# Patient Record
Sex: FEMALE | Race: WHITE | NOT HISPANIC OR LATINO | Employment: OTHER | URBAN - METROPOLITAN AREA
[De-identification: names, ages, dates, MRNs, and addresses within clinical notes are randomized per-mention and may not be internally consistent; named-entity substitution may affect disease eponyms.]

---

## 2017-02-09 ENCOUNTER — OFFICE VISIT (OUTPATIENT)
Dept: MEDICAL GROUP | Facility: CLINIC | Age: 52
End: 2017-02-09
Payer: MEDICARE

## 2017-02-09 VITALS
OXYGEN SATURATION: 97 % | WEIGHT: 159.8 LBS | HEIGHT: 66 IN | SYSTOLIC BLOOD PRESSURE: 110 MMHG | RESPIRATION RATE: 14 BRPM | TEMPERATURE: 97.8 F | DIASTOLIC BLOOD PRESSURE: 70 MMHG | HEART RATE: 75 BPM | BODY MASS INDEX: 25.68 KG/M2

## 2017-02-09 DIAGNOSIS — F31.30 BIPOLAR AFFECTIVE DISORDER, CURRENT EPISODE DEPRESSED, CURRENT EPISODE SEVERITY UNSPECIFIED (HCC): ICD-10-CM

## 2017-02-09 DIAGNOSIS — G47.01 INSOMNIA DUE TO MEDICAL CONDITION: ICD-10-CM

## 2017-02-09 DIAGNOSIS — K58.0 IRRITABLE BOWEL SYNDROME WITH DIARRHEA: ICD-10-CM

## 2017-02-09 DIAGNOSIS — G25.0 ESSENTIAL TREMOR: ICD-10-CM

## 2017-02-09 DIAGNOSIS — R30.0 DYSURIA: ICD-10-CM

## 2017-02-09 DIAGNOSIS — G43.809 OTHER MIGRAINE WITHOUT STATUS MIGRAINOSUS, NOT INTRACTABLE: ICD-10-CM

## 2017-02-09 DIAGNOSIS — E53.9 VITAMIN B DEFICIENCY: ICD-10-CM

## 2017-02-09 DIAGNOSIS — J44.89 COPD (CHRONIC OBSTRUCTIVE PULMONARY DISEASE) WITH CHRONIC BRONCHITIS: ICD-10-CM

## 2017-02-09 DIAGNOSIS — M79.7 FIBROMYALGIA: ICD-10-CM

## 2017-02-09 PROBLEM — E53.8 VITAMIN B12 DEFICIENCY: Status: ACTIVE | Noted: 2017-02-09

## 2017-02-09 LAB
APPEARANCE UR: CLEAR
BILIRUB UR STRIP-MCNC: NEGATIVE MG/DL
COLOR UR AUTO: NORMAL
GLUCOSE UR STRIP.AUTO-MCNC: NEGATIVE MG/DL
KETONES UR STRIP.AUTO-MCNC: NEGATIVE MG/DL
LEUKOCYTE ESTERASE UR QL STRIP.AUTO: NEGATIVE
NITRITE UR QL STRIP.AUTO: NEGATIVE
PH UR STRIP.AUTO: 6 [PH] (ref 5–8)
PROT UR QL STRIP: 300 MG/DL
RBC UR QL AUTO: NEGATIVE
SP GR UR STRIP.AUTO: NEGATIVE
UROBILINOGEN UR STRIP-MCNC: NEGATIVE MG/DL

## 2017-02-09 PROCEDURE — 81002 URINALYSIS NONAUTO W/O SCOPE: CPT | Performed by: PHYSICIAN ASSISTANT

## 2017-02-09 PROCEDURE — G8484 FLU IMMUNIZE NO ADMIN: HCPCS | Performed by: PHYSICIAN ASSISTANT

## 2017-02-09 PROCEDURE — 3014F SCREEN MAMMO DOC REV: CPT | Mod: 8P | Performed by: PHYSICIAN ASSISTANT

## 2017-02-09 PROCEDURE — 3017F COLORECTAL CA SCREEN DOC REV: CPT | Mod: 8P | Performed by: PHYSICIAN ASSISTANT

## 2017-02-09 PROCEDURE — G8419 CALC BMI OUT NRM PARAM NOF/U: HCPCS | Performed by: PHYSICIAN ASSISTANT

## 2017-02-09 PROCEDURE — 4004F PT TOBACCO SCREEN RCVD TLK: CPT | Performed by: PHYSICIAN ASSISTANT

## 2017-02-09 PROCEDURE — 99215 OFFICE O/P EST HI 40 MIN: CPT | Mod: 25 | Performed by: PHYSICIAN ASSISTANT

## 2017-02-09 RX ORDER — PRIMIDONE 50 MG/1
50 TABLET ORAL DAILY
Qty: 90 TAB | Refills: 3 | Status: SHIPPED | OUTPATIENT
Start: 2017-02-09 | End: 2019-07-16

## 2017-02-09 RX ORDER — LANOLIN ALCOHOL/MO/W.PET/CERES
1000 CREAM (GRAM) TOPICAL 2 TIMES DAILY
Qty: 60 TAB | Refills: 11 | Status: SHIPPED | OUTPATIENT
Start: 2017-02-09 | End: 2021-12-28

## 2017-02-09 RX ORDER — MELOXICAM 15 MG/1
15 TABLET ORAL DAILY
Qty: 30 TAB | Refills: 11 | Status: SHIPPED | OUTPATIENT
Start: 2017-02-09 | End: 2017-12-15 | Stop reason: SDUPTHER

## 2017-02-09 RX ORDER — ALBUTEROL SULFATE 90 UG/1
2 AEROSOL, METERED RESPIRATORY (INHALATION) EVERY 6 HOURS PRN
Qty: 8.5 G | Refills: 11 | Status: SHIPPED | OUTPATIENT
Start: 2017-02-09 | End: 2017-12-01

## 2017-02-09 RX ORDER — ONDANSETRON 4 MG/1
4 TABLET, ORALLY DISINTEGRATING ORAL EVERY 8 HOURS PRN
Qty: 30 TAB | Refills: 3 | Status: SHIPPED | OUTPATIENT
Start: 2017-02-09 | End: 2018-03-19 | Stop reason: SDUPTHER

## 2017-02-09 RX ORDER — GABAPENTIN 600 MG/1
TABLET ORAL
Qty: 90 TAB | Refills: 11 | Status: SHIPPED | OUTPATIENT
Start: 2017-02-09 | End: 2018-03-11 | Stop reason: SDUPTHER

## 2017-02-09 RX ORDER — BACLOFEN 10 MG/1
10 TABLET ORAL 3 TIMES DAILY
COMMUNITY
End: 2017-02-09 | Stop reason: SDUPTHER

## 2017-02-09 RX ORDER — MELOXICAM 7.5 MG/1
15 TABLET ORAL DAILY
COMMUNITY
End: 2017-12-01

## 2017-02-09 RX ORDER — TOPIRAMATE 50 MG/1
TABLET, FILM COATED ORAL
Qty: 90 TAB | Refills: 11 | Status: SHIPPED | OUTPATIENT
Start: 2017-02-09 | End: 2018-01-29 | Stop reason: SDUPTHER

## 2017-02-09 RX ORDER — BACLOFEN 10 MG/1
10 TABLET ORAL 3 TIMES DAILY
Qty: 90 TAB | Refills: 3 | Status: SHIPPED | OUTPATIENT
Start: 2017-02-09 | End: 2018-07-12 | Stop reason: SDUPTHER

## 2017-02-09 RX ORDER — FOLIC ACID 1 MG/1
1 TABLET ORAL DAILY
Qty: 30 TAB | Refills: 11 | Status: SHIPPED | OUTPATIENT
Start: 2017-02-09 | End: 2018-07-12 | Stop reason: SDUPTHER

## 2017-02-09 RX ORDER — MIRTAZAPINE 30 MG/1
30 TABLET, FILM COATED ORAL
Qty: 30 TAB | Refills: 11 | Status: SHIPPED | OUTPATIENT
Start: 2017-02-09 | End: 2018-03-11 | Stop reason: SDUPTHER

## 2017-02-09 ASSESSMENT — PATIENT HEALTH QUESTIONNAIRE - PHQ9
SUM OF ALL RESPONSES TO PHQ QUESTIONS 1-9: 19
CLINICAL INTERPRETATION OF PHQ2 SCORE: 6
5. POOR APPETITE OR OVEREATING: 0 - NOT AT ALL

## 2017-02-09 NOTE — ASSESSMENT & PLAN NOTE
States she is chronically low on vitamin B12 B9 in the one most possibly due to chronic diarrhea. Currently takes cyanocobalamin 1000  mcg bid, folate acid 1 mg daily and B complex daily. Recent labs at [place] showed normal vitamin B12 level per pt.

## 2017-02-09 NOTE — ASSESSMENT & PLAN NOTE
First symtoms strated in 2002, on disability since 2010. Takes Savella, gabapentin, topiramate everyday. States without medication she can't function at all and she will stay all day in bed.
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
## 2017-02-09 NOTE — MR AVS SNAPSHOT
"        Jaclyn Peter Roberto   2017 2:45 PM   Office Visit   MRN: [id number]    Department:  [facility]   Dept Phone:  [phone]    Description:  Female : 1965   Provider:  Selene Gray PA-C           Reason for Visit     Establish Care Med Refill      Allergies as of 2017     Allergen Noted Reactions    Erythromycin 09/15/2016       Tetracycline 09/15/2016         You were diagnosed with     COPD (chronic obstructive pulmonary disease) with chronic bronchitis (CMS-HCC)   [256211]       H/O hysterectomy with oophorectomy   [417189]       Essential tremor   [239511]       Irritable bowel syndrome with diarrhea   [940591]       Other migraine without status migrainosus, not intractable   [5517549]       Fibromyalgia   [365263]       Insomnia due to medical condition   [386179]       Bipolar affective disorder, current episode depressed, current episode severity unspecified (CMS-HCC)   [9922818]       Vitamin B deficiency   [763652]       Dysuria   [788.1.ICD-9-CM]         Vital Signs     Blood Pressure Pulse Temperature Respirations Height Weight    110/70 mmHg 75 36.6 °C (97.8 °F) 14 1.664 m (5' 5.51\") 72.485 kg (159 lb 12.8 oz)    Body Mass Index Oxygen Saturation Breastfeeding? Smoking Status          26.18 kg/m2 97% No Current Every Day Smoker        Basic Information     Date Of Birth Sex Race Ethnicity Preferred Language    1965 Female White Non- English      Your appointments     2017 11:05 AM   Established Patient with Selene Gray PA-C   [facility] ([facility])    [address] [place] [address]   [phone]           You will be receiving a confirmation call a few days before your appointment from our automated call confirmation system.              Problem List              ICD-10-CM Priority Class Noted - Resolved    Fibromyalgia M79.7   9/15/2016 - Present    Migraine without status migrainosus, not " intractable G43.909   9/15/2016 - Present    Acute vaginitis N76.0   9/15/2016 - Present    COPD (chronic obstructive pulmonary disease) with chronic bronchitis (CMS-HCC) J44.9   2/9/2017 - Present    H/O hysterectomy with oophorectomy Z90.710, Z90.721   2/9/2017 - Present    Essential tremor G25.0   2/9/2017 - Present    Irritable bowel syndrome with diarrhea K58.0   2/9/2017 - Present    Insomnia due to medical condition G47.01   2/9/2017 - Present    Bipolar affective disorder, current episode depressed (CMS-HCC) F31.30   2/9/2017 - Present    Vitamin B deficiency E53.9   2/9/2017 - Present      Health Maintenance        Date Due Completion Dates    IMM DTaP/Tdap/Td Vaccine (1 - Tdap) 1/3/1985 ---    MAMMOGRAM 1/3/2006 ---    COLONOSCOPY 1/3/2016 ---    IMM INFLUENZA (1) 9/22/2017 (Originally 9/1/2016) ---            Results     POCT Urinalysis      Component Value Standard Range & Units    POC Color Livier Negative    POC Appearance Clear Negative    POC Leukocyte Esterase Negative Negative    POC Nitrites Negative Negative    POC Urobiligen Negative Negative (0.2) mg/dL    POC Protein 300 Negative mg/dL    POC Urine PH 6.0 5.0 - 8.0    POC Blood Negative Negative    POC Specific Gravity Negative <1.005 - >1.030    POC Ketones Negative Negative mg/dL    POC Biliruben Negative Negative mg/dL    POC Glucose Negative Negative mg/dL                        Current Immunizations     No immunizations on file.      Below and/or attached are the medications your provider expects you to take. Review all of your home medications and newly ordered medications with your provider and/or pharmacist. Follow medication instructions as directed by your provider and/or pharmacist. Please keep your medication list with you and share with your provider. Update the information when medications are discontinued, doses are changed, or new medications (including over-the-counter products) are added; and carry medication information at  all times in the event of emergency situations     Allergies:  ERYTHROMYCIN - (reactions not documented)     TETRACYCLINE - (reactions not documented)               Medications  Valid as of: February 09, 2017 -  5:01 PM    Generic Name Brand Name Tablet Size Instructions for use    Albuterol Sulfate (Aero Soln) albuterol 108 (90 BASE) MCG/ACT Inhale 2 Puffs by mouth every 6 hours as needed for Shortness of Breath.        Albuterol Sulfate (Aero Soln) albuterol 108 (90 BASE) MCG/ACT Inhale 2 Puffs by mouth every 6 hours as needed for Shortness of Breath.        B Complex Vitamins   Take  by mouth.        Baclofen (Tab) LIORESAL 10 MG Take 1 Tab by mouth 3 times a day.        Beclomethasone Dipropionate (Aero Soln) QVAR 40 MCG/ACT Inhale 1 Puff by mouth 2 Times a Day.        Bisacodyl (Tablet Delayed Response) DULCOLAX 5 MG Take 5 mg by mouth 1 time daily as needed for Constipation.        Clindamycin Phosphate (Cream) CLEOCIN 2 % 1 applic IV x 7d        Cyanocobalamin (Tab) vitamin B-12 1000 MCG Take 1 Tab by mouth 2 Times a Day.        Fluconazole (Tab) DIFLUCAN 150 MG Take 1 Tab by mouth every day.        Folic Acid (Tab) FOLVITE 1 MG Take 1 Tab by mouth every day.        Gabapentin (Tab) NEURONTIN 600 MG 1 TAB 3 TIMES A DAY        HydrOXYzine HCl (Tab) ATARAX 25 MG Take 25 mg by mouth 3 times a day as needed for Itching.        Meloxicam (Tab) MOBIC 7.5 MG Take 15 mg by mouth every day.        Meloxicam (Tab) MOBIC 15 MG Take 1 Tab by mouth every day.        MetroNIDAZOLE (Tab) FLAGYL 500 MG 1 TAB TWICE A DAY X 7 DAYS. NO ALCOHOL USE WITH THIS.        Milnacipran HCl (Tab) Milnacipran HCl 100 MG Take 1 Tab by mouth 2 Times a Day.        Mirtazapine (TABLET DISPERSIBLE) REMERON 30 MG Take 1 Tab by mouth every bedtime.        Ondansetron (TABLET DISPERSIBLE) ZOFRAN ODT 4 MG Take 1 Tab by mouth every 8 hours as needed for Nausea/Vomiting.        Ondansetron HCl (Tab) ZOFRAN 4 MG Take 1 Tab by mouth every four hours  as needed for Nausea/Vomiting.        Primidone (Tab) MYSOLINE 50 MG Take 1 Tab by mouth every day.        Topiramate (Tab) TOPAMAX 50 MG 1 TAB 3 TIMES A DAY FOR MIGRAINE PREVENTION.        .                 Medicines prescribed today were sent to:     [facility][id number] - [place] - [address] [place] [address]    Phone: [phone] Fax: [phone]    Open 24 Hours?: No      Medication refill instructions:       If your prescription bottle indicates you have medication refills left, it is not necessary to call your provider’s office. Please contact your pharmacy and they will refill your medication.    If your prescription bottle indicates you do not have any refills left, you may request refills at any time through one of the following ways: The online [employer] system (except Urgent Care), by calling your provider’s office, or by asking your pharmacy to contact your provider’s office with a refill request. Medication refills are processed only during regular business hours and may not be available until the next business day. Your provider may request additional information or to have a follow-up visit with you prior to refilling your medication.   *Please Note: Medication refills are assigned a new Rx number when refilled electronically. Your pharmacy may indicate that no refills were authorized even though a new prescription for the same medication is available at the pharmacy. Please request the medicine by name with the pharmacy before contacting your provider for a refill.        Referral     A referral request has been sent to our patient care coordination department. Please allow 3-5 business days for us to process this request and contact you either by phone or mail. If you do not hear from us by the 5th business day, please call us at [phone].           [employer] Access Code: [email or web address]  Expires: 2/20/2017  9:34 AM    [employer]  A secure, online tool to manage your health  information     [employer] Like[employer]fm® is a secure, online tool that connects you to your personalized health information from the privacy of your home -- day or night - making it very easy for you to manage your healthcare. Once the activation process is completed, you can even access your medical information using the Like.fm robin, which is available for free in the [employer] Robin store or Google Play store.     Like.fm provides the following levels of access (as shown below):   My Chart Features   [facility] Doctor [facility]  Specialists [facility]  Urgent  Care Non-[facility]  Primary Care  Doctor   Email your healthcare team securely and privately 24/7 X X X    Manage appointments: schedule your next appointment; view details of past/upcoming appointments X      Request prescription refills. X      View recent personal medical records, including lab and immunizations X X X X   View health record, including health history, allergies, medications X X X X   Read reports about your outpatient visits, procedures, consult and ER notes X X X X   See your discharge summary, which is a recap of your hospital and/or ER visit that includes your diagnosis, lab results, and care plan. X X       How to register for Like[employer]fm:  1. Go to  [email or web address].  2. Click on the Sign Up Now box, which takes you to the New Member Sign Up page. You will need to provide the following information:  a. Enter your Like[employer]fm Access Code exactly as it appears at the top of this page. (You will not need to use this code after you’ve completed the sign-up process. If you do not sign up before the expiration date, you must request a new code.)   b. Enter your date of birth.   c. Enter your home email address.   d. Click Submit, and follow the next screen’s instructions.  3. Create a Like.fm ID. This will be your Like[employer]fm login ID and cannot be changed, so think of one that is secure and easy to remember.  4. Create a Like[employer]fm password. You can  change your password at any time.  5. Enter your Password Reset Question and Answer. This can be used at a later time if you forget your password.   6. Enter your e-mail address. This allows you to receive e-mail notifications when new information is available in [employer].  7. Click Sign Up. You can now view your health information.    For assistance activating your [employer] account, call [phone]

## 2017-02-09 NOTE — ASSESSMENT & PLAN NOTE
She is on topiramate for fibromyalgia on Remeron for sleep. Didn't mention any other medicines for bipolar disorder.

## 2017-02-09 NOTE — ASSESSMENT & PLAN NOTE
States she has fibromyalgia for many years and eventually she started having tremors which were diagnosed as essential tremors by her neurologist in [place]. Currently on primidone 50 mg qd.

## 2017-02-09 NOTE — ASSESSMENT & PLAN NOTE
Patient is on albuterol and Qvar, however has not been taking medicine as prescribed and therfore cough and phlegm has increased. Patient continues to smoke daily one pack a day.

## 2017-02-09 NOTE — ASSESSMENT & PLAN NOTE
Has diarrhea and loose stooland takes Bentyl as needed for diarrhea. Also takes Zofran as needed for nausea. Denies vomiting or hematemesis
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
## 2017-02-16 ENCOUNTER — APPOINTMENT (OUTPATIENT)
Dept: MEDICAL GROUP | Facility: CLINIC | Age: 52
End: 2017-02-16
Payer: MEDICARE

## 2017-05-02 ENCOUNTER — APPOINTMENT (OUTPATIENT)
Dept: NEUROLOGY | Facility: MEDICAL CENTER | Age: 52
End: 2017-05-02
Payer: MEDICARE

## 2017-05-05 ENCOUNTER — OFFICE VISIT (OUTPATIENT)
Dept: MEDICAL GROUP | Facility: MEDICAL CENTER | Age: 52
End: 2017-05-05
Payer: MEDICARE

## 2017-05-05 VITALS
HEART RATE: 77 BPM | SYSTOLIC BLOOD PRESSURE: 114 MMHG | WEIGHT: 159 LBS | TEMPERATURE: 98.1 F | DIASTOLIC BLOOD PRESSURE: 78 MMHG | BODY MASS INDEX: 25.55 KG/M2 | HEIGHT: 66 IN | OXYGEN SATURATION: 97 % | RESPIRATION RATE: 20 BRPM

## 2017-05-05 DIAGNOSIS — R06.00 PND (PAROXYSMAL NOCTURNAL DYSPNEA): ICD-10-CM

## 2017-05-05 DIAGNOSIS — R05.9 COUGH: ICD-10-CM

## 2017-05-05 DIAGNOSIS — J40 BRONCHITIS: ICD-10-CM

## 2017-05-05 DIAGNOSIS — M79.7 FIBROMYALGIA: ICD-10-CM

## 2017-05-05 DIAGNOSIS — J02.9 SORE THROAT: ICD-10-CM

## 2017-05-05 LAB
FLUAV+FLUBV AG SPEC QL IA: NEGATIVE
INT CON NEG: NEGATIVE
INT CON NEG: NEGATIVE
INT CON POS: POSITIVE
INT CON POS: POSITIVE
S PYO AG THROAT QL: NEGATIVE

## 2017-05-05 PROCEDURE — 99214 OFFICE O/P EST MOD 30 MIN: CPT | Performed by: PHYSICIAN ASSISTANT

## 2017-05-05 PROCEDURE — 3014F SCREEN MAMMO DOC REV: CPT | Mod: 8P | Performed by: PHYSICIAN ASSISTANT

## 2017-05-05 PROCEDURE — G8432 DEP SCR NOT DOC, RNG: HCPCS | Performed by: PHYSICIAN ASSISTANT

## 2017-05-05 PROCEDURE — 3017F COLORECTAL CA SCREEN DOC REV: CPT | Mod: 8P | Performed by: PHYSICIAN ASSISTANT

## 2017-05-05 PROCEDURE — G8419 CALC BMI OUT NRM PARAM NOF/U: HCPCS | Performed by: PHYSICIAN ASSISTANT

## 2017-05-05 PROCEDURE — 87880 STREP A ASSAY W/OPTIC: CPT | Performed by: PHYSICIAN ASSISTANT

## 2017-05-05 PROCEDURE — 87804 INFLUENZA ASSAY W/OPTIC: CPT | Performed by: PHYSICIAN ASSISTANT

## 2017-05-05 PROCEDURE — 4004F PT TOBACCO SCREEN RCVD TLK: CPT | Performed by: PHYSICIAN ASSISTANT

## 2017-05-05 RX ORDER — AZITHROMYCIN 250 MG/1
TABLET, FILM COATED ORAL
Qty: 6 TAB | Refills: 0 | Status: SHIPPED | OUTPATIENT
Start: 2017-05-05 | End: 2017-05-10

## 2017-05-05 NOTE — PROGRESS NOTES
"Chief Complaint   Patient presents with   • Cough     chest congestion, sore throat with productive cough and sinus drainage x 1 wk.       HPIChristine Brittani Mejia is a 52 y.o. female here today for productive cough X sinus drainage X 1 wk. symptoms of started w throat dryness and itchiness and pain, no fevers or chills. Then she started having sinus pressure and productive cough, no nasal discharge which is green. States she is very congested. No new worsening SOB.  Pt has COPD, Currently on Qvar and albuterol current smoker 1 pack a  Day, No fevers or chills, no  Nv, pos bodyaches. Positive sick contact with mom who also has COPD.   No otalgia, positive pressure over her ears      Past medical, surgical, family, and social history is reviewed in Epic chart by me today.   Medications and allergies reviewed and updated in Epic chart by me today.     ROS:   As documented in history of present illness above    Exam:  Blood pressure 114/78, pulse 77, temperature 36.7 °C (98.1 °F), resp. rate 20, height 1.664 m (5' 5.51\"), weight 72.122 kg (159 lb), SpO2 97 %.  Constitutional: Alert, no distress, plus 3 vital signs  Skin:  Warm, dry, no rashes invisible areas  Eye: Equal, round and reactive, conjunctiva clear  ENT:Neck: Trachea midline, no masses, no thyromegaly, erythematous oropharynx with cobblestoning and PND tonsils nonenlarged without white exudates. No supraclavicular or so to call lymphadenopathy. No tenderness over sinuses.   Ears:  External ears unremarkable. TMs pearly gray, clear and intact, w/o any perforation or effusion.  Respiratory: Unlabored respiratory effort, lungs clear to auscultation, no wheezes, no rhonchi  Cardiovascular: RRR, no murmur, no lower extremities edema, pedal pulses equal bilaterally and 2+/4   Abdomen: Soft, nontender, no masses or hepatosplenomegaly  Psych: Alert, pleasant, well-groomed, normal affect    Negative for flu and strep    A/P:    1. Bronchitis  Discussed continue taking " albuterol and Qvar. Discussed stop smoking.  Robitussin for cough,  loratadine Sudafed, fluid intake, rest, humidified      - azithromycin (ZITHROMAX) 250 MG Tab; Take 2 pills first day, then 1 pill daily days 2-5, po.  Dispense: 6 Tab; Refill: 0  - POCT Influenza A/B  - POCT Rapid Strep A    2. PND (paroxysmal nocturnal dyspnea)    - loratadine-pseudoephedrine (CLARITIN-D 24-HOUR)  MG per tablet; Take 1 Tab by mouth every day.  Dispense: 30 Tab; Refill: 0  - POCT Influenza A/B  - POCT Rapid Strep A    3. Cough    - POCT Influenza A/B  - POCT Rapid Strep A    5. Sore throat    - POCT Influenza A/B  - POCT Rapid Strep A  Follow-up if symptoms does not improve or resolve in 7-10 days, sooner if worsening.

## 2017-05-05 NOTE — MR AVS SNAPSHOT
"        Jaclyn Peter Roberto   2017 11:00 AM   Office Visit   MRN: [id number]    Department:  [facility]   Dept Phone:  [phone]    Description:  Female : 1965   Provider:  Selene Gray PA-C           Reason for Visit     Cough chest congestion, sore throat with productive cough and sinus drainage x 1 wk.      Allergies as of 2017     Allergen Noted Reactions    Erythromycin 09/15/2016       Tetracycline 09/15/2016         You were diagnosed with     Bronchitis   [575315]       PND (paroxysmal nocturnal dyspnea)   [999180]       Fibromyalgia   [803835]         Vital Signs     Blood Pressure Pulse Temperature Respirations Height Weight    114/78 mmHg 77 36.7 °C (98.1 °F) 20 1.664 m (5' 5.51\") 72.122 kg (159 lb)    Body Mass Index Oxygen Saturation Smoking Status             26.05 kg/m2 97% Current Every Day Smoker         Basic Information     Date Of Birth Sex Race Ethnicity Preferred Language    1965 Female White Non- English      Problem List              ICD-10-CM Priority Class Noted - Resolved    Fibromyalgia M79.7   9/15/2016 - Present    Migraine without status migrainosus, not intractable G43.909   9/15/2016 - Present    Acute vaginitis N76.0   9/15/2016 - Present    COPD (chronic obstructive pulmonary disease) with chronic bronchitis (CMS-HCC) J44.9   2017 - Present    H/O hysterectomy with oophorectomy Z90.710, Z90.721   2017 - Present    Essential tremor G25.0   2017 - Present    Irritable bowel syndrome with diarrhea K58.0   2017 - Present    Insomnia due to medical condition G47.01   2017 - Present    Bipolar affective disorder, current episode depressed (CMS-HCC) F31.30   2017 - Present    Vitamin B deficiency E53.9   2017 - Present      Health Maintenance        Date Due Completion Dates    IMM DTaP/Tdap/Td Vaccine (1 - Tdap) 1/3/1985 ---    MAMMOGRAM 1/3/2006 ---    COLONOSCOPY 1/3/2016 ---            Current Immunizations     No " immunizations on file.      Below and/or attached are the medications your provider expects you to take. Review all of your home medications and newly ordered medications with your provider and/or pharmacist. Follow medication instructions as directed by your provider and/or pharmacist. Please keep your medication list with you and share with your provider. Update the information when medications are discontinued, doses are changed, or new medications (including over-the-counter products) are added; and carry medication information at all times in the event of emergency situations     Allergies:  ERYTHROMYCIN - (reactions not documented)     TETRACYCLINE - (reactions not documented)               Medications  Valid as of: May 05, 2017 - 11:50 AM    Generic Name Brand Name Tablet Size Instructions for use    Albuterol Sulfate (Aero Soln) albuterol 108 (90 BASE) MCG/ACT Inhale 2 Puffs by mouth every 6 hours as needed for Shortness of Breath.        Albuterol Sulfate (Aero Soln) albuterol 108 (90 BASE) MCG/ACT Inhale 2 Puffs by mouth every 6 hours as needed for Shortness of Breath.        Azithromycin (Tab) ZITHROMAX 250 MG Take 2 pills first day, then 1 pill daily days 2-5, po.        B Complex Vitamins   Take  by mouth.        Baclofen (Tab) LIORESAL 10 MG Take 1 Tab by mouth 3 times a day.        Beclomethasone Dipropionate (Aero Soln) QVAR 40 MCG/ACT Inhale 1 Puff by mouth 2 Times a Day.        Bisacodyl (Tablet Delayed Response) DULCOLAX 5 MG Take 5 mg by mouth 1 time daily as needed for Constipation.        Clindamycin Phosphate (Cream) CLEOCIN 2 % 1 applic IV x 7d        Cyanocobalamin (Tab) vitamin B-12 1000 MCG Take 1 Tab by mouth 2 Times a Day.        Fluconazole (Tab) DIFLUCAN 150 MG Take 1 Tab by mouth every day.        Folic Acid (Tab) FOLVITE 1 MG Take 1 Tab by mouth every day.        Gabapentin (Tab) NEURONTIN 600 MG 1 TAB 3 TIMES A DAY        HydrOXYzine HCl (Tab) ATARAX 25 MG Take 25 mg by mouth 3  times a day as needed for Itching.        Loratadine-Pseudoephedrine (TABLET SR 24 HR) CLARITIN-D 24-hour  MG Take 1 Tab by mouth every day.        Meloxicam (Tab) MOBIC 7.5 MG Take 15 mg by mouth every day.        Meloxicam (Tab) MOBIC 15 MG Take 1 Tab by mouth every day.        MetroNIDAZOLE (Tab) FLAGYL 500 MG 1 TAB TWICE A DAY X 7 DAYS. NO ALCOHOL USE WITH THIS.        Milnacipran HCl (Tab) Milnacipran HCl 100 MG Take 1 Tab by mouth 2 Times a Day.        Mirtazapine (TABLET DISPERSIBLE) REMERON 30 MG Take 1 Tab by mouth every bedtime.        Ondansetron (TABLET DISPERSIBLE) ZOFRAN ODT 4 MG Take 1 Tab by mouth every 8 hours as needed for Nausea/Vomiting.        Ondansetron HCl (Tab) ZOFRAN 4 MG Take 1 Tab by mouth every four hours as needed for Nausea/Vomiting.        Primidone (Tab) MYSOLINE 50 MG Take 1 Tab by mouth every day.        Topiramate (Tab) TOPAMAX 50 MG 1 TAB 3 TIMES A DAY FOR MIGRAINE PREVENTION.        .                 Medicines prescribed today were sent to:     [id number] - [place] - [address] [place] [address]    Phone: [phone] Fax: [phone]    Open 24 Hours?: No      Medication refill instructions:       If your prescription bottle indicates you have medication refills left, it is not necessary to call your provider’s office. Please contact your pharmacy and they will refill your medication.    If your prescription bottle indicates you do not have any refills left, you may request refills at any time through one of the following ways: The online Personalis system (except Urgent Care), by calling your provider’s office, or by asking your pharmacy to contact your provider’s office with a refill request. Medication refills are processed only during regular business hours and may not be available until the next business day. Your provider may request additional information or to have a follow-up visit with you prior to refilling your medication.   *Please Note:  Medication refills are assigned a new Rx number when refilled electronically. Your pharmacy may indicate that no refills were authorized even though a new prescription for the same medication is available at the pharmacy. Please request the medicine by name with the pharmacy before contacting your provider for a refill.           Waicai Access Code: [email or web address]  Expires: 5/18/2017 12:55 PM    Waicai  A secure, online tool to manage your health information     [employer]’s Waicai® is a secure, online tool that connects you to your personalized health information from the privacy of your home -- day or night - making it very easy for you to manage your healthcare. Once the activation process is completed, you can even access your medical information using the Waicai robin, which is available for free in the Apple Robin store or Google Play store.     Waicai provides the following levels of access (as shown below):   My Chart Features   [facility] Primary Care Doctor [facility]  Specialists [facility]  Urgent  Care Non-[facility]  Primary Care  Doctor   Email your healthcare team securely and privately 24/7 X X X    Manage appointments: schedule your next appointment; view details of past/upcoming appointments X      Request prescription refills. X      View recent personal medical records, including lab and immunizations X X X X   View health record, including health history, allergies, medications X X X X   Read reports about your outpatient visits, procedures, consult and ER notes X X X X   See your discharge summary, which is a recap of your hospital and/or ER visit that includes your diagnosis, lab results, and care plan. X X       How to register for Waicai:  1. Go to  [email or web address].  2. Click on the Sign Up Now box, which takes you to the New Member Sign Up page. You will need to provide the following information:  a. Enter your Waicai Access Code exactly as it appears at the top of this page. (You will  not need to use this code after you’ve completed the sign-up process. If you do not sign up before the expiration date, you must request a new code.)   b. Enter your date of birth.   c. Enter your home email address.   d. Click Submit, and follow the next screen’s instructions.  3. Create a SocialDefender ID. This will be your SocialDefender login ID and cannot be changed, so think of one that is secure and easy to remember.  4. Create a SocialDefender password. You can change your password at any time.  5. Enter your Password Reset Question and Answer. This can be used at a later time if you forget your password.   6. Enter your e-mail address. This allows you to receive e-mail notifications when new information is available in SocialDefender.  7. Click Sign Up. You can now view your health information.    For assistance activating your SocialDefender account, call [phone]        Quit Tobacco Information     Do you want to quit using tobacco?    Quitting tobacco decreases risks of cancer, heart and lung disease, increases life expectancy, improves sense of taste and smell, and increases spending money, among other benefits.    If you are thinking about quitting, we can help.  • [facility] Quit Tobacco Program: 210.926.7414  o Program occurs weekly for four weeks and includes pharmacist consultation on products to support quitting smoking or chewing tobacco. A provider referral is needed for pharmacist consultation.  • Tobacco Users Help Hotline: 8-565-QUIT-NOW (282-6956) or https://[place].quitlogix.org/  o Free, confidential telephone and online coaching for [place] residents. Sessions are designed on a schedule that is convenient for you. Eligible clients receive free nicotine replacement therapy.  • Nationally: www.smokefree.gov  o Information and professional assistance to support both immediate and long-term needs as you become, and remain, a non-smoker. Smokefree.gov allows you to choose the help that best fits your needs.

## 2017-11-13 ENCOUNTER — TELEPHONE (OUTPATIENT)
Dept: MEDICAL GROUP | Facility: PHYSICIAN GROUP | Age: 52
End: 2017-11-13

## 2017-11-13 NOTE — TELEPHONE ENCOUNTER
ESTABLISHED PATIENT PRE-VISIT PLANNING     Note: Patient will not be contacted if there is no indication to call.     1.  Reviewed notes from the last few office visits within the medical group: Yes    2.  If any orders were placed at last visit or intended to be done for this visit (i.e. 6 mos follow-up), do we have Results/Consult Notes?        •  Labs - Labs were not ordered at last office visit.   Note: If patient appointment is for lab review and patient did not complete labs, check with provider if OK to reschedule patient until labs completed.       •  Imaging - Imaging was not ordered at last office visit.       •  Referrals - No referrals were ordered at last office visit.    3. Is this appointment scheduled as a Hospital Follow-Up? No    4.  Immunizations were updated in Epic using WebIZ?: No WebIZ record       •  Web Iz Recommendations: UNKNOWN    5.  Patient is due for the following Health Maintenance Topics:   Health Maintenance Due   Topic Date Due   • Annual Wellness Visit  1965   • PFT SCREENING-FEV1 AND FEV/FVC RATIO / SPIROMETRY SHOULD BE PERFORMED ANNUALLY  01/03/1984   • IMM DTaP/Tdap/Td Vaccine (1 - Tdap) 01/03/1985   • IMM PNEUMOCOCCAL 19-64 (ADULT) MEDIUM RISK SERIES (1 of 1 - PPSV23) 01/03/1985   • MAMMOGRAM  01/03/2006   • COLONOSCOPY  01/03/2016   • IMM INFLUENZA (1) 09/01/2017       - Patient already has appointment scheduled for Immunizations: FLU, PNEUMOVAX (PPSV23) and TDAP.Ptlease ask pt about vaccinations at appointment      6.  Patient was NOT informed to arrive 15 min prior to their scheduled appointment and bring in their medication bottles.

## 2017-11-14 ENCOUNTER — APPOINTMENT (OUTPATIENT)
Dept: MEDICAL GROUP | Facility: PHYSICIAN GROUP | Age: 52
End: 2017-11-14
Payer: MEDICARE

## 2017-11-17 ENCOUNTER — APPOINTMENT (OUTPATIENT)
Dept: MEDICAL GROUP | Facility: PHYSICIAN GROUP | Age: 52
End: 2017-11-17
Payer: MEDICARE

## 2017-12-01 ENCOUNTER — OFFICE VISIT (OUTPATIENT)
Dept: MEDICAL GROUP | Facility: PHYSICIAN GROUP | Age: 52
End: 2017-12-01
Payer: MEDICARE

## 2017-12-01 ENCOUNTER — HOSPITAL ENCOUNTER (OUTPATIENT)
Facility: MEDICAL CENTER | Age: 52
End: 2017-12-01
Attending: NURSE PRACTITIONER
Payer: MEDICARE

## 2017-12-01 VITALS
OXYGEN SATURATION: 96 % | TEMPERATURE: 97.5 F | HEIGHT: 67 IN | BODY MASS INDEX: 27.47 KG/M2 | DIASTOLIC BLOOD PRESSURE: 72 MMHG | HEART RATE: 77 BPM | WEIGHT: 175 LBS | SYSTOLIC BLOOD PRESSURE: 118 MMHG | RESPIRATION RATE: 16 BRPM

## 2017-12-01 DIAGNOSIS — E53.9 VITAMIN B DEFICIENCY: ICD-10-CM

## 2017-12-01 DIAGNOSIS — G47.01 INSOMNIA DUE TO MEDICAL CONDITION: ICD-10-CM

## 2017-12-01 DIAGNOSIS — R53.83 FATIGUE, UNSPECIFIED TYPE: ICD-10-CM

## 2017-12-01 DIAGNOSIS — Z13.220 SCREENING FOR HYPERLIPIDEMIA: ICD-10-CM

## 2017-12-01 DIAGNOSIS — R39.89 URINARY PROBLEM: ICD-10-CM

## 2017-12-01 DIAGNOSIS — M79.7 FIBROMYALGIA: ICD-10-CM

## 2017-12-01 DIAGNOSIS — N89.8 VAGINAL DISCHARGE: ICD-10-CM

## 2017-12-01 DIAGNOSIS — D22.9 CHANGING NEVUS: ICD-10-CM

## 2017-12-01 DIAGNOSIS — G43.809 OTHER MIGRAINE WITHOUT STATUS MIGRAINOSUS, NOT INTRACTABLE: ICD-10-CM

## 2017-12-01 DIAGNOSIS — G25.0 ESSENTIAL TREMOR: ICD-10-CM

## 2017-12-01 DIAGNOSIS — Z23 NEED FOR VACCINATION: ICD-10-CM

## 2017-12-01 DIAGNOSIS — M19.90 ARTHRITIS: ICD-10-CM

## 2017-12-01 PROCEDURE — 87480 CANDIDA DNA DIR PROBE: CPT

## 2017-12-01 PROCEDURE — 87660 TRICHOMONAS VAGIN DIR PROBE: CPT

## 2017-12-01 PROCEDURE — 99215 OFFICE O/P EST HI 40 MIN: CPT | Mod: 25 | Performed by: NURSE PRACTITIONER

## 2017-12-01 PROCEDURE — 90686 IIV4 VACC NO PRSV 0.5 ML IM: CPT | Performed by: NURSE PRACTITIONER

## 2017-12-01 PROCEDURE — 87491 CHLMYD TRACH DNA AMP PROBE: CPT

## 2017-12-01 PROCEDURE — 87510 GARDNER VAG DNA DIR PROBE: CPT

## 2017-12-01 PROCEDURE — 87591 N.GONORRHOEAE DNA AMP PROB: CPT

## 2017-12-01 PROCEDURE — 99000 SPECIMEN HANDLING OFFICE-LAB: CPT | Performed by: NURSE PRACTITIONER

## 2017-12-01 PROCEDURE — G0008 ADMIN INFLUENZA VIRUS VAC: HCPCS | Performed by: NURSE PRACTITIONER

## 2017-12-01 RX ORDER — CLINDAMYCIN PHOSPHATE 20 MG/G
CREAM VAGINAL
Qty: 40 G | Refills: 0 | Status: SHIPPED | OUTPATIENT
Start: 2017-12-01 | End: 2018-03-28 | Stop reason: SDUPTHER

## 2017-12-01 ASSESSMENT — PATIENT HEALTH QUESTIONNAIRE - PHQ9
SUM OF ALL RESPONSES TO PHQ QUESTIONS 1-9: 10
CLINICAL INTERPRETATION OF PHQ2 SCORE: 4
5. POOR APPETITE OR OVEREATING: 2 - MORE THAN HALF THE DAYS

## 2017-12-01 NOTE — ASSESSMENT & PLAN NOTE
This is a new problem. Patient states that she has had vaginal discharge, itching, irritation for the last 2 months. States that it has become worse since she has been in a sexual relationship with a male partner. States that she does remember having chronic vaginal infections when she was previously with a male partner. States that she would like STD testing, swab for yeast/BV as she is unsure the cause of this most recent infection. States that she has used clindamycin cream in the past for BV.

## 2017-12-02 DIAGNOSIS — N89.8 VAGINAL DISCHARGE: ICD-10-CM

## 2017-12-02 LAB
C TRACH DNA SPEC QL NAA+PROBE: NEGATIVE
CANDIDA DNA VAG QL PROBE+SIG AMP: NEGATIVE
G VAGINALIS DNA VAG QL PROBE+SIG AMP: POSITIVE
N GONORRHOEA DNA SPEC QL NAA+PROBE: NEGATIVE
SPECIMEN SOURCE: NORMAL
T VAGINALIS DNA VAG QL PROBE+SIG AMP: NEGATIVE

## 2017-12-02 NOTE — ASSESSMENT & PLAN NOTE
Chronic in nature. Stable. Patient states that she has difficulty with sleep related to fibromyalgia, anxiety, bipolar disorder. Patient states she takes Remeron at night for sleep. States that she does not have side effects from this medication. States that this was originally recommended by psychiatry she was seen in [place].

## 2017-12-02 NOTE — ASSESSMENT & PLAN NOTE
Chronic in nature. States that symptoms started in 2002 discussed that she did have a diagnosis by neurology, has not seen rheumatology. States that she has been on permanent complete disability since 2010 related to severe pain. States she takes Savella, gabapentin, Topamax states that Topamax is also preventative therapy for migraines. Patient states that she does not have side effects from these medications. States that she is not found anything that makes her symptoms better aside from medications. Patient states that she does have difficulty eating up and out of bed some days.

## 2017-12-02 NOTE — ASSESSMENT & PLAN NOTE
Chronic in nature. Stable. Patient states she does not have many episodes of migraines. Patient is currently taking Topamax C she is doing overall well this time.

## 2017-12-02 NOTE — ASSESSMENT & PLAN NOTE
Chronic in nature. Stable. Patient states she is taking primidone 50 mg every day states that this is working well to control her symptoms. Patient states that she does have a diagnosis of essential tremors with complete workup from a neurologist in [place]. Patient states she feels she is overall doing well at this time.

## 2017-12-02 NOTE — PROGRESS NOTES
Chief Complaint   Patient presents with   • Establish Care     New Patient    • Vaginal Discharge     x 3 months        HISTORY OF THE PRESENT ILLNESS: This is a 52 y.o. female new patient to our practice. This pleasant patient is here todayTo establish care and discuss vaginal discharge.    Vaginal discharge  This is a new problem. Patient states that she has had vaginal discharge, itching, irritation for the last 2 months. States that it has become worse since she has been in a sexual relationship with a male partner. States that she does remember having chronic vaginal infections when she was previously with a male partner. States that she would like STD testing, swab for yeast/BV as she is unsure the cause of this most recent infection. States that she has used clindamycin cream in the past for BV.    Essential tremor  Chronic in nature. Stable. Patient states she is taking primidone 50 mg every day states that this is working well to control her symptoms. Patient states that she does have a diagnosis of essential tremors with complete workup from a neurologist in [place]. Patient states she feels she is overall doing well at this time.    Fibromyalgia  Chronic in nature. States that symptoms started in 2002 discussed that she did have a diagnosis by neurology, has not seen rheumatology. States that she has been on permanent complete disability since 2010 related to severe pain. States she takes Savella, gabapentin, Topamax states that Topamax is also preventative therapy for migraines. Patient states that she does not have side effects from these medications. States that she is not found anything that makes her symptoms better aside from medications. Patient states that she does have difficulty eating up and out of bed some days.     Insomnia due to medical condition  Chronic in nature. Stable. Patient states that she has difficulty with sleep related to fibromyalgia, anxiety, bipolar disorder. Patient  states she takes Remeron at night for sleep. States that she does not have side effects from this medication. States that this was originally recommended by psychiatry she was seen in [place].    Migraine without status migrainosus, not intractable  Chronic in nature. Stable. Patient states she does not have many episodes of migraines. Patient is currently taking Topamax C she is doing overall well this time.      Past Medical History:   Diagnosis Date   • Allergy    • Anxiety    • Arthritis    • Chronic pain syndrome    • COPD (chronic obstructive pulmonary disease) (CMS-[facility])    • DDD (degenerative disc disease), cervical    • DDD (degenerative disc disease), thoracolumbar    • Depression    • Diverticulosis    • Essential tremor    • Fibromyalgia    • IBS (irritable bowel syndrome)    • Migraine    • Pulmonary emphysema (CMS-[facility])        Past Surgical History:   Procedure Laterality Date   • ABDOMINAL HYSTERECTOMY TOTAL     • CHOLECYSTECTOMY     • TONSILLECTOMY         Family Status   Relation Status   • Mother Alive   • Father    • Sister      Family History   Problem Relation Age of Onset   • Lung Disease Mother      copd   • Cancer Mother      basal/squamous   • Heart Disease Father      HF   • Arthritis Father      rheumatoid   • Lung Disease Father      emphysema   • Cancer Sister        Social History   Substance Use Topics   • Smoking status: Current Every Day Smoker     Packs/day: 1.00     Years: 35.00   • Smokeless tobacco: Never Used   • Alcohol use 1.2 oz/week     2 Glasses of wine per week      Comment: weekly       Allergies: Erythromycin and Tetracycline    [facility] Outpatient Pres[facility]   Medication Sig Dispense Refill   • Diclofenac Sodium (VOLTAREN) 1 % Gel Apply 1 Application to skin as directed 2 times a day as needed. 2 Tube 6   • clindamycin (CLEOCIN) 2 % vaginal cream 1 applic IV x 7d 40 g 0   • primidone (MYSOLINE) 50 MG Tab Take 1 Tab by mouth every day. 90 Tab 3    • topiramate (TOPAMAX) 50 MG tablet 1 TAB 3 TIMES A DAY FOR MIGRAINE PREVENTION. 90 Tab 11   • ondansetron (ZOFRAN ODT) 4 MG TABLET DISPERSIBLE Take 1 Tab by mouth every 8 hours as needed for Nausea/Vomiting. 30 Tab 3   • gabapentin (NEURONTIN) 600 MG tablet 1 TAB 3 TIMES A DAY 90 Tab 11   • Milnacipran HCl (SAVELLA) 100 MG Tab Take 1 Tab by mouth 2 Times a Day. 60 Tab 11   • baclofen (LIORESAL) 10 MG Tab Take 1 Tab by mouth 3 times a day. 90 Tab 3   • folic acid (FOLVITE) 1 MG Tab Take 1 Tab by mouth every day. 30 Tab 11   • Cyanocobalamin (VITAMIN B-12) 1000 MCG Tab Take 1 Tab by mouth 2 Times a Day. 60 Tab 11   • meloxicam (MOBIC) 15 MG tablet Take 1 Tab by mouth every day. 30 Tab 11   • mirtazapine (REMERON) 30 MG TABLET DISPERSIBLE Take 1 Tab by mouth every bedtime. 30 Tab 11   • hydrOXYzine (ATARAX) 25 MG Tab Take 25 mg by mouth 3 times a day as needed for Itching.     • B Complex Vitamins (B COMPLEX 1 PO) Take  by mouth.     • albuterol (PROAIR HFA) 108 (90 BASE) MCG/ACT Aero Soln inhalation aerosol Inhale 2 Puffs by mouth every 6 hours as needed for Shortness of Breath.     • loratadine-pseudoephedrine (CLARITIN-D 24-HOUR)  MG per tablet Take 1 Tab by mouth every day. 30 Tab 0   • ondansetron (ZOFRAN) 4 MG Tab tablet Take 1 Tab by mouth every four hours as needed for Nausea/Vomiting. 20 Tab 1   • bisacodyl EC (DULCOLAX) 5 MG Tablet Delayed Response Take 5 mg by mouth 1 time daily as needed for Constipation.       No current Epic-ordered facility-administered medications on file.        Review of Systems   Constitutional:  Negative for fever, chills, weight loss and malaise/fatigue.   HENT:  Negative for ear pain, nosebleeds, congestion, sore throat and neck pain.    Eyes:  Negative for blurred vision.   Respiratory:  Negative for cough, sputum production, shortness of breath and wheezing.    Cardiovascular:  Negative for chest pain, palpitations, orthopnea and leg swelling.   Gastrointestinal:   "Negative for heartburn, nausea, vomiting and abdominal pain.   Genitourinary:  Negative for dysuria, urgency and frequency.   Musculoskeletal:  Positive for myalgias, back pain and joint pain.   Skin:  Negative for rash and itching.   Neurological:  Negative for dizziness, tingling, tremors, sensory change, focal weakness and headaches.   Endo/Heme/Allergies:  Does not bruise/bleed easily.   Psychiatric/Behavioral:  Negative for depression, anxiety, or memory loss.     All other systems reviewed and are negative except as in HPI.    Exam: Blood pressure 118/72, pulse 77, temperature 36.4 °C (97.5 °F), resp. rate 16, height 1.702 m (5' 7\"), weight 79.4 kg (175 lb), last menstrual period 12/01/2000, SpO2 96 %, not currently breastfeeding.  General:  Normal appearing. No distress.  Pulmonary:  Clear to ausculation.  Normal effort. No rales, ronchi, or wheezing.  Cardiovascular:  Regular rate and rhythm without murmur. Carotid and radial pulses are intact and equal bilaterally.  Abdomen:  Soft, nontender, nondistended. Normal bowel sounds. Liver and spleen are not palpable  Neurologic:  Grossly nonfocal  Skin:  Warm and dry.  No obvious lesions.  Musculoskeletal:  Normal gait. No extremity cyanosis, clubbing, or edema.  Psych:  Normal mood and affect. Alert and oriented x3. Judgment and insight is normal.   :Perineum and external genitalia erythematous without rash. Vagina with white thick  Discharge and a fishy odor. Cervix without visible lesions or discharge. Bimanual exam without adnexal masses or cervical motion tenderness.    PLAN:    1. Need for vaccination  - Flu Quad Inj >3 Year Pre-Filled (Preservative Free)    2. Vaginal discharge  Counseled patient regarding vaginal discharge, treatment of BV, safe sex, proper hygiene, wearing cotton underwear, STD testing.  - CHLAMYDIA/GC, DNA PROBE W/RFLX  - VAGINAL PATHOGENS DNA PANEL; Future  - HIV ANTIBODIES; Future  - T.PALLIDUM AB EIA; Future  - clindamycin " (CLEOCIN) 2 % vaginal cream; 1 applic IV x 7d  Dispense: 40 g; Refill: 0    3. Arthritis  Continue current plan of care.    4. Fibromyalgia  Continue current medications, counseled patient regarding risks, benefits, side effects of medication, discussed treatment options, discussed opportunity for follow-up with specialists patient states she is stable at this time and does not feel that that is necessary at this time. Refill of diclofenac gel provided at this time.  - Diclofenac Sodium (VOLTAREN) 1 % Gel; Apply 1 Application to skin as directed 2 times a day as needed.  Dispense: 2 Tube; Refill: 6    5. Other migraine without status migrainosus, not intractable  Continue Topamax    6. Vitamin B deficiency  Will repeat labs.  - VITAMIN B12; Future    7. Fatigue, unspecified type  - COMP METABOLIC PANEL; Future  - CBC WITH DIFFERENTIAL; Future  - VITAMIN D,25 HYDROXY; Future  - TSH+FREE T4    8. Screening for hyperlipidemia  Review of labs to screen for hyperlipidemia patient does have significant family history.  - LIPID PROFILE; Future    10. Changing nevus  Patient is referred to dermatology as the lesion is on her face.  - REFERRAL TO DERMATOLOGY    11. Essential tremor  Continue current plan of care.    12. Insomnia due to medical condition  Continue current plan of care    Follow-up in one month for a long appointment. Patient is encouraged to be seen in the emergency room for chest pain, palpitations, shortness of breath, dizziness, severe abdominal pain or other concerning symptoms.    Patient was seen for 40 minutes face to face of which, greater than 50% was spent counseling regarding the above mentioned problems.    Please note that this dictation was created using voice recognition software. I have made every reasonable attempt to correct obvious errors, but I expect that there are errors of grammar and possibly content that I did not discover before finalizing the note.      Assessment/Plan  1. Need for  vaccination  Flu Quad Inj >3 Year Pre-Filled (Preservative Free)   2. Vaginal discharge  CHLAMYDIA/GC, DNA PROBE W/RFLX    VAGINAL PATHOGENS DNA PANEL    HIV ANTIBODIES    T.PALLIDUM AB EIA   3. Arthritis     4. Fibromyalgia  Diclofenac Sodium (VOLTAREN) 1 % Gel   5. Other migraine without status migrainosus, not intractable     6. Vitamin B deficiency  VITAMIN B12   7. Fatigue, unspecified type  COMP METABOLIC PANEL    CBC WITH DIFFERENTIAL    VITAMIN D,25 HYDROXY    TSH+FREE T4   8. Screening for hyperlipidemia  LIPID PROFILE   9. Urinary problem  clindamycin (CLEOCIN) 2 % vaginal cream   10. Changing nevus  REFERRAL TO DERMATOLOGY   11. Essential tremor     12. Insomnia due to medical condition           I have placed the below orders and discussed them with an approved delegating provider. The MA is performing the below orders under the direction of Dr. Hernandez.

## 2017-12-04 ENCOUNTER — PATIENT OUTREACH (OUTPATIENT)
Dept: HEALTH INFORMATION MANAGEMENT | Facility: OTHER | Age: 52
End: 2017-12-04

## 2017-12-04 ENCOUNTER — TELEPHONE (OUTPATIENT)
Dept: MEDICAL GROUP | Facility: PHYSICIAN GROUP | Age: 52
End: 2017-12-04

## 2017-12-04 NOTE — PROGRESS NOTES
Attempt #:1    WebIZ Checked & Epic Updated: Yes  · WebIZ Recommendations: PREVNAR (PCV13)  and TDAP  · Is patient due for Tdap? YES. Patient was not notified of copay/out of pocket cost.  · Is patient due for Shingles? NO  [facility] Verified: yes  Verify PCP: yes    Communication Preference Obtained: yes     Review Care Team: yes    Annual Wellness Visit Scheduling  1. Scheduling Status:Scheduled     Care Gap Scheduling (Attempt to Schedule EACH Overdue Care Gap!)     Health Maintenance Due   Topic Date Due   • Annual Wellness Visit  1965   • PFT SCREENING-FEV1 AND FEV/FVC RATIO / SPIROMETRY SHOULD BE PERFORMED ANNUALLY  01/03/1984   • IMM DTaP/Tdap/Td Vaccine (1 - Tdap) 01/03/1985   • IMM PNEUMOCOCCAL 19-64 (ADULT) MEDIUM RISK SERIES (1 of 1 - PPSV23) 01/03/1985   • MAMMOGRAM  01/03/2006   • COLONOSCOPY  01/03/2016        Scheduled patient for Annual Wellness Visit and MammogramPatient prefers to discuss Immunizations: FLU, PREVNAR (PCV13)  and TDAP with PCP.           Genomera Activation: already active  Genomera Robin: no  Virtual Visits: no  Opt In to Text Messages: no

## 2017-12-05 NOTE — TELEPHONE ENCOUNTER
----- Message from JAN Rose sent at 12/4/2017  2:52 PM PST -----  Please call pt and give lab results: Vaginal swab is positive for Gardnerella, medication provided at your appointment should treat this infection please let me know symptoms worsen or do not resolve.

## 2017-12-05 NOTE — TELEPHONE ENCOUNTER
Called and spoke with patient regarding lab results. Patient had no questions at this time. LEONOR

## 2017-12-15 DIAGNOSIS — M79.7 FIBROMYALGIA: ICD-10-CM

## 2017-12-15 RX ORDER — MELOXICAM 15 MG/1
15 TABLET ORAL DAILY
Qty: 30 TAB | Refills: 5 | Status: SHIPPED | OUTPATIENT
Start: 2017-12-15 | End: 2018-06-22 | Stop reason: SDUPTHER

## 2018-01-15 ENCOUNTER — TELEPHONE (OUTPATIENT)
Dept: MEDICAL GROUP | Facility: PHYSICIAN GROUP | Age: 53
End: 2018-01-15

## 2018-01-15 NOTE — TELEPHONE ENCOUNTER
.Left message for patient to call back regarding pre-visit planning. Please transfer call to Angely at [phone]

## 2018-01-22 ENCOUNTER — OFFICE VISIT (OUTPATIENT)
Dept: MEDICAL GROUP | Facility: PHYSICIAN GROUP | Age: 53
End: 2018-01-22
Payer: MEDICARE

## 2018-01-22 VITALS
HEART RATE: 88 BPM | OXYGEN SATURATION: 94 % | SYSTOLIC BLOOD PRESSURE: 112 MMHG | DIASTOLIC BLOOD PRESSURE: 80 MMHG | TEMPERATURE: 99.4 F | BODY MASS INDEX: 29.09 KG/M2 | RESPIRATION RATE: 16 BRPM | HEIGHT: 66 IN | WEIGHT: 181 LBS

## 2018-01-22 DIAGNOSIS — E53.9 VITAMIN B DEFICIENCY: ICD-10-CM

## 2018-01-22 DIAGNOSIS — Z00.00 MEDICARE ANNUAL WELLNESS VISIT, SUBSEQUENT: Primary | ICD-10-CM

## 2018-01-22 DIAGNOSIS — M47.816 FACET ARTHROPATHY, LUMBAR: ICD-10-CM

## 2018-01-22 DIAGNOSIS — G25.0 ESSENTIAL TREMOR: ICD-10-CM

## 2018-01-22 DIAGNOSIS — F41.1 GAD (GENERALIZED ANXIETY DISORDER): ICD-10-CM

## 2018-01-22 DIAGNOSIS — M79.7 FIBROMYALGIA: ICD-10-CM

## 2018-01-22 DIAGNOSIS — G47.01 INSOMNIA DUE TO MEDICAL CONDITION: ICD-10-CM

## 2018-01-22 DIAGNOSIS — Z12.39 SCREENING FOR BREAST CANCER: ICD-10-CM

## 2018-01-22 DIAGNOSIS — Z12.11 SCREEN FOR COLON CANCER: ICD-10-CM

## 2018-01-22 DIAGNOSIS — G43.809 OTHER MIGRAINE WITHOUT STATUS MIGRAINOSUS, NOT INTRACTABLE: ICD-10-CM

## 2018-01-22 DIAGNOSIS — F31.30 BIPOLAR AFFECTIVE DISORDER, CURRENT EPISODE DEPRESSED, CURRENT EPISODE SEVERITY UNSPECIFIED (HCC): ICD-10-CM

## 2018-01-22 DIAGNOSIS — F17.200 TOBACCO USE DISORDER: ICD-10-CM

## 2018-01-22 DIAGNOSIS — K58.0 IRRITABLE BOWEL SYNDROME WITH DIARRHEA: ICD-10-CM

## 2018-01-22 DIAGNOSIS — J84.9 INTERSTITIAL LUNG DISEASE (HCC): ICD-10-CM

## 2018-01-22 DIAGNOSIS — M47.812 FACET ARTHROPATHY, CERVICAL: ICD-10-CM

## 2018-01-22 DIAGNOSIS — M65.321 TRIGGER FINGER, RIGHT INDEX FINGER: ICD-10-CM

## 2018-01-22 DIAGNOSIS — F33.1 MODERATE EPISODE OF RECURRENT MAJOR DEPRESSIVE DISORDER (HCC): ICD-10-CM

## 2018-01-22 DIAGNOSIS — Z23 NEED FOR VACCINATION: ICD-10-CM

## 2018-01-22 DIAGNOSIS — J44.89 COPD (CHRONIC OBSTRUCTIVE PULMONARY DISEASE) WITH CHRONIC BRONCHITIS: ICD-10-CM

## 2018-01-22 PROBLEM — N89.8 VAGINAL DISCHARGE: Status: RESOLVED | Noted: 2017-12-01 | Resolved: 2018-01-22

## 2018-01-22 PROCEDURE — G0438 PPPS, INITIAL VISIT: HCPCS | Mod: 25 | Performed by: NURSE PRACTITIONER

## 2018-01-22 PROCEDURE — 90732 PPSV23 VACC 2 YRS+ SUBQ/IM: CPT | Performed by: NURSE PRACTITIONER

## 2018-01-22 PROCEDURE — G0009 ADMIN PNEUMOCOCCAL VACCINE: HCPCS | Performed by: NURSE PRACTITIONER

## 2018-01-22 ASSESSMENT — PATIENT HEALTH QUESTIONNAIRE - PHQ9
SUM OF ALL RESPONSES TO PHQ QUESTIONS 1-9: 23
CLINICAL INTERPRETATION OF PHQ2 SCORE: 6
5. POOR APPETITE OR OVEREATING: 3 - NEARLY EVERY DAY

## 2018-01-22 ASSESSMENT — PAIN SCALES - GENERAL: PAINLEVEL: 7=MODERATE-SEVERE PAIN

## 2018-01-22 NOTE — ASSESSMENT & PLAN NOTE
Chronic in nature. Stable. Patient states she continues to have symptoms of anxiety states that she would like to see psychology but is unable to afford counseling sessions. Patient states that she does have a lot of techniques to reduce anxiety including medication. Patient states right now a lot of her anxiety stems from her current relationship states that she is planning on dissolving this relationship and states she believes symptoms will improve after that.

## 2018-01-22 NOTE — ASSESSMENT & PLAN NOTE
Chronic in nature. Stable. Patient has not had any recent episodes of migraine. Continues to take Topamax and is doing well this time.

## 2018-01-22 NOTE — ASSESSMENT & PLAN NOTE
Panic and nature. Stable. Patient continues to use meloxicam, diclofenac gel, over-the-counter medications as needed. Patient states the pain is worse during the winter. Patient states that heating pads, other conservative measures to improve symptoms.

## 2018-01-22 NOTE — ASSESSMENT & PLAN NOTE
Chronic in nature. Stable. Patient takes Bentyl, Zofran as needed. Denies nausea vomiting or hematemesis or blood in her stool.

## 2018-01-22 NOTE — ASSESSMENT & PLAN NOTE
Chronic in nature. Stable. Symptoms are well-controlled with primidone 50mg. Has had complete work-up.
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
## 2018-01-22 NOTE — ASSESSMENT & PLAN NOTE
Chronic in nature. Stable. Patient refuses referral to pulmonology. States that breathing symptoms have been somewhat worse recently related to not using her inhalers patient is encouraged to use inhalers regularly and follow-up if symptoms do not improve. Patient states that her inhalers over the last 2 days has significantly improved her shortness of breath.

## 2018-01-22 NOTE — ASSESSMENT & PLAN NOTE
Chronic in nature. Stable. Patient takes Remeron for sleep. Denies side effects. Patient is not currently seeing psychiatry.

## 2018-01-22 NOTE — ASSESSMENT & PLAN NOTE
Chronic in nature. Has had increased symptoms, but has not been using inhalers regularly. Continues to smoke. Discussed smoking cessation. Patient is having more SOB and more dyspnea. Restarted inhalers. Will see if this improves symptoms.

## 2018-01-22 NOTE — ASSESSMENT & PLAN NOTE
Chronic in nature. Stable. Patient continues to have severe pain. Sonya is using meloxicam, Voltaren gel, over-the-counter medications as needed.
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
## 2018-01-22 NOTE — ASSESSMENT & PLAN NOTE
Chronic in nature. Patient continues to smoke. States that she is not interested in cessation at this time.

## 2018-01-22 NOTE — ASSESSMENT & PLAN NOTE
This is a new problem. Started approximately 1 week ago. States she is having pain, soreness, consistently over the last 3 weeks. Patient has not tried ice, these that she has not used anything over-the-counter for this issue. Patient states that finger while intermittently become locked states that she will notice some popping when she tries to straighten it. Patient states she is also having some pain in her bilateral thumbs.

## 2018-01-22 NOTE — ASSESSMENT & PLAN NOTE
Chronic in nature. Stable. Patient has seen neurology in the past refuses referral to rheumatology. Patient has been on disability since 2010 related to pain. Patient takes Savella, gabapentin, Topamax, Remeron. Patient denies side effects from these medications. Patient has not found anything that makes symptoms better or worse but does use over-the-counter medications, NSAIDs as needed.

## 2018-01-29 DIAGNOSIS — G43.809 OTHER MIGRAINE WITHOUT STATUS MIGRAINOSUS, NOT INTRACTABLE: ICD-10-CM

## 2018-01-29 DIAGNOSIS — M79.7 FIBROMYALGIA: ICD-10-CM

## 2018-01-29 RX ORDER — TOPIRAMATE 50 MG/1
TABLET, FILM COATED ORAL
Qty: 90 TAB | Refills: 11 | Status: SHIPPED | OUTPATIENT
Start: 2018-01-29 | End: 2018-07-12 | Stop reason: SDUPTHER

## 2018-01-29 NOTE — TELEPHONE ENCOUNTER
Was the patient seen in the last year in this department? Yes     Does patient have an active prescription for medications requested? No     Received Request Via: Pharmacy     Future Appointments       Provider Department [place]    2/6/2018 11:00 AM DORIS Guerrero [facility]    2/7/2018 11:40 AM MICHAEL GILL [facility] IMAGING [facility] MAMMOGRAPHY [facility]    4/24/2018 10:00 AM JAN Rose [facility] [place]

## 2018-02-06 ENCOUNTER — OFFICE VISIT (OUTPATIENT)
Dept: DERMATOLOGY | Facility: IMAGING CENTER | Age: 53
End: 2018-02-06
Payer: MEDICARE

## 2018-02-06 VITALS
HEIGHT: 66 IN | WEIGHT: 180 LBS | BODY MASS INDEX: 28.93 KG/M2 | DIASTOLIC BLOOD PRESSURE: 60 MMHG | TEMPERATURE: 97.3 F | SYSTOLIC BLOOD PRESSURE: 110 MMHG

## 2018-02-06 DIAGNOSIS — L57.0 ACTINIC KERATOSIS: ICD-10-CM

## 2018-02-06 DIAGNOSIS — L82.1 SEBORRHEIC KERATOSES: ICD-10-CM

## 2018-02-06 PROCEDURE — 17000 DESTRUCT PREMALG LESION: CPT | Mod: 59 | Performed by: NURSE PRACTITIONER

## 2018-02-06 PROCEDURE — 17003 DESTRUCT PREMALG LES 2-14: CPT | Performed by: NURSE PRACTITIONER

## 2018-02-06 PROCEDURE — 99213 OFFICE O/P EST LOW 20 MIN: CPT | Mod: 25 | Performed by: NURSE PRACTITIONER

## 2018-02-06 PROCEDURE — 17110 DESTRUCTION B9 LES UP TO 14: CPT | Performed by: NURSE PRACTITIONER

## 2018-02-06 NOTE — ASSESSMENT & PLAN NOTE
Patient has felt somewhat rough lesions on her nose and face. They have been present for the past several months.

## 2018-02-06 NOTE — PROGRESS NOTES
Chief Complaint   Patient presents with   • Skin Lesion         HISTORY OF THE PRESENT ILLNESS: Patient is a 53 y.o. female. Her primary care provider is Magdiel Johnson This pleasant patient is here today regarding skin lesions she's noticed and requesting a skin check.  Her mother and sisters were treated for squamous cell cancer. Patient has no personal history of skin cancer. She does continue to smoke and has smoked for some 35 years. She admits to lots of sun exposure for many years.She admits to picking at her skin at rough areas frequently .  Actinic keratosis  Patient has felt somewhat rough lesions on her nose and face. They have been present for the past several months.      Allergies: Erythromycin and Tetracycline    Current Outpatient Prescriptions   Medication Sig Dispense Refill   • topiramate (TOPAMAX) 50 MG tablet 1 TAB 3 TIMES A DAY FOR MIGRAINE PREVENTION. 90 Tab 11   • meloxicam (MOBIC) 15 MG tablet TAKE 1 TAB BY MOUTH EVERY DAY. 30 Tab 5   • Diclofenac Sodium (VOLTAREN) 1 % Gel Apply 1 Application to skin as directed 2 times a day as needed. 2 Tube 6   • clindamycin (CLEOCIN) 2 % vaginal cream 1 applic IV x 7d 40 g 0   • loratadine-pseudoephedrine (CLARITIN-D 24-HOUR)  MG per tablet Take 1 Tab by mouth every day. 30 Tab 0   • primidone (MYSOLINE) 50 MG Tab Take 1 Tab by mouth every day. 90 Tab 3   • ondansetron (ZOFRAN ODT) 4 MG TABLET DISPERSIBLE Take 1 Tab by mouth every 8 hours as needed for Nausea/Vomiting. 30 Tab 3   • gabapentin (NEURONTIN) 600 MG tablet 1 TAB 3 TIMES A DAY 90 Tab 11   • Milnacipran HCl (SAVELLA) 100 MG Tab Take 1 Tab by mouth 2 Times a Day. 60 Tab 11   • baclofen (LIORESAL) 10 MG Tab Take 1 Tab by mouth 3 times a day. 90 Tab 3   • folic acid (FOLVITE) 1 MG Tab Take 1 Tab by mouth every day. 30 Tab 11   • Cyanocobalamin (VITAMIN B-12) 1000 MCG Tab Take 1 Tab by mouth 2 Times a Day. 60 Tab 11   • mirtazapine (REMERON) 30 MG TABLET DISPERSIBLE Take 1 Tab by mouth every  "bedtime. 30 Tab 11   • hydrOXYzine (ATARAX) 25 MG Tab Take 25 mg by mouth 3 times a day as needed for Itching.     • B Complex Vitamins (B COMPLEX 1 PO) Take  by mouth.     • bisacodyl EC (DULCOLAX) 5 MG Tablet Delayed Response Take 5 mg by mouth 1 time daily as needed for Constipation.     • albuterol (PROAIR HFA) 108 (90 BASE) MCG/ACT Aero Soln inhalation aerosol Inhale 2 Puffs by mouth every 6 hours as needed for Shortness of Breath.       No current facility-administered medications for this visit.        Past Medical History:   Diagnosis Date   • Actinic keratosis 2018   • Allergy    • Anxiety    • Arthritis    • Chronic pain syndrome    • COPD (chronic obstructive pulmonary disease) (CMS-HCC)    • DDD (degenerative disc disease), cervical    • DDD (degenerative disc disease), thoracolumbar    • Depression    • Diverticulosis    • Essential tremor    • Fibromyalgia    • IBS (irritable bowel syndrome)    • Migraine    • Pulmonary emphysema (CMS-HCC)        Past Surgical History:   Procedure Laterality Date   • ABDOMINAL HYSTERECTOMY TOTAL     • CHOLECYSTECTOMY     • TONSILLECTOMY         Social History   Substance Use Topics   • Smoking status: Current Every Day Smoker     Packs/day: 1.00     Years: 35.00   • Smokeless tobacco: Never Used   • Alcohol use 1.2 oz/week     2 Glasses of wine per week      Comment: weekly       Family Status   Relation Status   • Mother Alive   • Father    • Sister      Family History   Problem Relation Age of Onset   • Lung Disease Mother      copd   • Cancer Mother      basal/squamous   • Heart Disease Father      HF   • Arthritis Father      rheumatoid   • Lung Disease Father      emphysema   • Cancer Sister        Review of Systems   Constitutional: Negative for fever, chills, weight loss and malaise/fatigue.   Skin: Skin neoplasms on her nose.    Exam: Blood pressure 110/60, temperature 36.3 °C (97.3 °F), height 1.676 m (5' 6\"), weight 81.6 kg (180 lb).  General: " Normal appearing. No distress.  An examination was performed including the scalp( including the hair) , head and face, inspection of eyelids, lips , right and left ear externally but not ear canals.  Neck and chest and back.  Including  Both upper and lower extremities, including hands and feet and nails and between fingers and toes.     All areas were found to be within normal limits except as noted in the description below.     Patient's nose with 2 small actinic keratosis. We discussed liquid nitrogen can cause hypo-or hyperpigmentation. She consents to liquid nitrogen to actinic keratosis are treated with liquid nitrogen.  Left shoulder a heart-shaped tattoo with 2 names is noted  Left breast a bird and roses tattoo is noted.  Left upper chest evidence of excoriation over 2 seborrheic keratosis patient states she picks it these lesions because they itch. That are treated with liquid nitrogen .  Left foot with a lady bug and Natacha tattoo.  Right upper quadrant of the abdomen well-healed surgical incision consistent with patient's history of cholecystectomy.  Suprapubic area well-healed incision consistent with patient's history of abdominal hysterectomy.  1. Actinic keratosis      We discussed these are precancerous lesions that should be monitored and treated. Patient will return in 6 months for retreatment.    Seborrheic keratosis we discussed these are benign lesions that can be treated with liquid nitrogen if the above.    I encouraged use of sunscreens and protective clothing and hats.    She is encouraged to continue to monitor skin and return for any changes or suspicious lesions.  Please note that this dictation was created using voice recognition software. I have made every reasonable attempt to correct obvious errors, but I expect that there are errors of grammar and possibly content that I did not discover before finalizing the note.      Assessment/Plan

## 2018-02-07 ENCOUNTER — APPOINTMENT (OUTPATIENT)
Dept: RADIOLOGY | Facility: MEDICAL CENTER | Age: 53
End: 2018-02-07
Attending: NURSE PRACTITIONER
Payer: MEDICARE

## 2018-03-19 DIAGNOSIS — K58.0 IRRITABLE BOWEL SYNDROME WITH DIARRHEA: ICD-10-CM

## 2018-03-19 RX ORDER — ONDANSETRON 4 MG/1
4 TABLET, ORALLY DISINTEGRATING ORAL EVERY 8 HOURS PRN
Qty: 30 TAB | Refills: 0 | Status: SHIPPED | OUTPATIENT
Start: 2018-03-19 | End: 2018-07-12 | Stop reason: SDUPTHER

## 2018-03-28 DIAGNOSIS — R39.89 URINARY PROBLEM: ICD-10-CM

## 2018-03-28 RX ORDER — CLINDAMYCIN PHOSPHATE 20 MG/G
CREAM VAGINAL
Qty: 40 G | Refills: 0 | Status: SHIPPED | OUTPATIENT
Start: 2018-03-28 | End: 2019-06-06 | Stop reason: SDUPTHER

## 2018-04-09 DIAGNOSIS — G47.01 INSOMNIA DUE TO MEDICAL CONDITION: ICD-10-CM

## 2018-04-09 DIAGNOSIS — M79.7 FIBROMYALGIA: ICD-10-CM

## 2018-04-09 RX ORDER — MILNACIPRAN HYDROCHLORIDE 100 MG/1
TABLET, FILM COATED ORAL
Qty: 60 TAB | Refills: 0 | Status: SHIPPED | OUTPATIENT
Start: 2018-04-09 | End: 2018-05-12 | Stop reason: SDUPTHER

## 2018-04-09 RX ORDER — MIRTAZAPINE 30 MG/1
30 TABLET, FILM COATED ORAL
Qty: 30 TAB | Refills: 0 | Status: SHIPPED | OUTPATIENT
Start: 2018-04-09 | End: 2018-05-11 | Stop reason: SDUPTHER

## 2018-04-09 RX ORDER — GABAPENTIN 600 MG/1
TABLET ORAL
Qty: 90 TAB | Refills: 0 | Status: SHIPPED | OUTPATIENT
Start: 2018-04-09 | End: 2018-05-12 | Stop reason: SDUPTHER

## 2018-04-23 ENCOUNTER — TELEPHONE (OUTPATIENT)
Dept: MEDICAL GROUP | Facility: PHYSICIAN GROUP | Age: 53
End: 2018-04-23

## 2018-04-23 NOTE — TELEPHONE ENCOUNTER
ESTABLISHED PATIENT PRE-VISIT PLANNING     Note: Patient will not be contacted if there is no indication to call.     1.  Reviewed notes from the last few office visits within the medical group: Yes    2.  If any orders were placed at last visit or intended to be done for this visit (i.e. 6 mos follow-up), do we have Results/Consult Notes?        •  Labs - Labs ordered, NOT completed. Patient advised to complete prior to next appointment.   Note: If patient appointment is for lab review and patient did not complete labs, check with provider if OK to reschedule patient until labs completed.       •  Imaging - Imaging ordered, NOT completed. Patient advised to complete prior to next appointment.       •  Referrals - Referral ordered, patient has NOT been seen.    3. Is this appointment scheduled as a Hospital Follow-Up? No    4.  Immunizations were updated in [facility] using WebIZ?: Yes       •  Web [employer] Recommendations: MMR , TDAP and VARICELLA (Chicken Pox)     5.  Patient is due for the following Health Maintenance Topics:   Health Maintenance Due   Topic Date Due   • PFT SCREENING-FEV1 AND FEV/FVC RATIO / SPIROMETRY SHOULD BE PERFORMED ANNUALLY  01/03/1984   • IMM DTaP/Tdap/Td Vaccine (1 - Tdap) 01/03/1985   • MAMMOGRAM  01/03/2006   • COLONOSCOPY  01/03/2016       - Patient has completed FLU and PNEUMOVAX (PPSV23) Immunization(s) per WebIZ. Chart has been updated.    6.  MDX printed for Provider? YES    7.  Patient was NOT informed to arrive 15 min prior to their scheduled appointment and bring in their medication bottles.

## 2018-04-24 ENCOUNTER — APPOINTMENT (OUTPATIENT)
Dept: MEDICAL GROUP | Facility: PHYSICIAN GROUP | Age: 53
End: 2018-04-24
Payer: MEDICARE

## 2018-05-11 DIAGNOSIS — G47.01 INSOMNIA DUE TO MEDICAL CONDITION: ICD-10-CM

## 2018-05-11 RX ORDER — MIRTAZAPINE 30 MG/1
30 TABLET, FILM COATED ORAL
Qty: 30 TAB | Refills: 0 | Status: SHIPPED | OUTPATIENT
Start: 2018-05-11 | End: 2018-07-12 | Stop reason: SDUPTHER

## 2018-05-12 DIAGNOSIS — M79.7 FIBROMYALGIA: ICD-10-CM

## 2018-05-14 DIAGNOSIS — G47.01 INSOMNIA DUE TO MEDICAL CONDITION: ICD-10-CM

## 2018-05-14 RX ORDER — GABAPENTIN 600 MG/1
TABLET ORAL
Qty: 90 TAB | Refills: 0 | Status: SHIPPED | OUTPATIENT
Start: 2018-05-14 | End: 2018-07-12 | Stop reason: SDUPTHER

## 2018-05-14 RX ORDER — MIRTAZAPINE 30 MG/1
30 TABLET, FILM COATED ORAL
Qty: 90 TAB | Refills: 0 | OUTPATIENT
Start: 2018-05-14

## 2018-05-14 RX ORDER — MILNACIPRAN HYDROCHLORIDE 100 MG/1
TABLET, FILM COATED ORAL
Qty: 60 TAB | Refills: 0 | Status: SHIPPED | OUTPATIENT
Start: 2018-05-14 | End: 2018-07-12 | Stop reason: SDUPTHER

## 2018-06-22 DIAGNOSIS — M79.7 FIBROMYALGIA: ICD-10-CM

## 2018-06-22 RX ORDER — MELOXICAM 15 MG/1
15 TABLET ORAL DAILY
Qty: 30 TAB | Refills: 0 | Status: SHIPPED | OUTPATIENT
Start: 2018-06-22 | End: 2018-07-12 | Stop reason: SDUPTHER

## 2018-07-12 ENCOUNTER — HOSPITAL ENCOUNTER (OUTPATIENT)
Dept: LAB | Facility: MEDICAL CENTER | Age: 53
End: 2018-07-12
Attending: NURSE PRACTITIONER
Payer: MEDICARE

## 2018-07-12 DIAGNOSIS — K58.0 IRRITABLE BOWEL SYNDROME WITH DIARRHEA: ICD-10-CM

## 2018-07-12 DIAGNOSIS — Z13.220 SCREENING FOR HYPERLIPIDEMIA: ICD-10-CM

## 2018-07-12 DIAGNOSIS — G43.809 OTHER MIGRAINE WITHOUT STATUS MIGRAINOSUS, NOT INTRACTABLE: ICD-10-CM

## 2018-07-12 DIAGNOSIS — M79.7 FIBROMYALGIA: ICD-10-CM

## 2018-07-12 DIAGNOSIS — E53.9 VITAMIN B DEFICIENCY: ICD-10-CM

## 2018-07-12 DIAGNOSIS — R53.83 FATIGUE, UNSPECIFIED TYPE: ICD-10-CM

## 2018-07-12 DIAGNOSIS — N89.8 VAGINAL DISCHARGE: ICD-10-CM

## 2018-07-12 DIAGNOSIS — G47.01 INSOMNIA DUE TO MEDICAL CONDITION: ICD-10-CM

## 2018-07-12 LAB
25(OH)D3 SERPL-MCNC: 42 NG/ML (ref 30–100)
ALBUMIN SERPL BCP-MCNC: 4.2 G/DL (ref 3.2–4.9)
ALBUMIN/GLOB SERPL: 1.6 G/DL
ALP SERPL-CCNC: 87 U/L (ref 30–99)
ALT SERPL-CCNC: 12 U/L (ref 2–50)
ANION GAP SERPL CALC-SCNC: 8 MMOL/L (ref 0–11.9)
AST SERPL-CCNC: 15 U/L (ref 12–45)
BASOPHILS # BLD AUTO: 0.8 % (ref 0–1.8)
BASOPHILS # BLD: 0.06 K/UL (ref 0–0.12)
BILIRUB SERPL-MCNC: 0.5 MG/DL (ref 0.1–1.5)
BUN SERPL-MCNC: 15 MG/DL (ref 8–22)
CALCIUM SERPL-MCNC: 9.2 MG/DL (ref 8.5–10.5)
CHLORIDE SERPL-SCNC: 110 MMOL/L (ref 96–112)
CHOLEST SERPL-MCNC: 132 MG/DL (ref 100–199)
CO2 SERPL-SCNC: 22 MMOL/L (ref 20–33)
CREAT SERPL-MCNC: 1.18 MG/DL (ref 0.5–1.4)
EOSINOPHIL # BLD AUTO: 0.23 K/UL (ref 0–0.51)
EOSINOPHIL NFR BLD: 3.1 % (ref 0–6.9)
ERYTHROCYTE [DISTWIDTH] IN BLOOD BY AUTOMATED COUNT: 43.1 FL (ref 35.9–50)
GLOBULIN SER CALC-MCNC: 2.6 G/DL (ref 1.9–3.5)
GLUCOSE SERPL-MCNC: 91 MG/DL (ref 65–99)
HCT VFR BLD AUTO: 45.7 % (ref 37–47)
HDLC SERPL-MCNC: 49 MG/DL
HGB BLD-MCNC: 15 G/DL (ref 12–16)
HIV 1+2 AB+HIV1 P24 AG SERPL QL IA: NON REACTIVE
IMM GRANULOCYTES # BLD AUTO: 0.02 K/UL (ref 0–0.11)
IMM GRANULOCYTES NFR BLD AUTO: 0.3 % (ref 0–0.9)
LDLC SERPL CALC-MCNC: 64 MG/DL
LYMPHOCYTES # BLD AUTO: 3.58 K/UL (ref 1–4.8)
LYMPHOCYTES NFR BLD: 47.6 % (ref 22–41)
MCH RBC QN AUTO: 32.2 PG (ref 27–33)
MCHC RBC AUTO-ENTMCNC: 32.8 G/DL (ref 33.6–35)
MCV RBC AUTO: 98.1 FL (ref 81.4–97.8)
MONOCYTES # BLD AUTO: 0.44 K/UL (ref 0–0.85)
MONOCYTES NFR BLD AUTO: 5.9 % (ref 0–13.4)
NEUTROPHILS # BLD AUTO: 3.19 K/UL (ref 2–7.15)
NEUTROPHILS NFR BLD: 42.3 % (ref 44–72)
NRBC # BLD AUTO: 0 K/UL
NRBC BLD-RTO: 0 /100 WBC
PLATELET # BLD AUTO: 299 K/UL (ref 164–446)
PMV BLD AUTO: 10.8 FL (ref 9–12.9)
POTASSIUM SERPL-SCNC: 4.2 MMOL/L (ref 3.6–5.5)
PROT SERPL-MCNC: 6.8 G/DL (ref 6–8.2)
RBC # BLD AUTO: 4.66 M/UL (ref 4.2–5.4)
SODIUM SERPL-SCNC: 140 MMOL/L (ref 135–145)
T4 FREE SERPL-MCNC: 0.79 NG/DL (ref 0.53–1.43)
TREPONEMA PALLIDUM IGG+IGM AB [PRESENCE] IN SERUM OR PLASMA BY IMMUNOASSAY: NON REACTIVE
TRIGL SERPL-MCNC: 95 MG/DL (ref 0–149)
TSH SERPL DL<=0.005 MIU/L-ACNC: 1.87 UIU/ML (ref 0.38–5.33)
VIT B12 SERPL-MCNC: 963 PG/ML (ref 211–911)
WBC # BLD AUTO: 7.5 K/UL (ref 4.8–10.8)

## 2018-07-12 PROCEDURE — 82607 VITAMIN B-12: CPT

## 2018-07-12 PROCEDURE — 85025 COMPLETE CBC W/AUTO DIFF WBC: CPT

## 2018-07-12 PROCEDURE — 82306 VITAMIN D 25 HYDROXY: CPT

## 2018-07-12 PROCEDURE — 87389 HIV-1 AG W/HIV-1&-2 AB AG IA: CPT

## 2018-07-12 PROCEDURE — 80061 LIPID PANEL: CPT

## 2018-07-12 PROCEDURE — 36415 COLL VENOUS BLD VENIPUNCTURE: CPT

## 2018-07-12 PROCEDURE — 84439 ASSAY OF FREE THYROXINE: CPT

## 2018-07-12 PROCEDURE — 80053 COMPREHEN METABOLIC PANEL: CPT

## 2018-07-12 PROCEDURE — 86780 TREPONEMA PALLIDUM: CPT

## 2018-07-12 PROCEDURE — 84443 ASSAY THYROID STIM HORMONE: CPT

## 2018-07-12 RX ORDER — MELOXICAM 15 MG/1
15 TABLET ORAL DAILY
Qty: 30 TAB | Refills: 5 | Status: SHIPPED | OUTPATIENT
Start: 2018-07-12 | End: 2019-07-08

## 2018-07-12 RX ORDER — GABAPENTIN 600 MG/1
600 TABLET ORAL 3 TIMES DAILY
Qty: 90 TAB | Refills: 0 | Status: SHIPPED | OUTPATIENT
Start: 2018-07-12 | End: 2018-08-13 | Stop reason: SDUPTHER

## 2018-07-12 RX ORDER — MELOXICAM 15 MG/1
15 TABLET ORAL DAILY
Qty: 30 TAB | Refills: 0 | Status: SHIPPED | OUTPATIENT
Start: 2018-07-12 | End: 2019-07-08

## 2018-07-12 RX ORDER — TOPIRAMATE 50 MG/1
TABLET, FILM COATED ORAL
Qty: 90 TAB | Refills: 0 | Status: SHIPPED | OUTPATIENT
Start: 2018-07-12 | End: 2018-09-06 | Stop reason: SDUPTHER

## 2018-07-12 RX ORDER — ONDANSETRON 4 MG/1
4 TABLET, ORALLY DISINTEGRATING ORAL EVERY 8 HOURS PRN
Qty: 30 TAB | Refills: 0 | Status: SHIPPED | OUTPATIENT
Start: 2018-07-12 | End: 2018-12-18 | Stop reason: SDUPTHER

## 2018-07-12 RX ORDER — MIRTAZAPINE 30 MG/1
30 TABLET, FILM COATED ORAL
Qty: 30 TAB | Refills: 0 | Status: SHIPPED | OUTPATIENT
Start: 2018-07-12 | End: 2018-07-30 | Stop reason: SDUPTHER

## 2018-07-12 RX ORDER — BACLOFEN 10 MG/1
10 TABLET ORAL 3 TIMES DAILY
Qty: 90 TAB | Refills: 0 | Status: SHIPPED | OUTPATIENT
Start: 2018-07-12 | End: 2018-08-13 | Stop reason: SDUPTHER

## 2018-07-12 RX ORDER — FOLIC ACID 1 MG/1
1 TABLET ORAL DAILY
Qty: 30 TAB | Refills: 0 | Status: SHIPPED | OUTPATIENT
Start: 2018-07-12 | End: 2018-08-13 | Stop reason: SDUPTHER

## 2018-07-12 RX ORDER — ALBUTEROL SULFATE 90 UG/1
2 AEROSOL, METERED RESPIRATORY (INHALATION) EVERY 6 HOURS PRN
Qty: 8.5 G | Refills: 0 | Status: SHIPPED | OUTPATIENT
Start: 2018-07-12 | End: 2019-07-16

## 2018-07-12 NOTE — TELEPHONE ENCOUNTER
Was the patient seen in the last year in this department? Yes     Does patient have an active prescription for medications requested? No     Received Request Via: Patient     Patient will get blood drawn today and make a follow up after. LEONOR

## 2018-07-13 NOTE — PROGRESS NOTES
Please let the patient know that the labs look good. We can discuss at her next appointment with Magdiel. Thank you  Sukhi Quintanilla M.D.

## 2018-07-30 ENCOUNTER — TELEPHONE (OUTPATIENT)
Dept: MEDICAL GROUP | Facility: PHYSICIAN GROUP | Age: 53
End: 2018-07-30

## 2018-07-30 DIAGNOSIS — G47.01 INSOMNIA DUE TO MEDICAL CONDITION: ICD-10-CM

## 2018-07-30 RX ORDER — MIRTAZAPINE 30 MG/1
30 TABLET, FILM COATED ORAL
Qty: 90 TAB | Refills: 0 | Status: SHIPPED | OUTPATIENT
Start: 2018-07-30 | End: 2018-07-31

## 2018-07-30 NOTE — TELEPHONE ENCOUNTER
Was the patient seen in the last year in this department? Yes   Refill on 7/12/18 states patient needs appt for refills - please advise.     Does patient have an active prescription for medications requested? No     Received Request Via: Pharmacy     Request for 90 day supply.

## 2018-07-30 NOTE — TELEPHONE ENCOUNTER
ESTABLISHED PATIENT PRE-VISIT PLANNING     Note: Patient will not be contacted if there is no indication to call.     1.  Reviewed notes from the last few office visits within the medical group: Yes    2.  If any orders were placed at last visit or intended to be done for this visit (i.e. 6 mos follow-up), do we have Results/Consult Notes?        •  Labs - Labs ordered, completed on 07/12/18 and results are in chart.   Note: If patient appointment is for lab review and patient did not complete labs, check with provider if OK to reschedule patient until labs completed.       •  Imaging - Imaging was not ordered at last office visit.       •  Referrals - Referral ordered, patient has NOT been seen.    3. Is this appointment scheduled as a Hospital Follow-Up? No    4.  Immunizations were updated in Epic using WebIZ?: Epic matches WebIZ       •  Web Iz Recommendations: FLU, MMR , TDAP, VARICELLA (Chicken Pox)  and ZOSTAVAX (Shingles)    5.  Patient is due for the following Health Maintenance Topics:   Health Maintenance Due   Topic Date Due   • PFT SCREENING-FEV1 AND FEV/FVC RATIO / SPIROMETRY SHOULD BE PERFORMED ANNUALLY  01/03/1984   • IMM DTaP/Tdap/Td Vaccine (1 - Tdap) 01/03/1985   • MAMMOGRAM  01/03/2006   • COLONOSCOPY  01/03/2016   • IMM ZOSTER VACCINES (1 of 2) 01/03/2016       - Patient has completed FLU and PNEUMOVAX (PPSV23) Immunization(s) per WebIZ. Chart has been updated.    6.  MDX printed for Provider? NO    7.  Patient was NOT informed to arrive 15 min prior to their scheduled appointment and bring in their medication bottles.

## 2018-07-31 ENCOUNTER — HOSPITAL ENCOUNTER (OUTPATIENT)
Facility: MEDICAL CENTER | Age: 53
End: 2018-07-31
Attending: NURSE PRACTITIONER
Payer: MEDICARE

## 2018-07-31 ENCOUNTER — OFFICE VISIT (OUTPATIENT)
Dept: MEDICAL GROUP | Facility: PHYSICIAN GROUP | Age: 53
End: 2018-07-31
Payer: MEDICARE

## 2018-07-31 VITALS
DIASTOLIC BLOOD PRESSURE: 78 MMHG | HEIGHT: 66 IN | RESPIRATION RATE: 18 BRPM | SYSTOLIC BLOOD PRESSURE: 134 MMHG | OXYGEN SATURATION: 96 % | HEART RATE: 92 BPM | TEMPERATURE: 97.6 F | WEIGHT: 171 LBS | BODY MASS INDEX: 27.48 KG/M2

## 2018-07-31 DIAGNOSIS — F41.1 GAD (GENERALIZED ANXIETY DISORDER): ICD-10-CM

## 2018-07-31 DIAGNOSIS — F31.30 BIPOLAR AFFECTIVE DISORDER, CURRENT EPISODE DEPRESSED, CURRENT EPISODE SEVERITY UNSPECIFIED (HCC): ICD-10-CM

## 2018-07-31 DIAGNOSIS — R94.4 DECREASED GFR: ICD-10-CM

## 2018-07-31 DIAGNOSIS — J44.89 COPD (CHRONIC OBSTRUCTIVE PULMONARY DISEASE) WITH CHRONIC BRONCHITIS: ICD-10-CM

## 2018-07-31 DIAGNOSIS — G25.0 ESSENTIAL TREMOR: ICD-10-CM

## 2018-07-31 DIAGNOSIS — R10.9 FLANK PAIN: ICD-10-CM

## 2018-07-31 DIAGNOSIS — F33.1 MODERATE EPISODE OF RECURRENT MAJOR DEPRESSIVE DISORDER (HCC): ICD-10-CM

## 2018-07-31 DIAGNOSIS — G47.01 INSOMNIA DUE TO MEDICAL CONDITION: ICD-10-CM

## 2018-07-31 DIAGNOSIS — M79.7 FIBROMYALGIA: ICD-10-CM

## 2018-07-31 LAB
APPEARANCE UR: CLEAR
BILIRUB UR STRIP-MCNC: NORMAL MG/DL
COLOR UR AUTO: YELLOW
GLUCOSE UR STRIP.AUTO-MCNC: NORMAL MG/DL
KETONES UR STRIP.AUTO-MCNC: NORMAL MG/DL
LEUKOCYTE ESTERASE UR QL STRIP.AUTO: NORMAL
NITRITE UR QL STRIP.AUTO: NORMAL
PH UR STRIP.AUTO: 5.5 [PH] (ref 5–8)
PROT UR QL STRIP: NORMAL MG/DL
RBC UR QL AUTO: NORMAL
SP GR UR STRIP.AUTO: 1.02
UROBILINOGEN UR STRIP-MCNC: 0.2 MG/DL

## 2018-07-31 PROCEDURE — 99000 SPECIMEN HANDLING OFFICE-LAB: CPT | Performed by: NURSE PRACTITIONER

## 2018-07-31 PROCEDURE — 99214 OFFICE O/P EST MOD 30 MIN: CPT | Performed by: NURSE PRACTITIONER

## 2018-07-31 PROCEDURE — 81002 URINALYSIS NONAUTO W/O SCOPE: CPT | Performed by: NURSE PRACTITIONER

## 2018-07-31 PROCEDURE — 87086 URINE CULTURE/COLONY COUNT: CPT

## 2018-07-31 RX ORDER — MIRTAZAPINE 30 MG/1
30 TABLET, FILM COATED ORAL
Qty: 90 TAB | Refills: 3 | Status: SHIPPED | OUTPATIENT
Start: 2018-07-31 | End: 2019-08-05 | Stop reason: SDUPTHER

## 2018-07-31 ASSESSMENT — PAIN SCALES - GENERAL: PAINLEVEL: 9=SEVERE PAIN

## 2018-07-31 NOTE — PROGRESS NOTES
Chief Complaint   Patient presents with   • Results     Labs    • Medication Refill       HISTORY OF PRESENT ILLNESS: Patient is a 53 y.o. female established patient who presents today to follow-up on labs and chronic issues.    Flank pain  Has not noticed dark urine, states she has been drinking plenty of fluids. States that she has pain on the left. States that it has been sharp intermittently. Has not noticed fevers or chills. States that she has not had frequency or urgency with urination. Does have hx diverticulitis.     Bipolar affective disorder, current episode depressed ([facility])  Chronic in nature.  Stable.  Patient states that things have been improved since winter.  States that boyfriend is currently working and she has been doing better.  Patient states that she has had increases in anxiety and depression she continues to take topiramate and Remeron but did not follow-up with behavioral health as recommended.  Patient denies side effects on the medication.    COPD (chronic obstructive pulmonary disease) with chronic bronchitis ([facility])  Chronic in nature.  Stable.  O2 saturation is 96% today.  Patient continues to use inhalers intermittently.  Continues to smoke.  Smoking cessation patient refuses at this time.  Patient states that shortness of breath and dyspnea has been surprisingly good considering the smoke outside.    Essential tremor  Chronic in nature.  Stable.  Symptoms are well controlled with primidone, the tremor is more noticeable today.  Patient states that has had work-up in the past.     Fibromyalgia  Chronic in nature.  Stable.  Patient states that she continues to take medications as prescribed without side effects.  Recent states that she uses over-the-counter medications as needed for pain.  Refuses referral.      Patient Active Problem List    Diagnosis Date Noted   • Flank pain 07/31/2018   • Actinic keratosis 02/06/2018   • Trigger finger, right index finger 01/22/2018   • COPD  (chronic obstructive pulmonary disease) with chronic bronchitis ([facility]) 02/09/2017   • H/O hysterectomy with oophorectomy 02/09/2017   • Essential tremor 02/09/2017   • Irritable bowel syndrome with diarrhea 02/09/2017   • Insomnia due to medical condition 02/09/2017   • Bipolar affective disorder, current episode depressed ([facility]) 02/09/2017   • Vitamin B deficiency 02/09/2017   • Fibromyalgia 09/15/2016   • Migraine without status migrainosus, not intractable 09/15/2016   • Facet arthropathy, cervical ([facility]) 04/08/2016   • Facet arthropathy, lumbar ([facility]) 04/08/2016   • KVNG (generalized anxiety disorder) 09/23/2014   • Interstitial lung disease ([facility]) 09/05/2014   • Tobacco use disorder 06/07/2014   • Recurrent major depressive disorder ([facility]) 01/27/2010       Allergies:Erythromycin and Tetracycline    Current Outpatient Prescriptions   Medication Sig Dispense Refill   • mirtazapine (REMERON) 30 MG Tab tablet Take 1 Tab by mouth every bedtime. 90 Tab 3   • albuterol (PROAIR HFA) 108 (90 Base) MCG/ACT Aero Soln inhalation aerosol Inhale 2 Puffs by mouth every 6 hours as needed for Shortness of Breath. 8.5 g 0   • gabapentin (NEURONTIN) 600 MG tablet Take 1 Tab by mouth 3 times a day. 90 Tab 0   • topiramate (TOPAMAX) 50 MG tablet 1 TAB 3 TIMES A DAY FOR MIGRAINE PREVENTION. 90 Tab 0   • Milnacipran HCl (SAVELLA) 100 MG Tab TAKE 1 TABLET BY MOUTH 2 TIMES A DAY. 60 Tab 0   • ondansetron (ZOFRAN ODT) 4 MG TABLET DISPERSIBLE Take 1 Tab by mouth every 8 hours as needed. 30 Tab 0   • meloxicam (MOBIC) 15 MG tablet Take 1 Tab by mouth every day. 30 Tab 0   • folic acid (FOLVITE) 1 MG Tab Take 1 Tab by mouth every day. 30 Tab 0   • baclofen (LIORESAL) 10 MG Tab Take 1 Tab by mouth 3 times a day. 90 Tab 0   • meloxicam (MOBIC) 15 MG tablet TAKE 1 TAB BY MOUTH EVERY DAY. 30 Tab 5   • clindamycin (CLEOCIN) 2 % vaginal cream APPLY 1 TIME TO THE AFFECTED AREA ONE TIME A DAY FOR 7 DAYS 40 g 0   • Diclofenac Sodium (VOLTAREN) 1 % Gel  Apply 1 Application to skin as directed 2 times a day as needed. 2 Tube 6   • loratadine-pseudoephedrine (CLARITIN-D 24-HOUR)  MG per tablet Take 1 Tab by mouth every day. 30 Tab 0   • primidone (MYSOLINE) 50 MG Tab Take 1 Tab by mouth every day. 90 Tab 3   • Cyanocobalamin (VITAMIN B-12) 1000 MCG Tab Take 1 Tab by mouth 2 Times a Day. 60 Tab 11   • hydrOXYzine (ATARAX) 25 MG Tab Take 25 mg by mouth 3 times a day as needed for Itching.     • B Complex Vitamins (B COMPLEX 1 PO) Take  by mouth.     • bisacodyl EC (DULCOLAX) 5 MG Tablet Delayed Response Take 5 mg by mouth 1 time daily as needed for Constipation.       No current facility-administered medications for this visit.        Social History   Substance Use Topics   • Smoking status: Current Every Day Smoker     Packs/day: 1.00     Years: 35.00   • Smokeless tobacco: Never Used   • Alcohol use 1.2 oz/week     2 Glasses of wine per week      Comment: weekly       Family Status   Relation Status   • Mo Alive   • Fa    • Sis (Not Specified)     Family History   Problem Relation Age of Onset   • Lung Disease Mother         copd   • Cancer Mother         basal/squamous   • Heart Disease Father         HF   • Arthritis Father         rheumatoid   • Lung Disease Father         emphysema   • Cancer Sister        Review of Systems:   Constitutional:  Negative for fever, chills, weight loss and malaise/fatigue.   HEENT:  Negative for ear pain, nosebleeds, congestion, sore throat and neck pain.    Eyes:  Negative for blurred vision.   Respiratory:  Negative for cough, sputum production, shortness of breath and wheezing.    Cardiovascular:  Negative for chest pain, palpitations, orthopnea and leg swelling.   Gastrointestinal:  Negative for heartburn, nausea, vomiting and abdominal pain. Positive flank pain.  Genitourinary:  Negative for dysuria, urgency and frequency.   Musculoskeletal:  Positive for myalgias, back pain and joint pain.   Skin:  Negative for  "rash and itching.   Neurological:  Negative for dizziness, tingling, positive unchanged tremors, negative sensory change, focal weakness and headaches.   Endo/Heme/Allergies:  Does not bruise/bleed easily.   Psychiatric/Behavioral: Positive for depression, negative suicidal ideas and memory loss.  The patient is nervous/anxious and does not have insomnia.    All other systems reviewed and are negative except as in HPI.    Exam:  Blood pressure 134/78, pulse 92, temperature 36.4 °C (97.6 °F), resp. rate 18, height 1.676 m (5' 6\"), weight 77.6 kg (171 lb), SpO2 96 %, not currently breastfeeding.  General:  Normal appearing. No distress.  Pulmonary:  Clear to ausculation.  Normal effort. No rales, ronchi, or wheezing.  Cardiovascular:  Regular rate and rhythm without murmur. Carotid and radial pulses are intact and equal bilaterally.  Neurologic:  Grossly nonfocal  Skin:  Warm and dry.  No obvious lesions.  Musculoskeletal:  Normal gait. No extremity cyanosis, clubbing, or edema.  Musculoskeletal tenderness at left flank, spasm is noted.  Psych:  Normal mood and affect. Alert and oriented x3. Judgment and insight is normal.      PLAN:    1. Insomnia due to medical condition  2. Bipolar affective disorder, current episode depressed, current episode severity unspecified (HCC)  3. Moderate episode of recurrent major depressive disorder (HCC)  4. KVNG (generalized anxiety disorder)  Current medication patient is recommended to be seen in behavioral health.  - mirtazapine (REMERON) 30 MG Tab tablet; Take 1 Tab by mouth every bedtime.  Dispense: 90 Tab; Refill: 3  - REFERRAL TO BEHAVIORAL HEALTH    5. Flank pain  In clinic her urinalysis is 100% within normal limits.  - POCT Urinalysis  - URINE CULTURE    6. Decreased GFR  - BASIC METABOLIC PANEL; Future    7. COPD (chronic obstructive pulmonary disease) with chronic bronchitis (HCC)  Continue to use inhalers.    8. Essential tremor  Continue monitoring.    9. " Fibromyalgia  Continue to take current medications.    Follow-up in 3 months or sooner as needed. Patient is encouraged to be seen in the emergency room for chest pain, palpitations, shortness of breath, dizziness, severe abdominal pain or other concerning symptoms.    Please note that this dictation was created using voice recognition software. I have made every reasonable attempt to correct obvious errors, but I expect that there are errors of grammar and possibly content that I did not discover before finalizing the note.    Assessment/Plan:  1. Insomnia due to medical condition  mirtazapine (REMERON) 30 MG Tab tablet    REFERRAL TO BEHAVIORAL HEALTH   2. Bipolar affective disorder, current episode depressed, current episode severity unspecified (HCC)  REFERRAL TO BEHAVIORAL HEALTH   3. Moderate episode of recurrent major depressive disorder (HCC)  REFERRAL TO BEHAVIORAL HEALTH   4. KVGN (generalized anxiety disorder)  REFERRAL TO BEHAVIORAL HEALTH   5. Flank pain  POCT Urinalysis    URINE CULTURE    CANCELED: URINE CULTURE   6. Decreased GFR  BASIC METABOLIC PANEL   7. COPD (chronic obstructive pulmonary disease) with chronic bronchitis (HCC)     8. Essential tremor     9. Fibromyalgia            I have placed the below orders and discussed them with an approved delegating provider. The MA is performing the below orders under the direction of Dr. Hernandez.

## 2018-07-31 NOTE — ASSESSMENT & PLAN NOTE
Has not noticed dark urine, states she has been drinking plenty of fluids. States that she has pain on the left. States that it has been sharp intermittently. Has not noticed fevers or chills. States that she has not had frequency or urgency with urination. Does have hx diverticulitis.

## 2018-07-31 NOTE — ASSESSMENT & PLAN NOTE
Chronic in nature.  Stable.  O2 saturation is 96% today.  Patient continues to use inhalers intermittently.  Continues to smoke.  Smoking cessation patient refuses at this time.  Patient states that shortness of breath and dyspnea has been surprisingly good considering the smoke outside.

## 2018-07-31 NOTE — ASSESSMENT & PLAN NOTE
Chronic in nature.  Stable.  Symptoms are well controlled with primidone, the tremor is more noticeable today.  Patient states that has had work-up in the past.

## 2018-07-31 NOTE — ASSESSMENT & PLAN NOTE
Chronic in nature.  Stable.  Patient states that she continues to take medications as prescribed without side effects.  Recent states that she uses over-the-counter medications as needed for pain.  Refuses referral.

## 2018-07-31 NOTE — ASSESSMENT & PLAN NOTE
Chronic in nature.  Stable.  Patient states that things have been improved since winter.  States that boyfriend is currently working and she has been doing better.  Patient states that she has had increases in anxiety and depression she continues to take topiramate and Remeron but did not follow-up with behavioral health as recommended.  Patient denies side effects on the medication.

## 2018-08-02 LAB
BACTERIA UR CULT: NORMAL
SIGNIFICANT IND 70042: NORMAL
SITE SITE: NORMAL
SOURCE SOURCE: NORMAL

## 2018-08-13 DIAGNOSIS — M79.7 FIBROMYALGIA: ICD-10-CM

## 2018-08-13 DIAGNOSIS — E53.9 VITAMIN B DEFICIENCY: ICD-10-CM

## 2018-08-13 RX ORDER — BACLOFEN 10 MG/1
TABLET ORAL
Qty: 90 TAB | Refills: 0 | Status: SHIPPED | OUTPATIENT
Start: 2018-08-13 | End: 2018-09-16 | Stop reason: SDUPTHER

## 2018-08-13 RX ORDER — FOLIC ACID 1 MG/1
TABLET ORAL
Qty: 30 TAB | Refills: 0 | Status: SHIPPED | OUTPATIENT
Start: 2018-08-13 | End: 2019-07-16

## 2018-08-13 RX ORDER — MILNACIPRAN HYDROCHLORIDE 100 MG/1
TABLET, FILM COATED ORAL
Qty: 60 TAB | Refills: 0 | Status: SHIPPED | OUTPATIENT
Start: 2018-08-13 | End: 2018-09-16 | Stop reason: SDUPTHER

## 2018-08-13 RX ORDER — GABAPENTIN 600 MG/1
TABLET ORAL
Qty: 90 TAB | Refills: 0 | Status: SHIPPED | OUTPATIENT
Start: 2018-08-13 | End: 2018-09-16 | Stop reason: SDUPTHER

## 2018-09-06 DIAGNOSIS — G43.809 OTHER MIGRAINE WITHOUT STATUS MIGRAINOSUS, NOT INTRACTABLE: ICD-10-CM

## 2018-09-06 DIAGNOSIS — M79.7 FIBROMYALGIA: ICD-10-CM

## 2018-09-06 RX ORDER — TOPIRAMATE 50 MG/1
TABLET, FILM COATED ORAL
Qty: 90 TAB | Refills: 0 | Status: SHIPPED | OUTPATIENT
Start: 2018-09-06 | End: 2019-01-14 | Stop reason: SDUPTHER

## 2018-09-16 DIAGNOSIS — M79.7 FIBROMYALGIA: ICD-10-CM

## 2018-09-17 RX ORDER — BACLOFEN 10 MG/1
TABLET ORAL
Qty: 90 TAB | Refills: 0 | Status: SHIPPED | OUTPATIENT
Start: 2018-09-17 | End: 2018-10-28 | Stop reason: SDUPTHER

## 2018-09-17 RX ORDER — GABAPENTIN 600 MG/1
TABLET ORAL
Qty: 90 TAB | Refills: 0 | Status: SHIPPED | OUTPATIENT
Start: 2018-09-17 | End: 2018-10-28 | Stop reason: SDUPTHER

## 2018-09-17 RX ORDER — MILNACIPRAN HYDROCHLORIDE 100 MG/1
TABLET, FILM COATED ORAL
Qty: 60 TAB | Refills: 0 | Status: SHIPPED | OUTPATIENT
Start: 2018-09-17 | End: 2018-10-28 | Stop reason: SDUPTHER

## 2018-12-18 DIAGNOSIS — K58.0 IRRITABLE BOWEL SYNDROME WITH DIARRHEA: ICD-10-CM

## 2018-12-18 RX ORDER — ONDANSETRON 4 MG/1
TABLET, ORALLY DISINTEGRATING ORAL
Qty: 30 TAB | Refills: 0 | Status: SHIPPED | OUTPATIENT
Start: 2018-12-18 | End: 2019-02-22 | Stop reason: SDUPTHER

## 2018-12-21 ENCOUNTER — PATIENT OUTREACH (OUTPATIENT)
Dept: HEALTH INFORMATION MANAGEMENT | Facility: OTHER | Age: 53
End: 2018-12-21

## 2018-12-21 NOTE — PROGRESS NOTES
Attempt #: Final    WebIZ Checked & Epic Updated: yes  HealthConnect Verified: yes  Verify PCP: yes    Communication Preference Obtained: no / prefers to complete info during appt.     Review Care Team: no    Annual Wellness Visit Scheduling  1. Scheduling Status:Scheduled     Care Gap Scheduling (Attempt to Schedule EACH Overdue Care Gap!) // Pt stated that her last Colonoscopy and Mammogram  Were couple yrs ago in [place]. / Requested records from [facility].  F #[phone]      Health Maintenance Due   Topic Date Due   • PFT SCREENING-FEV1 AND FEV/FVC RATIO / SPIROMETRY SHOULD BE PERFORMED ANNUALLY  01/03/1984   • MAMMOGRAM  01/03/2006   • COLONOSCOPY  01/03/2016   • IMM ZOSTER VACCINES (1 of 2) 01/03/2016   • IMM INFLUENZA (1) 09/01/2018       MyChart Activation: already active

## 2019-01-03 ENCOUNTER — TELEPHONE (OUTPATIENT)
Dept: MEDICAL GROUP | Facility: PHYSICIAN GROUP | Age: 54
End: 2019-01-03

## 2019-01-03 NOTE — TELEPHONE ENCOUNTER
Left message for patient to call back regarding pre-visit planning. Please transfer call to Angely Ricardo.

## 2019-01-09 ENCOUNTER — APPOINTMENT (OUTPATIENT)
Dept: RADIOLOGY | Facility: MEDICAL CENTER | Age: 54
End: 2019-01-09
Attending: NURSE PRACTITIONER
Payer: MEDICARE

## 2019-01-14 DIAGNOSIS — M79.7 FIBROMYALGIA: ICD-10-CM

## 2019-01-14 DIAGNOSIS — G43.809 OTHER MIGRAINE WITHOUT STATUS MIGRAINOSUS, NOT INTRACTABLE: ICD-10-CM

## 2019-01-15 ENCOUNTER — APPOINTMENT (OUTPATIENT)
Dept: RADIOLOGY | Facility: MEDICAL CENTER | Age: 54
End: 2019-01-15
Attending: NURSE PRACTITIONER
Payer: MEDICARE

## 2019-01-15 ENCOUNTER — HOSPITAL ENCOUNTER (OUTPATIENT)
Facility: MEDICAL CENTER | Age: 54
End: 2019-01-15
Payer: MEDICARE

## 2019-01-15 PROCEDURE — 82274 ASSAY TEST FOR BLOOD FECAL: CPT

## 2019-01-15 RX ORDER — TOPIRAMATE 50 MG/1
TABLET, FILM COATED ORAL
Qty: 90 TAB | Refills: 0 | Status: SHIPPED | OUTPATIENT
Start: 2019-01-15 | End: 2019-02-22 | Stop reason: SDUPTHER

## 2019-01-16 ENCOUNTER — APPOINTMENT (OUTPATIENT)
Dept: MEDICAL GROUP | Facility: PHYSICIAN GROUP | Age: 54
End: 2019-01-16
Payer: MEDICARE

## 2019-01-17 LAB — HEMOCCULT STL QL IA: NEGATIVE

## 2019-01-23 ENCOUNTER — APPOINTMENT (OUTPATIENT)
Dept: RADIOLOGY | Facility: MEDICAL CENTER | Age: 54
End: 2019-01-23
Attending: NURSE PRACTITIONER
Payer: MEDICARE

## 2019-02-20 ENCOUNTER — APPOINTMENT (OUTPATIENT)
Dept: MEDICAL GROUP | Facility: PHYSICIAN GROUP | Age: 54
End: 2019-02-20
Payer: MEDICARE

## 2019-03-27 DIAGNOSIS — G43.809 OTHER MIGRAINE WITHOUT STATUS MIGRAINOSUS, NOT INTRACTABLE: ICD-10-CM

## 2019-03-27 DIAGNOSIS — M79.7 FIBROMYALGIA: ICD-10-CM

## 2019-03-27 RX ORDER — TOPIRAMATE 50 MG/1
TABLET, FILM COATED ORAL
Qty: 270 TAB | Refills: 3 | Status: SHIPPED | OUTPATIENT
Start: 2019-03-27 | End: 2020-03-16

## 2019-04-08 DIAGNOSIS — K58.0 IRRITABLE BOWEL SYNDROME WITH DIARRHEA: ICD-10-CM

## 2019-04-08 RX ORDER — ONDANSETRON 4 MG/1
TABLET, ORALLY DISINTEGRATING ORAL
Qty: 30 TAB | Refills: 0 | Status: SHIPPED | OUTPATIENT
Start: 2019-04-08 | End: 2019-06-06 | Stop reason: SDUPTHER

## 2019-05-09 ENCOUNTER — PATIENT OUTREACH (OUTPATIENT)
Dept: HEALTH INFORMATION MANAGEMENT | Facility: OTHER | Age: 54
End: 2019-05-09

## 2019-05-09 NOTE — PROGRESS NOTES
Outcome: Left Message    Please transfer to Patient Outreach Team at [phone] when patient returns call.    WebIZ Checked & Epic Updated:  yes    HealthConnect Verified: yes    Attempt # 1

## 2019-05-23 NOTE — PROGRESS NOTES
Outcome: Left Message    Please transfer to Patient Outreach Team at [phone] when patient returns call.      Attempt # 2

## 2019-05-30 NOTE — PROGRESS NOTES
1. Attempt #:1    2. HealthConnect Verified: no    3. Verify PCP: yes    4. Review Care Team: yes    5. WebIZ Checked & Epic Updated: NA  · WebIZ Recommendations:NA  · Is patient due for Tdap? NO  · Is patient due for Shingles? YES. Patient was not notified of copay/out of pocket cost. Out of Stock     6. Reviewed/Updated the following with patient:       •   Communication Preference Obtained? YES       •   Preferred Pharmacy? YES       •   Preferred Lab? YES       •   Family History (document living status of immediate family members and if + hx of cancer, diabetes, hypertension, hyperlipidemia, heart attack, stroke) YES    7. Annual Wellness Visit Scheduling  · Scheduling Status:Scheduled     8. Care Gap Scheduling (Attempt to Schedule EACH Overdue Care Gap!)     Health Maintenance Due   Topic Date Due   • PFT SCREENING-FEV1 AND FEV/FVC RATIO / SPIROMETRY SHOULD BE PERFORMED ANNUALLY  01/03/1984   • MAMMOGRAM  01/03/2006   • COLONOSCOPY  01/03/2016   • IMM ZOSTER VACCINES (1 of 2) 01/03/2016   • Annual Wellness Visit  01/23/2019        Scheduled patient for Annual Wellness Visit     9. [employer] Activation: already active    10. [employer] Robin: yes    11. Virtual Visits: no    12. Opt In to Text Messages: yes    13. Patient was advised: “This is a free wellness visit. The provider will screen for medical conditions to help you stay healthy. If you have other concerns to address you may be asked to discuss these at a separate visit or there may be an additional fee.”     14. Patient was informed to arrive 15 min prior to their scheduled appointment and bring in their medication bottles.

## 2019-06-06 DIAGNOSIS — K58.0 IRRITABLE BOWEL SYNDROME WITH DIARRHEA: ICD-10-CM

## 2019-06-06 DIAGNOSIS — R39.89 URINARY PROBLEM: ICD-10-CM

## 2019-06-10 RX ORDER — ONDANSETRON 4 MG/1
TABLET, ORALLY DISINTEGRATING ORAL
Qty: 30 TAB | Refills: 0 | Status: SHIPPED | OUTPATIENT
Start: 2019-06-10 | End: 2019-07-06 | Stop reason: SDUPTHER

## 2019-06-10 RX ORDER — CLINDAMYCIN PHOSPHATE 20 MG/G
CREAM VAGINAL
Qty: 40 G | Refills: 0 | Status: SHIPPED | OUTPATIENT
Start: 2019-06-10 | End: 2019-07-16

## 2019-06-12 ENCOUNTER — APPOINTMENT (OUTPATIENT)
Dept: MEDICAL GROUP | Facility: PHYSICIAN GROUP | Age: 54
End: 2019-06-12
Payer: MEDICARE

## 2019-07-06 DIAGNOSIS — K58.0 IRRITABLE BOWEL SYNDROME WITH DIARRHEA: ICD-10-CM

## 2019-07-07 DIAGNOSIS — M79.7 FIBROMYALGIA: ICD-10-CM

## 2019-07-08 RX ORDER — BACLOFEN 10 MG/1
TABLET ORAL
Qty: 90 TAB | Refills: 0 | Status: SHIPPED | OUTPATIENT
Start: 2019-07-08 | End: 2019-08-04 | Stop reason: SDUPTHER

## 2019-07-08 RX ORDER — MELOXICAM 15 MG/1
TABLET ORAL
Qty: 30 TAB | Refills: 0 | Status: SHIPPED | OUTPATIENT
Start: 2019-07-08 | End: 2019-07-16

## 2019-07-08 RX ORDER — ONDANSETRON 4 MG/1
8 TABLET, ORALLY DISINTEGRATING ORAL EVERY 8 HOURS PRN
Qty: 30 TAB | Refills: 0 | Status: SHIPPED | OUTPATIENT
Start: 2019-07-08 | End: 2019-07-16

## 2019-07-16 ENCOUNTER — HOSPITAL ENCOUNTER (EMERGENCY)
Facility: MEDICAL CENTER | Age: 54
End: 2019-07-17
Attending: EMERGENCY MEDICINE
Payer: MEDICARE

## 2019-07-16 LAB
POC BREATHALIZER: 0.03 PERCENT (ref 0–0.01)
POC BREATHALIZER: 0.18 PERCENT (ref 0–0.01)

## 2019-07-16 PROCEDURE — 99285 EMERGENCY DEPT VISIT HI MDM: CPT

## 2019-07-16 PROCEDURE — 302970 POC BREATHALIZER

## 2019-07-16 PROCEDURE — 700102 HCHG RX REV CODE 250 W/ 637 OVERRIDE(OP): Performed by: EMERGENCY MEDICINE

## 2019-07-16 PROCEDURE — 302970 POC BREATHALIZER: Performed by: EMERGENCY MEDICINE

## 2019-07-16 PROCEDURE — A9270 NON-COVERED ITEM OR SERVICE: HCPCS | Performed by: EMERGENCY MEDICINE

## 2019-07-16 RX ORDER — NICOTINE 21 MG/24HR
1 PATCH, TRANSDERMAL 24 HOURS TRANSDERMAL ONCE
Status: DISCONTINUED | OUTPATIENT
Start: 2019-07-16 | End: 2019-07-17

## 2019-07-16 RX ORDER — BACITRACIN ZINC 500 [USP'U]/G
OINTMENT TOPICAL 2 TIMES DAILY
Status: DISCONTINUED | OUTPATIENT
Start: 2019-07-16 | End: 2019-07-17 | Stop reason: HOSPADM

## 2019-07-16 RX ORDER — LORAZEPAM 1 MG/1
1 TABLET ORAL ONCE
Status: COMPLETED | OUTPATIENT
Start: 2019-07-16 | End: 2019-07-16

## 2019-07-16 RX ADMIN — NICOTINE 14 MG: 14 PATCH, EXTENDED RELEASE TRANSDERMAL at 19:33

## 2019-07-16 RX ADMIN — BACITRACIN ZINC: 500 OINTMENT TOPICAL at 21:45

## 2019-07-16 RX ADMIN — LORAZEPAM 1 MG: 1 TABLET ORAL at 19:33

## 2019-07-16 ASSESSMENT — LIFESTYLE VARIABLES
ON A TYPICAL DAY WHEN YOU DRINK ALCOHOL HOW MANY DRINKS DO YOU HAVE: 6
EVER HAD A DRINK FIRST THING IN THE MORNING TO STEADY YOUR NERVES TO GET RID OF A HANGOVER: YES
TOTAL SCORE: 2
AVERAGE NUMBER OF DAYS PER WEEK YOU HAVE A DRINK CONTAINING ALCOHOL: 7
CONSUMPTION TOTAL: POSITIVE
HAVE YOU EVER FELT YOU SHOULD CUT DOWN ON YOUR DRINKING: NO
TOTAL SCORE: 2
DOES PATIENT WANT TO STOP DRINKING: CANNOT ASSESS
EVER FELT BAD OR GUILTY ABOUT YOUR DRINKING: NO
HAVE PEOPLE ANNOYED YOU BY CRITICIZING YOUR DRINKING: YES
DO YOU DRINK ALCOHOL: YES
HOW MANY TIMES IN THE PAST YEAR HAVE YOU HAD 5 OR MORE DRINKS IN A DAY: 4
TOTAL SCORE: 2

## 2019-07-17 VITALS
HEIGHT: 66 IN | WEIGHT: 191 LBS | RESPIRATION RATE: 18 BRPM | BODY MASS INDEX: 30.7 KG/M2 | OXYGEN SATURATION: 99 % | TEMPERATURE: 97.2 F | SYSTOLIC BLOOD PRESSURE: 144 MMHG | HEART RATE: 63 BPM | DIASTOLIC BLOOD PRESSURE: 95 MMHG

## 2019-07-17 LAB
AMPHET UR QL SCN: NEGATIVE
BARBITURATES UR QL SCN: NEGATIVE
BENZODIAZ UR QL SCN: NEGATIVE
BZE UR QL SCN: NEGATIVE
CANNABINOIDS UR QL SCN: POSITIVE
METHADONE UR QL SCN: NEGATIVE
OPIATES UR QL SCN: NEGATIVE
OXYCODONE UR QL SCN: NEGATIVE
PCP UR QL SCN: NEGATIVE
PROPOXYPH UR QL SCN: NEGATIVE

## 2019-07-17 PROCEDURE — 700102 HCHG RX REV CODE 250 W/ 637 OVERRIDE(OP): Performed by: EMERGENCY MEDICINE

## 2019-07-17 PROCEDURE — A9270 NON-COVERED ITEM OR SERVICE: HCPCS | Performed by: EMERGENCY MEDICINE

## 2019-07-17 PROCEDURE — 80307 DRUG TEST PRSMV CHEM ANLYZR: CPT

## 2019-07-17 RX ORDER — LORAZEPAM 1 MG/1
1 TABLET ORAL EVERY 4 HOURS PRN
Status: DISCONTINUED | OUTPATIENT
Start: 2019-07-17 | End: 2019-07-17 | Stop reason: HOSPADM

## 2019-07-17 RX ORDER — LORAZEPAM 1 MG/1
0.5 TABLET ORAL EVERY 4 HOURS PRN
Status: DISCONTINUED | OUTPATIENT
Start: 2019-07-17 | End: 2019-07-17 | Stop reason: HOSPADM

## 2019-07-17 RX ORDER — LORAZEPAM 2 MG/ML
0.5 INJECTION INTRAMUSCULAR EVERY 4 HOURS PRN
Status: DISCONTINUED | OUTPATIENT
Start: 2019-07-17 | End: 2019-07-17 | Stop reason: HOSPADM

## 2019-07-17 RX ORDER — LORAZEPAM 2 MG/1
2 TABLET ORAL
Status: DISCONTINUED | OUTPATIENT
Start: 2019-07-17 | End: 2019-07-17 | Stop reason: HOSPADM

## 2019-07-17 RX ORDER — LORAZEPAM 2 MG/ML
2 INJECTION INTRAMUSCULAR
Status: DISCONTINUED | OUTPATIENT
Start: 2019-07-17 | End: 2019-07-17 | Stop reason: HOSPADM

## 2019-07-17 RX ORDER — LORAZEPAM 2 MG/ML
1 INJECTION INTRAMUSCULAR
Status: DISCONTINUED | OUTPATIENT
Start: 2019-07-17 | End: 2019-07-17 | Stop reason: HOSPADM

## 2019-07-17 RX ORDER — NICOTINE 21 MG/24HR
1 PATCH, TRANSDERMAL 24 HOURS TRANSDERMAL ONCE
Status: DISCONTINUED | OUTPATIENT
Start: 2019-07-17 | End: 2019-07-17 | Stop reason: HOSPADM

## 2019-07-17 RX ORDER — LORAZEPAM 2 MG/1
4 TABLET ORAL
Status: DISCONTINUED | OUTPATIENT
Start: 2019-07-17 | End: 2019-07-17 | Stop reason: HOSPADM

## 2019-07-17 RX ORDER — FOLIC ACID 1 MG/1
1 TABLET ORAL DAILY
Status: DISCONTINUED | OUTPATIENT
Start: 2019-07-17 | End: 2019-07-17 | Stop reason: HOSPADM

## 2019-07-17 RX ORDER — LORAZEPAM 2 MG/ML
1.5 INJECTION INTRAMUSCULAR
Status: DISCONTINUED | OUTPATIENT
Start: 2019-07-17 | End: 2019-07-17 | Stop reason: HOSPADM

## 2019-07-17 RX ORDER — THIAMINE MONONITRATE (VIT B1) 100 MG
100 TABLET ORAL DAILY
Status: DISCONTINUED | OUTPATIENT
Start: 2019-07-17 | End: 2019-07-17 | Stop reason: HOSPADM

## 2019-07-17 RX ADMIN — THERA TABS 1 TABLET: TAB at 06:25

## 2019-07-17 RX ADMIN — LORAZEPAM 0.5 MG: 1 TABLET ORAL at 10:58

## 2019-07-17 RX ADMIN — Medication 100 MG: at 06:25

## 2019-07-17 RX ADMIN — FOLIC ACID 1 MG: 1 TABLET ORAL at 06:25

## 2019-07-17 RX ADMIN — NICOTINE 1 PATCH: 21 PATCH, EXTENDED RELEASE TRANSDERMAL at 10:58

## 2019-07-17 ASSESSMENT — LIFESTYLE VARIABLES
AGITATION: SOMEWHAT MORE THAN NORMAL ACTIVITY
AUDITORY DISTURBANCES: NOT PRESENT
TREMOR: NO TREMOR
HEADACHE, FULLNESS IN HEAD: NOT PRESENT
HEADACHE, FULLNESS IN HEAD: NOT PRESENT
TACTILE DISTURBANCES: MILD ITCHING, PINS AND NEEDLES SENSATION, BURNING OR NUMBNESS
TACTILE DISTURBANCES: VERY MILD ITCHING, PINS AND NEEDLES SENSATION, BURNING OR NUMBNESS
ORIENTATION AND CLOUDING OF SENSORIUM: ORIENTED AND CAN DO SERIAL ADDITIONS
NAUSEA AND VOMITING: MILD NAUSEA WITH NO VOMITING
TREMOR: NO TREMOR
AUDITORY DISTURBANCES: NOT PRESENT
ANXIETY: NO ANXIETY (AT EASE)
VISUAL DISTURBANCES: NOT PRESENT
ANXIETY: MILDLY ANXIOUS
PAROXYSMAL SWEATS: BARELY PERCEPTIBLE SWEATING. PALMS MOIST
TOTAL SCORE: 1
NAUSEA AND VOMITING: NO NAUSEA AND NO VOMITING
AGITATION: NORMAL ACTIVITY
VISUAL DISTURBANCES: NOT PRESENT
ORIENTATION AND CLOUDING OF SENSORIUM: CANNOT DO SERIAL ADDITIONS OR IS UNCERTAIN ABOUT DATE
PAROXYSMAL SWEATS: NO SWEAT VISIBLE
TOTAL SCORE: 7

## 2019-07-17 NOTE — ED NOTES
Crying in room after eating, no other distress noted.     Turned TV on for patient, remains in line of sight of sitter for q15 min checks.

## 2019-07-17 NOTE — ED NOTES
Med rec updated and complete. Allergies reviewed. Pt denies  Oral antibiotic use in last 14 days at home  Home pharmacy [facility]

## 2019-07-17 NOTE — ED NOTES
Patient able to be verbally deescalated but cries intermittently in relation to her social and family situation.    Granted access to phone to talk to sister, patient tolerating well and has good behavior.     Pt remains line of sight of RN and haleigh.

## 2019-07-17 NOTE — ED NOTES
"Pt standing at door, appears agitated. Pt states she is having a nicotine fit and needs a cigarette now, explained to Pt no smoking policy and educated on  hospital rules. Pt tearful, speaking in fast sentences states she is having \"a high anxiety level now. \"   Consulted ERP, orders received.   "

## 2019-07-17 NOTE — DISCHARGE PLANNING
Medical Social Work    Referral: Legal Hold    Intervention: Legal Hold Paperwork given to SW by [employer] RN Donovan Guillory     Legal Hold Initiated: Date: 07- Time: 0450    Patient’s Insurance Listed on Face Sheet: [facility]    Referrals sent to: [facility] [place], [facility]    This referral contains the following information:  1) Face sheet _x___  2) Page 1 and Page 2 of Legal Hold x____  3) Alert Team Assessment/Psych Assessment _x__  4) Head to toe physical exam _x___  5) Urine Drug Screen __Pending__  6) Blood Alcohol __x__  7) Vital signs __x__  8) Pregnancy test when applicable __NA_  9) Medications list __x__    Plan: Patient will transfer to mental health facility once acceptance is obtained

## 2019-07-17 NOTE — ED NOTES
Patient medicated per MAR for anxiety and given Nicotine patch. Report attempted to [facility] but RN unavailable.     Patient transported at this time .

## 2019-07-17 NOTE — ED PROVIDER NOTES
ED Provider Note    Scribed for Benjie Parsons M.D. by Jemal Trinh. 7/16/2019  6:46 PM    Primary care provider: JAN Rose  Means of arrival: EMS  History obtained from: Patient  History limited by: None    CHIEF COMPLAINT  Chief Complaint   Patient presents with   • Suicidal Ideation   • T-5000 Lacerations       HPI  Jaclyn Mejia is a 54 y.o. female who presents to the Emergency Department via ambulance with self inflicted lacerations to her left arm with associated mild pain. She denies previous attempts to cut herself. She states that she cut her arm because she was mad at her son, adding that they were yelling at each other and calling each other names. She adds that she had been drinking today. States that she does not want to kill herself, but has a history of depression and bipolar disorder.      REVIEW OF SYSTEMS  Pertinent positives include lacerations of left arm, left arm pain, depression, alcohol use. Pertinent negatives include no suicidal ideation.  All other systems reviewed and negative.    PAST MEDICAL HISTORY   has a past medical history of Actinic keratosis (2/6/2018); Allergy; Anxiety; Arthritis; Chronic pain syndrome; COPD (chronic obstructive pulmonary disease) ([facility]); DDD (degenerative disc disease), cervical; DDD (degenerative disc disease), thoracolumbar; Depression; Diverticulosis; Essential tremor; Fibromyalgia; IBS (irritable bowel syndrome); Migraine; and Pulmonary emphysema ([facility]).    SURGICAL HISTORY   has a past surgical history that includes cholecystectomy; abdominal hysterectomy total; and tonsillectomy.    SOCIAL HISTORY  Social History   Substance Use Topics   • Smoking status: Current Every Day Smoker     Packs/day: 1.00     Years: 35.00   • Smokeless tobacco: Never Used   • Alcohol use 1.2 oz/week     2 Glasses of wine per week      Comment: weekly      History   Drug Use   • Types: Marijuana       FAMILY HISTORY  Family History   Problem  Relation Age of Onset   • Lung Disease Mother         copd   • Cancer Mother         basal/squamous   • Hypertension Mother    • Hyperlipidemia Mother    • Stroke Mother    • Heart Disease Father         HF   • Arthritis Father         rheumatoid   • Lung Disease Father         emphysema   • Cancer Sister    • Cancer Maternal Grandfather         colon or bladder not sure    • Cancer Sister         pre cancerous spots        CURRENT MEDICATIONS  No current facility-administered medications on file prior to encounter.      Current Outpatient Prescriptions on File Prior to Encounter   Medication Sig Dispense Refill   • ondansetron (ZOFRAN ODT) 4 MG TABLET DISPERSIBLE Take 2 Tabs by mouth every 8 hours as needed for Nausea. 30 Tab 0   • meloxicam (MOBIC) 15 MG tablet TAKE 1 TABLET BY MOUTH EVERY DAY 30 Tab 0   • baclofen (LIORESAL) 10 MG Tab TAKE 1 TABLET BY MOUTH THREE TIMES A DAY 90 Tab 0   • clindamycin (CLEOCIN) 2 % vaginal cream INSERT 1 APPLICATORFUL VAGINALLY ONE TIME A DAY FOR 7 DAYS 40 g 0   • topiramate (TOPAMAX) 50 MG tablet TAKE 1 TABLET BY MOUTH 3 TIMES DAILY FOR MIGRAINE PREVENTION 270 Tab 3   • gabapentin (NEURONTIN) 600 MG tablet TAKE 1 TABLET BY MOUTH THREE TIMES A  Tab 2   • SAVELLA 100 MG Tab TAKE 1 TABLET BY MOUTH TWICE A  Tab 3   • folic acid (FOLVITE) 1 MG Tab TAKE 1 TABLET BY MOUTH EVERY DAY 30 Tab 0   • mirtazapine (REMERON) 30 MG Tab tablet Take 1 Tab by mouth every bedtime. 90 Tab 3   • albuterol (PROAIR HFA) 108 (90 Base) MCG/ACT Aero Soln inhalation aerosol Inhale 2 Puffs by mouth every 6 hours as needed for Shortness of Breath. 8.5 g 0   • Diclofenac Sodium (VOLTAREN) 1 % Gel Apply 1 Application to skin as directed 2 times a day as needed. 2 Tube 6   • loratadine-pseudoephedrine (CLARITIN-D 24-HOUR)  MG per tablet Take 1 Tab by mouth every day. 30 Tab 0   • primidone (MYSOLINE) 50 MG Tab Take 1 Tab by mouth every day. 90 Tab 3   • Cyanocobalamin (VITAMIN B-12) 1000 MCG  "Tab Take 1 Tab by mouth 2 Times a Day. 60 Tab 11   • hydrOXYzine (ATARAX) 25 MG Tab Take 25 mg by mouth 3 times a day as needed for Itching.     • B Complex Vitamins (B COMPLEX 1 PO) Take  by mouth.     • bisacodyl EC (DULCOLAX) 5 MG Tablet Delayed Response Take 5 mg by mouth 1 time daily as needed for Constipation.         ALLERGIES  Allergies   Allergen Reactions   • Erythromycin    • Tetracycline        PHYSICAL EXAM  VITAL SIGNS: /74   Pulse (!) 109   Temp 37.1 °C (98.7 °F) (Temporal)   Resp 16   Ht 1.676 m (5' 6\")   Wt 86.6 kg (191 lb)   SpO2 96%   BMI 30.83 kg/m²   PSYTearful smells of etoh.  Vital signs reviewed.  Constitutional:  Appears well-developed and well-nourished, smells of alcohol   Head: Normocephalic.   Mouth/Throat: Oropharynx is clear and moist.   Eyes: EOM are normal. Pupils are equal, round, and reactive to light.   Neck: Normal range of motion. Neck supple.   Cardiovascular: Normal rate, regular rhythm and normal heart sounds.    Pulmonary/Chest: Effort normal and breath sounds normal. No wheezes.   Abdominal: Soft. There is no tenderness. There is no rebound and no guarding.   Musculoskeletal: Exhibits no edema.   Lymphadenopathy: No cervical adenopathy.   Neurological: Patient is alert and oriented to person, place, and time. CNs II - XII intact. DTRs intact. Normal sensation and strength.  Skin: Multiple superficial and nonsuturable lacerations over left ventral forearm, Skin is warm and dry.   Psychiatric: Tearful.    LABS  Results for orders placed or performed during the hospital encounter of 07/16/19   POC BREATHALIZER   Result Value Ref Range    POC Breathalizer 0.18 (A) 0.00 - 0.01 Percent     All labs reviewed by me.    COURSE & MEDICAL DECISION MAKING  Pertinent Labs & Imaging studies reviewed. (See chart for details) The patient's [facility] and past medical  records were reviewed showing history bipolar disorder and depression. She is supposed to be taking " topamax and remeron and she hasn't been following up with behavioral health.    6:46 PM - Patient seen and examined at bedside. Ordered urine drug screen and POC breathalizer to evaluate her symptoms. The differential diagnoses include but are not limited to: Suicidal ideation, alcohol intoxication, depression. Life skills will come and see the patient when she is legally sober.         Disposition is currently pending patient will be signed out to my partner at 10 PM    FINAL IMPRESSION  Depression  Acute lcohol intoxication  Alcohol dependence  Superficial arm lacerations     Jemal PALACIOS (S[place]), am scribing for, and in the presence of, Benjie Parsons M.D..    Electronically signed by: Jemal Trinh (Sony), 7/16/2019    IBenjie M.D. personally performed the services described in this documentation, as scribed by Jemal Trinh in my presence, and it is both accurate and complete. C    The note accurately reflects work and decisions made by me.  Benjie Parsons  7/16/2019  9:40 PM
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
## 2019-07-17 NOTE — DISCHARGE PLANNING
Medical Social Work:    [facility] has called and accepted Pt.  Admitting Physician will be Dr. Vincent.    SW will arrange transportation and update RN on transfer and transfer time.

## 2019-07-17 NOTE — ED NOTES
Patient ambulated to bathroom this morning. States she would like to go home, denies SI/HI at this time.     She is now resting in room within line of sight of sitter, NAD noted.

## 2019-07-17 NOTE — ED NOTES
"Pt standing at sink moaning, states \"I fell awful.\" Pt states she drinks a pint of vodka a day, denies HA, is slightly nauseated, dizzy. Escorted Pt back to bed and educated on need to stay in bed. Pt cooperative.   "

## 2019-07-17 NOTE — ED PROVIDER NOTES
ED PROVIDER NOTE    Scribed for Kemal Ramirez M.D. by Ambrocio Cheung. 7/17/2019, 5:45 AM.    This is an addendum to the note on Jaclyn Mejia. For further details and full chart entry, see the previously signed ED Provider Note written by Dr. Parsons (ERP).      10:00PM - I discussed the patient's case with Dr. Parsons (ERP) who will transfer care of the patient to me at this time.       1:07AM- The patient is currently too sedated for evaluation. The alert team has not evaluated the patient at this time.      I reevaluated the patient when she is become more awake and conversive.  She continues to seemingly respond to external stimuli.  At this point I do believe the patient will require ongoing inpatient psychiatric care.  She has been evaluated by life skills and we are agreeable to holding a legal hold.  She will be further evaluated by the psychiatric team in the morning.  Her regular medications will be restarted and given her alcohol intake as discussed she will be started on the [facility] protocol.    FINAL IMPRESSION   Hallucinations      IAmbrocio (Scribe), am scribing for, and in the presence of, Kemal Ramirez M.D..    Electronically signed by: Ambrocio Cheung (Scribe), 7/17/2019    Kemal PALACIOS M.D. personally performed the services described in this documentation, as scribed by Ambrocio Cheung in my presence, and it is both accurate and complete.    The note accurately reflects work and decisions made by me.  Kemal Ramirez  7/17/2019  11:05 PM

## 2019-07-18 ENCOUNTER — TELEPHONE (OUTPATIENT)
Dept: MEDICAL GROUP | Facility: PHYSICIAN GROUP | Age: 54
End: 2019-07-18

## 2019-07-18 NOTE — DISCHARGE PLANNING
Medical Social Work    LSW received call from this pt who was at [facility] and requesting her sister's phone number. Pt requesting that LSW call [facility] w/ code [id number] and provide RNs w/ sister's phone number. LSW called [facility] and provided phone number for pt's sister, KELSEY Mejia ([phone]).

## 2019-07-18 NOTE — TELEPHONE ENCOUNTER
Phone Number Called: [phone]    Call outcome: left message for patient to call back regarding message below    Message: left message for pt to call back re scheduling AWV

## 2019-07-31 ENCOUNTER — APPOINTMENT (OUTPATIENT)
Dept: MEDICAL GROUP | Facility: PHYSICIAN GROUP | Age: 54
End: 2019-07-31
Payer: MEDICARE

## 2019-08-03 DIAGNOSIS — M79.7 FIBROMYALGIA: ICD-10-CM

## 2019-08-04 DIAGNOSIS — M79.7 FIBROMYALGIA: ICD-10-CM

## 2019-08-05 DIAGNOSIS — G47.01 INSOMNIA DUE TO MEDICAL CONDITION: ICD-10-CM

## 2019-08-05 RX ORDER — MELOXICAM 15 MG/1
TABLET ORAL
Qty: 30 TAB | Refills: 0 | OUTPATIENT
Start: 2019-08-05

## 2019-08-05 RX ORDER — BACLOFEN 10 MG/1
TABLET ORAL
Qty: 90 TAB | Refills: 0 | Status: SHIPPED | OUTPATIENT
Start: 2019-08-05 | End: 2020-03-16

## 2019-08-05 RX ORDER — GABAPENTIN 600 MG/1
TABLET ORAL
Qty: 270 TAB | Refills: 0 | Status: SHIPPED | OUTPATIENT
Start: 2019-08-05 | End: 2019-10-02 | Stop reason: SDUPTHER

## 2019-08-05 RX ORDER — MIRTAZAPINE 30 MG/1
TABLET, FILM COATED ORAL
Qty: 30 TAB | Refills: 0 | Status: SHIPPED | OUTPATIENT
Start: 2019-08-05 | End: 2019-09-18 | Stop reason: SDUPTHER

## 2019-08-14 ENCOUNTER — OFFICE VISIT (OUTPATIENT)
Dept: MEDICAL GROUP | Facility: PHYSICIAN GROUP | Age: 54
End: 2019-08-14
Payer: MEDICARE

## 2019-08-14 VITALS
RESPIRATION RATE: 16 BRPM | SYSTOLIC BLOOD PRESSURE: 146 MMHG | BODY MASS INDEX: 31.05 KG/M2 | HEART RATE: 92 BPM | HEIGHT: 66 IN | WEIGHT: 193.2 LBS | TEMPERATURE: 98.4 F | DIASTOLIC BLOOD PRESSURE: 82 MMHG | OXYGEN SATURATION: 92 %

## 2019-08-14 DIAGNOSIS — J44.89 COPD (CHRONIC OBSTRUCTIVE PULMONARY DISEASE) WITH CHRONIC BRONCHITIS: ICD-10-CM

## 2019-08-14 DIAGNOSIS — F10.20 ALCOHOLISM (HCC): ICD-10-CM

## 2019-08-14 DIAGNOSIS — Z72.89 DELIBERATE SELF-CUTTING: ICD-10-CM

## 2019-08-14 DIAGNOSIS — F33.1 MODERATE EPISODE OF RECURRENT MAJOR DEPRESSIVE DISORDER (HCC): ICD-10-CM

## 2019-08-14 DIAGNOSIS — M79.7 FIBROMYALGIA: ICD-10-CM

## 2019-08-14 DIAGNOSIS — F41.1 GAD (GENERALIZED ANXIETY DISORDER): ICD-10-CM

## 2019-08-14 DIAGNOSIS — G25.0 ESSENTIAL TREMOR: ICD-10-CM

## 2019-08-14 DIAGNOSIS — F31.30 BIPOLAR AFFECTIVE DISORDER, CURRENT EPISODE DEPRESSED, CURRENT EPISODE SEVERITY UNSPECIFIED (HCC): ICD-10-CM

## 2019-08-14 PROCEDURE — 99215 OFFICE O/P EST HI 40 MIN: CPT | Performed by: NURSE PRACTITIONER

## 2019-08-14 RX ORDER — ONDANSETRON 4 MG/1
4 TABLET, ORALLY DISINTEGRATING ORAL EVERY 6 HOURS PRN
COMMUNITY
End: 2020-05-07

## 2019-08-14 RX ORDER — DICYCLOMINE HCL 20 MG
20 TABLET ORAL EVERY 6 HOURS PRN
COMMUNITY
End: 2023-06-20 | Stop reason: SDUPTHER

## 2019-08-14 RX ORDER — HYDROXYZINE HYDROCHLORIDE 25 MG/1
25 TABLET, FILM COATED ORAL 3 TIMES DAILY PRN
Qty: 90 TAB | Refills: 0 | Status: SHIPPED | OUTPATIENT
Start: 2019-08-14 | End: 2019-09-18 | Stop reason: SDUPTHER

## 2019-08-14 NOTE — PROGRESS NOTES
"Chief Complaint   Patient presents with   • Follow-Up     hospital follow up, detox and suicide watch    • Other     letter for emotional support dog        HISTORY OF THE PRESENT ILLNESS: This is a 54 y.o. female established patient who presents today for hospital follow up and for evaluation of left rib pain.    Patient was in the ED on 7/16/2019 for suicide watch and alcohol detox.  Per hospital note, patient cut her arm due to being mad at her son at the time. Patient was on suicide watch for 7 hours. Since getting home, she did not consume any alcohol at all for 5 days. States she is now limiting her alcohol intake. She notes drinking at least one alcoholic beverage per day. Patient states she has an upcoming appointment with a nurse in a psychiatrist office in a couple of months. She will meet with her therapist on 8/27. Patient notes having tremors and states she was previously diagnosed with essential tremors that is unrelated to alcoholism. states her last drink was last night and that she is drinking, \"just one\" per night.  States she will not have alcohol in her house. Patient mentions she had labs done by [facility] which showed thyroid abnormality. We are still awaiting records to see the lab results. Patient has history of anxiety and depression is asking if she can get paperwork to have an emotional support dog.    Patient reports having break-through pain to her left rib area onset about one month ago. Patient states she has been applying over-the-counter cream and baclofen, as she thinks it may be muscular-related. States these medications have not helped. States she continues to have a chronic cough related to COPD. She has inhalers but has not been using them. States the rib pain intermittently spasms when she takes a breath. The pain sometimes goes away for 3 days. She is unsure if she strained a muscle while picking up her dog. She wears her purse on her left side. States she was " given tylenol while in the hospital which seems to help with the pain.      Past Medical History:   Diagnosis Date   • Actinic keratosis 2018   • Allergy    • Anxiety    • Arthritis    • Chronic pain syndrome    • COPD (chronic obstructive pulmonary disease) (HCC)    • DDD (degenerative disc disease), cervical    • DDD (degenerative disc disease), thoracolumbar    • Depression    • Diverticulosis    • Essential tremor    • Fibromyalgia    • IBS (irritable bowel syndrome)    • Migraine    • Pulmonary emphysema (HCC)        Past Surgical History:   Procedure Laterality Date   • ABDOMINAL HYSTERECTOMY TOTAL     • CHOLECYSTECTOMY     • TONSILLECTOMY         Family Status   Relation Name Status   • Mo  Alive   • Fa 54 yrs old    • Sis  Alive   • Bro  Alive   • MGFa  (Not Specified)   • Sis  Alive   • Sis  Alive   • Sis  Alive     Family History   Problem Relation Age of Onset   • Lung Disease Mother         copd   • Cancer Mother         basal/squamous   • Hypertension Mother    • Hyperlipidemia Mother    • Stroke Mother    • Heart Disease Father         HF   • Arthritis Father         rheumatoid   • Lung Disease Father         emphysema   • Cancer Sister    • Cancer Maternal Grandfather         colon or bladder not sure    • Cancer Sister         pre cancerous spots        Social History     Tobacco Use   • Smoking status: Current Every Day Smoker     Packs/day: 1.00     Years: 35.00     Pack years: 35.00   • Smokeless tobacco: Never Used   Substance Use Topics   • Alcohol use: Yes     Alcohol/week: 4.2 oz     Types: 7 Glasses of wine per week   • Drug use: Yes     Types: Marijuana       Allergies: Erythromycin and Tetracycline    Current Outpatient Medications Ordered in Epic   Medication Sig Dispense Refill   • dicyclomine (BENTYL) 20 MG Tab Take 20 mg by mouth every 6 hours.     • ondansetron (ZOFRAN ODT) 4 MG TABLET DISPERSIBLE Take 4 mg by mouth every 6 hours as needed for Nausea.     • hydrOXYzine HCl  "(ATARAX) 25 MG Tab Take 1 Tab by mouth 3 times a day as needed for Anxiety. 90 Tab 0   • gabapentin (NEURONTIN) 600 MG tablet TAKE 1 TABLET BY MOUTH THREE TIMES A  Tab 0   • baclofen (LIORESAL) 10 MG Tab TAKE 1 TABLET BY MOUTH THREE TIMES A DAY 90 Tab 0   • mirtazapine (REMERON) 30 MG Tab tablet TAKE 1 TABLET BY MOUTH EVERYDAY AT BEDTIME 30 Tab 0   • Milnacipran HCl (SAVELLA) 100 MG Tab Take 100 mg by mouth every day.     • topiramate (TOPAMAX) 50 MG tablet TAKE 1 TABLET BY MOUTH 3 TIMES DAILY FOR MIGRAINE PREVENTION 270 Tab 3   • Cyanocobalamin (VITAMIN B-12) 1000 MCG Tab Take 1 Tab by mouth 2 Times a Day. 60 Tab 11     No current [facility]-ordered facility-administered medications on file.        Review of Systems   Constitutional: Negative for fever, chills, weight loss and malaise/fatigue.   Respiratory: Chronic cough (from COPD). Negative for sputum production, shortness of breath and wheezing.    Cardiovascular: Negative for chest pain, palpitations, orthopnea and leg swelling.   Musculoskeletal: Left rib pain. Negative for myalgias, back pain and joint pain.   Neurological: Tremors. Negative for dizziness, tingling, sensory change, focal weakness and headaches.   Psychiatric/Behavioral: Chronic anxiety and depression. Negative for memory loss.     All other systems reviewed and are negative except as in HPI.    Exam: /82 (BP Location: Left arm, Patient Position: Sitting, BP Cuff Size: Adult)   Pulse 92   Temp 36.9 °C (98.4 °F) (Temporal)   Resp 16   Ht 1.676 m (5' 6\")   Wt 87.6 kg (193 lb 3.2 oz)   SpO2 92%   General:  Normal appearing. No distress.  Pulmonary:  Clear to ausculation.  Normal effort. No rales, ronchi, or wheezing.  Cardiovascular:  Regular rate and rhythm without murmur. Carotid and radial pulses are intact and equal bilaterally.  Neurologic:  Grossly nonfocal. Visible tremoring.  Skin:  Warm and dry.  No obvious lesions.  Musculoskeletal:  Normal gait. No extremity cyanosis, " clubbing, or edema. Tenderness to palpation of left ribs just inferior to left breast.  Psych:  Normal mood and affect. Alert and oriented x3. Judgment and insight is normal.    PLAN:    1. Alcoholism ([facility])  5. Bipolar affective disorder, current episode depressed, current episode severity unspecified ([facility])  6. Deliberate self-cutting  8. Moderate episode of recurrent major depressive disorder ([facility])  - Patient has upcoming appointment with psychiatrist nurse as well as her therapist. Patient states she has decreased her alcohol intake since being discharged from the hospital. Encouraged patient to continue decreasing alcohol intake as much as possible.    2. Fibromyalgia  - Advised conservative measure for now. Advised wearing her purse on her other shoulder that does not have the pain. Recommended using Bengay cream, topical anti-inflammatories and to use tylenol as needed.    3. COPD (chronic obstructive pulmonary disease) with chronic bronchitis ([facility])  - Patient continues to have a cough. States she does have inhalers at home.  Denies worsening symptoms, fever, chills, shortness of breath.    4. Essential tremor  - Patient is noted to have a tremor on exam. States she was previously diagnosed with essential tremor.    7. KVNG (generalized anxiety disorder)  - Atarax prescription provided  - Patient asked if she could a letter for an emotional support dog. However, informed patient we do not do these letters here and that she would need to get a letter from a psychiatrist or counselor.  - hydrOXYzine HCl (ATARAX) 25 MG Tab; Take 1 Tab by mouth 3 times a day as needed for Anxiety.  Dispense: 90 Tab; Refill: 0    Follow-up in 1 week or sooner as needed. Patient is encouraged to be seen in the emergency room for chest pain, palpitations, shortness of breath, dizziness, severe abdominal pain or other concerning symptoms.     Please note that this dictation was created using voice recognition software. I have made every  reasonable attempt to correct obvious errors, but I expect that there are errors of grammar and possibly content that I did not discover before finalizing the note.      Assessment/Plan  1. Alcoholism (HCC)     2. Fibromyalgia     3. COPD (chronic obstructive pulmonary disease) with chronic bronchitis (HCC)     4. Essential tremor     5. Bipolar affective disorder, current episode depressed, current episode severity unspecified (HCC)     6. Deliberate self-cutting     7. KVNG (generalized anxiety disorder)  hydrOXYzine HCl (ATARAX) 25 MG Tab   8. Moderate episode of recurrent major depressive disorder (HCC)           I have placed the below orders and discussed them with an approved delegating provider. The MA is performing the below orders under the direction of Dr. Hernandez.       ITerence (Scribe), am scribing for, and in the presence of, YEIMY Negron    Electronically signed by: Terence Ruvalcaba (Franciscoibe), 8/14/2019    Magdiel PALACIOS APRN personally performed the services described in this documentation, as scribed by Terence Ruvalcaba in my presence, and it is both accurate and complete.

## 2019-08-14 NOTE — LETTER
[employer] [facility]  VENUS RoseP.RVALARIE  [address]  [place] [address]  Fax: [phone]   Authorization for Release/Disclosure of   Protected Health Information   Name: JAMES BLUE : 1965 SSN: [id number]   Address: [address] [address] Phone:    [phone] (home)    I authorize the entity listed below to release/disclose the PHI below to:   [facility]/ETIENNE Rose.P.R.CHACHA and VENUS RoseP.RVALARIE   Provider or Entity Name:  [facility]    Address   City, [facility], [facility]   Phone:  [phone]    Fax:     Reason for request: continuity of care   Information to be released:    [  ] LAST COLONOSCOPY,  including any PATH REPORT and follow-up  [  ] LAST FIT/COLOGUARD RESULT [  ] LAST DEXA  [  ] LAST MAMMOGRAM  [  ] LAST PAP  [  ] LAST LABS [  ] RETINA EXAM REPORT  [  ] IMMUNIZATION RECORDS  [ XX ] Release all info      [  ] Check here and initial the line next to each item to release ALL health information INCLUDING  _____ Care and treatment for drug and / or alcohol abuse  _____ HIV testing, infection status, or AIDS  _____ Genetic Testing    DATES OF SERVICE OR TIME PERIOD TO BE DISCLOSED: _____________  I understand and acknowledge that:  * This Authorization may be revoked at any time by you in writing, except if your health information has already been used or disclosed.  * Your health information that will be used or disclosed as a result of you signing this authorization could be re-disclosed by the recipient. If this occurs, your re-disclosed health information may no longer be protected by State or Federal laws.  * You may refuse to sign this Authorization. Your refusal will not affect your ability to obtain treatment.  * This Authorization becomes effective upon signing and will  on (date) __________.      If no date is indicated, this Authorization will  one (1) year from the signature date.    Name: James  Brittani Mejia    Signature:   Date:     8/14/2019       PLEASE FAX REQUESTED RECORDS BACK TO: [phone]

## 2019-08-24 DIAGNOSIS — M79.7 FIBROMYALGIA: ICD-10-CM

## 2019-08-26 DIAGNOSIS — K58.0 IRRITABLE BOWEL SYNDROME WITH DIARRHEA: ICD-10-CM

## 2019-08-26 RX ORDER — ONDANSETRON 4 MG/1
8 TABLET, ORALLY DISINTEGRATING ORAL EVERY 8 HOURS PRN
Qty: 30 TAB | Refills: 0 | Status: SHIPPED | OUTPATIENT
Start: 2019-08-26 | End: 2019-08-29

## 2019-08-26 RX ORDER — MELOXICAM 15 MG/1
TABLET ORAL
Qty: 30 TAB | Refills: 0 | Status: SHIPPED | OUTPATIENT
Start: 2019-08-26 | End: 2019-11-24 | Stop reason: SDUPTHER

## 2019-08-29 ENCOUNTER — OFFICE VISIT (OUTPATIENT)
Dept: MEDICAL GROUP | Facility: PHYSICIAN GROUP | Age: 54
End: 2019-08-29
Payer: MEDICARE

## 2019-08-29 ENCOUNTER — HOSPITAL ENCOUNTER (OUTPATIENT)
Dept: LAB | Facility: MEDICAL CENTER | Age: 54
End: 2019-08-29
Attending: NURSE PRACTITIONER
Payer: MEDICARE

## 2019-08-29 VITALS
SYSTOLIC BLOOD PRESSURE: 123 MMHG | RESPIRATION RATE: 16 BRPM | OXYGEN SATURATION: 97 % | DIASTOLIC BLOOD PRESSURE: 82 MMHG | TEMPERATURE: 99.2 F | HEART RATE: 81 BPM | BODY MASS INDEX: 30.4 KG/M2 | HEIGHT: 66 IN | WEIGHT: 189.15 LBS

## 2019-08-29 DIAGNOSIS — M65.321 TRIGGER FINGER, RIGHT INDEX FINGER: ICD-10-CM

## 2019-08-29 DIAGNOSIS — F33.1 MODERATE EPISODE OF RECURRENT MAJOR DEPRESSIVE DISORDER (HCC): ICD-10-CM

## 2019-08-29 DIAGNOSIS — R79.89 ELEVATED TSH: ICD-10-CM

## 2019-08-29 DIAGNOSIS — G43.809 OTHER MIGRAINE WITHOUT STATUS MIGRAINOSUS, NOT INTRACTABLE: ICD-10-CM

## 2019-08-29 DIAGNOSIS — J44.89 COPD (CHRONIC OBSTRUCTIVE PULMONARY DISEASE) WITH CHRONIC BRONCHITIS: ICD-10-CM

## 2019-08-29 DIAGNOSIS — F31.30 BIPOLAR AFFECTIVE DISORDER, CURRENT EPISODE DEPRESSED, CURRENT EPISODE SEVERITY UNSPECIFIED (HCC): ICD-10-CM

## 2019-08-29 DIAGNOSIS — E66.9 OBESITY (BMI 30-39.9): ICD-10-CM

## 2019-08-29 DIAGNOSIS — M72.0 DUPUYTREN'S CONTRACTURE OF RIGHT HAND: ICD-10-CM

## 2019-08-29 DIAGNOSIS — Z12.39 SCREENING FOR BREAST CANCER: ICD-10-CM

## 2019-08-29 DIAGNOSIS — J84.9 INTERSTITIAL LUNG DISEASE (HCC): ICD-10-CM

## 2019-08-29 DIAGNOSIS — G47.01 INSOMNIA DUE TO MEDICAL CONDITION: ICD-10-CM

## 2019-08-29 DIAGNOSIS — F10.20 ALCOHOLISM (HCC): ICD-10-CM

## 2019-08-29 DIAGNOSIS — M79.7 FIBROMYALGIA: ICD-10-CM

## 2019-08-29 DIAGNOSIS — Z12.11 SCREENING FOR COLON CANCER: ICD-10-CM

## 2019-08-29 DIAGNOSIS — F17.200 TOBACCO USE DISORDER: ICD-10-CM

## 2019-08-29 DIAGNOSIS — G25.0 ESSENTIAL TREMOR: ICD-10-CM

## 2019-08-29 DIAGNOSIS — K58.0 IRRITABLE BOWEL SYNDROME WITH DIARRHEA: ICD-10-CM

## 2019-08-29 DIAGNOSIS — F41.1 GAD (GENERALIZED ANXIETY DISORDER): ICD-10-CM

## 2019-08-29 DIAGNOSIS — Z00.00 MEDICARE ANNUAL WELLNESS VISIT, SUBSEQUENT: Primary | ICD-10-CM

## 2019-08-29 PROBLEM — E53.9 VITAMIN B DEFICIENCY: Status: RESOLVED | Noted: 2017-02-09 | Resolved: 2019-08-29

## 2019-08-29 LAB — TSH SERPL DL<=0.005 MIU/L-ACNC: 1.97 UIU/ML (ref 0.38–5.33)

## 2019-08-29 PROCEDURE — 36415 COLL VENOUS BLD VENIPUNCTURE: CPT

## 2019-08-29 PROCEDURE — 84443 ASSAY THYROID STIM HORMONE: CPT

## 2019-08-29 PROCEDURE — G0439 PPPS, SUBSEQ VISIT: HCPCS | Performed by: NURSE PRACTITIONER

## 2019-08-29 ASSESSMENT — PATIENT HEALTH QUESTIONNAIRE - PHQ9
SUM OF ALL RESPONSES TO PHQ QUESTIONS 1-9: 25
CLINICAL INTERPRETATION OF PHQ2 SCORE: 6
5. POOR APPETITE OR OVEREATING: 3 - NEARLY EVERY DAY

## 2019-08-29 ASSESSMENT — ACTIVITIES OF DAILY LIVING (ADL): BATHING_REQUIRES_ASSISTANCE: 0

## 2019-08-29 ASSESSMENT — ENCOUNTER SYMPTOMS: GENERAL WELL-BEING: FAIR

## 2019-08-29 NOTE — PROGRESS NOTES
Chief Complaint   Patient presents with   • Annual Wellness Visit         HPI:  Jaclyn is a 54 y.o. here for Medicare Annual Wellness Visit. Health maintenance reviewed, patient is due for her colon and breast cancer screenings.    Obesity (BMI 30-39.9)  The patient is attempting to lose weight with her own efforts, including controlling her diet. She has stopped using four pounds in the since 8/14.    Fibromyalgia  She complains that her fibromyalgia pain has been much worse recently. Her pain is worst in her bilateral feet. She describes this pain as her toes being chopped off. She states that if is very busy all day she will spending most of the next couple days off her feet secondary to severe foot pain. She also complains of numbness and tinlging of her bilateral hands. During the winter she experiences severe numbness of her feet and must spend much of the day in bed or with her feet next to a heater to keep her feet warm. The patient also complains of dry, cracked skin on her feet. The cracks are severely tender when touched. She complains of persistent pain located along her left side ribs. The pain usually takes about three days to resolve.    Other migraine without status migrainosus, not intractable  Recently, she experienced a severe headache. Patient describe the headache as her head being in a device. This was her first break through migraine in quite some time. Patient continues to take regular Topamax.    COPD (chronic obstructive pulmonary disease) with chronic bronchitis (HCC)  Chronic. Stable. O2 saturation is 97% today.  Patient continues to use PRN inhalers. The patient has not had PFTs in quite some time. She has no interest in smoking cessation at this time.    Alcoholism (HCC)  The patient is working with her therapist and she is trying not to bring alcohol home. She admits to buying a fifth of vodka recently and drank the entire fifth over the weekend. Patient has been trying to  supplement going to the bar with counseling. She had two alcoholic drinks at a casino yesterday. 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    Bipolar affective disorder, current episode depressed, current episode severity unspecified (HCC)  Chronic. Stable. Her symptoms are worse in the winter. She states she will miss gardening this summer. The patient is taking topiramate and Remeron.    Essential tremor  Chronic. Stable. She states she still experiencing tremors on a regular basis. She is trying not to take primidone anymore. She states that reducing her caffeine intake is improving the tremors. She used to drink a pot of coffee a day. Patient states that has had work-up in the past.     Insomnia due to medical condition  Chronic. Stable. Patient takes Remeron for sleep. Denies side effects.    Irritable bowel syndrome with diarrhea  Chronic. Stable. She continues to take Bentyl and Zofran. Symptoms are well controlled with current medication, although, she occasionally must increase her Zofran use to 1-2 times daily. Denies blood in stool.    Moderate episode of recurrent major depressive disorder (HCC)  Chronic. Stable. See bipolar disorder.    Tobacco use disorder  Chronic. Patient continues to smoke. States that she is not interested in cessation at this time. See COPD.    KVNG (generalized anxiety disorder)  Chronic. Stable. Patient states she continues to have symptoms of anxiety    Trigger finger, right index finger  Dupuytren's contracture of right hand  The patient experienced a trigger finger flare up two days ago. Patient states that finger while intermittently become locked states that she will notice some popping when she tries to straighten it. Her symptoms are not interfering with her life. She not interested in seeing an orthopedist at this time. She endorses playing a lot of phone [employer] recently.    Elevated TSH  The patient had an elevated TSH on prior blood work. I would  like to further evaluate.      Patient Active Problem List    Diagnosis Date Noted   • Obesity (BMI 30-39.9) 08/29/2019   • Dupuytren's contracture of right hand 08/29/2019   • Alcoholism ([facility]) 08/14/2019   • Deliberate self-cutting 08/14/2019   • Flank pain 07/31/2018   • Actinic keratosis 02/06/2018   • Trigger finger, right index finger 01/22/2018   • COPD (chronic obstructive pulmonary disease) with chronic bronchitis ([facility]) 02/09/2017   • H/O hysterectomy with oophorectomy 02/09/2017   • Essential tremor 02/09/2017   • Irritable bowel syndrome with diarrhea 02/09/2017   • Insomnia due to medical condition 02/09/2017   • Bipolar affective disorder, current episode depressed ([facility]) 02/09/2017   • Fibromyalgia 09/15/2016   • Migraine without status migrainosus, not intractable 09/15/2016   • Facet arthropathy, cervical 04/08/2016   • Facet arthropathy, lumbar 04/08/2016   • KVNG (generalized anxiety disorder) 09/23/2014   • Interstitial lung disease ([facility]) 09/05/2014   • Tobacco use disorder 06/07/2014   • Recurrent major depressive disorder ([facility]) 01/27/2010       Current Outpatient Medications   Medication Sig Dispense Refill   • meloxicam (MOBIC) 15 MG tablet TAKE 1 TABLET BY MOUTH EVERY DAY MAKE APPT 30 Tab 0   • dicyclomine (BENTYL) 20 MG Tab Take 20 mg by mouth every 6 hours.     • ondansetron (ZOFRAN ODT) 4 MG TABLET DISPERSIBLE Take 4 mg by mouth every 6 hours as needed for Nausea.     • hydrOXYzine HCl (ATARAX) 25 MG Tab Take 1 Tab by mouth 3 times a day as needed for Anxiety. 90 Tab 0   • gabapentin (NEURONTIN) 600 MG tablet TAKE 1 TABLET BY MOUTH THREE TIMES A  Tab 0   • mirtazapine (REMERON) 30 MG Tab tablet TAKE 1 TABLET BY MOUTH EVERYDAY AT BEDTIME 30 Tab 0   • Milnacipran HCl (SAVELLA) 100 MG Tab Take 100 mg by mouth every day.     • topiramate (TOPAMAX) 50 MG tablet TAKE 1 TABLET BY MOUTH 3 TIMES DAILY FOR MIGRAINE PREVENTION 270 Tab 3   • Cyanocobalamin (VITAMIN B-12) 1000 MCG Tab Take 1 Tab by  mouth 2 Times a Day. 60 Tab 11   • baclofen (LIORESAL) 10 MG Tab TAKE 1 TABLET BY MOUTH THREE TIMES A DAY (Patient taking differently: 10 mg 1 time daily as needed.) 90 Tab 0     No current facility-administered medications for this visit.         Patient is taking medications as noted in medication list.  Current supplements as per medication list.     Allergies: Erythromycin and Tetracycline    Current social contact/activities: garden, dog, games, dance camping     Is patient current with immunizations? No, due for HEPATITIS B. Patient is interested in receiving NONE today.    She  reports that she has been smoking. She has a 35.00 pack-year smoking history. She has never used smokeless tobacco. She reports that she drinks about 7.2 oz of alcohol per week. She reports that she has current or past drug history. Drug: Marijuana.  Ready to quit: No  Counseling given: Yes        DPA/Advanced directive: Patient does not have an Advanced Directive.  A packet and workshop information was given on Advanced Directives.    ROS:    Gait: Uses no assistive device cane sometimes  Ostomy: No   Other tubes: No   Amputations: No   Chronic oxygen use No   Last eye exam 2014   Wears hearing aids: No   : Denies any urinary leakage during the last 6 months      Screening:        Little interest or pleasure in doing things?  3 - nearly every day  Feeling down, depressed, or hopeless? 3 - nearly every day  Trouble falling or staying asleep, or sleeping too much?  3 - nearly every day  Feeling tired or having little energy?  3 - nearly every day  Poor appetite or overeating?  3 - nearly every day  Feeling bad about yourself - or that you are a failure or have let yourself or your family down? 3 - nearly every day  Trouble concentrating on things, such as reading the newspaper or watching television? 3 - nearly every day  Moving or speaking so slowly that other people could have noticed.  Or the opposite - being so fidgety or restless  that you have been moving around a lot more than usual?  3 - nearly every day  Thoughts that you would be better off dead, or of hurting yourself?  1 - several days  Patient Health Questionnaire Score: 25      If depressive symptoms identified deferred to follow up visit unless specifically addressed in assessment and plan.    Interpretation of PHQ-9 Total Score   Score Severity   1-4 No Depression   5-9 Mild Depression   10-14 Moderate Depression   15-19 Moderately Severe Depression   20-27 Severe Depression      Screening for Cognitive Impairment    Three Minute Recall (village, kitchen, baby)  3/3 Village Kitchen Baby  Draw clock face with all 12 numbers and set the hands to show 10 past 10.  Yes Time 10:10   5/5  If cognitive concerns identified, deferred for follow up unless specifically addressed in assessment and plan.    Fall Risk Assessment    Has the patient had two or more falls in the last year or any fall with injury in the last year?  No  If fall risk identified, deferred for follow up unless specifically addressed in assessment and plan.      Safety Assessment    Throw rugs on floor.  Yes  Handrails on all stairs.  No  Good lighting in all hallways.  No  Difficulty hearing.  No  Patient counseled about all safety risks that were identified.    Functional Assessment ADLs    Are there any barriers preventing you from cooking for yourself or meeting nutritional needs?  No.    Are there any barriers preventing you from driving safely or obtaining transportation?  No.    Are there any barriers preventing you from using a telephone or calling for help?  No.    Are there any barriers preventing you from shopping?  No.    Are there any barriers preventing you from taking care of your own finances?  No.    Are there any barriers preventing you from managing your medications?    No.    Are there any barriers preventing you from showering, bathing or dressing yourself?  No.    Are you currently engaging in any  exercise or physical activity?  No.     What is your perception of your health?  Fair.    Health Maintenance Summary                PFT SCREENING-FEV1 AND FEV/FVC RATIO / SPIROMETRY SHOULD BE PERFORMED ANNUALLY Overdue 1/3/1984     MAMMOGRAM Overdue 1/3/2006     COLONOSCOPY Overdue 1/3/2016     Annual Wellness Visit Overdue 1/23/2019      Done 1/22/2018 Visit Dx: Medicare annual wellness visit, subsequent    IMM INFLUENZA Next Due 9/1/2019      Done 12/1/2017 Imm Admin: Influenza Vaccine Quad Inj (Pf)    IMM ZOSTER VACCINES Postponed 6/30/2020 Originally 1/3/2016. System: vaccine not available, other system reasons    IMM DTaP/Tdap/Td Vaccine Next Due 1/14/2024      Done 1/14/2014 Imm Admin: Tdap Vaccine          Patient Care Team:  JAN Rose as PCP - General (Family Medicine)      Social History     Tobacco Use   • Smoking status: Current Every Day Smoker     Packs/day: 1.00     Years: 35.00     Pack years: 35.00   • Smokeless tobacco: Never Used   Substance Use Topics   • Alcohol use: Yes     Alcohol/week: 7.2 oz     Types: 12 Shots of liquor per week     Comment: 12 shots a day when actively drinking   • Drug use: Yes     Types: Marijuana     Comment:  not current      Family History   Problem Relation Age of Onset   • Lung Disease Mother         copd   • Cancer Mother         basal/squamous   • Hypertension Mother    • Hyperlipidemia Mother    • Stroke Mother    • Heart Disease Father         HF   • Arthritis Father         rheumatoid   • Lung Disease Father         emphysema   • Cancer Sister    • Cancer Maternal Grandfather         colon or bladder not sure    • Cancer Sister         pre cancerous spots      She  has a past medical history of Actinic keratosis (2/6/2018), Allergy, Anxiety, Arthritis, Chronic pain syndrome, COPD (chronic obstructive pulmonary disease) (HCC), DDD (degenerative disc disease), cervical, DDD (degenerative disc disease), thoracolumbar, Depression,  "Diverticulosis, Essential tremor, Fibromyalgia, IBS (irritable bowel syndrome), Migraine, and Pulmonary emphysema (HCC). She also has no past medical history of GERD (gastroesophageal reflux disease) or Hyperlipidemia.   Past Surgical History:   Procedure Laterality Date   • ABDOMINAL HYSTERECTOMY TOTAL     • CHOLECYSTECTOMY     • TONSILLECTOMY           Exam:     /82 (BP Location: Right arm, Patient Position: Sitting, BP Cuff Size: Adult)   Pulse 81   Temp 37.3 °C (99.2 °F) (Temporal)   Resp 16   Ht 1.676 m (5' 6\")   Wt 85.8 kg (189 lb 2.5 oz)   SpO2 97%  Body mass index is 30.53 kg/m².    Hearing excellent.    Dentition good  Alert, oriented in no acute distress.  Eye contact is good, speech goal directed, affect calm      Assessment and Plan. The following treatment and monitoring plan is recommended:    1. Medicare annual wellness visit, subsequent  Subsequent Annual Wellness Visit - Includes PPPS ()   2. Obesity (BMI 30-39.9)  Patient identified as having weight management issue.  Appropriate orders and counseling given.    Subsequent Annual Wellness Visit - Includes PPPS ()   3. Fibromyalgia  Subsequent Annual Wellness Visit - Includes PPPS ()   4. Other migraine without status migrainosus, not intractable  Subsequent Annual Wellness Visit - Includes PPPS ()   5. COPD (chronic obstructive pulmonary disease) with chronic bronchitis (HCC)  Subsequent Annual Wellness Visit - Includes PPPS ()    PULMONARY FUNCTION TESTS -Test requested: Complete Pulmonary Function Test   6. Alcoholism (HCC)  Subsequent Annual Wellness Visit - Includes PPPS ()   7. Bipolar affective disorder, current episode depressed, current episode severity unspecified (HCC)  Subsequent Annual Wellness Visit - Includes PPPS ()    Patient has been identified as having a positive depression screening. Appropriate orders and counseling have been given.   8. Essential tremor  Subsequent Annual Wellness " Visit - Includes PPPS ()   9. Insomnia due to medical condition  Subsequent Annual Wellness Visit - Includes PPPS ()   10. Irritable bowel syndrome with diarrhea  Subsequent Annual Wellness Visit - Includes PPPS ()   11. Moderate episode of recurrent major depressive disorder (HCC)  Subsequent Annual Wellness Visit - Includes PPPS ()   12. Tobacco use disorder  Subsequent Annual Wellness Visit - Includes PPPS ()   13. KVNG (generalized anxiety disorder)  Subsequent Annual Wellness Visit - Includes PPPS ()   14. Interstitial lung disease (HCC)  Subsequent Annual Wellness Visit - Includes PPPS ()    PULMONARY FUNCTION TESTS -Test requested: Complete Pulmonary Function Test   15. Trigger finger, right index finger  Subsequent Annual Wellness Visit - Includes PPPS ()   16. Dupuytren's contracture of right hand  Subsequent Annual Wellness Visit - Includes PPPS ()   17. Elevated TSH  Subsequent Annual Wellness Visit - Includes PPPS ()    Patient identified as having weight management issue.  Appropriate orders and counseling given.    TSH WITH REFLEX TO FT4   18. Screening for breast cancer  MA-SCREENING MAMMO BILAT W/TOMOSYNTHESIS W/CAD   19. Screening for colon cancer  REFERRAL TO GI FOR COLONOSCOPY     1. Obesity (BMI 30-39.9)  - Patient identified as having weight management issue.  Appropriate orders and counseling given.  - Subsequent Annual Wellness Visit - Includes PPPS ()    2. Fibromyalgia  - Stable. Continue same regimen.  - Subsequent Annual Wellness Visit - Includes PPPS ()    3. Other migraine without status migrainosus, not intractable  - Patient reports migraines have returned after quite some time. I will have the patient monitor the frequency of her migraines and return as needed for medication adjustment.  - Subsequent Annual Wellness Visit - Includes PPPS ()    4. COPD (chronic obstructive pulmonary disease) with chronic bronchitis  (HCC)  - Stable. Continue current regimen.  - Patient did not follow up for PFTs, so I reordered them.  - Subsequent Annual Wellness Visit - Includes PPPS ()  - PULMONARY FUNCTION TESTS -Test requested: Complete Pulmonary Function Test; Future    5. Alcoholism (HCC)  - Stable. Continue follow up with therapist.  - We had a long discussion regarding her alcoholism and her current methods for alcohol cessation. She is occasionally relapsing, but appears to be self aware in the that regard.  - Subsequent Annual Wellness Visit - Includes PPPS ()    6. Bipolar affective disorder, current episode depressed, current episode severity unspecified (HCC)  - Stable on current medications. Continue current regime and therapy follow up.  - Subsequent Annual Wellness Visit - Includes PPPS ()  - Patient has been identified as having a positive depression screening. Appropriate orders and counseling have been given.    7. Essential tremor  - Patient is no longer taking primidone and states she experiences a regular tremor. Reducing caffeine intake. Return as needed.  - Subsequent Annual Wellness Visit - Includes PPPS ()    8. Insomnia due to medical condition  - Stable. Continue current regimen.  - Subsequent Annual Wellness Visit - Includes PPPS ()    9. Irritable bowel syndrome with diarrhea  - Stable. Patient may increase Zofran dose as needed for nausea. Continue current regimen.  - Subsequent Annual Wellness Visit - Includes PPPS ()    10. Moderate episode of recurrent major depressive disorder (HCC)  - Stable. Continue current regimen.  - Subsequent Annual Wellness Visit - Includes PPPS ()    11. Tobacco use disorder  - Patient has no interest in smoking cessation. See #4  - Subsequent Annual Wellness Visit - Includes PPPS ()    12. KVNG (generalized anxiety disorder)  - Stable. Continue current regimen  - Subsequent Annual Wellness Visit - Includes PPPS ()    13. Interstitial lung  disease (HCC)  - Patient has no interest in smoking cessation. See #4  - Subsequent Annual Wellness Visit - Includes PPPS ()  - PULMONARY FUNCTION TESTS -Test requested: Complete Pulmonary Function Test; Future    14. Trigger finger, right index finger  - Patient is experiencing recurrent symptoms. Her symptoms are not very bothersome and she is not interested in orthopedic or physical therapy referral.  - Subsequent Annual Wellness Visit - Includes PPPS ()    15. Dupuytren's contracture of right hand  - Patient is experiencing recurrent symptoms. Her symptoms are not very bothersome and she is not interested in orthopedic or physical therapy referral.  - Subsequent Annual Wellness Visit - Includes PPPS ()    16. Elevated TSH  - We had a long discussion regarding her prior thyroid function tests.I explained that I would like to order a TSH with a free T4.  - Subsequent Annual Wellness Visit - Includes PPPS ()  - Patient identified as having weight management issue.  Appropriate orders and counseling given.  - TSH WITH REFLEX TO FT4; Future    17. Medicare annual wellness visit, subsequent  - Subsequent Annual Wellness Visit - Includes PPPS ()    18. Screening for breast cancer  - Patient is due for breast cancer screening. Mammogram ordered.  - MA-SCREENING MAMMO BILAT W/TOMOSYNTHESIS W/CAD; Future    19. Screening for colon cancer  - Patient is due for colon cancer screening. She is referred to GI for colonoscopy.  - REFERRAL TO GI FOR COLONOSCOPY      Services suggested: No services needed at this time  Health Care Screening recommendations as per orders if indicated.  Referrals offered: PT/OT/Nutrition counseling/Behavioral Health/Smoking cessation as per orders if indicated.    Discussion today about general wellness and lifestyle habits:    · Prevent falls and reduce trip hazards; Cautioned about securing or removing rugs.  · Have a working fire alarm and carbon monoxide detector;    · Engage in regular physical activity and social activities       Follow-up: Return in about 1 month (around 9/29/2019).

## 2019-09-04 DIAGNOSIS — R79.89 ELEVATED TSH: ICD-10-CM

## 2019-09-04 DIAGNOSIS — R94.4 DECREASED GFR: ICD-10-CM

## 2019-09-05 ENCOUNTER — HOSPITAL ENCOUNTER (OUTPATIENT)
Dept: LAB | Facility: MEDICAL CENTER | Age: 54
End: 2019-09-05
Attending: NURSE PRACTITIONER
Payer: MEDICARE

## 2019-09-05 DIAGNOSIS — R79.89 ELEVATED TSH: ICD-10-CM

## 2019-09-05 DIAGNOSIS — R94.4 DECREASED GFR: ICD-10-CM

## 2019-09-05 LAB
ALBUMIN SERPL BCP-MCNC: 4 G/DL (ref 3.2–4.9)
ALBUMIN/GLOB SERPL: 1.5 G/DL
ALP SERPL-CCNC: 74 U/L (ref 30–99)
ALT SERPL-CCNC: 10 U/L (ref 2–50)
ANION GAP SERPL CALC-SCNC: 8 MMOL/L (ref 0–11.9)
AST SERPL-CCNC: 16 U/L (ref 12–45)
BILIRUB SERPL-MCNC: 0.4 MG/DL (ref 0.1–1.5)
BUN SERPL-MCNC: 16 MG/DL (ref 8–22)
CALCIUM SERPL-MCNC: 9.1 MG/DL (ref 8.5–10.5)
CHLORIDE SERPL-SCNC: 110 MMOL/L (ref 96–112)
CO2 SERPL-SCNC: 26 MMOL/L (ref 20–33)
CREAT SERPL-MCNC: 1.16 MG/DL (ref 0.5–1.4)
FASTING STATUS PATIENT QL REPORTED: NORMAL
GLOBULIN SER CALC-MCNC: 2.6 G/DL (ref 1.9–3.5)
GLUCOSE SERPL-MCNC: 81 MG/DL (ref 65–99)
POTASSIUM SERPL-SCNC: 4.2 MMOL/L (ref 3.6–5.5)
PROT SERPL-MCNC: 6.6 G/DL (ref 6–8.2)
SODIUM SERPL-SCNC: 144 MMOL/L (ref 135–145)
T4 FREE SERPL-MCNC: 0.67 NG/DL (ref 0.53–1.43)

## 2019-09-05 PROCEDURE — 84439 ASSAY OF FREE THYROXINE: CPT

## 2019-09-05 PROCEDURE — 80053 COMPREHEN METABOLIC PANEL: CPT

## 2019-09-05 PROCEDURE — 36415 COLL VENOUS BLD VENIPUNCTURE: CPT

## 2019-09-13 ENCOUNTER — HOSPITAL ENCOUNTER (OUTPATIENT)
Dept: RADIOLOGY | Facility: MEDICAL CENTER | Age: 54
End: 2019-09-13
Attending: NURSE PRACTITIONER
Payer: MEDICARE

## 2019-09-13 ENCOUNTER — HOSPITAL ENCOUNTER (OUTPATIENT)
Dept: RADIOLOGY | Facility: MEDICAL CENTER | Age: 54
End: 2019-09-13
Attending: INTERNAL MEDICINE
Payer: MEDICARE

## 2019-09-13 DIAGNOSIS — M79.7 SCAPULOHUMERAL FIBROSITIS: ICD-10-CM

## 2019-09-13 DIAGNOSIS — R19.4 FREQUENT BOWEL MOVEMENTS: ICD-10-CM

## 2019-09-13 DIAGNOSIS — K58.0 IRRITABLE BOWEL SYNDROME WITH DIARRHEA: ICD-10-CM

## 2019-09-13 DIAGNOSIS — R11.0 NAUSEA: ICD-10-CM

## 2019-09-13 DIAGNOSIS — R10.30 INGUINAL PAIN, UNSPECIFIED LATERALITY: ICD-10-CM

## 2019-09-13 DIAGNOSIS — R19.7 DIARRHEA OF PRESUMED INFECTIOUS ORIGIN: ICD-10-CM

## 2019-09-13 DIAGNOSIS — Z12.39 SCREENING FOR BREAST CANCER: ICD-10-CM

## 2019-09-13 DIAGNOSIS — R14.0 GASTRIC TYMPANY: ICD-10-CM

## 2019-09-13 DIAGNOSIS — R15.9 FULL INCONTINENCE OF FECES: ICD-10-CM

## 2019-09-13 PROCEDURE — 77063 BREAST TOMOSYNTHESIS BI: CPT

## 2019-09-13 PROCEDURE — 74018 RADEX ABDOMEN 1 VIEW: CPT

## 2019-09-18 DIAGNOSIS — G47.01 INSOMNIA DUE TO MEDICAL CONDITION: ICD-10-CM

## 2019-09-18 DIAGNOSIS — F41.1 GAD (GENERALIZED ANXIETY DISORDER): ICD-10-CM

## 2019-09-18 RX ORDER — HYDROXYZINE HYDROCHLORIDE 25 MG/1
25 TABLET, FILM COATED ORAL 3 TIMES DAILY PRN
Qty: 90 TAB | Refills: 0 | Status: SHIPPED | OUTPATIENT
Start: 2019-09-18 | End: 2019-10-24 | Stop reason: SDUPTHER

## 2019-09-18 RX ORDER — MIRTAZAPINE 30 MG/1
TABLET, FILM COATED ORAL
Qty: 90 TAB | Refills: 0 | Status: SHIPPED | OUTPATIENT
Start: 2019-09-18 | End: 2019-12-23

## 2019-09-18 NOTE — TELEPHONE ENCOUNTER
Was the patient seen in the last year in this department? Yes    Does patient have an active prescription for medications requested? Yes    Received Request Via: Pharmacy     Future Appointments       Provider Department [place]    10/2/2019 10:40 AM JAN Rose [facility] - [address]

## 2019-09-19 ENCOUNTER — HOSPITAL ENCOUNTER (OUTPATIENT)
Dept: RADIOLOGY | Facility: MEDICAL CENTER | Age: 54
End: 2019-09-19

## 2019-10-02 ENCOUNTER — OFFICE VISIT (OUTPATIENT)
Dept: MEDICAL GROUP | Facility: PHYSICIAN GROUP | Age: 54
End: 2019-10-02
Payer: MEDICARE

## 2019-10-02 VITALS
OXYGEN SATURATION: 98 % | TEMPERATURE: 98.1 F | DIASTOLIC BLOOD PRESSURE: 84 MMHG | WEIGHT: 195 LBS | BODY MASS INDEX: 31.34 KG/M2 | RESPIRATION RATE: 18 BRPM | HEIGHT: 66 IN | HEART RATE: 78 BPM | SYSTOLIC BLOOD PRESSURE: 124 MMHG

## 2019-10-02 DIAGNOSIS — R10.9 FLANK PAIN: ICD-10-CM

## 2019-10-02 DIAGNOSIS — K58.0 IRRITABLE BOWEL SYNDROME WITH DIARRHEA: ICD-10-CM

## 2019-10-02 DIAGNOSIS — M79.7 FIBROMYALGIA: ICD-10-CM

## 2019-10-02 DIAGNOSIS — G47.01 INSOMNIA DUE TO MEDICAL CONDITION: ICD-10-CM

## 2019-10-02 DIAGNOSIS — F10.20 ALCOHOLISM (HCC): ICD-10-CM

## 2019-10-02 DIAGNOSIS — Z23 NEED FOR VACCINATION: ICD-10-CM

## 2019-10-02 DIAGNOSIS — R10.12 LEFT UPPER QUADRANT PAIN: ICD-10-CM

## 2019-10-02 DIAGNOSIS — N28.1 CYST OF LEFT KIDNEY: ICD-10-CM

## 2019-10-02 PROCEDURE — 99214 OFFICE O/P EST MOD 30 MIN: CPT | Mod: 25 | Performed by: NURSE PRACTITIONER

## 2019-10-02 PROCEDURE — 90686 IIV4 VACC NO PRSV 0.5 ML IM: CPT | Performed by: NURSE PRACTITIONER

## 2019-10-02 PROCEDURE — G0008 ADMIN INFLUENZA VIRUS VAC: HCPCS | Performed by: NURSE PRACTITIONER

## 2019-10-02 RX ORDER — POLYETHYLENE GLYCOL-3350 AND ELECTROLYTES 236; 6.74; 5.86; 2.97; 22.74 G/274.31G; G/274.31G; G/274.31G; G/274.31G; G/274.31G
POWDER, FOR SOLUTION ORAL
Refills: 0 | COMMUNITY
Start: 2019-09-06 | End: 2020-08-31

## 2019-10-02 RX ORDER — GABAPENTIN 600 MG/1
TABLET ORAL
Qty: 270 TAB | Refills: 0 | Status: SHIPPED | OUTPATIENT
Start: 2019-10-02 | End: 2020-06-08

## 2019-10-02 RX ORDER — ONDANSETRON 4 MG/1
8 TABLET, ORALLY DISINTEGRATING ORAL EVERY 8 HOURS PRN
Qty: 30 TAB | Refills: 0 | Status: SHIPPED | OUTPATIENT
Start: 2019-10-02 | End: 2019-11-24 | Stop reason: SDUPTHER

## 2019-10-02 NOTE — PROGRESS NOTES
"Chief Complaint   Patient presents with   • Results     Labs        HISTORY OF THE PRESENT ILLNESS: This is a 54 y.o. female established patient who presents today for follow up and management of:    Left upper quadrant pain  Cyst of left kidney  Flank pain  Chronic. Patient is here to discuss her lab results. Lab results on 9/5 was notable for GFR of 49. BUN was 16, creatinine was 1.16. Patient mentions having history of left kidney cyst. States she experiences intermittent left upper quadrant abdominal pain and is unsure if it is related to the kidney cyst. States when the pain occurs, it feels like gas or cramping which causes her to double over. States she has a constant fullness pain when eating or drinking. States she recently got a colonoscopy which was overall normal. She had an abdominal xray done on 9/13/2019, as below. She reports that her sister has history of interstitial kidney disease which was diagnosed 10 years ago.    Abdominal xray on 9/13/2019 showed findings:  Right upper quadrant cholecystectomy clips.  Bilateral pelvic phleboliths.  Gas-filled colon.  Nonspecific nonobstructive bowel gas pattern. No dilated gas-filled loop of small bowel.  No portal venous gas or pneumatosis.  No definite free intraperitoneal air but evaluation is limited on supine radiograph.    Alcoholism (HCC)  Chronic. Patient states her drinking situation has been \"up and down.\" She was previously doing fairly well for a while in terms of cutting back on alcohol, however, she states that currently she is still drinking \"too many\" drinks per day. States the influence from the people around her makes it difficulty for her to quit alcohol. States her son who is an alcoholic has moved back in her house. For this reason, she has even more difficulty eliminating alcohol. Patient states she still ultimately plans to stop drinking.     Fibromyalgia  Chronic in nature. Patient has chronic intermittent fibromyalgia pains as " described in prior visits. She mentions having some intermittent pain to her groin area. She otherwise does not currently have a flare up at this time.    Need for vaccination  Patient would like to get the influenza vaccination.       Past Medical History:   Diagnosis Date   • Actinic keratosis 2018   • Allergy    • Anxiety    • Arthritis    • Chronic pain syndrome    • COPD (chronic obstructive pulmonary disease) (HCC)    • DDD (degenerative disc disease), cervical    • DDD (degenerative disc disease), thoracolumbar    • Depression    • Diverticulosis    • Essential tremor    • Fibromyalgia    • IBS (irritable bowel syndrome)    • Migraine    • Pulmonary emphysema (HCC)        Past Surgical History:   Procedure Laterality Date   • ABDOMINAL HYSTERECTOMY TOTAL     • CHOLECYSTECTOMY     • TONSILLECTOMY         Family Status   Relation Name Status   • Mo  Alive   • Fa 54 yrs old    • Sis  Alive   • Bro  Alive   • MGFa  (Not Specified)   • Sis  Alive   • Sis  Alive   • Sis  Alive     Family History   Problem Relation Age of Onset   • Lung Disease Mother         copd   • Cancer Mother         basal/squamous   • Hypertension Mother    • Hyperlipidemia Mother    • Stroke Mother    • Heart Disease Father         HF   • Arthritis Father         rheumatoid   • Lung Disease Father         emphysema   • Cancer Sister    • Cancer Maternal Grandfather         colon or bladder not sure    • Cancer Sister         pre cancerous spots        Social History     Tobacco Use   • Smoking status: Current Every Day Smoker     Packs/day: 1.00     Years: 35.00     Pack years: 35.00   • Smokeless tobacco: Never Used   Substance Use Topics   • Alcohol use: Yes     Alcohol/week: 7.2 oz     Types: 12 Shots of liquor per week     Comment: 12 shots a day when actively drinking   • Drug use: Yes     Types: Marijuana     Comment:  not current        Allergies: Erythromycin and Tetracycline    Current Outpatient Medications Ordered in  Epic   Medication Sig Dispense Refill   • mirtazapine (REMERON) 30 MG Tab tablet TAKE 1 TABLET BY MOUTH EVERYDAY AT BEDTIME 90 Tab 0   • hydrOXYzine HCl (ATARAX) 25 MG Tab TAKE 1 TAB BY MOUTH 3 TIMES A DAY AS NEEDED FOR ANXIETY. 90 Tab 0   • meloxicam (MOBIC) 15 MG tablet TAKE 1 TABLET BY MOUTH EVERY DAY MAKE APPT 30 Tab 0   • dicyclomine (BENTYL) 20 MG Tab Take 20 mg by mouth every 6 hours.     • ondansetron (ZOFRAN ODT) 4 MG TABLET DISPERSIBLE Take 4 mg by mouth every 6 hours as needed for Nausea.     • gabapentin (NEURONTIN) 600 MG tablet TAKE 1 TABLET BY MOUTH THREE TIMES A  Tab 0   • baclofen (LIORESAL) 10 MG Tab TAKE 1 TABLET BY MOUTH THREE TIMES A DAY (Patient taking differently: 10 mg 1 time daily as needed.) 90 Tab 0   • Milnacipran HCl (SAVELLA) 100 MG Tab Take 100 mg by mouth every day.     • topiramate (TOPAMAX) 50 MG tablet TAKE 1 TABLET BY MOUTH 3 TIMES DAILY FOR MIGRAINE PREVENTION 270 Tab 3   • Cyanocobalamin (VITAMIN B-12) 1000 MCG Tab Take 1 Tab by mouth 2 Times a Day. 60 Tab 11   • GAVILYTE-G 236 g Recon Soln FOLLOW [facility]S INSTRUCTION HANDOUT: OK TO SUBSTITUTE FOR INSURANCE PREFERRED  0     No current [facility]-ordered facility-administered medications on file.        Review of Systems   Constitutional: Negative for fever, chills, weight loss and malaise/fatigue.   Respiratory: Negative for cough, sputum production, shortness of breath and wheezing.    Cardiovascular: Negative for chest pain, palpitations, orthopnea and leg swelling.   Gastrointestinal: Left upper quadrant pain. Negative for heartburn, nausea, vomiting.  Musculoskeletal: Chronic fibromyalgia pains (including groin area).   Neurological: Negative for dizziness, tingling, tremors, sensory change, focal weakness and headaches.   Psychiatric: Alcohol consumption.    All other systems reviewed and are negative except as in HPI.    Exam: /84 (BP Location: Left arm, Patient Position: Sitting, BP Cuff Size: Adult)   Pulse  "78   Temp 36.7 °C (98.1 °F) (Temporal)   Resp 18   Ht 1.676 m (5' 6\")   Wt 88.5 kg (195 lb)   SpO2 98%   General:  Normal appearing. No distress.  Pulmonary:  Clear to ausculation.  Normal effort. No rales, ronchi, or wheezing.  Cardiovascular:  Regular rate and rhythm without murmur. Carotid and radial pulses are intact and equal bilaterally.  Neurologic:  Grossly nonfocal  Skin:  Warm and dry.  No obvious lesions.  Musculoskeletal:  Normal gait. No extremity cyanosis, clubbing, or edema.  Psych:  Normal mood and affect. Alert and oriented x3. Judgment and insight is normal.    Labs:  Hospital Outpatient Visit on 09/05/2019   Component Date Value Ref Range Status   • Free T-4 09/05/2019 0.67  0.53 - 1.43 ng/dL Final   • Sodium 09/05/2019 144  135 - 145 mmol/L Final   • Potassium 09/05/2019 4.2  3.6 - 5.5 mmol/L Final   • Chloride 09/05/2019 110  96 - 112 mmol/L Final   • Co2 09/05/2019 26  20 - 33 mmol/L Final   • Anion Gap 09/05/2019 8.0  0.0 - 11.9 Final   • Glucose 09/05/2019 81  65 - 99 mg/dL Final   • Bun 09/05/2019 16  8 - 22 mg/dL Final   • Creatinine 09/05/2019 1.16  0.50 - 1.40 mg/dL Final   • Calcium 09/05/2019 9.1  8.5 - 10.5 mg/dL Final   • AST(SGOT) 09/05/2019 16  12 - 45 U/L Final   • ALT(SGPT) 09/05/2019 10  2 - 50 U/L Final   • Alkaline Phosphatase 09/05/2019 74  30 - 99 U/L Final   • Total Bilirubin 09/05/2019 0.4  0.1 - 1.5 mg/dL Final   • Albumin 09/05/2019 4.0  3.2 - 4.9 g/dL Final   • Total Protein 09/05/2019 6.6  6.0 - 8.2 g/dL Final   • Globulin 09/05/2019 2.6  1.9 - 3.5 g/dL Final   • A-G Ratio 09/05/2019 1.5  g/dL Final   • Fasting Status 09/05/2019 Fasting   Final   • GFR If  09/05/2019 59* >60 mL/min/1.73 m 2 Final   • GFR If Non  09/05/2019 49* >60 mL/min/1.73 m 2 Final          PLAN:    1. Need for vaccination  - Patient received influenza vaccination.  - Influenza Vaccine Quad Injection (PF)    2. Left upper quadrant pain  3. Cyst of left kidney  6. " "Flank pain  - Patient reports having left upper quadrant pain, as described in the HPI. She has concern that it may be related to her left kidney cyst. Recent labs showed mildly low GFR of 49. BUN was 16 and creatinine 1.16. Advised doing ultrasound if her GFR continues to decrease. Ultrasound was ordered.  - Advised patient to avoid nephrotoxic agents.  - US-RENAL; Future    4. Alcoholism (HCC)  - Patient states she is still having \"too many\" drinks per day. States her son who is an alcoholic moved back in her house. States her son keeps alcohol in her house which makes it harder for her to eliminate alcohol. Advised patient to set boundaries and to ask her son to keep his alcohol somewhere else and not in her house. States she still plans to ultimately quit alcohol. Encouraged her to continue taking measures to eliminate alcohol. Encouraged alcohol cessation.     5. Fibromyalgia  - Patient has the same chronic intermittent fibromyalgia pains. She reports having some associated groin pain as well. Recommended doing swimming exercises for her fibromyalgia pain.    Follow-up in 6 months or sooner as needed. Patient is encouraged to be seen in the emergency room for chest pain, palpitations, shortness of breath, dizziness, severe abdominal pain or other concerning symptoms.     Please note that this dictation was created using voice recognition software. I have made every reasonable attempt to correct obvious errors, but I expect that there are errors of grammar and possibly content that I did not discover before finalizing the note.      Assessment/Plan  1. Need for vaccination  Influenza Vaccine Quad Injection (PF)   2. Left upper quadrant pain  US-RENAL   3. Cyst of left kidney  US-RENAL   4. Alcoholism (HCC)     5. Fibromyalgia     6. Flank pain           I have placed the below orders and discussed them with an approved delegating provider. The MA is performing the below orders under the direction of " David.       ITerence (Scribe), am scribing for, and in the presence of, YEIMY Negron    Electronically signed by: Terence Ruvalcaba (Scribe), 10/2/2019    Magdiel PALACIOS APRN personally performed the services described in this documentation, as scribed by Terence Ruvalcaba in my presence, and it is both accurate and complete.

## 2019-10-24 DIAGNOSIS — F41.1 GAD (GENERALIZED ANXIETY DISORDER): ICD-10-CM

## 2019-10-24 RX ORDER — HYDROXYZINE HYDROCHLORIDE 25 MG/1
25 TABLET, FILM COATED ORAL 3 TIMES DAILY PRN
Qty: 90 TAB | Refills: 0 | Status: SHIPPED | OUTPATIENT
Start: 2019-10-24 | End: 2019-11-11 | Stop reason: SDUPTHER

## 2019-11-11 DIAGNOSIS — F41.1 GAD (GENERALIZED ANXIETY DISORDER): ICD-10-CM

## 2019-11-11 RX ORDER — HYDROXYZINE HYDROCHLORIDE 25 MG/1
25 TABLET, FILM COATED ORAL 3 TIMES DAILY PRN
Qty: 90 TAB | Refills: 0 | Status: SHIPPED | OUTPATIENT
Start: 2019-11-11

## 2019-11-24 DIAGNOSIS — M79.7 FIBROMYALGIA: ICD-10-CM

## 2019-11-24 DIAGNOSIS — K58.0 IRRITABLE BOWEL SYNDROME WITH DIARRHEA: ICD-10-CM

## 2019-11-25 RX ORDER — MELOXICAM 15 MG/1
TABLET ORAL
Qty: 30 TAB | Refills: 0 | Status: SHIPPED | OUTPATIENT
Start: 2019-11-25 | End: 2020-01-03

## 2019-11-25 RX ORDER — ONDANSETRON 4 MG/1
8 TABLET, ORALLY DISINTEGRATING ORAL EVERY 8 HOURS PRN
Qty: 30 TAB | Refills: 0 | Status: SHIPPED | OUTPATIENT
Start: 2019-11-25 | End: 2020-01-02

## 2019-12-23 DIAGNOSIS — G47.01 INSOMNIA DUE TO MEDICAL CONDITION: ICD-10-CM

## 2019-12-23 RX ORDER — MIRTAZAPINE 30 MG/1
TABLET, FILM COATED ORAL
Qty: 90 TAB | Refills: 0 | Status: SHIPPED | OUTPATIENT
Start: 2019-12-23 | End: 2020-03-26

## 2020-01-02 DIAGNOSIS — K58.0 IRRITABLE BOWEL SYNDROME WITH DIARRHEA: ICD-10-CM

## 2020-01-02 DIAGNOSIS — M79.7 FIBROMYALGIA: ICD-10-CM

## 2020-01-02 RX ORDER — ONDANSETRON 4 MG/1
8 TABLET, ORALLY DISINTEGRATING ORAL EVERY 8 HOURS PRN
Qty: 30 TAB | Refills: 0 | Status: SHIPPED | OUTPATIENT
Start: 2020-01-02 | End: 2020-02-20

## 2020-01-03 RX ORDER — MELOXICAM 15 MG/1
TABLET ORAL
Qty: 30 TAB | Refills: 0 | Status: SHIPPED | OUTPATIENT
Start: 2020-01-03 | End: 2020-01-27

## 2020-01-10 ENCOUNTER — PATIENT OUTREACH (OUTPATIENT)
Dept: HEALTH INFORMATION MANAGEMENT | Facility: OTHER | Age: 55
End: 2020-01-10

## 2020-01-10 NOTE — PROGRESS NOTES
Outcome: No vm     Please transfer to Patient Outreach Team at [phone] when patient returns call.      [facility] Verified: yes    Attempt # 1

## 2020-01-26 DIAGNOSIS — M79.7 FIBROMYALGIA: ICD-10-CM

## 2020-01-27 RX ORDER — MELOXICAM 15 MG/1
TABLET ORAL
Qty: 90 TAB | Refills: 0 | Status: SHIPPED | OUTPATIENT
Start: 2020-01-27 | End: 2020-05-18

## 2020-02-05 ENCOUNTER — TELEPHONE (OUTPATIENT)
Dept: MEDICAL GROUP | Facility: PHYSICIAN GROUP | Age: 55
End: 2020-02-05

## 2020-02-05 NOTE — TELEPHONE ENCOUNTER
ESTABLISHED PATIENT PRE-VISIT PLANNING     Patient was NOT contacted to complete PVP.         1.  Reviewed notes from the last few office visits within the medical group: Yes    2.  If any orders were placed at last visit or intended to be done for this visit (i.e. 6 mos follow-up), do we have Results/Consult Notes?        •  Labs - Labs were not ordered at last office visit.         •  Imaging - Imaging ordered, NOT completed. Patient advised to complete prior to next appointment.       •  Referrals - No referrals were ordered at last office visit.    3. Is this appointment scheduled as a Hospital Follow-Up? No    4.  Immunizations were updated in Epic using WebIZ?: Epic matches WebIZ       •  Web Iz Recommendations: MMR , TDAP, VARICELLA (Chicken Pox)  and SHINGRIX (Shingles)    5.  Patient is due for the following Health Maintenance Topics:   Health Maintenance Due   Topic Date Due   • ANNUAL PFT SCREENING-FEV1 AND FEV/FVC RATIO / SPIROMETRY  01/03/1984       - Patient is up-to-date on all Health Maintenance topics. No records have been requested at this time.    6. Orders for overdue Health Maintenance topics pended in Pre-Charting? NO    7.  AHA (MDX) form printed for Provider? YES    8.  Patient was NOT informed to arrive 15 min prior to their scheduled appointment and bring in their medication bottles.

## 2020-02-20 DIAGNOSIS — K58.0 IRRITABLE BOWEL SYNDROME WITH DIARRHEA: ICD-10-CM

## 2020-02-20 RX ORDER — ONDANSETRON 4 MG/1
8 TABLET, ORALLY DISINTEGRATING ORAL EVERY 8 HOURS PRN
Qty: 30 TAB | Refills: 0 | Status: SHIPPED | OUTPATIENT
Start: 2020-02-20 | End: 2020-03-16

## 2020-02-26 ENCOUNTER — APPOINTMENT (OUTPATIENT)
Dept: MEDICAL GROUP | Facility: PHYSICIAN GROUP | Age: 55
End: 2020-02-26
Payer: MEDICARE

## 2020-03-11 ENCOUNTER — TELEPHONE (OUTPATIENT)
Dept: HEALTH INFORMATION MANAGEMENT | Facility: OTHER | Age: 55
End: 2020-03-11

## 2020-03-11 NOTE — TELEPHONE ENCOUNTER
1. Caller Name: Jaclyn Mejia                    Call Back Number: [phone]  [facility] PCP or Specialty Provider: Yes Bl[facility]        2.  Does patient have any active symptoms of respiratory illness (fever OR cough OR shortness of breath)? Yes, the patient reports the following respiratory symptoms: cough and hayfever s/s. PMH includes COPD, +tobacco use, per pt, she does not use her inhaler as she should. Has chronic cough and brown sputum at baseline.    3.  In the last 30 days, has the patient traveled outside of the country OR in a high risk area within the [place] OR have any known contact with someone who has?  No.    4.  Does patient have any comoribidities? COPD    5. Disposition:  Pt calling to reschedule an appt that was for tomorrow that is f/u r/t an abdominal cyst US; wants to move appt to early April.  Transferred to scheduling to get rescheduled.    Note routed to PCP: FYI only.

## 2020-03-12 ENCOUNTER — APPOINTMENT (OUTPATIENT)
Dept: MEDICAL GROUP | Facility: PHYSICIAN GROUP | Age: 55
End: 2020-03-12
Payer: MEDICARE

## 2020-03-14 DIAGNOSIS — M79.7 FIBROMYALGIA: ICD-10-CM

## 2020-03-14 DIAGNOSIS — K58.0 IRRITABLE BOWEL SYNDROME WITH DIARRHEA: ICD-10-CM

## 2020-03-14 DIAGNOSIS — G43.809 OTHER MIGRAINE WITHOUT STATUS MIGRAINOSUS, NOT INTRACTABLE: ICD-10-CM

## 2020-03-18 RX ORDER — MILNACIPRAN HYDROCHLORIDE 100 MG/1
TABLET, FILM COATED ORAL
Qty: 180 TAB | Refills: 3 | Status: SHIPPED | OUTPATIENT
Start: 2020-03-18 | End: 2021-04-02

## 2020-03-18 RX ORDER — TOPIRAMATE 50 MG/1
TABLET, FILM COATED ORAL
Qty: 270 TAB | Refills: 3 | Status: SHIPPED | OUTPATIENT
Start: 2020-03-18 | End: 2021-03-18 | Stop reason: SDUPTHER

## 2020-03-18 RX ORDER — ONDANSETRON 4 MG/1
8 TABLET, ORALLY DISINTEGRATING ORAL EVERY 8 HOURS PRN
Qty: 30 TAB | Refills: 3 | Status: SHIPPED | OUTPATIENT
Start: 2020-03-18 | End: 2021-01-11

## 2020-03-18 RX ORDER — BACLOFEN 10 MG/1
TABLET ORAL
Qty: 90 TAB | Refills: 3 | Status: SHIPPED | OUTPATIENT
Start: 2020-03-18 | End: 2020-12-18

## 2020-03-19 ENCOUNTER — TELEPHONE (OUTPATIENT)
Dept: MEDICAL GROUP | Facility: PHYSICIAN GROUP | Age: 55
End: 2020-03-19

## 2020-03-19 NOTE — TELEPHONE ENCOUNTER
VOICEMAIL  1. Caller Name: Jaclyn Peter Cloud                      Call Back Number: [phone]       2. Message: Could not contact Pt by phone as no  set up, sent Pt a [employer] Message that refills have been sent to the [facility] on [address] per her request on 03/18/2020

## 2020-03-26 DIAGNOSIS — G47.01 INSOMNIA DUE TO MEDICAL CONDITION: ICD-10-CM

## 2020-03-26 RX ORDER — MIRTAZAPINE 30 MG/1
TABLET, FILM COATED ORAL
Qty: 90 TAB | Refills: 0 | Status: SHIPPED | OUTPATIENT
Start: 2020-03-26 | End: 2020-06-19

## 2020-05-06 ENCOUNTER — TELEPHONE (OUTPATIENT)
Dept: MEDICAL GROUP | Age: 55
End: 2020-05-06

## 2020-05-06 NOTE — TELEPHONE ENCOUNTER
ESTABLISHED PATIENT PRE-VISIT PLANNING     Patient was NOT contacted to complete PVP.     Note: Patient will not be contacted if there is no indication to call.     1.  Reviewed notes from the last few office visits within the medical group: Yes    2.  If any orders were placed at last visit or intended to be done for this visit (i.e. 6 mos follow-up), do we have Results/Consult Notes?        •  Labs - Labs were not ordered at last office visit.   Note: If patient appointment is for lab review and patient did not complete labs, check with provider if OK to reschedule patient until labs completed.       •  Imaging - Imaging was not ordered at last office visit.       •  Referrals - No referrals were ordered at last office visit.    3. Is this appointment scheduled as a Hospital Follow-Up? No    4.  Immunizations were updated in Epic using WebIZ?: Epic matches WebIZ       •  Web Iz Recommendations: Patient is up to date on all vaccines    5.  Patient is due for the following Health Maintenance Topics:   Health Maintenance Due   Topic Date Due   • Annual Pulmonary Function Test / Spirometry  01/03/1971           6. Orders for overdue Health Maintenance topics pended in Pre-Charting? N\A    7.  AHA (MDX) form printed for Provider? YES    8.  Patient was NOT informed to arrive 15 min prior to their scheduled appointment and bring in their medication bottles.

## 2020-05-07 ENCOUNTER — TELEMEDICINE (OUTPATIENT)
Dept: MEDICAL GROUP | Facility: PHYSICIAN GROUP | Age: 55
End: 2020-05-07
Payer: MEDICARE

## 2020-05-07 ENCOUNTER — HOSPITAL ENCOUNTER (OUTPATIENT)
Dept: LAB | Facility: MEDICAL CENTER | Age: 55
End: 2020-05-07
Attending: NURSE PRACTITIONER
Payer: MEDICARE

## 2020-05-07 VITALS
SYSTOLIC BLOOD PRESSURE: 140 MMHG | HEIGHT: 66 IN | BODY MASS INDEX: 31.34 KG/M2 | DIASTOLIC BLOOD PRESSURE: 106 MMHG | WEIGHT: 195 LBS

## 2020-05-07 DIAGNOSIS — I10 ESSENTIAL HYPERTENSION: ICD-10-CM

## 2020-05-07 DIAGNOSIS — F10.20 ALCOHOLISM (HCC): ICD-10-CM

## 2020-05-07 DIAGNOSIS — Z71.6 ENCOUNTER FOR SMOKING CESSATION COUNSELING: ICD-10-CM

## 2020-05-07 DIAGNOSIS — J44.89 COPD (CHRONIC OBSTRUCTIVE PULMONARY DISEASE) WITH CHRONIC BRONCHITIS (HCC): ICD-10-CM

## 2020-05-07 PROBLEM — R03.0 ELEVATED BP WITHOUT DIAGNOSIS OF HYPERTENSION: Status: ACTIVE | Noted: 2020-05-07

## 2020-05-07 LAB
ALBUMIN SERPL BCP-MCNC: 4.4 G/DL (ref 3.2–4.9)
ALBUMIN/GLOB SERPL: 1.6 G/DL
ALP SERPL-CCNC: 123 U/L (ref 30–99)
ALT SERPL-CCNC: 32 U/L (ref 2–50)
ANION GAP SERPL CALC-SCNC: 14 MMOL/L (ref 7–16)
AST SERPL-CCNC: 31 U/L (ref 12–45)
BASOPHILS # BLD AUTO: 0.6 % (ref 0–1.8)
BASOPHILS # BLD: 0.05 K/UL (ref 0–0.12)
BILIRUB SERPL-MCNC: 0.4 MG/DL (ref 0.1–1.5)
BUN SERPL-MCNC: 20 MG/DL (ref 8–22)
CALCIUM SERPL-MCNC: 9.9 MG/DL (ref 8.5–10.5)
CHLORIDE SERPL-SCNC: 103 MMOL/L (ref 96–112)
CHOLEST SERPL-MCNC: 194 MG/DL (ref 100–199)
CO2 SERPL-SCNC: 22 MMOL/L (ref 20–33)
CREAT SERPL-MCNC: 1.06 MG/DL (ref 0.5–1.4)
EOSINOPHIL # BLD AUTO: 0.13 K/UL (ref 0–0.51)
EOSINOPHIL NFR BLD: 1.6 % (ref 0–6.9)
ERYTHROCYTE [DISTWIDTH] IN BLOOD BY AUTOMATED COUNT: 46.3 FL (ref 35.9–50)
FASTING STATUS PATIENT QL REPORTED: NORMAL
GLOBULIN SER CALC-MCNC: 2.8 G/DL (ref 1.9–3.5)
GLUCOSE SERPL-MCNC: 99 MG/DL (ref 65–99)
HCT VFR BLD AUTO: 44.9 % (ref 37–47)
HDLC SERPL-MCNC: 57 MG/DL
HGB BLD-MCNC: 15.2 G/DL (ref 12–16)
IMM GRANULOCYTES # BLD AUTO: 0.02 K/UL (ref 0–0.11)
IMM GRANULOCYTES NFR BLD AUTO: 0.2 % (ref 0–0.9)
LDLC SERPL CALC-MCNC: 114 MG/DL
LYMPHOCYTES # BLD AUTO: 3.56 K/UL (ref 1–4.8)
LYMPHOCYTES NFR BLD: 44.1 % (ref 22–41)
MCH RBC QN AUTO: 35.2 PG (ref 27–33)
MCHC RBC AUTO-ENTMCNC: 33.9 G/DL (ref 33.6–35)
MCV RBC AUTO: 103.9 FL (ref 81.4–97.8)
MONOCYTES # BLD AUTO: 0.46 K/UL (ref 0–0.85)
MONOCYTES NFR BLD AUTO: 5.7 % (ref 0–13.4)
NEUTROPHILS # BLD AUTO: 3.85 K/UL (ref 2–7.15)
NEUTROPHILS NFR BLD: 47.8 % (ref 44–72)
NRBC # BLD AUTO: 0 K/UL
NRBC BLD-RTO: 0 /100 WBC
PLATELET # BLD AUTO: 295 K/UL (ref 164–446)
PMV BLD AUTO: 10.9 FL (ref 9–12.9)
POTASSIUM SERPL-SCNC: 4.1 MMOL/L (ref 3.6–5.5)
PROT SERPL-MCNC: 7.2 G/DL (ref 6–8.2)
RBC # BLD AUTO: 4.32 M/UL (ref 4.2–5.4)
SODIUM SERPL-SCNC: 139 MMOL/L (ref 135–145)
TRIGL SERPL-MCNC: 113 MG/DL (ref 0–149)
WBC # BLD AUTO: 8.1 K/UL (ref 4.8–10.8)

## 2020-05-07 PROCEDURE — 80053 COMPREHEN METABOLIC PANEL: CPT

## 2020-05-07 PROCEDURE — 99442 PR PHYSICIAN TELEPHONE EVALUATION 11-20 MIN: CPT | Mod: CR | Performed by: NURSE PRACTITIONER

## 2020-05-07 PROCEDURE — 80061 LIPID PANEL: CPT

## 2020-05-07 PROCEDURE — 85025 COMPLETE CBC W/AUTO DIFF WBC: CPT

## 2020-05-07 PROCEDURE — 36415 COLL VENOUS BLD VENIPUNCTURE: CPT

## 2020-05-07 RX ORDER — NICOTINE 21 MG/24HR
1 PATCH, TRANSDERMAL 24 HOURS TRANSDERMAL EVERY 24 HOURS
Qty: 30 PATCH | Refills: 0 | Status: SHIPPED | OUTPATIENT
Start: 2020-05-07 | End: 2020-08-31

## 2020-05-07 RX ORDER — LISINOPRIL 20 MG/1
20 TABLET ORAL DAILY
Qty: 30 TAB | Refills: 0 | Status: SHIPPED | OUTPATIENT
Start: 2020-05-07 | End: 2020-05-08 | Stop reason: SDUPTHER

## 2020-05-07 ASSESSMENT — FIBROSIS 4 INDEX: FIB4 SCORE: 0.91

## 2020-05-07 NOTE — ASSESSMENT & PLAN NOTE
Chronic in nature.  Stable.  Patient has been breathing well at this time.  No concerns with nighttime waking, shortness of breath.

## 2020-05-07 NOTE — ASSESSMENT & PLAN NOTE
This is a new problem.  Patient has been drinking an increasing amount of alcohol and states that she has been gaining weight.  Patient states that she has noticed over the last week or so that she has been feeling dizzy, having headaches as such she took her blood pressure which has been elevated states she has noted numbers as high as 160/100.  Pressure today is 140/106 on her personal blood pressure cuff.  Patient has never had high blood pressure in the past.  Denies any chest pain, palpitations, blurry vision.  Patient is not forthcoming with the amount of alcohol she is drinking daily.

## 2020-05-07 NOTE — PROGRESS NOTES
"  Telephone Appointment Visit   As a means of avoiding spread of COVID-19, this visit is being conducted by telephone. This telephone visit was initiated by the patient and they verbally consented.    Time at start of call: 1330    Reason for Call:  New Symptom/ Concern: Elevated blood pressure    Patient Comments / History:   Essential hypertension  This is a new problem.  Patient has been drinking an increasing amount of alcohol and states that she has been gaining weight.  Patient states that she has noticed over the last week or so that she has been feeling dizzy, having headaches as such she took her blood pressure which has been elevated states she has noted numbers as high as 160/100.  Pressure today is 140/106 on her personal blood pressure cuff.  Patient has never had high blood pressure in the past.  Denies any chest pain, palpitations, blurry vision.  Patient is not forthcoming with the amount of alcohol she is drinking daily.    Alcoholism (HCC)  Chronic in nature.  Patient has quit drinking multiple times but states that she is currently drinking daily related to the quarantine and states that she feels a her alcoholism has gotten worse when being cooped up in the house.  She states she has been drinking \"some\" vodka but is not forthcoming with a number of drinks per day.  Patient states she feels like she has been going through \"some withdrawals\" as she has not had anything to drink today.  Patient denies jitters, during brief time that she was visible on the video this provider did not notice any shakes.  Patient is alert and oriented at this time.  States that she does have some vodka and is planning on drinking.  Counseled patient extensively regarding reasons to be seen in the emergency room.  Counseled her regarding altered level of consciousness, vomiting, shakes, signs and symptoms of alcohol withdrawal.  Patient states that she is safe.    COPD (chronic obstructive pulmonary disease) with " chronic bronchitis (CMS-HCC)  Chronic in nature.  Stable.  Patient has been breathing well at this time.  No concerns with nighttime waking, shortness of breath.        Labs / Images Reviewed   none    Assessment and Plan:     1. Alcoholism (HCC)  Counseled patient regarding importance of careful cessation of alcohol patient states she does not plan to stop drinking as such counseled patient regarding signs and symptoms to be seen in the emergency room strict ER precautions are provided.    2. Essential hypertension  With regard to elevated blood pressure patient is encouraged to check her blood pressure and report blood pressures higher than 160/90, this with patient that if her blood pressure is consistently elevated above this level she should be seen in the emergency room patient is also counseled to be seen in the emergency room for chest pain or pressure.   - CBC WITH DIFFERENTIAL; Future  - Comp Metabolic Panel; Future  - Lipid Profile; Future  - lisinopril (PRINIVIL) 20 MG Tab; Take 1 Tab by mouth every day.  Dispense: 30 Tab; Refill: 0    3. COPD (chronic obstructive pulmonary disease) with chronic bronchitis ([facility])  Continue monitoring    4. Encounter for smoking cessation counseling  - nicotine (NICODERM) 21 MG/24HR PATCH 24 HR; Apply 1 Patch to skin as directed every 24 hours.  Dispense: 30 Patch; Refill: 0    Follow-up: Return in about 2 weeks (around 5/21/2020), or if symptoms worsen or fail to improve.    Time at end of call: 3597  Total Time Spent: 11-20 minutes    DEONTE Rose.

## 2020-05-07 NOTE — ASSESSMENT & PLAN NOTE
"Chronic in nature.  Patient has quit drinking multiple times but states that she is currently drinking daily related to the quarantine and states that she feels a her alcoholism has gotten worse when being cooped up in the house.  She states she has been drinking \"some\" vodka but is not forthcoming with a number of drinks per day.  Patient states she feels like she has been going through \"some withdrawals\" as she has not had anything to drink today.  Patient denies jitters, during brief time that she was visible on the video this provider did not notice any shakes.  Patient is alert and oriented at this time.  States that she does have some vodka and is planning on drinking.  Counseled patient extensively regarding reasons to be seen in the emergency room.  Counseled her regarding altered level of consciousness, vomiting, shakes, signs and symptoms of alcohol withdrawal.  Patient states that she is safe.  "

## 2020-05-08 DIAGNOSIS — I10 ESSENTIAL HYPERTENSION: ICD-10-CM

## 2020-05-11 RX ORDER — LISINOPRIL 20 MG/1
20 TABLET ORAL DAILY
Qty: 100 TAB | Refills: 0 | Status: SHIPPED | OUTPATIENT
Start: 2020-05-11 | End: 2020-09-08

## 2020-05-18 DIAGNOSIS — M79.7 FIBROMYALGIA: ICD-10-CM

## 2020-05-18 RX ORDER — MELOXICAM 15 MG/1
TABLET ORAL
Qty: 90 TAB | Refills: 0 | Status: SHIPPED | OUTPATIENT
Start: 2020-05-18 | End: 2020-08-31

## 2020-06-08 DIAGNOSIS — M79.7 FIBROMYALGIA: ICD-10-CM

## 2020-06-08 RX ORDER — GABAPENTIN 600 MG/1
TABLET ORAL
Qty: 270 TAB | Refills: 0 | Status: SHIPPED | OUTPATIENT
Start: 2020-06-08 | End: 2020-10-02

## 2020-06-19 DIAGNOSIS — G47.01 INSOMNIA DUE TO MEDICAL CONDITION: ICD-10-CM

## 2020-06-19 RX ORDER — MIRTAZAPINE 30 MG/1
TABLET, FILM COATED ORAL
Qty: 90 TAB | Refills: 0 | Status: SHIPPED | OUTPATIENT
Start: 2020-06-19 | End: 2020-09-22

## 2020-08-29 DIAGNOSIS — M79.7 FIBROMYALGIA: ICD-10-CM

## 2020-08-31 ENCOUNTER — NURSE TRIAGE (OUTPATIENT)
Dept: HEALTH INFORMATION MANAGEMENT | Facility: OTHER | Age: 55
End: 2020-08-31

## 2020-08-31 ENCOUNTER — APPOINTMENT (OUTPATIENT)
Dept: RADIOLOGY | Facility: MEDICAL CENTER | Age: 55
End: 2020-08-31
Attending: EMERGENCY MEDICINE
Payer: MEDICARE

## 2020-08-31 ENCOUNTER — HOSPITAL ENCOUNTER (EMERGENCY)
Facility: MEDICAL CENTER | Age: 55
End: 2020-08-31
Attending: EMERGENCY MEDICINE
Payer: MEDICARE

## 2020-08-31 VITALS
RESPIRATION RATE: 18 BRPM | OXYGEN SATURATION: 97 % | SYSTOLIC BLOOD PRESSURE: 116 MMHG | BODY MASS INDEX: 32.24 KG/M2 | HEART RATE: 76 BPM | DIASTOLIC BLOOD PRESSURE: 69 MMHG | WEIGHT: 199.74 LBS | TEMPERATURE: 98.2 F

## 2020-08-31 DIAGNOSIS — J44.1 ACUTE EXACERBATION OF CHRONIC OBSTRUCTIVE PULMONARY DISEASE (COPD) (HCC): ICD-10-CM

## 2020-08-31 LAB
ALBUMIN SERPL BCP-MCNC: 4.8 G/DL (ref 3.2–4.9)
ALBUMIN/GLOB SERPL: 1.7 G/DL
ALP SERPL-CCNC: 120 U/L (ref 30–99)
ALT SERPL-CCNC: 16 U/L (ref 2–50)
ANION GAP SERPL CALC-SCNC: 14 MMOL/L (ref 7–16)
AST SERPL-CCNC: 20 U/L (ref 12–45)
BASOPHILS # BLD AUTO: 0.6 % (ref 0–1.8)
BASOPHILS # BLD: 0.05 K/UL (ref 0–0.12)
BILIRUB SERPL-MCNC: 0.3 MG/DL (ref 0.1–1.5)
BLOOD CULTURE HOLD CXBCH: NORMAL
BLOOD CULTURE HOLD CXBCH: NORMAL
BUN SERPL-MCNC: 16 MG/DL (ref 8–22)
CALCIUM SERPL-MCNC: 9.7 MG/DL (ref 8.5–10.5)
CHLORIDE SERPL-SCNC: 109 MMOL/L (ref 96–112)
CO2 SERPL-SCNC: 21 MMOL/L (ref 20–33)
COVID ORDER STATUS COVID19: NORMAL
CREAT SERPL-MCNC: 1.21 MG/DL (ref 0.5–1.4)
EKG IMPRESSION: NORMAL
EOSINOPHIL # BLD AUTO: 0.08 K/UL (ref 0–0.51)
EOSINOPHIL NFR BLD: 0.9 % (ref 0–6.9)
ERYTHROCYTE [DISTWIDTH] IN BLOOD BY AUTOMATED COUNT: 49.8 FL (ref 35.9–50)
GLOBULIN SER CALC-MCNC: 2.8 G/DL (ref 1.9–3.5)
GLUCOSE SERPL-MCNC: 97 MG/DL (ref 65–99)
HCT VFR BLD AUTO: 45.4 % (ref 37–47)
HGB BLD-MCNC: 14.9 G/DL (ref 12–16)
IMM GRANULOCYTES # BLD AUTO: 0.04 K/UL (ref 0–0.11)
IMM GRANULOCYTES NFR BLD AUTO: 0.5 % (ref 0–0.9)
LYMPHOCYTES # BLD AUTO: 3.33 K/UL (ref 1–4.8)
LYMPHOCYTES NFR BLD: 37.7 % (ref 22–41)
MCH RBC QN AUTO: 33.9 PG (ref 27–33)
MCHC RBC AUTO-ENTMCNC: 32.8 G/DL (ref 33.6–35)
MCV RBC AUTO: 103.2 FL (ref 81.4–97.8)
MONOCYTES # BLD AUTO: 0.58 K/UL (ref 0–0.85)
MONOCYTES NFR BLD AUTO: 6.6 % (ref 0–13.4)
NEUTROPHILS # BLD AUTO: 4.75 K/UL (ref 2–7.15)
NEUTROPHILS NFR BLD: 53.7 % (ref 44–72)
NRBC # BLD AUTO: 0 K/UL
NRBC BLD-RTO: 0 /100 WBC
PLATELET # BLD AUTO: 312 K/UL (ref 164–446)
PMV BLD AUTO: 10.2 FL (ref 9–12.9)
POTASSIUM SERPL-SCNC: 3.6 MMOL/L (ref 3.6–5.5)
PROT SERPL-MCNC: 7.6 G/DL (ref 6–8.2)
RBC # BLD AUTO: 4.4 M/UL (ref 4.2–5.4)
S PYO DNA SPEC NAA+PROBE: NOT DETECTED
SARS-COV-2 RNA RESP QL NAA+PROBE: NOTDETECTED
SODIUM SERPL-SCNC: 144 MMOL/L (ref 135–145)
SPECIMEN SOURCE: NORMAL
TROPONIN T SERPL-MCNC: 6 NG/L (ref 6–19)
WBC # BLD AUTO: 8.8 K/UL (ref 4.8–10.8)

## 2020-08-31 PROCEDURE — 93005 ELECTROCARDIOGRAM TRACING: CPT

## 2020-08-31 PROCEDURE — 85025 COMPLETE CBC W/AUTO DIFF WBC: CPT

## 2020-08-31 PROCEDURE — 87651 STREP A DNA AMP PROBE: CPT

## 2020-08-31 PROCEDURE — 96374 THER/PROPH/DIAG INJ IV PUSH: CPT

## 2020-08-31 PROCEDURE — U0003 INFECTIOUS AGENT DETECTION BY NUCLEIC ACID (DNA OR RNA); SEVERE ACUTE RESPIRATORY SYNDROME CORONAVIRUS 2 (SARS-COV-2) (CORONAVIRUS DISEASE [COVID-19]), AMPLIFIED PROBE TECHNIQUE, MAKING USE OF HIGH THROUGHPUT TECHNOLOGIES AS DESCRIBED BY CMS-2020-01-R: HCPCS

## 2020-08-31 PROCEDURE — C9803 HOPD COVID-19 SPEC COLLECT: HCPCS | Performed by: EMERGENCY MEDICINE

## 2020-08-31 PROCEDURE — 84484 ASSAY OF TROPONIN QUANT: CPT

## 2020-08-31 PROCEDURE — 700111 HCHG RX REV CODE 636 W/ 250 OVERRIDE (IP): Performed by: EMERGENCY MEDICINE

## 2020-08-31 PROCEDURE — 94760 N-INVAS EAR/PLS OXIMETRY 1: CPT

## 2020-08-31 PROCEDURE — 36415 COLL VENOUS BLD VENIPUNCTURE: CPT

## 2020-08-31 PROCEDURE — 71046 X-RAY EXAM CHEST 2 VIEWS: CPT

## 2020-08-31 PROCEDURE — 99284 EMERGENCY DEPT VISIT MOD MDM: CPT

## 2020-08-31 PROCEDURE — 93005 ELECTROCARDIOGRAM TRACING: CPT | Performed by: EMERGENCY MEDICINE

## 2020-08-31 PROCEDURE — 80053 COMPREHEN METABOLIC PANEL: CPT

## 2020-08-31 RX ORDER — METHYLPREDNISOLONE 4 MG/1
TABLET ORAL
Qty: 1 EACH | Refills: 0 | Status: SHIPPED | OUTPATIENT
Start: 2020-08-31 | End: 2020-09-08

## 2020-08-31 RX ORDER — LEVOFLOXACIN 750 MG/1
750 TABLET, FILM COATED ORAL DAILY
Qty: 10 TAB | Refills: 0 | Status: SHIPPED | OUTPATIENT
Start: 2020-08-31 | End: 2020-09-05

## 2020-08-31 RX ORDER — MELOXICAM 15 MG/1
TABLET ORAL
Qty: 90 TAB | Refills: 0 | Status: SHIPPED | OUTPATIENT
Start: 2020-08-31 | End: 2020-10-02

## 2020-08-31 RX ORDER — KETOROLAC TROMETHAMINE 30 MG/ML
30 INJECTION, SOLUTION INTRAMUSCULAR; INTRAVENOUS ONCE
Status: COMPLETED | OUTPATIENT
Start: 2020-08-31 | End: 2020-08-31

## 2020-08-31 RX ADMIN — KETOROLAC TROMETHAMINE 30 MG: 30 INJECTION, SOLUTION INTRAMUSCULAR at 15:03

## 2020-08-31 ASSESSMENT — FIBROSIS 4 INDEX: FIB4 SCORE: 1

## 2020-08-31 NOTE — ED NOTES
Patient continues to be in mask with appropriate PPE and precautions in place for patient and staff.

## 2020-08-31 NOTE — ED PROVIDER NOTES
"ED Provider Note    Scribed for Beatrice Hercules M.D. by Emelia Lopez. 8/31/2020, 2:39 PM.    Primary care provider: JAN Rose  Means of arrival: Walk in  History obtained from: Patient  History limited by: None    CHIEF COMPLAINT  Chief Complaint   Patient presents with   • Chest Pain     Pt states pain present that has been getting worse for a couple of weeks. pt states pain to be diffuse and heavy in chest.    • Cough     pt states to have chronic cough that has been getting worse.    • Sore Throat     Woke 6 days ago with red spots, irritation to throat.        HPI  Jaclyn Mejia is a 55 y.o. female with a history of COPD, fibromyalgia, alcoholism who presents to the Emergency Department for evaluation of a sore throat with \"red spots\" onset 6 days ago. The patient does have chronic shortness of breath and cough at baseline secondary to her COPD, but states they both have been increasing in severity. She reports associated increased yellow sputum and has had a few episodes of post tussive emesis. The patient additionally complains of associated diffuse chest pain. She has been struggling with episodes of lightheadedness and dizziness as well, but states she was recently placed on Lisinopril 1.5 months ago. No alleviating or aggravating factors are reported. The patient has no complaints of fevers.     REVIEW OF SYSTEMS  Pertinent positives include sore throat, cough, shortness of breath, sputum, lightheadedness, dizziness. Pertinent negatives include no fever. As above, all other systems reviewed and are negative.   See HPI for further details.     PAST MEDICAL HISTORY  Past Medical History:   Diagnosis Date   • Actinic keratosis 2/6/2018   • Allergy    • Anxiety    • Arthritis    • Chronic pain syndrome    • COPD (chronic obstructive pulmonary disease) (HCC)    • DDD (degenerative disc disease), cervical    • DDD (degenerative disc disease), thoracolumbar    • Depression    • " Diverticulosis    • Essential tremor    • Fibromyalgia    • IBS (irritable bowel syndrome)    • Migraine    • Pulmonary emphysema (HCC)        SURGICAL HISTORY  Past Surgical History:   Procedure Laterality Date   • ABDOMINAL HYSTERECTOMY TOTAL     • CHOLECYSTECTOMY     • TONSILLECTOMY         SOCIAL HISTORY  Social History     Tobacco Use   • Smoking status: Current Every Day Smoker     Packs/day: 1.00     Years: 35.00     Pack years: 35.00   • Smokeless tobacco: Never Used   Substance Use Topics   • Alcohol use: Yes     Alcohol/week: 7.2 oz     Types: 12 Shots of liquor per week     Comment: 12 shots a day when actively drinking   • Drug use: Yes     Types: Marijuana     Comment:  not current       Social History     Substance and Sexual Activity   Drug Use Yes   • Types: Marijuana    Comment:  not current        FAMILY HISTORY  Family History   Problem Relation Age of Onset   • Lung Disease Mother         copd   • Cancer Mother         basal/squamous   • Hypertension Mother    • Hyperlipidemia Mother    • Stroke Mother    • Heart Disease Father         HF   • Arthritis Father         rheumatoid   • Lung Disease Father         emphysema   • Cancer Sister    • Cancer Maternal Grandfather         colon or bladder not sure    • Cancer Sister         pre cancerous spots        CURRENT MEDICATIONS  Home Medications     Reviewed by Nancy Amezcua PhT (Pharmacy Tech) on 08/31/20 at 1520  Med List Status: Complete   Medication Last Dose Status   baclofen (LIORESAL) 10 MG Tab 8/30/2020 Active   Calcium-Magnesium-Zinc (JUDSON-MAG-ZINC PO) 8/30/2020 Active   Cyanocobalamin (VITAMIN B-12) 1000 MCG Tab 8/30/2020 Active   dicyclomine (BENTYL) 20 MG Tab 1 WEEK Active   gabapentin (NEURONTIN) 600 MG tablet 8/30/2020 Active   hydrOXYzine HCl (ATARAX) 25 MG Tab 8/28/2020 Active   lisinopril (PRINIVIL) 20 MG Tab 8/30/2020 Active   meloxicam (MOBIC) 15 MG tablet 8/30/2020 Active   mirtazapine (REMERON) 30 MG Tab tablet 8/30/2020  Active   ondansetron (ZOFRAN ODT) 4 MG TABLET DISPERSIBLE 8/30/2020 Active   PROAIR  (90 Base) MCG/ACT Aero Soln inhalation aerosol 8/28/2020 Active   SAVELLA 100 MG Tab 8/30/2020 Active   Thiamine HCl (VITAMIN B-1 PO) 8/30/2020 Active   topiramate (TOPAMAX) 50 MG tablet 8/30/2020 Active                ALLERGIES  Allergies   Allergen Reactions   • Erythromycin Rash     Chest rash   • Tetracycline Rash     Chest rash       PHYSICAL EXAM  VITAL SIGNS: /96   Pulse 97   Temp 36.7 °C (98.1 °F) (Temporal)   Resp 14   Wt 90.6 kg (199 lb 11.8 oz)   SpO2 97%   BMI 32.24 kg/m²   Vitals reviewed.    Consitutional: Well-developed, well-nourished, mildly disheveled. Negative for: distress.  HENT: Normocephalic, right external ear normal, left external ear normal. Dry mucous membranes. Tonsillar pillars are mildly erythematous with no exudates or petechiae. Uvula is midline and edematous, no petechiae.  Posterior taste buds are prominent likely due to dehydration.  Eyes: Conjunctivae normal, extraocular movements normal. Negative for: discharge in right and left eye, icterus.  Neck: Range of motion normal, supple. Negative for cervical adenopathy.  Cardiovascular: Mild tachycardia, regular rhythm, heart sounds normal, intact distal pulses. Negative for: murmur, rub, gallop.  Pulmonary/Chest Wall: End expiratory wheezes. Negative for: rhonchi.   Abdominal: Soft, bowel sounds normal. Negative for: distention, tenderness, rebound, guarding.  Musculoskeletal: Normal range of motion. Negative for edema.  Neurological: Alert and oriented x3. No focal deficits.  Skin: Warm, dry. Negative for rash.  Psych: Mood/affect normal, behavior normal, judgment normal.    DIAGNOSTIC STUDIES / PROCEDURES    LABS  Results for orders placed or performed during the hospital encounter of 08/31/20   CBC WITH DIFFERENTIAL   Result Value Ref Range    WBC 8.8 4.8 - 10.8 K/uL    RBC 4.40 4.20 - 5.40 M/uL    Hemoglobin 14.9 12.0 - 16.0  g/dL    Hematocrit 45.4 37.0 - 47.0 %    .2 (H) 81.4 - 97.8 fL    MCH 33.9 (H) 27.0 - 33.0 pg    MCHC 32.8 (L) 33.6 - 35.0 g/dL    RDW 49.8 35.9 - 50.0 fL    Platelet Count 312 164 - 446 K/uL    MPV 10.2 9.0 - 12.9 fL    Neutrophils-Polys 53.70 44.00 - 72.00 %    Lymphocytes 37.70 22.00 - 41.00 %    Monocytes 6.60 0.00 - 13.40 %    Eosinophils 0.90 0.00 - 6.90 %    Basophils 0.60 0.00 - 1.80 %    Immature Granulocytes 0.50 0.00 - 0.90 %    Nucleated RBC 0.00 /100 WBC    Neutrophils (Absolute) 4.75 2.00 - 7.15 K/uL    Lymphs (Absolute) 3.33 1.00 - 4.80 K/uL    Monos (Absolute) 0.58 0.00 - 0.85 K/uL    Eos (Absolute) 0.08 0.00 - 0.51 K/uL    Baso (Absolute) 0.05 0.00 - 0.12 K/uL    Immature Granulocytes (abs) 0.04 0.00 - 0.11 K/uL    NRBC (Absolute) 0.00 K/uL   COMP METABOLIC PANEL   Result Value Ref Range    Sodium 144 135 - 145 mmol/L    Potassium 3.6 3.6 - 5.5 mmol/L    Chloride 109 96 - 112 mmol/L    Co2 21 20 - 33 mmol/L    Anion Gap 14.0 7.0 - 16.0    Glucose 97 65 - 99 mg/dL    Bun 16 8 - 22 mg/dL    Creatinine 1.21 0.50 - 1.40 mg/dL    Calcium 9.7 8.5 - 10.5 mg/dL    AST(SGOT) 20 12 - 45 U/L    ALT(SGPT) 16 2 - 50 U/L    Alkaline Phosphatase 120 (H) 30 - 99 U/L    Total Bilirubin 0.3 0.1 - 1.5 mg/dL    Albumin 4.8 3.2 - 4.9 g/dL    Total Protein 7.6 6.0 - 8.2 g/dL    Globulin 2.8 1.9 - 3.5 g/dL    A-G Ratio 1.7 g/dL   TROPONIN   Result Value Ref Range    Troponin T 6 6 - 19 ng/L   COVID/SARS CoV-2 PCR    Specimen: Nasopharyngeal; Respirate   Result Value Ref Range    COVID Order Status Received    Group A Strep by PCR    Specimen: Throat   Result Value Ref Range    Group A Strep by PCR Not detected Not Detected   ESTIMATED GFR   Result Value Ref Range    GFR If  56 (A) >60 mL/min/1.73 m 2    GFR If Non  46 (A) >60 mL/min/1.73 m 2   Blood Culture,Hold   Result Value Ref Range    Blood Culture Hold Collected    Blood Culture,Hold   Result Value Ref Range    Blood Culture  Hold Collected    SARS-CoV-2, PCR (In-House)   Result Value Ref Range    SARS-CoV-2 Source NP Swab    EKG (NOW)   Result Value Ref Range    Report       [facility] Emergency Dept.    Test Date:  2020  Pt Name:    JAMES BLUE              Department: ER  MRN:        [id number]                      Room:  Gender:     Female                       Technician: CARLOS  :        1965                   Requested By:ER TRIAGE PROTOCOL  Order #:    [id number]                    Reading MD: NYA ISAACS MD    Measurements  Intervals                                Axis  Rate:       81                           P:          71  MD:         176                          QRS:        70  QRSD:       90                           T:          39  QT:         392  QTc:        455    Interpretive Statements  SINUS RHYTHM  RIGHT ATRIAL ABNORMALITY  No previous ECG available for comparison  Electronically Signed On 2020 14:33:35 PDT by NYA ISAACS MD       All labs reviewed by me.    EKG Interpretation:  Twelve-lead EKG by my interpretation is as above.  No ST or T wave changes to indicate ischemia or infarct    RADIOLOGY  DX-CHEST-2 VIEWS   Final Result      No acute cardiopulmonary abnormality.        The radiologist's interpretation of all radiological studies have been reviewed by me.    COURSE & MEDICAL DECISION MAKING  Nursing notes, VS, PMSFHx reviewed in chart.    2:39 PM Patient seen and examined at bedside. The patient presents with 1 sore throat and the differential diagnosis includes but is not limited to COPD exacerbation, bronchitis, COVID. Ordered DX-Chest (2 views), COVID/SARS, Group A strep, CBC with differential, CMP, Troponin, and EKG. Patient will be treated with Toradol 30 mg for her symptoms.      4:22 PM - Patient was reevaluated at bedside. She is resting comfortably in bed with stable vital signs. Discussed lab results which are negative for strep. The patient was informed  her symptoms seem consistent with COPD exacerbation and that she can be discharged home. She is instructed to drink plenty of fluids. The patient is understanding and agreeable to discharge.  She is ambulatory without assistance.    The patient will return for new or worsening symptoms and is stable at the time of discharge.    The patient is referred to a primary physician for blood pressure management, diabetic screening, and for all other preventative health concerns.    DISPOSITION:  Patient will be discharged home in stable condition.    FOLLOW UP:  Magdiel Colorado, A.P.R.N.  [address] [place] [id number]  [phone]    Schedule an appointment as soon as possible for a visit         OUTPATIENT MEDICATIONS:  New Prescriptions    LEVOFLOXACIN (LEVAQUIN) 750 MG TABLET    Take 1 Tab by mouth every day for 5 days.    METHYLPREDNISOLONE (MEDROL DOSEPAK) 4 MG TABLET THERAPY PACK    Use as directed       FINAL IMPRESSION  1. Acute exacerbation of chronic obstructive pulmonary disease (COPD) ([facility])          Emelia PALACIOS ([place]), am scribing for, and in the presence of, Beatrice Hercules M.D..    Electronically signed by: Emelia Lopez ([place]), 8/31/2020    Beatrice PALACIOS M.D. personally performed the services described in this documentation, as scribed by Emelia Lopez in my presence, and it is both accurate and complete.    C.    The note accurately reflects work and decisions made by me.  Beatrice Hercules M.D.  8/31/2020  5:10 PM

## 2020-08-31 NOTE — ED TRIAGE NOTES
Chief Complaint   Patient presents with   • Chest Pain     Pt states pain present that has been getting worse for a couple of weeks. pt states pain to be diffuse and heavy in chest.    • Cough     pt states to have chronic cough that has been getting worse.    • Sore Throat     Woke 6 days ago with red spots, irritation to throat.      Pt/staff masked and in appropriate PPE during encounter.  Pt denies fever/travel or being in contact with anyone testing positive for COVID/Corona.

## 2020-08-31 NOTE — TELEPHONE ENCOUNTER
"Pt is having sore throat, shortness of breath, and dull chest pain. Has had no air conditioning so she has had windows open. Still smoking. Has COPD/Emphysema and fibromyalgia.     1. Caller Name: Jaclyn Mejia             Call Back Number: [phone] (home)     [facility] PCP or Specialty Provider: Yes Magdiel Kamara        2.  In the last two weeks, has the patient had any new or worsening symptoms (not explained by alternative diagnosis)? Yes, the patient reports the following COVID-19 consistent symptoms: cough, shortness of breath or difficulty breathing and sore throat.     3.  Does patient have any comoribidities? COPD    4.  Has the patient traveled in the last 14 days OR had any known contact with someone who is suspected or confirmed to have COVID-19?  No.    5. Disposition: Advised to go to     Note routed to [facility] Provider: [facility] only.     Reason for Disposition  • Chest pain present when not coughing  • Difficulty breathing    Additional Information  • Negative: Bluish (or gray) lips or face  • Negative: SEVERE difficulty breathing (e.g., struggling for each breath, speaks in single words)  • Negative: Rapid onset of cough and has hives  • Negative: Coughing started suddenly after medicine, an allergic food or bee sting  • Negative: Difficulty breathing after exposure to flames, smoke, or fumes  • Negative: Sounds like a life-threatening emergency to the triager  • Negative: Previous asthma attacks and this feels like asthma attack  • Negative: Passed out (i.e., fainted, collapsed and was not responding)    Answer Assessment - Initial Assessment Questions  1. ONSET: \"When did the cough begin?\"       6 days ago  2. SEVERITY: \"How bad is the cough today?\"       moderate  3. RESPIRATORY DISTRESS: \"Describe your breathing.\"       Short of breath  4. FEVER: \"Do you have a fever?\" If so, ask: \"What is your temperature, how was it measured, and when did it start?\"      no  5. HEMOPTYSIS: \"Are you coughing " "up any blood?\" If so ask: \"How much?\" (flecks, streaks, tablespoons, etc.)      no  6. TREATMENT: \"What have you done so far to treat the cough?\" (e.g., meds, fluids, humidifier)        7. CARDIAC HISTORY: \"Do you have any history of heart disease?\" (e.g., heart attack, congestive heart failure)       no  8. LUNG HISTORY: \"Do you have any history of lung disease?\"  (e.g., pulmonary embolus, asthma, emphysema)      Copd emphysema  9. PE RISK FACTORS: \"Do you have a history of blood clots?\" (or: recent major surgery, recent prolonged travel, bedridden)      no  10. OTHER SYMPTOMS: \"Do you have any other symptoms? (e.g., runny nose, wheezing, chest pain)        Pleuritic pain  11. PREGNANCY: \"Is there any chance you are pregnant?\" \"When was your last menstrual period?\"        n/a  12. TRAVEL: \"Have you traveled out of the country in the last month?\" (e.g., travel history, exposures)        n/a    Protocols used: COUGH-A-[place]    Advised pt to be seen at  initially but when she endorsed chest pain, advised ED. Pt states her son is also suicidal. Urged patient for both her and her son to be seen at [facility]. Pt tearful but agrees to plan.   "

## 2020-09-01 NOTE — ED NOTES
Patient discharged home per ERP.  Discharge teaching and education discussed with patient. POC discussed.   Patient verbalized understanding of discharge teaching and education. No other questions at this time.     RX x 2 sent to pharmacy by ERP.   PIV removed.     VSS. Patient alert and oriented. Patient arranged ride for self. Able to ambulate off unit safely with steady gait.

## 2020-09-08 ENCOUNTER — OFFICE VISIT (OUTPATIENT)
Dept: MEDICAL GROUP | Facility: PHYSICIAN GROUP | Age: 55
End: 2020-09-08
Payer: MEDICARE

## 2020-09-08 ENCOUNTER — HOSPITAL ENCOUNTER (OUTPATIENT)
Dept: LAB | Facility: MEDICAL CENTER | Age: 55
End: 2020-09-08
Attending: FAMILY MEDICINE
Payer: MEDICARE

## 2020-09-08 ENCOUNTER — NURSE TRIAGE (OUTPATIENT)
Dept: HEALTH INFORMATION MANAGEMENT | Facility: OTHER | Age: 55
End: 2020-09-08

## 2020-09-08 VITALS
BODY MASS INDEX: 32.11 KG/M2 | HEART RATE: 74 BPM | DIASTOLIC BLOOD PRESSURE: 76 MMHG | SYSTOLIC BLOOD PRESSURE: 118 MMHG | RESPIRATION RATE: 16 BRPM | HEIGHT: 66 IN | TEMPERATURE: 97.5 F | WEIGHT: 199.8 LBS | OXYGEN SATURATION: 96 %

## 2020-09-08 DIAGNOSIS — I10 ESSENTIAL HYPERTENSION: ICD-10-CM

## 2020-09-08 DIAGNOSIS — R74.8 ELEVATED ALKALINE PHOSPHATASE LEVEL: ICD-10-CM

## 2020-09-08 DIAGNOSIS — F17.200 TOBACCO USE DISORDER: ICD-10-CM

## 2020-09-08 DIAGNOSIS — J44.89 COPD (CHRONIC OBSTRUCTIVE PULMONARY DISEASE) WITH CHRONIC BRONCHITIS (HCC): Primary | ICD-10-CM

## 2020-09-08 LAB — GGT SERPL-CCNC: 20 U/L (ref 7–34)

## 2020-09-08 PROCEDURE — 99214 OFFICE O/P EST MOD 30 MIN: CPT | Performed by: FAMILY MEDICINE

## 2020-09-08 PROCEDURE — 36415 COLL VENOUS BLD VENIPUNCTURE: CPT

## 2020-09-08 PROCEDURE — 82977 ASSAY OF GGT: CPT

## 2020-09-08 RX ORDER — LISINOPRIL 20 MG/1
TABLET ORAL
Qty: 100 TAB | Refills: 0 | Status: SHIPPED | OUTPATIENT
Start: 2020-09-08 | End: 2021-01-07

## 2020-09-08 RX ORDER — NICOTINE 21 MG/24HR
1 PATCH, TRANSDERMAL 24 HOURS TRANSDERMAL EVERY 24 HOURS
Qty: 30 PATCH | Refills: 0 | Status: SHIPPED | OUTPATIENT
Start: 2020-09-08 | End: 2021-03-18

## 2020-09-08 ASSESSMENT — FIBROSIS 4 INDEX: FIB4 SCORE: .8653846153846153846

## 2020-09-08 NOTE — TELEPHONE ENCOUNTER
"1. Caller Name: Jaclyn                 Call Back   [facility] PCP or Specialty Provider: Yes les Kamara        2.  In the last two weeks, has the patient had any new or worsening symptoms (not explained by alternative diagnosis)? No.    3.  Does patient have any comoribidities? COPD  With exacerbation    4.  Has the patient traveled in the last 14 days OR had any known contact with someone who is suspected or confirmed to have COVID-19?  No.    5. Disposition: Cleared by RN Triage as potential is low for COVID-19; OK to keep/schedule appointment Negative for Covid 19 8/31/2020    Note routed to [facility] Provider: [facility] only.      This is a Follow up from ER visit 8/31 which was an exacerbation of COPD    Answer Assessment - Initial Assessment Questions  1. ONSET: \"When did the cough begin?\"      COPD  2. SEVERITY: \"How bad is the cough today?\"    improved   3. RESPIRATORY DISTRESS: \"Describe your breathing.\"   Breathing has improved  4. FEVER: \"Do you have a fever?\" If so, ask: \"What is your temperature, how was it measured, and when did it start?\"   no  5. SPUTUM: \"Describe the color of your sputum\" (e.g., clear, white, yellow, green), \"Has there been any change recently?\"  Sputum is much less   6. HEMOPTYSIS: \"Are you coughing up any blood?\" If so ask: \"How much, flecks, streaks, tablespoons, etc.?\"   no  7. CARDIAC HISTORY: \"Do you have any history of heart disease?\" (e.g., heart attack, congestive heart failure)      no  8. LUNG HISTORY: \"Do you have any history of lung disease?\"  (e.g., pulmonary embolus, asthma, emphysema/COPD)     COPD   9. OTHER SYMPTOMS: \"Do you have any other symptoms? (e.g., runny nose, wheezing, chest pain)itch watery eyes  Some runny nose but smoke related   10. PREGNANCY: \"Is there any chance you are pregnant?\" \"When was your last menstrual period?\"  NO  11. TRAVEL: \"Have you traveled out of the country in the last month?\" (e.g., travel history, exposures)        NO    Protocols used: " COUGH - CHRONIC-A-AH

## 2020-09-08 NOTE — PROGRESS NOTES
Subjective:     CC: ER follow up  1. 8/31/2020: COPD exacerbation    HPI:   Jaclyn presents today with ER follow up. She was recently diagnosed with COPD exacerbation. She was discharged with levofloxacin and medrol dosepak. Both courses have been completed. She is feeling better but not 100% since the ER visit. She is still having some difficulty with ADLs.    She is still smoking. She is smokes 1 ppd. She has tried to quit in the past with the patch. She states she is pretty motivated. She is really scared and worried about her health.     Past Medical History:   Diagnosis Date   • Actinic keratosis 2/6/2018   • Allergy    • Anxiety    • Arthritis    • Chronic pain syndrome    • COPD (chronic obstructive pulmonary disease) (HCC)    • DDD (degenerative disc disease), cervical    • DDD (degenerative disc disease), thoracolumbar    • Depression    • Diverticulosis    • Essential tremor    • Fibromyalgia    • IBS (irritable bowel syndrome)    • Migraine    • Pulmonary emphysema (HCC)        Social History     Tobacco Use   • Smoking status: Current Every Day Smoker     Packs/day: 1.00     Years: 35.00     Pack years: 35.00   • Smokeless tobacco: Never Used   Substance Use Topics   • Alcohol use: Yes     Alcohol/week: 7.2 oz     Types: 12 Shots of liquor per week     Comment: 12 shots a day when actively drinking   • Drug use: Yes     Types: Marijuana     Comment:  not current        Current Outpatient Medications Ordered in Epic   Medication Sig Dispense Refill   • lisinopril (PRINIVIL) 20 MG Tab TAKE 1 TABLET BY MOUTH EVERY  Tab 0   • nicotine (NICODERM) 21 MG/24HR PATCH 24 HR Apply 1 Patch to skin as directed every 24 hours. 30 Patch 0   • meloxicam (MOBIC) 15 MG tablet TAKE 1 TABLET BY MOUTH EVERY DAY 90 Tab 0   • Thiamine HCl (VITAMIN B-1 PO) Take 1 Tab by mouth every evening.     • Calcium-Magnesium-Zinc (JUDSON-MAG-ZINC PO) Take 1 Tab by mouth every evening.     • mirtazapine (REMERON) 30 MG Tab tablet  "TAKE 1 TABLET BY MOUTH EVERYDAY AT BEDTIME 90 Tab 0   • gabapentin (NEURONTIN) 600 MG tablet TAKE 1 TABLET BY MOUTH THREE TIMES A  Tab 0   • topiramate (TOPAMAX) 50 MG tablet TAKE 1 TABLET BY MOUTH 3 TIMES DAILY FOR MIGRAINE PREVENTION 270 Tab 3   • ondansetron (ZOFRAN ODT) 4 MG TABLET DISPERSIBLE TAKE 2 TABS BY MOUTH EVERY 8 HOURS AS NEEDED FOR NAUSEA. 30 Tab 3   • baclofen (LIORESAL) 10 MG Tab TAKE 1 TABLET BY MOUTH THREE TIMES A DAY 90 Tab 3   • PROAIR  (90 Base) MCG/ACT Aero Soln inhalation aerosol INHALE 2 PUFFS BY MOUTH EVERY 6 HOURS AS NEEDED FOR SHORTNESS OF BREATH. 8.5 Inhaler 3   • SAVELLA 100 MG Tab TAKE 1 TABLET BY MOUTH TWICE A  Tab 3   • hydrOXYzine HCl (ATARAX) 25 MG Tab Take 1 Tab by mouth 3 times a day as needed for Anxiety. 90 Tab 0   • dicyclomine (BENTYL) 20 MG Tab Take 20 mg by mouth every 6 hours as needed.     • Cyanocobalamin (VITAMIN B-12) 1000 MCG Tab Take 1 Tab by mouth 2 Times a Day. 60 Tab 11     No current [facility]-ordered facility-administered medications on file.        Allergies:  Erythromycin and Tetracycline    Health Maintenance: Completed    ROS:  Gen: no fevers/chills  Pulm: + cough  CV: no chest pain    Objective:     Exam:  /76 (BP Location: Right arm, Patient Position: Sitting, BP Cuff Size: Adult)   Pulse 74   Temp 36.4 °C (97.5 °F) (Temporal)   Resp 16   Ht 1.676 m (5' 6\")   Wt 90.6 kg (199 lb 12.8 oz)   SpO2 96%   BMI 32.25 kg/m²  Body mass index is 32.25 kg/m².    Gen: Alert and oriented, No apparent distress.  Neck: Neck is supple without lymphadenopathy.  Lungs: Normal effort, CTA bilaterally, no wheezes, rhonchi, or rales  CV: Regular rate and rhythm. No murmurs, rubs, or gallops.  Ext: No clubbing, cyanosis, edema.    Assessment & Plan:     55 y.o. female with the following -     1. COPD (chronic obstructive pulmonary disease) with chronic bronchitis (HCC)  This is a chronic condition, stable.  She was recently seen in the ER on 8/31/2020 " "with an acute COPD exacerbation.  She was treated with levofloxacin and methylprednisolone.  She states she is completed both courses and is feeling much better.  However, she is not back at 100% and is still coughing up \"crud\".  She thinks is partially because of the poor air quality and the fact that she is still smoking.  Lungs are clear on exam today and she has good air movement.    2. Elevated alkaline phosphatase level  This is a new condition.  Labs 4 months ago showed an elevated alkaline phosphatase.  While she was in the ER was noted to be persistently elevated.  She is very concerned about her liver function and would like this worked up.  - GAMMA GT (GGT); Future    3. Tobacco use disorder  This is a chronic condition, stable.  She states that she continues to smoke a pack per day.  After her ER visit with a COPD exacerbation she is very worried and scared about her health.  Therefore, she states today she is highly motivated to quit smoking and she really wants to.  She is tried to quit in the past with nicotine patches and she would like to try that again.  I did offer nicotine lozenges or gum to use as needed for cravings but she declined at this time.  -Nicotine 21 mg daily    Return if symptoms worsen or fail to improve.    Please note that this dictation was created using voice recognition software. I have made every reasonable attempt to correct obvious errors, but I expect that there are errors of grammar and possibly content that I did not discover before finalizing the note.      "

## 2020-09-09 ENCOUNTER — TELEPHONE (OUTPATIENT)
Dept: MEDICAL GROUP | Facility: PHYSICIAN GROUP | Age: 55
End: 2020-09-09

## 2020-09-09 NOTE — TELEPHONE ENCOUNTER
VOICEMAIL  1. Caller Name: Jaclyn Mejia  Call Back Number: [phone] (home)     2. Message: Patient LVM stating she was in to see  on 9/8/20 and forgot to mention the sores she has on her tongue that she believes to be thrush.  She is requesting rx be sent to pharmacy for this.       3. Patient approves office to leave a detailed voicemail/MyChart message: yes

## 2020-09-09 NOTE — TELEPHONE ENCOUNTER
Phone Number Called: [phone] (home)     Call outcome: Spoke with patient and scheduled appt [facility] (Tamika Duke M.D.)  09/10/2020 at 1:20 PM

## 2020-09-10 ENCOUNTER — APPOINTMENT (OUTPATIENT)
Dept: MEDICAL GROUP | Facility: PHYSICIAN GROUP | Age: 55
End: 2020-09-10
Payer: MEDICARE

## 2020-09-22 DIAGNOSIS — G47.01 INSOMNIA DUE TO MEDICAL CONDITION: ICD-10-CM

## 2020-09-22 RX ORDER — MIRTAZAPINE 30 MG/1
TABLET, FILM COATED ORAL
Qty: 90 TAB | Refills: 0 | Status: SHIPPED | OUTPATIENT
Start: 2020-09-22 | End: 2021-01-13

## 2020-10-02 DIAGNOSIS — M79.7 FIBROMYALGIA: ICD-10-CM

## 2020-10-02 RX ORDER — GABAPENTIN 600 MG/1
TABLET ORAL
Qty: 270 TAB | Refills: 0 | Status: SHIPPED | OUTPATIENT
Start: 2020-10-02 | End: 2020-12-29

## 2020-10-02 RX ORDER — MELOXICAM 15 MG/1
TABLET ORAL
Qty: 30 TAB | Refills: 0 | Status: SHIPPED | OUTPATIENT
Start: 2020-10-02 | End: 2021-02-08

## 2020-10-30 ENCOUNTER — PATIENT MESSAGE (OUTPATIENT)
Dept: HEALTH INFORMATION MANAGEMENT | Facility: OTHER | Age: 55
End: 2020-10-30

## 2020-10-30 ENCOUNTER — PATIENT OUTREACH (OUTPATIENT)
Dept: HEALTH INFORMATION MANAGEMENT | Facility: OTHER | Age: 55
End: 2020-10-30

## 2020-10-30 NOTE — PROGRESS NOTES
Outcome: Left Message- AWV     Please transfer to Patient Outreach Team at [phone] when patient returns call.     [facility] Verified: yes     Attempt # 1

## 2020-11-23 DIAGNOSIS — R39.89 URINARY PROBLEM: ICD-10-CM

## 2020-11-23 DIAGNOSIS — M79.7 FIBROMYALGIA: ICD-10-CM

## 2020-11-23 RX ORDER — CLINDAMYCIN PHOSPHATE 20 MG/G
CREAM VAGINAL
Qty: 40 G | Refills: 0 | OUTPATIENT
Start: 2020-11-23

## 2020-12-18 DIAGNOSIS — M79.7 FIBROMYALGIA: ICD-10-CM

## 2020-12-21 RX ORDER — BACLOFEN 10 MG/1
TABLET ORAL
Qty: 270 TAB | Refills: 0 | Status: SHIPPED | OUTPATIENT
Start: 2020-12-21 | End: 2021-02-22

## 2020-12-29 DIAGNOSIS — M79.7 FIBROMYALGIA: ICD-10-CM

## 2020-12-31 RX ORDER — GABAPENTIN 600 MG/1
TABLET ORAL
Qty: 270 TAB | Refills: 0 | Status: SHIPPED | OUTPATIENT
Start: 2020-12-31 | End: 2021-06-10

## 2021-01-06 DIAGNOSIS — I10 ESSENTIAL HYPERTENSION: ICD-10-CM

## 2021-01-07 RX ORDER — LISINOPRIL 20 MG/1
TABLET ORAL
Qty: 100 TAB | Refills: 0 | Status: SHIPPED | OUTPATIENT
Start: 2021-01-07 | End: 2021-03-10 | Stop reason: SDUPTHER

## 2021-01-11 DIAGNOSIS — K58.0 IRRITABLE BOWEL SYNDROME WITH DIARRHEA: ICD-10-CM

## 2021-01-11 DIAGNOSIS — G47.01 INSOMNIA DUE TO MEDICAL CONDITION: ICD-10-CM

## 2021-01-13 RX ORDER — MIRTAZAPINE 30 MG/1
TABLET, FILM COATED ORAL
Qty: 90 TAB | Refills: 0 | Status: SHIPPED | OUTPATIENT
Start: 2021-01-13 | End: 2021-04-06

## 2021-01-13 RX ORDER — ONDANSETRON 4 MG/1
8 TABLET, ORALLY DISINTEGRATING ORAL EVERY 8 HOURS PRN
Qty: 30 TAB | Refills: 0 | Status: SHIPPED | OUTPATIENT
Start: 2021-01-13 | End: 2021-02-22

## 2021-02-05 DIAGNOSIS — M79.7 FIBROMYALGIA: ICD-10-CM

## 2021-02-08 RX ORDER — MELOXICAM 15 MG/1
TABLET ORAL
Qty: 30 TAB | Refills: 0 | Status: SHIPPED | OUTPATIENT
Start: 2021-02-08 | End: 2021-03-08

## 2021-03-04 ENCOUNTER — NURSE TRIAGE (OUTPATIENT)
Dept: HEALTH INFORMATION MANAGEMENT | Facility: OTHER | Age: 56
End: 2021-03-04

## 2021-03-04 ENCOUNTER — TELEPHONE (OUTPATIENT)
Dept: MEDICAL GROUP | Facility: PHYSICIAN GROUP | Age: 56
End: 2021-03-04

## 2021-03-04 NOTE — TELEPHONE ENCOUNTER
I strongly recommend that patient be seen at the urgent care or emergency room considering her symptoms of withdrawal at this point.

## 2021-03-04 NOTE — TELEPHONE ENCOUNTER
"Pt calling c/o Lightheaded, dizzy, and off-balance. States able to walk. Stated that her measured vital signs today have been  Pulse ox 96%, BPM 87 , /98 and 164/98. Pt admitted to being an alcoholic that drinks 750mls+ per day of vodka and has been trying to \"dry-up\" on and off for the last week. Discussed s/sx of w/draw and need to perform under medical care and supervision. Called PCP office. Provider not av for same day. Booked pt next week with provider. Advised on home care, when to seek medical attn, discussed ETOH use and when pt should call back.     Reason for Disposition  • Alcohol use, abuse, and dependence, questions about    Additional Information  • Negative: Coma (e.g., not moving, not talking, not responding to stimuli)  • Negative: Difficult to awaken or acting confused (e.g., disoriented, slurred speech)  • Negative: Seeing, hearing, or feeling things that are not there (i.e., visual, auditory, or tactile hallucinations)  • Negative: Slow, shallow and weak breathing  • Negative: Seizure  • Negative: Violent behavior, or threatening to physically hurt or kill someone  • Negative: Patient attempted suicide  • Negative: Threatening suicide  • Negative: Sounds like a life-threatening emergency to the triager  • Negative: Substance abuse or dependence: question or problem related to  • Negative: Depression is main problem or symptom (e.g., feelings of sadness or hopelessness)  • Negative: SEVERE abdominal pain (e.g., excruciating)  • Negative: Constant abdominal pain lasting > 2 hours  • Negative: Bloody, black, or tarry bowel movements  • Negative: Vomiting red blood or black (coffee ground) material (Exception: few red streaks in vomit that only happened once)  • Negative: Multiple episodes of vomiting and lasting more than 2 hours  • Negative: Feeling very shaky (i.e., visible tremors of hands)  • Negative: Patient sounds very sick or weak to the triager  • Negative: White of the eyes have " "turned yellow (i.e., jaundice)  • Negative: Fever > 101 F (38.3 C)  • Negative: Drinks alcohol daily and prior alcohol withdrawal seizures  • Negative: Drinks alcohol daily and prior DTs (delirium tremens)  • Negative: Patient wants to be seen  • Negative: Pregnant and intoxicated or admits to drinking problem  • Negative: Alcohol or drug abuse, known or suspected  • Negative: Requesting admission for alcohol abuse  • Negative: Requesting to talk with a counselor (mental health worker, psychiatrist, etc.)  • Negative: Alcohol use interferes with work or school  • Negative: Male and 14 or more drinks/week, or more than 4 drinks on single occasion  • Negative: Female or Elderly and 7 or more drinks/week, or more than 3 drinks on single occasion    Answer Assessment - Initial Assessment Questions  1. BLOOD PRESSURE: \"What is the blood pressure?\" \"Did you take at least two measurements 5 minutes apart?\"      154/98 and 164/98  2. ONSET: \"When did you take your blood pressure?\"      6 mins ago  3. HOW: \"How did you obtain the blood pressure?\" (e.g., visiting nurse, automatic home BP monitor)      Home b/p monitor  4. HISTORY: \"Do you have a history of high blood pressure?\"      yes  5. MEDICATIONS: \"Are you taking any medications for blood pressure?\" \"Have you missed any doses recently?\"      losinapril  6. OTHER SYMPTOMS: \"Do you have any symptoms?\" (e.g., headache, chest pain, blurred vision, difficulty breathing, weakness)      Lightheaded, dizzy, off-balance x3 hours  7. PREGNANCY: \"Is there any chance you are pregnant?\" \"When was your last menstrual period?\"      no    Answer Assessment - Initial Assessment Questions  1. DO YOU DRINK: \"Do you drink alcohol, including beer, wine or hard liquor?\"      yes  2. HOW OFTEN: \"How many days per week do you typically drink alcohol?\"      yes  3. HOW MUCH: \"How many drinks do you typically have on days when you drink?\" (1.5 oz hard liquor [one shot or jigger; 45 ml], 5 oz " "wine [small glass; 150 ml], 12 oz beer [one can; 360 ml])      750 ml/day of vodka  4. MOST: \"What is the most that you have had to drink on any one occasion in the last month?\"      More the 1/5th  5. LAST 24 HOURS: \"Have you had a drink within the last 24 hours?\"      750 ml  6. DRINKING PROBLEM: \"Do you have or have you ever had a drinking problem?\"      Yes  7. DRUG PROBLEM: \"Are you using any other drugs?\" (e.g., yes/no; cocaine, prescription medications, etc.)      Rx for firbromyalgia, cannibis, vitamins, loretadine  8. SYMPTOMS: \"What symptoms are you currently experiencing?\" (e.g., none, tremors or shakiness, abdominal pain, vomiting, blackout spells)      Tremors, abd pain, vomiting,   9. DETOX PROGRAM: \"Have you ever gone through a detox program?\"      No  10. THERAPIST: \"Do you have a counselor or therapist? Name?\"        no  11. SUPPORT: \"Who is with you now?\" \"Who do you live with?\" \"Do you have family or friends nearby who you can talk to?\" \"Are you a member of Alcoholics Anonymous?\"        yes  12. PREGNANCY: \"Is there any chance you are pregnant?\" \"When was your last menstrual period?\"        no    Protocols used: ALCOHOL ABUSE AND [id number]-GA, HIGH BLOOD PRESSURE-A-OH      "

## 2021-03-04 NOTE — TELEPHONE ENCOUNTER
VOICEMAIL  1. Caller Name: Eve PATRICK w/Triage Hotline                          Call Back Number: [phone]    2. Message: Called to report telephone encounter with Pt.  Pt complained of being light headed, dizzy, and off balance.  She reported having an elevated BP, and she has been trying to ween self off of alcohol.  RN was going to recommend Pt go to UC, but wanted to know if PCP had any other advice she wanted to relay to Pt.  Asked for a cb.

## 2021-03-05 NOTE — TELEPHONE ENCOUNTER
Phone Number Called: [phone]    Call outcome:  Spoke to CELINA Prado    Message: Informed her per PCP instructions to be seen in either ER or UC.  RN stated they had developed a plan for Pt to see Dr Winkler next week to talk about safely weening off of Alcohol.  Stated she would follow up with Pt and inform her of PCP advice.

## 2021-03-06 DIAGNOSIS — M79.7 FIBROMYALGIA: ICD-10-CM

## 2021-03-08 RX ORDER — MELOXICAM 15 MG/1
TABLET ORAL
Qty: 30 TABLET | Refills: 0 | Status: SHIPPED | OUTPATIENT
Start: 2021-03-08 | End: 2021-04-12

## 2021-03-10 ENCOUNTER — OFFICE VISIT (OUTPATIENT)
Dept: MEDICAL GROUP | Facility: PHYSICIAN GROUP | Age: 56
End: 2021-03-10
Payer: MEDICARE

## 2021-03-10 VITALS
HEIGHT: 66 IN | BODY MASS INDEX: 31.18 KG/M2 | HEART RATE: 90 BPM | DIASTOLIC BLOOD PRESSURE: 100 MMHG | SYSTOLIC BLOOD PRESSURE: 148 MMHG | OXYGEN SATURATION: 97 % | WEIGHT: 194 LBS | RESPIRATION RATE: 16 BRPM | TEMPERATURE: 97.4 F

## 2021-03-10 DIAGNOSIS — F15.11 HISTORY OF METHAMPHETAMINE ABUSE (HCC): ICD-10-CM

## 2021-03-10 DIAGNOSIS — I10 ESSENTIAL HYPERTENSION: ICD-10-CM

## 2021-03-10 DIAGNOSIS — G25.0 ESSENTIAL TREMOR: ICD-10-CM

## 2021-03-10 DIAGNOSIS — M79.7 FIBROMYALGIA: ICD-10-CM

## 2021-03-10 DIAGNOSIS — F10.20 ALCOHOLISM (HCC): ICD-10-CM

## 2021-03-10 DIAGNOSIS — F10.930 ALCOHOL WITHDRAWAL SYNDROME WITHOUT COMPLICATION (HCC): ICD-10-CM

## 2021-03-10 PROCEDURE — 99214 OFFICE O/P EST MOD 30 MIN: CPT | Performed by: FAMILY MEDICINE

## 2021-03-10 RX ORDER — LISINOPRIL 20 MG/1
TABLET ORAL
Qty: 150 TABLET | Refills: 1 | Status: SHIPPED | OUTPATIENT
Start: 2021-03-10 | End: 2021-05-20

## 2021-03-10 ASSESSMENT — FIBROSIS 4 INDEX: FIB4 SCORE: 0.88

## 2021-03-10 NOTE — PROGRESS NOTES
Subjective:   Jaclyn Mejia is a 56 y.o. female here today for hypertension and alcohol problem.    This is my first time meeting Jaclyn.  She is concerned about her blood pressure and would also like to talk about options for treatment for alcoholism.     For her blood pressure, she is currently taking lisinopril 20mg at bedtime.  She brought in some numbers on her phone to show me.  She has been having higher blood pressures in the morning and in the evening they are within goal range.      She tells me that she has a problem with alcohol and is ready to make a change.  Her last drink was Sunday, but she tells me that she vomited shortly after drinking it.  The drink prior to that was on Saturday.  She has been drinking a fifth of hard liquor daily.  Since Sunday, she has been noticing some tremors in her arms.  No history of hospitalization or intubation for alcohol withdrawal. She has been staying home and taking some of her medication to help with symptoms.  In the past, she has tried to stay sober by going to AA meetings and just stopping drinking.      Her history is significant for bipolar affective disorder, an inpatient mental health hospital stay for deliberate self cutting and methamphetamine abuse.    She tells me that she has good support from her family.  Her boyfriend is also an alcoholic and stopped drinking Sunday.  She also has a chihuahua dog that is very dear to her.    Current medicines (including changes today)  Current Outpatient Medications   Medication Sig Dispense Refill   • meloxicam (MOBIC) 15 MG tablet TAKE 1 TABLET BY MOUTH EVERY DAY 30 tablet 0   • ondansetron (ZOFRAN ODT) 4 MG TABLET DISPERSIBLE TAKE 2 TABS BY MOUTH EVERY 8 HOURS AS NEEDED FOR NAUSEA. 30 tablet 0   • baclofen (LIORESAL) 10 MG Tab TAKE 1 TABLET BY MOUTH THREE TIMES A DAY 90 tablet 0   • mirtazapine (REMERON) 30 MG Tab tablet TAKE 1 TABLET BY MOUTH EVERYDAY AT BEDTIME 90 Tab 0   • lisinopril (PRINIVIL) 20 MG  Tab TAKE 1 TABLET BY MOUTH EVERY  Tab 0   • gabapentin (NEURONTIN) 600 MG tablet TAKE 1 TABLET BY MOUTH THREE TIMES A  Tab 0   • Diclofenac Sodium (VOLTAREN) 1 % Gel Place 1 Application on the skin 2 times a day as needed. 100 g 0   • nicotine (NICODERM) 21 MG/24HR PATCH 24 HR Apply 1 Patch to skin as directed every 24 hours. 30 Patch 0   • Thiamine HCl (VITAMIN B-1 PO) Take 1 Tab by mouth every evening.     • Calcium-Magnesium-Zinc (JUDSON-MAG-ZINC PO) Take 1 Tab by mouth every evening.     • topiramate (TOPAMAX) 50 MG tablet TAKE 1 TABLET BY MOUTH 3 TIMES DAILY FOR MIGRAINE PREVENTION 270 Tab 3   • PROAIR  (90 Base) MCG/ACT Aero Soln inhalation aerosol INHALE 2 PUFFS BY MOUTH EVERY 6 HOURS AS NEEDED FOR SHORTNESS OF BREATH. 8.5 Inhaler 3   • SAVELLA 100 MG Tab TAKE 1 TABLET BY MOUTH TWICE A  Tab 3   • hydrOXYzine HCl (ATARAX) 25 MG Tab Take 1 Tab by mouth 3 times a day as needed for Anxiety. 90 Tab 0   • dicyclomine (BENTYL) 20 MG Tab Take 20 mg by mouth every 6 hours as needed.     • Cyanocobalamin (VITAMIN B-12) 1000 MCG Tab Take 1 Tab by mouth 2 Times a Day. 60 Tab 11     No current facility-administered medications for this visit.     She  has a past medical history of Actinic keratosis (2/6/2018), Allergy, Anxiety, Arthritis, Chronic pain syndrome, COPD (chronic obstructive pulmonary disease) ([facility]), DDD (degenerative disc disease), cervical, DDD (degenerative disc disease), thoracolumbar, Depression, Diverticulosis, Essential tremor, Fibromyalgia, IBS (irritable bowel syndrome), Migraine, and Pulmonary emphysema ([facility]). She also has no past medical history of GERD (gastroesophageal reflux disease) or Hyperlipidemia.    ROS   No seizures, no fever, no chest pain, no shortness of breath, no abdominal pain.     Objective:     Physical Exam:  /100 (BP Location: Left arm, Patient Position: Sitting, BP Cuff Size: Adult)   Pulse 90   Temp 36.3 °C (97.4 °F) (Temporal)   Resp 16   Ht  "1.676 m (5' 6\")   Wt 88 kg (194 lb)   SpO2 97%  Body mass index is 31.31 kg/m².   Constitutional: Alert, no distress.  Skin: Warm, dry, good turgor, no rashes in visible areas.  Eye: Conjunctiva clear, lids normal.  ENMT: +Mask.  Respiratory: Unlabored respiratory effort, lungs clear to auscultation, no wheezes, no rhonchi.  Cardiovascular: Normal S1, S2, no murmur, no edema.  Neuro: Alert and oriented x 3. +Tremors in bilateral hands. No cranial nerve deficit. Strength and sensation intact.   Psych: Alert and oriented x3, normal affect and mood. Tearful at times.    Assessment and Plan:     1. Essential hypertension  This is a chronic issue that is not well controlled.  Patient's blood pressure is elevated today, most likely secondary to alcohol withdrawal.  Her blood pressure numbers from home show that she has higher blood pressure in the mornings.  We will continue her lisinopril 20mg in the evening and add lisinopril 10mg in the morning.  She has close follow-up with her PCP in one week.  Additionally, I spoke with Jaclyn about our Personal Care Management (Chronic Care Management) Program.  She is interested in nursing support.  - REFERRAL TO CARE MANAGEMENT  - lisinopril (PRINIVIL) 20 MG Tab; Take 1 tablet PO every bedtime. Take 0.5 tablet PO every morning only if blood pressure is over 140/90.  Dispense: 150 tablet; Refill: 1    2. Alcohol withdrawal syndrome without complication (HCC)  3. Alcoholism (HCC)  Chronic issue with exacerbation.  Patient is in the midst of alcohol withdrawal.  She is close to or just past the 96 hour adiel which significantly lowers her risk of delirium tremens.  She has been managing her symptoms at home with her other medications (baclofen, gabapentin, hydroxyzine) and this has been effective for her.  We will continue to have her do this.  I did discuss possibly adding diazepam or other medication, but we agreed this wasn't needed at this time.      She tells me that she is " ready to make a change and this is why she stopped drinking over the weekend.  After a lengthy discussion, we agreed to seek out intensive outpatient programs.  Being able to sleep at home and take care of her dog is very important to her.  She does agree that she needs more than medication to help her stay sober. I've placed a referral for social work to help us with resources in the community.  Jaclyn has also expressed concern about cost for these programs and I am hopeful a financial assessment can be done as well.  I have also referred her to the Personal Care Management Program for nursing support and help with her blood pressure.  - REFERRAL TO CARE MANAGEMENT  - REFERRAL TO CARE MANAGEMENT    4. History of methamphetamine abuse (HCC)  Per patient history.  No use for the last several years.  - REFERRAL TO CARE MANAGEMENT  - REFERRAL TO CARE MANAGEMENT    5. Essential tremor  Chronic and stable.  Patient has a tremor on exam today secondary to her alcohol withdrawal.  - REFERRAL TO CARE MANAGEMENT  - REFERRAL TO CARE MANAGEMENT    6. Fibromyalgia  Chronic and stable.  Will continue to monitor.  - REFERRAL TO CARE MANAGEMENT  - REFERRAL TO CARE MANAGEMENT    Followup: 1 week         PLEASE NOTE: This dictation was created using voice recognition software. I have made every reasonable attempt to correct obvious errors, but I expect that there are errors of grammar and possibly content that I did not discover before finalizing the note.

## 2021-03-11 ENCOUNTER — PATIENT OUTREACH (OUTPATIENT)
Dept: MEDICAL GROUP | Facility: PHYSICIAN GROUP | Age: 56
End: 2021-03-11

## 2021-03-11 NOTE — PROGRESS NOTES
Nurse CM outreach call for possible enrollment in CCM program. Referral received from PCP for CCM program. Patient reports history of drinking a fifth of alcohol a day. Reports she did quit 5 days ago. Reports she is feeling a little shaky today. Patient reports she is smoking marijuana today for relaxation. Reports she doesn't feel as dizzy today. Patient states she hasn't checked her blood pressure reading yet today but will be checking later.   Nurse discussed CCM program and details of enrollment.  Patient states she may be interested in enrolling but would like some time to think about it.  Requesting a call back on Monday 3/15/21.  Patient appreciative of call and has no additional questions at this time. Nursing to follow-up with patient a later date.

## 2021-03-12 ENCOUNTER — PATIENT OUTREACH (OUTPATIENT)
Dept: MEDICAL GROUP | Facility: PHYSICIAN GROUP | Age: 56
End: 2021-03-12

## 2021-03-13 NOTE — PROGRESS NOTES
"Referral received from [address] Provider Grazyna Winkler, specifying need of behavioral health (intensive out patient). TC to pt to engage, assess and establish care plan. Pt educated on role of SW and reason for referral. Pt reports being a little shaky today but being able to get out and walk today to the post office. She reports 6 days of of sobriety. Pt took hydroxyzine for symptoms management along with her regular medications gabapentin (she is waiting to take baclofen until later in the evening).     Pt has history of methamphetamine and alcohol use; she appears authentic in her history and can provide instances of recovery relapse and self identified triggers. Pt identifies more with NA and having an \"addictive personality;\" she sees additive behaviors in other realms of her life. Her current reason for recover is \"I am sick of tire dna being sick and tired.\" Pt is experiencing medical-related symptoms related to her alcohol use. It should be noted that her family also has alcohol use and eludes they are considered \"functional alcoholics.\" Pt's son is currently in inpatient treatment for alcohol use.     Our focus was on education surrounding intensive out patient treatment but pt was informed an appropriate assessment would be done by a qualified professional prior to admission to any location, whether that be in patient, intensive out patient or general out patient services. Pt educated [facility] (pt would like to avoid location due to her son's experience), [facility] and [facility] (pt has previously been here and has a positive perspective) are likely intensive out patient (IOP) programs which will accept pt's insurance. Pt agreeable to goal being set related to her calling to learn about the programs and associated costs. We discussed the use of care credit for management payment plan as pt is on fixed income.     Pt primary reservations regarding pursuit of treatment include " leaving her dog (for inpatient) and the allotment of time (for IOP). Pt verbalizes confidence as she has previously been successful at maintaining sobriety for 16 years. She also feels supported in her pursuit of recovery within her family system.      Pt agreeable to f/u TC and setting goal for her to reach out to [facility] and [facility] related to costs prior to next TC.     See social documentation section (history) for social assessment    Plan:  Pt added to caselaod  Care Plan established   TC to pt 8/17/2021 at 2:00pm

## 2021-03-15 ENCOUNTER — PATIENT OUTREACH (OUTPATIENT)
Dept: HEALTH INFORMATION MANAGEMENT | Facility: OTHER | Age: 56
End: 2021-03-15

## 2021-03-15 DIAGNOSIS — I10 ESSENTIAL HYPERTENSION: ICD-10-CM

## 2021-03-15 DIAGNOSIS — F10.20 ALCOHOLISM (HCC): ICD-10-CM

## 2021-03-15 DIAGNOSIS — Z23 NEED FOR VACCINATION: ICD-10-CM

## 2021-03-15 NOTE — PROGRESS NOTES
Follow up phone call to Jaclyn for possible enrollment for [facility].  Patient agreeable to enroll.  Verified Name  and verbal consent given for enrollment.  Scheduled intake call for 3/17/2021 at 1000.  Jaclyn did have questions about her blood pressure, she was told to take her blood pressure in the morning and her PCP gave parameter of 140/90 to take an extra half dose of lisinopril in the morning if her BP was elevated. Her BP this morning was 128/94 and she stated she did not take her extra dose .  Educated that if tomorrow her diastolic BP  was still elevated above 90 to call her PCP.  She was agreeable to this.  Will follow up on 3/17/21 at 1000.

## 2021-03-17 ENCOUNTER — PATIENT OUTREACH (OUTPATIENT)
Dept: MEDICAL GROUP | Facility: PHYSICIAN GROUP | Age: 56
End: 2021-03-17
Payer: MEDICARE

## 2021-03-17 ENCOUNTER — PATIENT OUTREACH (OUTPATIENT)
Dept: MEDICAL GROUP | Facility: PHYSICIAN GROUP | Age: 56
End: 2021-03-17

## 2021-03-17 DIAGNOSIS — F10.20 ALCOHOLISM (HCC): ICD-10-CM

## 2021-03-17 DIAGNOSIS — J44.89 COPD (CHRONIC OBSTRUCTIVE PULMONARY DISEASE) WITH CHRONIC BRONCHITIS (HCC): ICD-10-CM

## 2021-03-17 DIAGNOSIS — I10 ESSENTIAL HYPERTENSION: ICD-10-CM

## 2021-03-17 ASSESSMENT — PATIENT HEALTH QUESTIONNAIRE - PHQ9
CLINICAL INTERPRETATION OF PHQ2 SCORE: 1
SUM OF ALL RESPONSES TO PHQ QUESTIONS 1-9: 13
5. POOR APPETITE OR OVEREATING: 2 - MORE THAN HALF THE DAYS

## 2021-03-17 ASSESSMENT — PAIN SCALES - GENERAL: PAINLEVEL: 6=MODERATE PAIN

## 2021-03-17 NOTE — PROGRESS NOTES
TC to Jaclyn. Jaclyn called SCP who informed Jaclyn treatment costs would be $50 copay per visit. Jaclyn will be receiving [facility] Financial Aid Application from the financial department. She is aware she will need to collect income statements and 4 months bank statements. Informed Jaclyn she will likely need to apply for Medicaid in order to provide a denial letter with the [facility] Financial Aid Application. Jaclyn previously assisted her significant other in applying and declined needing SW to mail application/provide internet hyperlink.     We dicussed alternative substance/alcohol treatment locations that provide intensive out patient and sliding fee/financial assistance. Contact information provided ([facility] ([phone]), [facility] ([phone]), and [facility] ([phone])). Jaclyn has a preference towards [facility] as it is the closest proximity to her home.    Jaclyn has intake scheduled with [facility] tomorrow's date. She is considering cancelling. We discussed pros and cons related to canceling to include possible delay in treatment and that the assessment would provide a more firm recommendations regarding level of intervention (inpatient, intensive out patient, or general talk therapy).    Check in completed regarding withdrawal symptoms and current emotional state. Jaclyn is feeling accomplished as she is scheduling and following through with tasks/activities on her list. She feels calm and is not experiencing detox tremors.     Plan:  TC to pt 3/25/201 at 1:00pm

## 2021-03-17 NOTE — PROGRESS NOTES
Patient outreach for intake call for [facility].  Jaclyn is a pleasant 56 year old female who recently quit drinking.  She stated she has quit in the past but had started up again.  She is on disability, but does live independently.  She can perform her own ADLs.  She has family and friends for support.  She stated her sister also stopped drinking to support her and calls her everyday.  She has a significant other who lives in the same [place] and spends time with him and assists with his work when she can.  She stated it keeps me busy and occupied. Jaclyn has HTN and depression.  She suffers from fibromyalgia and has been on disability because of this. Jaclyn's goals are to live a healthier life.  She is changing her diet to a more Heart healthy diet and is staying away from alcohol.  She goes to therapy for depression and has been able to handle her withdrawal at home.  At this time plan of care is managing a cardiac diet.  Monitoring her BP and keep with in parameter of 140/90 set by her PCP.  And keep away from alcohol.  Patient is agreeable to these goals and will follow up on 3/24/2021at 1300.

## 2021-03-18 ENCOUNTER — HOSPITAL ENCOUNTER (OUTPATIENT)
Dept: LAB | Facility: MEDICAL CENTER | Age: 56
End: 2021-03-18
Attending: NURSE PRACTITIONER
Payer: MEDICARE

## 2021-03-18 ENCOUNTER — APPOINTMENT (OUTPATIENT)
Dept: BEHAVIORAL HEALTH | Facility: MEDICAL CENTER | Age: 56
End: 2021-03-18
Attending: PSYCHIATRY & NEUROLOGY
Payer: MEDICARE

## 2021-03-18 ENCOUNTER — OFFICE VISIT (OUTPATIENT)
Dept: MEDICAL GROUP | Facility: PHYSICIAN GROUP | Age: 56
End: 2021-03-18
Payer: MEDICARE

## 2021-03-18 VITALS
WEIGHT: 192.4 LBS | TEMPERATURE: 97.7 F | SYSTOLIC BLOOD PRESSURE: 114 MMHG | RESPIRATION RATE: 12 BRPM | HEART RATE: 71 BPM | DIASTOLIC BLOOD PRESSURE: 80 MMHG | HEIGHT: 66 IN | BODY MASS INDEX: 30.92 KG/M2 | OXYGEN SATURATION: 96 %

## 2021-03-18 DIAGNOSIS — B96.89 BV (BACTERIAL VAGINOSIS): ICD-10-CM

## 2021-03-18 DIAGNOSIS — J44.89 COPD (CHRONIC OBSTRUCTIVE PULMONARY DISEASE) WITH CHRONIC BRONCHITIS (HCC): ICD-10-CM

## 2021-03-18 DIAGNOSIS — G25.0 ESSENTIAL TREMOR: ICD-10-CM

## 2021-03-18 DIAGNOSIS — L57.0 ACTINIC KERATOSIS: ICD-10-CM

## 2021-03-18 DIAGNOSIS — Z12.31 ENCOUNTER FOR SCREENING MAMMOGRAM FOR MALIGNANT NEOPLASM OF BREAST: ICD-10-CM

## 2021-03-18 DIAGNOSIS — J84.9 INTERSTITIAL LUNG DISEASE (HCC): ICD-10-CM

## 2021-03-18 DIAGNOSIS — N76.0 BV (BACTERIAL VAGINOSIS): ICD-10-CM

## 2021-03-18 DIAGNOSIS — Z91.81 RISK FOR FALLS: ICD-10-CM

## 2021-03-18 DIAGNOSIS — F17.200 TOBACCO USE DISORDER: ICD-10-CM

## 2021-03-18 DIAGNOSIS — F10.21 ALCOHOLISM IN RECOVERY (HCC): ICD-10-CM

## 2021-03-18 DIAGNOSIS — Z00.00 MEDICARE ANNUAL WELLNESS VISIT, SUBSEQUENT: Primary | ICD-10-CM

## 2021-03-18 DIAGNOSIS — R10.9 FLANK PAIN: ICD-10-CM

## 2021-03-18 DIAGNOSIS — M79.7 FIBROMYALGIA: ICD-10-CM

## 2021-03-18 DIAGNOSIS — F33.1 MODERATE EPISODE OF RECURRENT MAJOR DEPRESSIVE DISORDER (HCC): ICD-10-CM

## 2021-03-18 DIAGNOSIS — F10.20 ALCOHOLISM (HCC): ICD-10-CM

## 2021-03-18 DIAGNOSIS — G43.809 OTHER MIGRAINE WITHOUT STATUS MIGRAINOSUS, NOT INTRACTABLE: ICD-10-CM

## 2021-03-18 DIAGNOSIS — F41.1 GENERALIZED ANXIETY DISORDER: ICD-10-CM

## 2021-03-18 DIAGNOSIS — I10 ESSENTIAL HYPERTENSION: ICD-10-CM

## 2021-03-18 DIAGNOSIS — E66.9 OBESITY (BMI 30-39.9): ICD-10-CM

## 2021-03-18 DIAGNOSIS — Z72.89 DELIBERATE SELF-CUTTING: ICD-10-CM

## 2021-03-18 DIAGNOSIS — G47.01 INSOMNIA DUE TO MEDICAL CONDITION: ICD-10-CM

## 2021-03-18 DIAGNOSIS — F31.30 BIPOLAR AFFECTIVE DISORDER, CURRENT EPISODE DEPRESSED, CURRENT EPISODE SEVERITY UNSPECIFIED (HCC): ICD-10-CM

## 2021-03-18 LAB
25(OH)D3 SERPL-MCNC: 25 NG/ML (ref 30–100)
ALBUMIN SERPL BCP-MCNC: 4.4 G/DL (ref 3.2–4.9)
ALBUMIN/GLOB SERPL: 1.5 G/DL
ALP SERPL-CCNC: 103 U/L (ref 30–99)
ALT SERPL-CCNC: 27 U/L (ref 2–50)
ANION GAP SERPL CALC-SCNC: 8 MMOL/L (ref 7–16)
AST SERPL-CCNC: 27 U/L (ref 12–45)
BASOPHILS # BLD AUTO: 0.6 % (ref 0–1.8)
BASOPHILS # BLD: 0.05 K/UL (ref 0–0.12)
BILIRUB SERPL-MCNC: 0.3 MG/DL (ref 0.1–1.5)
BUN SERPL-MCNC: 16 MG/DL (ref 8–22)
CALCIUM SERPL-MCNC: 9.9 MG/DL (ref 8.5–10.5)
CHLORIDE SERPL-SCNC: 108 MMOL/L (ref 96–112)
CHOLEST SERPL-MCNC: 160 MG/DL (ref 100–199)
CO2 SERPL-SCNC: 24 MMOL/L (ref 20–33)
CREAT SERPL-MCNC: 1.08 MG/DL (ref 0.5–1.4)
EOSINOPHIL # BLD AUTO: 0.09 K/UL (ref 0–0.51)
EOSINOPHIL NFR BLD: 1.1 % (ref 0–6.9)
ERYTHROCYTE [DISTWIDTH] IN BLOOD BY AUTOMATED COUNT: 47.6 FL (ref 35.9–50)
EST. AVERAGE GLUCOSE BLD GHB EST-MCNC: 123 MG/DL
FASTING STATUS PATIENT QL REPORTED: NORMAL
FOLATE SERPL-MCNC: 11.6 NG/ML
GLOBULIN SER CALC-MCNC: 2.9 G/DL (ref 1.9–3.5)
GLUCOSE SERPL-MCNC: 90 MG/DL (ref 65–99)
HBA1C MFR BLD: 5.9 % (ref 4–5.6)
HCT VFR BLD AUTO: 46.8 % (ref 37–47)
HDLC SERPL-MCNC: 42 MG/DL
HGB BLD-MCNC: 15.3 G/DL (ref 12–16)
IMM GRANULOCYTES # BLD AUTO: 0.02 K/UL (ref 0–0.11)
IMM GRANULOCYTES NFR BLD AUTO: 0.3 % (ref 0–0.9)
LDLC SERPL CALC-MCNC: 97 MG/DL
LYMPHOCYTES # BLD AUTO: 3.08 K/UL (ref 1–4.8)
LYMPHOCYTES NFR BLD: 39 % (ref 22–41)
MCH RBC QN AUTO: 34.8 PG (ref 27–33)
MCHC RBC AUTO-ENTMCNC: 32.7 G/DL (ref 33.6–35)
MCV RBC AUTO: 106.4 FL (ref 81.4–97.8)
MONOCYTES # BLD AUTO: 0.46 K/UL (ref 0–0.85)
MONOCYTES NFR BLD AUTO: 5.8 % (ref 0–13.4)
NEUTROPHILS # BLD AUTO: 4.2 K/UL (ref 2–7.15)
NEUTROPHILS NFR BLD: 53.2 % (ref 44–72)
NRBC # BLD AUTO: 0 K/UL
NRBC BLD-RTO: 0 /100 WBC
PLATELET # BLD AUTO: 345 K/UL (ref 164–446)
PMV BLD AUTO: 10.5 FL (ref 9–12.9)
POTASSIUM SERPL-SCNC: 4.3 MMOL/L (ref 3.6–5.5)
PROT SERPL-MCNC: 7.3 G/DL (ref 6–8.2)
RBC # BLD AUTO: 4.4 M/UL (ref 4.2–5.4)
SODIUM SERPL-SCNC: 140 MMOL/L (ref 135–145)
TRIGL SERPL-MCNC: 107 MG/DL (ref 0–149)
VIT B12 SERPL-MCNC: 837 PG/ML (ref 211–911)
WBC # BLD AUTO: 7.9 K/UL (ref 4.8–10.8)

## 2021-03-18 PROCEDURE — 36415 COLL VENOUS BLD VENIPUNCTURE: CPT

## 2021-03-18 PROCEDURE — 82607 VITAMIN B-12: CPT

## 2021-03-18 PROCEDURE — 80053 COMPREHEN METABOLIC PANEL: CPT

## 2021-03-18 PROCEDURE — 82306 VITAMIN D 25 HYDROXY: CPT

## 2021-03-18 PROCEDURE — 85025 COMPLETE CBC W/AUTO DIFF WBC: CPT

## 2021-03-18 PROCEDURE — G0439 PPPS, SUBSEQ VISIT: HCPCS | Performed by: NURSE PRACTITIONER

## 2021-03-18 PROCEDURE — 80061 LIPID PANEL: CPT

## 2021-03-18 PROCEDURE — 83036 HEMOGLOBIN GLYCOSYLATED A1C: CPT

## 2021-03-18 PROCEDURE — 82746 ASSAY OF FOLIC ACID SERUM: CPT

## 2021-03-18 RX ORDER — TOPIRAMATE 50 MG/1
50 TABLET, FILM COATED ORAL 3 TIMES DAILY
Qty: 270 TABLET | Refills: 3 | Status: SHIPPED | OUTPATIENT
Start: 2021-03-18 | End: 2022-02-01

## 2021-03-18 RX ORDER — CLINDAMYCIN PHOSPHATE 20 MG/G
CREAM VAGINAL
Qty: 40 G | Refills: 0 | Status: SHIPPED | OUTPATIENT
Start: 2021-03-18 | End: 2022-04-18 | Stop reason: SDUPTHER

## 2021-03-18 ASSESSMENT — ACTIVITIES OF DAILY LIVING (ADL): BATHING_REQUIRES_ASSISTANCE: 0

## 2021-03-18 ASSESSMENT — FIBROSIS 4 INDEX: FIB4 SCORE: 0.88

## 2021-03-18 ASSESSMENT — ENCOUNTER SYMPTOMS: GENERAL WELL-BEING: FAIR

## 2021-03-18 ASSESSMENT — PATIENT HEALTH QUESTIONNAIRE - PHQ9: CLINICAL INTERPRETATION OF PHQ2 SCORE: 0

## 2021-03-18 NOTE — PROGRESS NOTES
Chief Complaint   Patient presents with   • Annual Wellness Visit   • Medication Refill         HPI:  Jaclyn is a 56 y.o. here for Medicare Annual Wellness Visit    Alcoholism in recovery (HCC)  Chronic in nature.  Patient continues to be sober.  States that she is on day 12 of sobriety.  Has an appointment for evaluation with behavioral health that she plans to keep and does also plan to get in touch with alcoholics anonymous as this has been beneficial to her in the past.  Difficult other did also stop drinking and is also on day 12 of sobriety, patient states that she does have concerns that his sobriety is solely related to lack of funds and she is concerned that if he has money to burn that he will start drinking again she states that at this time her plan is to break up with him if he starts drinking again.  She does mention this is the same partner that she has been with over the time that she has been coming to this office.  Patient states that she does feel safe at this time.  States that she is hopeful as she has been making a lot of very positive changes in her health and would like to continue to move forward states that alcoholism started when she moved to [place] approximately 4 years ago and had a lot of stressful situations happening in her life.  She states that she is in a much better place and then also mentions that her son is getting treatment currently as well.    COPD (chronic obstructive pulmonary disease) with chronic bronchitis (CMS-[facility])  Chronic in nature. Stable. States she is still having coughing up green stuff, no change no fevers.     Deliberate self-cutting  No recent episodes, states she is doing well and this was a limited issue.    Actinic keratosis  Still some rough on nose forehead    Fibromyalgia  Chronic in nature.  States this has been more severe recently.  Severe pain in toes states that it feels like they have been chopped off with a Jessie.she does mention all over  achey-ness and fatigue.  Its that she does know that some of this is related to detox.  States she is also having increased point tenderness and states that she was not as acutely aware of it until her blood pressure was taken she noticed that it was very tender where the cuff was on her arm.     Essential tremor  Chronic in nature.  Overall stable.  She has not noticed any specific changes but did state that she did have a difficulty discerning between tremor and recent detox.    Bipolar affective disorder, current episode depressed (CMS-HCC)  Chronic in nature.  Patient appears cheerful and is feeling well today. states improving and well managed.  She does have an appointment coming up to continue follow-up on this issue and continues to take medications as prescribed.     Essential hypertension  Patient is currently taking lisinopril 20 mg for this issue and is not reporting any symptoms.  Blood pressure today is 114/80.  She is also off alcohol and states she feels like her blood pressure initially became very high related to alcohol consumption.  Discussed with patient that if she does notice dizziness that she should contact this provider as we may need to consider decreasing the medication if her blood pressure is getting low patient is encouraged to obtain a blood pressure cuff so that she can monitor at home intermittently.    KVNG (generalized anxiety disorder)  Chronic in nature.  Stable.  Patient does continue to have anxiety and is setting up appointment to see psychology at this time.    Recurrent major depressive disorder (CMS-HCC) (HCC)  Nature.  Stable at this time.  Patient is making an appointment with behavioral health.    Interstitial lung disease (CMS-HCC) (HCC)  Chronic in nature.  Stable.  Patient continues to smoke.  Plan to discuss pulmonology referral potential at next appointment.  Patient continues to have productive cough we will discuss this further at her next appointment as well.   States that she has not been having as much shortness of breath recently.        Patient Active Problem List    Diagnosis Date Noted   • Risk for falls 03/18/2021   • Essential hypertension 05/07/2020   • Obesity (BMI 30-39.9) 08/29/2019   • Dupuytren's contracture of right hand 08/29/2019   • Alcoholism in recovery ([facility]) 08/14/2019   • Deliberate self-cutting 08/14/2019   • Flank pain 07/31/2018   • Actinic keratosis 02/06/2018   • Trigger finger, right index finger 01/22/2018   • COPD (chronic obstructive pulmonary disease) with chronic bronchitis ([facility]) 02/09/2017   • H/O hysterectomy with oophorectomy 02/09/2017   • Essential tremor 02/09/2017   • Irritable bowel syndrome with diarrhea 02/09/2017   • Insomnia due to medical condition 02/09/2017   • Bipolar affective disorder, current episode depressed ([facility]) 02/09/2017   • Migraine without status migrainosus, not intractable 09/15/2016   • Facet arthropathy, cervical 04/08/2016   • Facet arthropathy, lumbar 04/08/2016   • History of degenerative disc disease 10/05/2015   • KVNG (generalized anxiety disorder) 09/23/2014   • Interstitial lung disease ([facility]) 09/05/2014   • Tobacco use disorder 06/07/2014   • Recurrent major depressive disorder ([facility]) 01/27/2010   • Fibromyalgia 01/01/2002       Current Outpatient Medications   Medication Sig Dispense Refill   • Loratadine (CLARITIN PO) Take 1 tablet by mouth every day.     • topiramate (TOPAMAX) 50 MG tablet Take 1 tablet by mouth 3 times a day. 270 tablet 3   • clindamycin (CLEOCIN) 2 % vaginal cream Use vaginally after sexual activity for irritation and odor related to BV. 40 g 0   • lisinopril (PRINIVIL) 20 MG Tab Take 1 tablet PO every bedtime. Take 0.5 tablet PO every morning only if blood pressure is over 140/90. 150 tablet 1   • meloxicam (MOBIC) 15 MG tablet TAKE 1 TABLET BY MOUTH EVERY DAY 30 tablet 0   • ondansetron (ZOFRAN ODT) 4 MG TABLET DISPERSIBLE TAKE 2 TABS BY MOUTH EVERY 8 HOURS AS NEEDED FOR NAUSEA.  30 tablet 0   • baclofen (LIORESAL) 10 MG Tab TAKE 1 TABLET BY MOUTH THREE TIMES A DAY 90 tablet 0   • mirtazapine (REMERON) 30 MG Tab tablet TAKE 1 TABLET BY MOUTH EVERYDAY AT BEDTIME 90 Tab 0   • gabapentin (NEURONTIN) 600 MG tablet TAKE 1 TABLET BY MOUTH THREE TIMES A  Tab 0   • Diclofenac Sodium (VOLTAREN) 1 % Gel Place 1 Application on the skin 2 times a day as needed. 100 g 0   • Thiamine HCl (VITAMIN B-1 PO) Take 1 Tab by mouth every evening.     • Calcium-Magnesium-Zinc (JUDSON-MAG-ZINC PO) Take 1 Tab by mouth every evening.     • PROAIR  (90 Base) MCG/ACT Aero Soln inhalation aerosol INHALE 2 PUFFS BY MOUTH EVERY 6 HOURS AS NEEDED FOR SHORTNESS OF BREATH. 8.5 Inhaler 3   • SAVELLA 100 MG Tab TAKE 1 TABLET BY MOUTH TWICE A  Tab 3   • hydrOXYzine HCl (ATARAX) 25 MG Tab Take 1 Tab by mouth 3 times a day as needed for Anxiety. 90 Tab 0   • dicyclomine (BENTYL) 20 MG Tab Take 20 mg by mouth every 6 hours as needed.     • Cyanocobalamin (VITAMIN B-12) 1000 MCG Tab Take 1 Tab by mouth 2 Times a Day. 60 Tab 11     No current facility-administered medications for this visit.        Patient is taking medications as noted in medication list.  Current supplements as per medication list.     Allergies: Erythromycin and Tetracycline    Current social contact/activities: Meeting with friends, spending time with boyfriend     Is patient current with immunizations? No, due for SHINGRIX (Shingles). Patient is interested in receiving NONE today.    She  reports that she has been smoking. She has a 35.00 pack-year smoking history. She has never used smokeless tobacco. She reports previous alcohol use. She reports current drug use. Drug: Marijuana.  Ready to quit: No  Counseling given: Yes        DPA/Advanced directive: Patient does not have an Advanced Directive.  A packet and workshop information was given on Advanced Directives.    ROS:    Gait: Uses a cane, at home and in community   Ostomy: No   Other  tubes: No   Amputations: No   Chronic oxygen use No   Last eye exam [id number]   Wears hearing aids: No   : Denies any urinary leakage during the last 6 months      Screening:    Annual Health Assessment Questions:    1.  Are you currently engaging in any exercise or physical activity? No    2.  How would you describe your mood or emotional well-being today? good    3.  Have you had any falls in the last year? Yes    4.  Have you noticed any problems with your balance or had difficulty walking? Yes    5.  In the last six months have you experienced any leakage of urine? No    6. DPA/Advanced Directive: Patient does not have an Advanced Directive.  A packet and workshop information was given on Advanced Directives.    Depression Screening    Little interest or pleasure in doing things?  0 - not at all  Feeling down, depressed, or hopeless? 0 - not at all  Patient Health Questionnaire Score: 0    If depressive symptoms identified deferred to follow up visit unless specifically addressed in assessment and plan.    Interpretation of PHQ-9 Total Score   Score Severity   1-4 No Depression   5-9 Mild Depression   10-14 Moderate Depression   15-19 Moderately Severe Depression   20-27 Severe Depression    Screening for Cognitive Impairment    Three Minute Recall (river, argelia, finger)  3/3    Doroteo clock face with all 12 numbers and set the hands to show 10 past 11.  Yes 5/5  If cognitive concerns identified, deferred for follow up unless specifically addressed in assessment and plan.    Fall Risk Assessment    Has the patient had two or more falls in the last year or any fall with injury in the last year?  Yes  If fall risk identified, deferred for follow up unless specifically addressed in assessment and plan.    Safety Assessment    Throw rugs on floor.  Yes  Handrails on all stairs.  Yes  Good lighting in all hallways.  Yes  Difficulty hearing.  No  Patient counseled about all safety risks that were  identified.    Functional Assessment ADLs    Are there any barriers preventing you from cooking for yourself or meeting nutritional needs?  No.    Are there any barriers preventing you from driving safely or obtaining transportation?  No.    Are there any barriers preventing you from using a telephone or calling for help?  No.    Are there any barriers preventing you from shopping?  Yes. Fibromyalgia sometimes interferes  Are there any barriers preventing you from taking care of your own finances?  No.    Are there any barriers preventing you from managing your medications?  No.    Are there any barriers preventing you from showering, bathing or dressing yourself?  No.    Are you currently engaging in any exercise or physical activity?  No.     What is your perception of your health?  Fair.    Health Maintenance Summary                Annual Pulmonary Function Test / Spirometry Overdue 1/3/1972     COVID-19 Vaccine Overdue 1/3/1982     IMM ZOSTER VACCINES Overdue 1/3/2016     Annual Wellness Visit Overdue 8/29/2020      Done 8/29/2019 SUBSEQUENT ANNUAL WELLNESS VISIT-INCLUDES PPPS ()     Patient has more history with this topic...    IMM INFLUENZA Overdue 9/1/2020      Done 10/2/2019 Imm Admin: Influenza Vaccine Quad Inj (Pf)     Patient has more history with this topic...    MAMMOGRAM Overdue 9/13/2020      Done 9/13/2019 MA-SCREENING MAMMO BILAT W/TOMOSYNTHESIS W/CAD    IMM DTaP/Tdap/Td Vaccine Next Due 1/14/2024      Done 1/14/2014 Imm Admin: Tdap Vaccine    COLONOSCOPY Next Due 9/18/2029      Done 9/18/2019 REFERRAL TO GI FOR COLONOSCOPY          Patient Care Team:  Magdiel Colorado, ETIENNE.P.R.N. as PCP - General (Family Medicine)  Salty Oliva Ass't as [facility]   Annabel Massey, LSW, MSW as Community Care Manager (Social Work)  Velvet Giraldo, RMOHAN. as Chronic Care Manager    Social History     Tobacco Use   • Smoking status: Current Every Day Smoker      "Packs/day: 1.00     Years: 35.00     Pack years: 35.00   • Smokeless tobacco: Never Used   Substance Use Topics   • Alcohol use: Not Currently   • Drug use: Yes     Types: Marijuana     Family History   Problem Relation Age of Onset   • Lung Disease Mother         copd   • Cancer Mother         basal/squamous   • Hypertension Mother    • Hyperlipidemia Mother    • Stroke Mother    • Heart Disease Father         HF   • Arthritis Father         rheumatoid   • Lung Disease Father         emphysema   • Cancer Sister    • Cancer Maternal Grandfather         colon or bladder not sure    • Cancer Sister         pre cancerous spots      She  has a past medical history of Actinic keratosis (2/6/2018), Allergy, Anxiety, Arthritis, Chronic pain syndrome, COPD (chronic obstructive pulmonary disease) (HCC), DDD (degenerative disc disease), cervical, DDD (degenerative disc disease), thoracolumbar, Depression, Diverticulosis, Essential tremor, Fibromyalgia, IBS (irritable bowel syndrome), Migraine, and Pulmonary emphysema (HCC). She also has no past medical history of GERD (gastroesophageal reflux disease) or Hyperlipidemia.   Past Surgical History:   Procedure Laterality Date   • ABDOMINAL HYSTERECTOMY TOTAL     • CHOLECYSTECTOMY     • TONSILLECTOMY             Exam:     /80 (BP Location: Left arm, Patient Position: Sitting, BP Cuff Size: Adult)   Pulse 71   Temp 36.5 °C (97.7 °F) (Temporal)   Resp 12   Ht 1.676 m (5' 6\")   Wt 87.3 kg (192 lb 6.4 oz)   SpO2 96%  Body mass index is 31.05 kg/m².    Hearing excellent.    Dentition good  Alert, oriented in no acute distress.  Eye contact is good, speech goal directed, affect calm      Assessment and Plan. The following treatment and monitoring plan is recommended: Continue current plan of care with regard to bipolar, fibromyalgia, depression, anxiety, inhalers as needed for COPD/interstitial lung disease considering referral to pulmonology in the future.  At this time our " strong focus is patient staying off of alcohol she is 12 days sober and is encouraged to continue on this path.  We will follow her closely to assist her and support her in this journey.  Patient will follow up in 1 month.  1. COPD (chronic obstructive pulmonary disease) with chronic bronchitis (HCC)     2. Bipolar affective disorder, current episode depressed, current episode severity unspecified (HCC)     3. Essential hypertension     4. Fibromyalgia  topiramate (TOPAMAX) 50 MG tablet   5. Interstitial lung disease (HCC)     6. Tobacco use disorder     7. Deliberate self-cutting     8. Risk for falls  Patient identified as fall risk.  Appropriate orders and counseling given.   9. Obesity (BMI 30-39.9)  Patient identified as having weight management issue.  Appropriate orders and counseling given.   10. Encounter for screening mammogram for malignant neoplasm of breast  MA-SCREENING MAMMO BILAT W/TOMOSYNTHESIS W/CAD   11. Actinic keratosis     12. Essential tremor     13. Insomnia due to medical condition     14. Other migraine without status migrainosus, not intractable  topiramate (TOPAMAX) 50 MG tablet   15. Flank pain  US-ABDOMEN COMPLETE SURVEY   16. BV (bacterial vaginosis)  clindamycin (CLEOCIN) 2 % vaginal cream   17. Alcoholism in recovery (HCC)  CBC WITH DIFFERENTIAL    Comp Metabolic Panel    HEMOGLOBIN A1C    Lipid Profile    VITAMIN D,25 HYDROXY    VITAMIN B12    FOLATE   18. KVNG (generalized anxiety disorder)     19. Moderate episode of recurrent major depressive disorder (HCC)           Services suggested: Referral to [facility]-is following with social work  Health Care Screening recommendations as per orders if indicated.  Referrals offered: PT/OT/Nutrition counseling/Behavioral Health/Smoking cessation as per orders if indicated.    Discussion today about general wellness and lifestyle habits:    · Prevent falls and reduce trip hazards; Cautioned about securing or removing  rugs.  · Have a working fire alarm and carbon monoxide detector;   · Engage in regular physical activity and social activities       Follow-up: Return in about 1 month (around 4/18/2021) for Labs.

## 2021-03-18 NOTE — ASSESSMENT & PLAN NOTE
Patient is currently taking lisinopril 20 mg for this issue and is not reporting any symptoms.  Blood pressure today is 114/80.  She is also off alcohol and states she feels like her blood pressure initially became very high related to alcohol consumption.  Discussed with patient that if she does notice dizziness that she should contact this provider as we may need to consider decreasing the medication if her blood pressure is getting low patient is encouraged to obtain a blood pressure cuff so that she can monitor at home intermittently.

## 2021-03-18 NOTE — ASSESSMENT & PLAN NOTE
Chronic in nature. Stable. States she is still having coughing up green stuff, no change no fevers.

## 2021-03-18 NOTE — ASSESSMENT & PLAN NOTE
Chronic in nature.  Overall stable.  She has not noticed any specific changes but did state that she did have a difficulty discerning between tremor and recent detox.

## 2021-03-18 NOTE — ASSESSMENT & PLAN NOTE
Chronic in nature.  Stable.  Patient does continue to have anxiety and is setting up appointment to see psychology at this time.

## 2021-03-18 NOTE — ASSESSMENT & PLAN NOTE
Chronic in nature.  Stable.  Patient continues to smoke.  Plan to discuss pulmonology referral potential at next appointment.  Patient continues to have productive cough we will discuss this further at her next appointment as well.  States that she has not been having as much shortness of breath recently.

## 2021-03-18 NOTE — ASSESSMENT & PLAN NOTE
Chronic in nature.  Patient appears cheerful and is feeling well today. states improving and well managed.  She does have an appointment coming up to continue follow-up on this issue and continues to take medications as prescribed.

## 2021-03-18 NOTE — ASSESSMENT & PLAN NOTE
Chronic in nature.  Patient continues to be sober.  States that she is on day 12 of sobriety.  Has an appointment for evaluation with behavioral health that she plans to keep and does also plan to get in touch with alcoholics anonymous as this has been beneficial to her in the past.  Difficult other did also stop drinking and is also on day 12 of sobriety, patient states that she does have concerns that his sobriety is solely related to lack of funds and she is concerned that if he has money to burn that he will start drinking again she states that at this time her plan is to break up with him if he starts drinking again.  She does mention this is the same partner that she has been with over the time that she has been coming to this office.  Patient states that she does feel safe at this time.  States that she is hopeful as she has been making a lot of very positive changes in her health and would like to continue to move forward states that alcoholism started when she moved to [place] approximately 4 years ago and had a lot of stressful situations happening in her life.  She states that she is in a much better place and then also mentions that her son is getting treatment currently as well.

## 2021-03-20 DIAGNOSIS — M79.7 FIBROMYALGIA: ICD-10-CM

## 2021-03-22 RX ORDER — BACLOFEN 10 MG/1
TABLET ORAL
Qty: 90 TABLET | Refills: 0 | Status: SHIPPED | OUTPATIENT
Start: 2021-03-22 | End: 2021-04-26

## 2021-03-23 ENCOUNTER — IMMUNIZATION (OUTPATIENT)
Dept: FAMILY PLANNING/WOMEN'S HEALTH CLINIC | Facility: IMMUNIZATION CENTER | Age: 56
End: 2021-03-23
Attending: INTERNAL MEDICINE
Payer: MEDICARE

## 2021-03-23 DIAGNOSIS — Z23 ENCOUNTER FOR VACCINATION: Primary | ICD-10-CM

## 2021-03-23 DIAGNOSIS — Z23 NEED FOR VACCINATION: ICD-10-CM

## 2021-03-23 PROCEDURE — 91300 PFIZER SARS-COV-2 VACCINE: CPT

## 2021-03-23 PROCEDURE — 0001A PFIZER SARS-COV-2 VACCINE: CPT

## 2021-03-24 ENCOUNTER — PATIENT OUTREACH (OUTPATIENT)
Dept: HEALTH INFORMATION MANAGEMENT | Facility: OTHER | Age: 56
End: 2021-03-24

## 2021-03-25 ENCOUNTER — PATIENT OUTREACH (OUTPATIENT)
Dept: MEDICAL GROUP | Facility: PHYSICIAN GROUP | Age: 56
End: 2021-03-25

## 2021-03-25 NOTE — PROGRESS NOTES
Call to Jaclyn. Phone call would not connected. SW utilized both work phone and [employer] in an attempt to establish contact; neither was connect to Jaclyn's phone.     Plan:  Call (2) to Jaclyn on or around 4/1/2021

## 2021-03-26 NOTE — DISCHARGE PLANNING
Medical Social Work:    Pt will be transferred to [facility] via [facility] at 1100    [facility] Authorization: [id number]    Transfer Packet Completed with Original Legal Hold Placed inside. RN updated on transfer and transfer time.     [place]  at [facility] updated on [facility] 1100 transfer time.       [FreeTextEntry1] : CT stone dow ordered--Pt to call to review results by telehealth\par Will discuss 24 hr urine ordering during next visit\par \par urine culture also sent [Urinary Tract Infection (599.0\N39.0)] : qualitative ~C was positive

## 2021-03-30 ENCOUNTER — HOSPITAL ENCOUNTER (OUTPATIENT)
Dept: BEHAVIORAL HEALTH | Facility: MEDICAL CENTER | Age: 56
End: 2021-03-30
Attending: PSYCHIATRY & NEUROLOGY
Payer: MEDICARE

## 2021-03-30 DIAGNOSIS — F10.21 ALCOHOLISM IN RECOVERY (HCC): ICD-10-CM

## 2021-03-30 PROCEDURE — 90791 PSYCH DIAGNOSTIC EVALUATION: CPT
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
## 2021-03-31 ENCOUNTER — PATIENT OUTREACH (OUTPATIENT)
Dept: HEALTH INFORMATION MANAGEMENT | Facility: OTHER | Age: 56
End: 2021-03-31
Payer: MEDICARE

## 2021-03-31 ENCOUNTER — HOSPITAL ENCOUNTER (OUTPATIENT)
Dept: RADIOLOGY | Facility: MEDICAL CENTER | Age: 56
End: 2021-03-31
Attending: NURSE PRACTITIONER
Payer: MEDICARE

## 2021-03-31 DIAGNOSIS — I10 ESSENTIAL HYPERTENSION: ICD-10-CM

## 2021-03-31 DIAGNOSIS — R10.9 FLANK PAIN: ICD-10-CM

## 2021-03-31 DIAGNOSIS — F10.21 ALCOHOLISM IN RECOVERY (HCC): ICD-10-CM

## 2021-03-31 PROCEDURE — 76700 US EXAM ABDOM COMPLETE: CPT

## 2021-03-31 NOTE — PROGRESS NOTES
I have reviewed the note by Vernell Correa RN and agree with the assessment and treatment plan.    1. Admit to Intensive Outpatient Program on 3/30/2021   - Group therapy per schedule,    - Psychoeducational groups per schedule,   - Individual/family counseling sessions with  per treatment plan.  2. Symptoms necessitating Intensive Outpatient Treatment: Alcohol Use Disorder/Bipolar Disorder/Anxiety  3. Medical screening/Physical exam per primary care provider or referring facility.    Jairo Bustillos MD

## 2021-03-31 NOTE — PROGRESS NOTES
Patient outreach for CCM follow up.  Jaclyn called for follow up She stated she is doing much better.  HTN is better and she has not needed to take her BP as often.  She is adhering to her prescription regimen and is able to make and keep all her appointments.  She is 25 days sober and no longer has any signs of withdrawal.  She had her labs done and was told her Vit D is low and was given a message by her PCP to take Vit D.  Jaclyn stated she will be taking her Vit D once she picks it up at the pharmacy.  Her labs will be reviewed with her PCP at her next appointment.  Will continue with current plan.  Medication regimen, monitoring BP, follow up with behavioral health program.  Next follow up 4/28.

## 2021-04-01 ENCOUNTER — PATIENT OUTREACH (OUTPATIENT)
Dept: MEDICAL GROUP | Facility: PHYSICIAN GROUP | Age: 56
End: 2021-04-01

## 2021-04-01 NOTE — PROGRESS NOTES
Call to Jaclyn. Jaclyn has been busy completing medical appointments and planning financially for the [facility]. She has obtain all the necessary documents to meet with [facility]'s financial assistance program. We discussed that she may need to provide a letter of denial from Medicaid/[place] (Jaclyn is over income ).     We discussed our ongoing professional relationship and the support [employer] can provide in meeting social needs while she focuses on recovery and her treatment. Unfortunately our call was ended early as Jaclyn's son was calling her; he will be discharging soon from his inpatient treatment program. Jaclyn encouraged to call this SW back.     Plan:  If no return call SW to call Jaclyn 4/6/2021

## 2021-04-02 ENCOUNTER — PATIENT OUTREACH (OUTPATIENT)
Dept: MEDICAL GROUP | Facility: PHYSICIAN GROUP | Age: 56
End: 2021-04-02

## 2021-04-02 NOTE — PROGRESS NOTES
Voicemail received from Jaclyn who informed [employer] has approved her application for assistance. She estimates her IOP costs will be $75 in its entirety.     Plan:  Call scheduled for 4/6/2021  Message routed to [facility] RN

## 2021-04-05 ENCOUNTER — HOSPITAL ENCOUNTER (OUTPATIENT)
Dept: BEHAVIORAL HEALTH | Facility: MEDICAL CENTER | Age: 56
End: 2021-04-05
Attending: PSYCHIATRY & NEUROLOGY
Payer: MEDICARE

## 2021-04-05 DIAGNOSIS — F33.1 MAJOR DEPRESSIVE DISORDER, RECURRENT EPISODE, MODERATE (HCC): ICD-10-CM

## 2021-04-05 DIAGNOSIS — F41.1 GENERALIZED ANXIETY DISORDER: ICD-10-CM

## 2021-04-05 DIAGNOSIS — F10.20 ACUTE ALCOHOLISM (HCC): ICD-10-CM

## 2021-04-05 PROCEDURE — 90853 GROUP PSYCHOTHERAPY: CPT | Performed by: MARRIAGE & FAMILY THERAPIST

## 2021-04-05 PROCEDURE — 90834 PSYTX W PT 45 MINUTES: CPT | Performed by: MARRIAGE & FAMILY THERAPIST

## 2021-04-05 NOTE — BH THERAPY
Group Therapy Checklist  Attendance: Attended  Attendance Duration (min): (90)  Number of Participants: 5  Program/Group: Intensive Outpatient Program  Topics Covered: (process group)  Participation: Active verbal participation, Attentive, Supportive to other group members, Open to feedback  Affect/Mood Range: Normal range, Flexible  Affect/Mood Display: Congruent w/content  Cognition: Alert, Oriented  Evidence of Imminent Suicide Risk: No  Evidence of imminent homicide risk: No  Therapeutic Interventions: Emotion clarification, Supportive psychotherapy  Progress Toward Treatment Goal: Moderate improvement  Patient actively participated in process group.  Addressed active unresolved emotional issues. Taught some emotional skills and techniques to assist with the emotional skill building that relates to their presenting issues and active treatment plan.

## 2021-04-05 NOTE — BH THERAPY
Group Therapy Checklist  Attendance: Attended  Attendance Duration (min): (60)  Number of Participants: 4  Program/Group: Intensive Outpatient Program  Topics Covered: Recovery Planning  Participation: Active verbal participation  Affect/Mood Range: Flexible  Affect/Mood Display: Congruent w/content  Cognition: Oriented, Alert  Evidence of Imminent Suicide Risk: No  Evidence of imminent homicide risk: No  Therapeutic Interventions: Psychoeducation re: (Comment), Cognitive clarification  Progress Toward Treatment Goal: Moderate improvement

## 2021-04-05 NOTE — CARE PLAN
"  Problem: Substance Induced Symptoms  Goal: Abstinence  Intervention: Individual Counseling Sessions  Note:    [facility]  Therapy Progress Note    Patient Name: Jaclyn Mejia  Patient MRN: [id number]  Today's Date: 4/5/2021     Type of session:Individual psychotherapy  Length of session: 45 minutes  Persons in attendance:Patient    Subjective/New Info: Met with pt to establish rapport and developed initial Care Plan. Pt brings her support srikanth to group with her. She stated that she has 28 days clean & sober.  She discussed her son (age 32) at length and that he is in [facility] for 30 days for alcohol cessation. She has set up clear boundaries with him that he cannot live with her and this weighs heavy on her heart.  She has been on Disability since 2010 due to numerous health conditions.     Objective/Observations:   Participation: Active verbal participation, Attentive, Engaged, and Open to feedback   Grooming: Casual and Neat   Cognition: Alert and Fully Oriented   Eye contact: Good   Mood: Depressed and Anxious   Affect: Flexible   Thought process: Logical and Goal-directed   Speech: Rate within normal limits and Volume within normal limits   Other:     Diagnoses:   Alcohol Use Disorder  KVNG with panic attacks  MDD:Moderate        Current risk:   SUICIDE: Low   Homicide: Low   Self-harm: Low   Relapse: Low   Other:    Safety Plan reviewed? No   If evidence of imminent risk is present, intervention/plan:     Therapeutic Intervention(s): Clarify:  Clarify feelings and Clarify thoughts, Cognitive modification, Establish rapport, Goal-setting, Limit-setting, Positive behavior reinforced, and Supportive psychotherapy    Treatment Goal(s)/Objective(s) addressed: Sobriety maintenance and anxiety & depression reduction.      Progress toward Treatment Goals: Moderate improvement    Plan:  - \"Homework\" recommendation: Complete Leisure Skills Assessment and handout on ' Recognize & Replace Self " Defeating Thoughts' and review with therapist next session on 4/12/21.    YEIMY Larsen.  4/5/2021

## 2021-04-06 ENCOUNTER — PATIENT OUTREACH (OUTPATIENT)
Dept: MEDICAL GROUP | Facility: PHYSICIAN GROUP | Age: 56
End: 2021-04-06

## 2021-04-06 DIAGNOSIS — G47.01 INSOMNIA DUE TO MEDICAL CONDITION: ICD-10-CM

## 2021-04-06 RX ORDER — MIRTAZAPINE 30 MG/1
TABLET, FILM COATED ORAL
Qty: 90 TABLET | Refills: 3 | Status: SHIPPED | OUTPATIENT
Start: 2021-04-06 | End: 2022-02-01

## 2021-04-06 NOTE — PROGRESS NOTES
"Call to Jaclyn. She is completing intake paperwork for IOP. Jaclyn struggled energy damon going to IOP yesterday. States she enjoyed the groups. 1:1 with talk therapist will be on Mondays. Jaclyn anticipates moving her hygiene schedule to evenings to create less morning stress.     Jaclyn continues to have support through non-professional supports. Jaclyn and BELL continue to both be in recovery, around 28 days now. BELL is not in treatment program at this time and does not anticipate engaging in Jaclyn's treatment via IOP. Jaclyn's mother engages in conversation surrounding Jaclyn's treatment.    Jaclyn's biggest challenge is still medical. She received her abdominal scan; she still has \"flank pain.\"     Jaclyn denied having any social needs at this time. Agreeable to f/u TC in 2 weeks.     Plan:  Call to Jaclyn 4/20/2021 at 1:00pm        "

## 2021-04-07 ENCOUNTER — HOSPITAL ENCOUNTER (OUTPATIENT)
Dept: BEHAVIORAL HEALTH | Facility: MEDICAL CENTER | Age: 56
End: 2021-04-07
Attending: PSYCHIATRY & NEUROLOGY
Payer: MEDICARE

## 2021-04-07 DIAGNOSIS — F31.32 BIPOLAR AFFECTIVE DISORDER, CURRENTLY DEPRESSED, MODERATE (HCC): ICD-10-CM

## 2021-04-07 DIAGNOSIS — F10.20 UNCOMPLICATED ALCOHOL DEPENDENCE (HCC): ICD-10-CM

## 2021-04-07 PROCEDURE — 90853 GROUP PSYCHOTHERAPY: CPT

## 2021-04-07 NOTE — BH THERAPY
Group Therapy Checklist  Attendance: Attended  Attendance Duration (min): (90)  Number of Participants: 5  Program/Group: Intensive Outpatient Program  Topics Covered: (Group Therapy)  Participation: Active verbal participation.  Pt processed their highest hopes and deepest fears moving forward in the recovery process.   Affect/Mood Range: Flexible  Affect/Mood Display: Congruent w/content, Unable to assess  Cognition: Oriented, Alert  Evidence of Imminent Suicide Risk: No  Evidence of imminent homicide risk: No  Therapeutic Interventions: Emotion clarification, Cognitive clarification  Progress Toward Treatment Goal: Moderate improvement

## 2021-04-07 NOTE — BH THERAPY
Group Therapy Checklist  Attendance: Attended  Attendance Duration (min): (60)  Number of Participants: 5  Program/Group: Intensive Outpatient Program  Topics Covered: Cognitive distortions (Attitudes and Beliefs)  Participation: Active verbal participation, Attentive, Open to feedback  Affect/Mood Range: Normal range, Flexible  Affect/Mood Display: Congruent w/content  Cognition: Alert, Oriented  Evidence of Imminent Suicide Risk: No  Evidence of imminent homicide risk: No  Therapeutic Interventions: Cognitive clarification, Emotion clarification  Progress Toward Treatment Goal: Moderate improvement

## 2021-04-09 ENCOUNTER — HOSPITAL ENCOUNTER (OUTPATIENT)
Dept: BEHAVIORAL HEALTH | Facility: MEDICAL CENTER | Age: 56
End: 2021-04-09
Attending: PSYCHIATRY & NEUROLOGY
Payer: MEDICARE

## 2021-04-09 DIAGNOSIS — F10.20 UNCOMPLICATED ALCOHOL DEPENDENCE (HCC): ICD-10-CM

## 2021-04-09 PROCEDURE — 90853 GROUP PSYCHOTHERAPY: CPT

## 2021-04-09 NOTE — BH THERAPY
Group Therapy Checklist  Attendance: Attended  Attendance Duration (min): (90)  Number of Participants: 3  Program/Group: Intensive Outpatient Program  Topics Covered: (Group Therapy)  Participation: Active verbal participation.  Pt processed week in Tx and weekend planning for a sober weekend.   Affect/Mood Range: Flexible  Affect/Mood Display: Congruent w/content  Cognition: Oriented, Alert  Evidence of Imminent Suicide Risk: No  Evidence of imminent homicide risk: No  Therapeutic Interventions: Emotion clarification, Cognitive clarification  Progress Toward Treatment Goal: Moderate improvement

## 2021-04-09 NOTE — BH THERAPY
[facility]    Treatment Team Staffing    Patient Name: Jaclyn Mejia Program: IOP Date: 4/9/2021     Attendees: Tanesha Arteaga, ANSELMO, [facility]; Vernell Correa, RN, Cora Breaux RN, [facility]; Clifford Pope, ANSELMO, [facility] and MD Paul      Patient's Progress toward Goals Listed on the Treatment Plan: Met with pt to establish rapport and developed initial Care Plan. Pt brings her support srikanth to group with her. She stated that she has 28 days clean & sober.  She discussed her son (age 32) at length and that he is in [facility] for 30 days for alcohol cessation. She has set up clear boundaries with him that he cannot live with her and this weighs heavy on her heart.  She has been on Disability since 2010 due to numerous health conditions.     1. Client's Participation When in Attendance Was: Active in a Positive Way    2. Counselor's Evaluation of Client's Progress: Positive Movement    3. Patient is attending group and individual sessions and is progressing well toward the treatment goals: yes      YES NO   A. Relapse During Program []  [x]    B. Requires physician review [x]  []    C. Referral to program inappropriate []  [x]    D. Non compliance with Treatment Plan []  [x]    E. Early treatment termination (lack of attendance) []  [x]    F. Other:  []  [x]      Comments: Pt is set to complete Program on 5/7/21.    Treatment Plan Review: - Patient is in agreement with the above plan:  YES

## 2021-04-09 NOTE — BH THERAPY
Group Therapy Checklist  Attendance: Attended  Attendance Duration (min): (60)  Number of Participants: 3  Program/Group: Intensive Outpatient Program  Topics Covered: ACT conept intro  Participation: Attentive, Active verbal participation  Affect/Mood Range: Normal range, Flexible  Affect/Mood Display: Congruent w/content  Cognition: Alert, Oriented  Evidence of Imminent Suicide Risk: No  Evidence of imminent homicide risk: No  Therapeutic Interventions: Values clarification, Cognitive clarification, Emotion clarification, Mindfulness exercise  Progress: Moderate

## 2021-04-12 ENCOUNTER — HOSPITAL ENCOUNTER (OUTPATIENT)
Dept: BEHAVIORAL HEALTH | Facility: MEDICAL CENTER | Age: 56
End: 2021-04-12
Attending: PSYCHIATRY & NEUROLOGY
Payer: MEDICARE

## 2021-04-12 DIAGNOSIS — M79.7 FIBROMYALGIA: ICD-10-CM

## 2021-04-12 DIAGNOSIS — F31.30 BIPOLAR I DISORDER, MOST RECENT EPISODE DEPRESSED (HCC): ICD-10-CM

## 2021-04-12 DIAGNOSIS — F10.20 ACUTE ALCOHOLISM (HCC): ICD-10-CM

## 2021-04-12 PROCEDURE — 90834 PSYTX W PT 45 MINUTES: CPT | Performed by: MARRIAGE & FAMILY THERAPIST

## 2021-04-12 RX ORDER — MELOXICAM 15 MG/1
TABLET ORAL
Qty: 30 TABLET | Refills: 0 | Status: SHIPPED | OUTPATIENT
Start: 2021-04-12 | End: 2021-05-06

## 2021-04-12 NOTE — CARE PLAN
Problem: Substance Induced Symptoms  Goal: Abstinence  Intervention: Recognizing and Replacing Self Defeating Thoughts  Note:    [facility]  Therapy Progress Note    Patient Name: Jaclyn Mejia  Patient MRN: [id number]  Today's Date: 4/12/2021     Type of session:Individual psychotherapy  Length of session: 45 minutes  Persons in attendance:Patient    Subjective/New Info: Pt was proud to state that she has 28 days sober.  She has had some cravings and cried over the weekend getting through one. She does not want Naltrexone.  Reviewed Leisure Skills Assessment and she acknowledged that she has some co-dependency issues. She will start going to at least one AA meeting this week.  In several weeks her dtr, son-in-law and grandson will move in with her in her trailer as they move to [place] for temporary accommodations until they find a place. Jaclyn mostly stays with her boyfriend several trailers down the road. She appears to be off to a good start completing her first week in IOP Program.     Objective/Observations:   Participation: Active verbal participation, Attentive, Engaged, and Open to feedback   Grooming: Casual and Neat   Cognition: Alert and Fully Oriented   Eye contact: Good   Mood: Depressed   Affect: Flexible   Thought process: Logical and Goal-directed   Speech: Rate within normal limits and Volume within normal limits   Other:     Diagnoses:   Alcohol Use Disorder  Bipolar Affective Disorder    Current risk:   SUICIDE: Low   Homicide: Low   Self-harm: Low   Relapse: Low   Other:    Safety Plan reviewed? No   If evidence of imminent risk is present, intervention/plan:     Therapeutic Intervention(s): Cognitive modification, Limit-setting, Maladaptive behavior addressed, Positive behavior reinforced, and Supportive psychotherapy    Treatment Goal(s)/Objective(s) addressed: Continued sobriety maintenance and mood stabilization.     Progress toward Treatment Goals: Moderate  "improvement    Plan:  - \"Homework\" recommendation: Step One Guide, Self-Compassion handout and attend a minimum of one AA meeting. Review homework next week in session with therapist on 4/21/21.    YEIMY Larsen.  4/12/2021                                     "

## 2021-04-13 ENCOUNTER — HOSPITAL ENCOUNTER (OUTPATIENT)
Dept: BEHAVIORAL HEALTH | Facility: MEDICAL CENTER | Age: 56
End: 2021-04-13
Attending: PSYCHIATRY & NEUROLOGY
Payer: MEDICARE

## 2021-04-13 DIAGNOSIS — F31.32 BIPOLAR AFFECTIVE DISORDER, CURRENTLY DEPRESSED, MODERATE (HCC): ICD-10-CM

## 2021-04-13 PROCEDURE — 90853 GROUP PSYCHOTHERAPY: CPT

## 2021-04-13 PROCEDURE — 90792 PSYCH DIAG EVAL W/MED SRVCS: CPT

## 2021-04-13 PROCEDURE — 90792 PSYCH DIAG EVAL W/MED SRVCS: CPT | Performed by: PSYCHIATRY & NEUROLOGY

## 2021-04-13 NOTE — BH THERAPY
Group Therapy Checklist  Attendance: Attended  Attendance Duration (min): (90)  Number of Participants: 5  Program/Group: Intensive Outpatient Program  Topics Covered: (Group Therapy)  Participation: Active verbal participation.  Pt processed a memory drawing a rosebush following the directions of a guided imagery exercise.  Affect/Mood Range: Flexible  Affect/Mood Display: Congruent w/content  Cognition: Oriented, Alert  Evidence of Imminent Suicide Risk: No  Evidence of imminent homicide risk: No  Progress Toward Treatment Goal: Moderate improvement
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
## 2021-04-13 NOTE — BH THERAPY
Group Therapy Checklist  Attendance: Attended  Attendance Duration (min): (60)  Number of Participants: 6  Program/Group: Intensive Outpatient Program  Topics Covered: Grief & Loss  Participation: Attentive, Active verbal participation  Affect/Mood Range: Normal range, Flexible  Affect/Mood Display: Congruent w/content, Sad  Cognition: Alert, Oriented  Evidence of Imminent Suicide Risk: No  Evidence of imminent homicide risk: No  Therapeutic Interventions: Cognitive clarification, Emotion clarification, Self-care skills  Progress Toward Treatment Goal: Moderate improvement

## 2021-04-14 ENCOUNTER — HOSPITAL ENCOUNTER (OUTPATIENT)
Dept: BEHAVIORAL HEALTH | Facility: MEDICAL CENTER | Age: 56
End: 2021-04-14
Attending: PSYCHIATRY & NEUROLOGY
Payer: MEDICARE

## 2021-04-14 DIAGNOSIS — F10.20 UNCOMPLICATED ALCOHOL DEPENDENCE (HCC): ICD-10-CM

## 2021-04-14 PROCEDURE — 90853 GROUP PSYCHOTHERAPY: CPT

## 2021-04-14 NOTE — BH THERAPY
Group Therapy Checklist  Attendance: Attended  Attendance Duration (min): (60)  Number of Participants: 5  Program/Group: Intensive Outpatient Program  Topics Covered: Chalk talk  Participation: Attentive, Active verbal participation  Affect/Mood Range: Normal range, Flexible  Affect/Mood Display: Congruent w/content  Cognition: Alert, Oriented  Evidence of Imminent Suicide Risk: No  Evidence of imminent homicide risk: No  Therapeutic Interventions: Cognitive clarification, Relapse prevention  Progress Toward Treatment Goal: Moderate improvement

## 2021-04-14 NOTE — BH THERAPY
Group Therapy Checklist  Attendance: Attended  Attendance Duration (min): (90)  Number of Participants: 5  Program/Group: Intensive Outpatient Program  Topics Covered: (Group Therapy)  Participation: Active verbal participation.  Pt participated in a guided imagery exercise for a relaxation tool to use to self soothe.   Affect/Mood Range: Flexible  Affect/Mood Display: Congruent w/content  Cognition: Oriented, Alert  Evidence of Imminent Suicide Risk: No  Evidence of imminent homicide risk: No  Therapeutic Interventions: Emotion clarification, Cognitive clarification  Progress Toward Treatment Goal: Moderate improvement

## 2021-04-15 ENCOUNTER — IMMUNIZATION (OUTPATIENT)
Dept: FAMILY PLANNING/WOMEN'S HEALTH CLINIC | Facility: IMMUNIZATION CENTER | Age: 56
End: 2021-04-15
Attending: INTERNAL MEDICINE
Payer: MEDICARE

## 2021-04-15 DIAGNOSIS — Z23 ENCOUNTER FOR VACCINATION: Primary | ICD-10-CM

## 2021-04-15 PROCEDURE — 91300 PFIZER SARS-COV-2 VACCINE: CPT | Performed by: INTERNAL MEDICINE

## 2021-04-15 PROCEDURE — 0002A PFIZER SARS-COV-2 VACCINE: CPT | Performed by: INTERNAL MEDICINE

## 2021-04-16 ENCOUNTER — HOSPITAL ENCOUNTER (OUTPATIENT)
Dept: BEHAVIORAL HEALTH | Facility: MEDICAL CENTER | Age: 56
End: 2021-04-16
Attending: PSYCHIATRY & NEUROLOGY
Payer: MEDICARE

## 2021-04-16 ENCOUNTER — HOSPITAL ENCOUNTER (OUTPATIENT)
Dept: RADIOLOGY | Facility: MEDICAL CENTER | Age: 56
End: 2021-04-16
Attending: NURSE PRACTITIONER
Payer: MEDICARE

## 2021-04-16 DIAGNOSIS — F10.20 UNCOMPLICATED ALCOHOL DEPENDENCE (HCC): ICD-10-CM

## 2021-04-16 DIAGNOSIS — Z12.31 ENCOUNTER FOR SCREENING MAMMOGRAM FOR MALIGNANT NEOPLASM OF BREAST: ICD-10-CM

## 2021-04-16 PROCEDURE — 77063 BREAST TOMOSYNTHESIS BI: CPT

## 2021-04-16 PROCEDURE — 90853 GROUP PSYCHOTHERAPY: CPT

## 2021-04-16 NOTE — BH THERAPY
Group Therapy Checklist  Attendance: Attended  Attendance Duration (min): (90)  Number of Participants: 5  Program/Group: Intensive Outpatient Program  Topics Covered: (Group THerapy)  Participation: Active verbal participation.  Pt processed weekend plans for sobriety maintenance and had sober fun playing a game.   Affect/Mood Range: Flexible  Affect/Mood Display: Congruent w/content  Cognition: Oriented, Alert  Evidence of Imminent Suicide Risk: No  Evidence of imminent homicide risk: No  Therapeutic Interventions: Emotion clarification, Cognitive clarification  Progress Toward Treatment Goal: Moderate improvement

## 2021-04-16 NOTE — BH THERAPY
Group Therapy Checklist  Attendance: Attended  Attendance Duration (min): (60)  Number of Participants: 5  Program/Group: Intensive Outpatient Program  Topics Covered: Belief Systems  Participation: Attentive, Active verbal participation  Affect/Mood Range: Normal range, Flexible  Affect/Mood Display: Congruent w/content  Cognition: Alert, Oriented  Evidence of Imminent Suicide Risk: No  Evidence of imminent homicide risk: No  Therapeutic Interventions: Cognitive clarification, Values clarification, Behavioral activation  Progress Toward Treatment Goal: Moderate improvement

## 2021-04-19 ENCOUNTER — HOSPITAL ENCOUNTER (OUTPATIENT)
Dept: BEHAVIORAL HEALTH | Facility: MEDICAL CENTER | Age: 56
End: 2021-04-19
Attending: PSYCHIATRY & NEUROLOGY
Payer: MEDICARE

## 2021-04-19 DIAGNOSIS — F10.20 ACUTE ALCOHOLISM (HCC): ICD-10-CM

## 2021-04-19 PROCEDURE — 90853 GROUP PSYCHOTHERAPY: CPT | Performed by: MARRIAGE & FAMILY THERAPIST

## 2021-04-19 NOTE — BH THERAPY
Group Therapy Checklist  Attendance: Attended  Attendance Duration (min): (90)  Number of Participants: 6  Program/Group: Intensive Outpatient Program  Topics Covered: (process group)  Participation: Active verbal participation, Attentive, Supportive to other group members, Open to feedback  Affect/Mood Range: Normal range, Flexible  Affect/Mood Display: Congruent w/content  Cognition: Alert, Oriented  Evidence of Imminent Suicide Risk: No  Evidence of imminent homicide risk: No  Therapeutic Interventions: Emotion clarification, Supportive psychotherapy, Self-care skills, Mindfulness exercise  Progress Toward Treatment Goal: Moderate improvement  Patient actively participated in process group. Processed self care and mindfulness. Addressed closely held beliefs. Explored thoughts, attitudes and behaviors that impact recovery. Receptive to peer support.

## 2021-04-19 NOTE — BH THERAPY
Group Therapy Checklist  Attendance: Attended  Attendance Duration (min): (60)  Number of Participants: 5  Program/Group: Intensive Outpatient Program  Topics Covered: (Loss in Addiction)  Participation: Active verbal participation  Affect/Mood Range: Flexible  Affect/Mood Display: Congruent w/content  Cognition: Oriented, Alert  Evidence of Imminent Suicide Risk: No  Evidence of imminent homicide risk: No  Therapeutic Interventions: Psychoeducation re: (Comment), Cognitive clarification  Progress Toward Treatment Goal: Moderate improvement

## 2021-04-20 ENCOUNTER — PATIENT OUTREACH (OUTPATIENT)
Dept: MEDICAL GROUP | Facility: PHYSICIAN GROUP | Age: 56
End: 2021-04-20

## 2021-04-20 NOTE — PROGRESS NOTES
Call to patient. Line will not connect. 2nd attempt, line will not connect.    Plan:  Call 2 to patient on or around 4/22/2021

## 2021-04-21 ENCOUNTER — HOSPITAL ENCOUNTER (OUTPATIENT)
Dept: BEHAVIORAL HEALTH | Facility: MEDICAL CENTER | Age: 56
End: 2021-04-21
Attending: PSYCHIATRY & NEUROLOGY
Payer: MEDICARE

## 2021-04-21 DIAGNOSIS — F31.30 BIPOLAR I DISORDER, MOST RECENT EPISODE DEPRESSED (HCC): ICD-10-CM

## 2021-04-21 DIAGNOSIS — F10.20 ACUTE ALCOHOLISM (HCC): ICD-10-CM

## 2021-04-21 DIAGNOSIS — F10.20 UNCOMPLICATED ALCOHOL DEPENDENCE (HCC): ICD-10-CM

## 2021-04-21 PROCEDURE — 90853 GROUP PSYCHOTHERAPY: CPT

## 2021-04-21 PROCEDURE — 90834 PSYTX W PT 45 MINUTES: CPT | Mod: XU | Performed by: MARRIAGE & FAMILY THERAPIST

## 2021-04-21 NOTE — BH THERAPY
Group Therapy Checklist  Attendance: Attended  Attendance Duration (min): (60)  Number of Participants: 6  Program/Group: Intensive Outpatient Program  Topics Covered:  Intro to self help groups/ secrecy and honesty  Participation: Active verbal participation, Attentive  Affect/Mood Range: Normal range, Flexible  Affect/Mood Display: Congruent w/content  Cognition: Oriented, Alert  Evidence of Imminent Suicide Risk: No  Evidence of imminent homicide risk: No  Therapeutic Interventions: Cognitive clarification, Relapse prevention  Progress Toward Treatment Goal: Mild improvement

## 2021-04-21 NOTE — BH THERAPY
Group Therapy Checklist  Attendance: Attended  Attendance Duration (min): (90)  Number of Participants: 6  Program/Group: Intensive Outpatient Program  Topics Covered: (Group Therapy)  Participation: Active verbal participation.  Pt processed getting back on the healthy path in recovery and sharing it with family members.   Affect/Mood Range: Flexible  Affect/Mood Display: Congruent w/content  Cognition: Oriented, Alert  Evidence of Imminent Suicide Risk: No  Evidence of imminent homicide risk: No  Therapeutic Interventions: Emotion clarification, Cognitive clarification  Progress Toward Treatment Goal: Moderate improvement

## 2021-04-22 ENCOUNTER — PATIENT OUTREACH (OUTPATIENT)
Dept: MEDICAL GROUP | Facility: PHYSICIAN GROUP | Age: 56
End: 2021-04-22

## 2021-04-22 NOTE — CARE PLAN
"  Problem: Substance Induced Symptoms  Goal: Abstinence  Intervention: Individual Counseling Sessions  Note:    [facility]  Therapy Progress Note    Patient Name: Jaclyn Mejia  Patient MRN: [id number]  Today's Date: 4/22/2021     Type of session:Individual psychotherapy  Length of session: 45 minutes  Persons in attendance:Patient    Subjective/New Info:Completed review of the Leisure Skills Assessment.  Discussed and reviewed Step I Guide.  Pt able to readily admit she is an alcoholic and drank despite it making her physically sick. Discussed her cognitive distortions.  Pt started going to AA meetings as she went to 3 this past week.     Objective/Observations:   Participation: Active verbal participation, Attentive, Engaged, and Open to feedback   Grooming: Casual and Neat   Cognition: Alert and Fully Oriented   Eye contact: Good   Mood: Euthymic   Affect: Flexible   Thought process: Logical and Goal-directed   Speech: Rate within normal limits and Volume within normal limits   Other:     Diagnoses:   Alcohol Use Disorder  Bipolar Disorder    Current risk:   SUICIDE: Low   Homicide: Low   Self-harm: Low   Relapse: Low   Other:    Safety Plan reviewed? No   If evidence of imminent risk is present, intervention/plan:     Therapeutic Intervention(s): Cognitive modification, Limit-setting, Maladaptive behavior addressed, Positive behavior reinforced, and Supportive psychotherapy    Treatment Goal(s)/Objective(s) addressed: Sobriety maintenance and mood stabilization.      Progress toward Treatment Goals: Moderate improvement    Plan:  - \"Homework\" recommendation: Step II Guide and handout on Self-Compassion.  Review with therapist next session on 4/26/21.    JACKELYN Larsen  4/22/2021                                     "

## 2021-04-22 NOTE — PROGRESS NOTES
"Call 2 to patient. Phone line would not connect. 2nd attempt, \"subscriber is not in service.\" Unable to leave VM(2).     [employer] message sent    Plan:  Call 3 of 3 to patient on or around 4/22/2021  "

## 2021-04-23 ENCOUNTER — HOSPITAL ENCOUNTER (OUTPATIENT)
Dept: BEHAVIORAL HEALTH | Facility: MEDICAL CENTER | Age: 56
End: 2021-04-23
Attending: PSYCHIATRY & NEUROLOGY
Payer: MEDICARE

## 2021-04-23 DIAGNOSIS — M79.7 FIBROMYALGIA: ICD-10-CM

## 2021-04-23 DIAGNOSIS — F10.21 ALCOHOLISM IN RECOVERY (HCC): ICD-10-CM

## 2021-04-23 PROCEDURE — 90853 GROUP PSYCHOTHERAPY: CPT

## 2021-04-23 NOTE — BH THERAPY
Group Therapy Checklist  Attendance: Attended  Attendance Duration (min): (90)  Number of Participants: 6  Program/Group: Intensive Outpatient Program  Topics Covered: (Group THerapy)  Participation: Active verbal participation.  Pt processed the therapy week and plans to stay busy and sober over the weekend.   Affect/Mood Range: Flexible  Affect/Mood Display: Congruent w/content  Cognition: Oriented, Alert  Evidence of Imminent Suicide Risk: No  Evidence of imminent homicide risk: No  Therapeutic Interventions: Emotion clarification, Cognitive clarification  Progress Toward Treatment Goal: Moderate improvement

## 2021-04-23 NOTE — BH THERAPY
Group Therapy Checklist  Attendance: Attended  Attendance Duration (min): (60)  Number of Participants: 6  Program/Group: Intensive Outpatient Program  Topics Covered: Codependency  Participation: Attentive, Active verbal participation  Affect/Mood Range: Normal range, Flexible  Affect/Mood Display: Congruent w/content  Cognition: Alert, Oriented  Evidence of Imminent Suicide Risk: No  Evidence of imminent homicide risk: No  Therapeutic Interventions: Emotion clarification, Cognitive clarification  Progress Toward Treatment Goal: Moderate improvement

## 2021-04-26 ENCOUNTER — HOSPITAL ENCOUNTER (OUTPATIENT)
Dept: BEHAVIORAL HEALTH | Facility: MEDICAL CENTER | Age: 56
End: 2021-04-26
Attending: PSYCHIATRY & NEUROLOGY
Payer: MEDICARE

## 2021-04-26 DIAGNOSIS — F10.20 ACUTE ALCOHOLISM (HCC): ICD-10-CM

## 2021-04-26 DIAGNOSIS — F31.30 BIPOLAR I DISORDER, MOST RECENT EPISODE DEPRESSED (HCC): ICD-10-CM

## 2021-04-26 PROCEDURE — 90834 PSYTX W PT 45 MINUTES: CPT | Mod: XU | Performed by: MARRIAGE & FAMILY THERAPIST

## 2021-04-26 PROCEDURE — 90853 GROUP PSYCHOTHERAPY: CPT | Performed by: MARRIAGE & FAMILY THERAPIST

## 2021-04-26 RX ORDER — BACLOFEN 10 MG/1
TABLET ORAL
Qty: 90 TABLET | Refills: 0 | Status: SHIPPED | OUTPATIENT
Start: 2021-04-26 | End: 2021-05-19

## 2021-04-26 NOTE — BH THERAPY
Group Therapy Checklist  Attendance: Attended  Attendance Duration (min): (60)  Number of Participants: 6  Program/Group: Intensive Outpatient Program  Topics Covered: Anger  Participation: Active verbal participation  Affect/Mood Range: Flexible  Affect/Mood Display: Congruent w/content  Cognition: Oriented, Alert  Evidence of Imminent Suicide Risk: No  Evidence of imminent homicide risk: No  Therapeutic Interventions: Psychoeducation re: (Comment), Cognitive clarification  Progress Toward Treatment Goal: Moderate improvement

## 2021-04-26 NOTE — BH THERAPY
Group Therapy Checklist  Attendance: Attended  Attendance Duration (min): (90)  Number of Participants: 9  Program/Group: Intensive Outpatient Program  Topics Covered: (Group Therapy)  Participation: Active verbal participation.  Pt processed the emotion of anger.   Affect/Mood Range: Flexible  Affect/Mood Display: Congruent w/content  Cognition: Oriented, Alert  Evidence of Imminent Suicide Risk: No  Evidence of imminent homicide risk: No  Therapeutic Interventions: Emotion clarification, Cognitive clarification  Progress Toward Treatment Goal: Moderate improvement

## 2021-04-27 ENCOUNTER — PATIENT OUTREACH (OUTPATIENT)
Dept: MEDICAL GROUP | Facility: PHYSICIAN GROUP | Age: 56
End: 2021-04-27

## 2021-04-27 NOTE — CARE PLAN
"  Problem: Substance Induced Symptoms  Goal: Abstinence  Intervention: Individual Counseling Sessions  Note:    [facility]  Therapy Progress Note    Patient Name: Jaclyn Mejia  Patient MRN: [id number]  Today's Date: 4/27/2021     Type of session:Individual psychotherapy  Length of session: 45 minutes  Persons in attendance:Patient    Subjective/New Info: Pt completed review of Step I Guide.  She stated that she has held her boundaries with her son not living with her again and now has her daughter's family in her trailer as she lives with her boyfriend. Jaclyn talked about her spirituality and connection with God but not in a formal Muslim way. She stated she is done with alcohol and decided to get sober due to moral bankruptcy.     Objective/Observations:   Participation: Active verbal participation, Attentive, Engaged, and Open to feedback   Grooming: Casual and Neat   Cognition: Alert and Fully Oriented   Eye contact: Good   Mood: Euthymic   Affect: Flexible   Thought process: Logical and Goal-directed   Speech: Rate within normal limits and Volume within normal limits   Other:     Diagnoses:   Alcohol Use Disorder    Current risk:   SUICIDE: Low   Homicide: Low   Self-harm: Low   Relapse: Low   Other:    Safety Plan reviewed? No   If evidence of imminent risk is present, intervention/plan:     Therapeutic Intervention(s): Cognitive modification, Goal-setting, Limit-setting, Maladaptive behavior addressed, Positive behavior reinforced, and Supportive psychotherapy    Treatment Goal(s)/Objective(s) addressed: Continued sobriety maintenance.      Progress toward Treatment Goals: Moderate improvement    Plan:  - \"Homework\" recommendation: Complete handout on Step II Guide and complete Personalized Recovery Plan for discharge planning.  Review with therapist next session on 5/3/21.    JACKELYN Larsen  4/27/2021                                     "

## 2021-04-27 NOTE — PROGRESS NOTES
Call 3 of 3 to patient. Phone would not connect. SW will not actively follow due to loss of contact.    Care Plan: Social Determinants of Health  Problem: Behavioral/Mental Health  Complete     Plan:  SW will not actively follow due to loss of contact  Patient may reach out to SW directly to re-establish services by calling [phone].

## 2021-04-28 ENCOUNTER — PATIENT OUTREACH (OUTPATIENT)
Dept: MEDICAL GROUP | Facility: PHYSICIAN GROUP | Age: 56
End: 2021-04-28

## 2021-04-28 ENCOUNTER — HOSPITAL ENCOUNTER (OUTPATIENT)
Dept: BEHAVIORAL HEALTH | Facility: MEDICAL CENTER | Age: 56
End: 2021-04-28
Attending: PSYCHIATRY & NEUROLOGY
Payer: MEDICARE

## 2021-04-28 DIAGNOSIS — F10.21 ALCOHOLISM IN RECOVERY (HCC): ICD-10-CM

## 2021-04-28 PROCEDURE — 90853 GROUP PSYCHOTHERAPY: CPT

## 2021-04-28 NOTE — PROGRESS NOTES
Patient outreach for CCM follow up.  Phone number unable to connect will attempt another follow up on 4/30/21

## 2021-04-28 NOTE — BH THERAPY
Group Therapy Checklist  Attendance: Attended  Attendance Duration (min): (90)  Number of Participants: 8  Program/Group: Intensive Outpatient Program  Topics Covered: (Group Therapy)  Participation: Active verbal participation. Pt processed the reason coming to group is for sobriety and social support.   Affect/Mood Range: Flexible  Affect/Mood Display: Congruent w/content  Cognition: Oriented, Alert  Evidence of Imminent Suicide Risk: No  Evidence of imminent homicide risk: No  Therapeutic Interventions: Emotion clarification, Cognitive clarification  Progress Toward Treatment Goal: Moderate improvement

## 2021-04-28 NOTE — BH THERAPY
Group Therapy Checklist  Attendance: Attended  Attendance Duration (min): (60)  Number of Participants: 8  Program/Group: Intensive Outpatient Program  Topics Covered: Steps 1/2/3  Participation: Attentive, Active verbal participation  Affect/Mood Range: Normal range, Flexible  Affect/Mood Display: Congruent w/content  Cognition: Alert, Oriented  Evidence of Imminent Suicide Risk: No  Evidence of imminent homicide risk: No  Therapeutic Interventions: Cognitive clarification, Relapse prevention  Progress Toward Treatment Goal: Moderate improvement

## 2021-05-03 ENCOUNTER — HOSPITAL ENCOUNTER (OUTPATIENT)
Dept: BEHAVIORAL HEALTH | Facility: MEDICAL CENTER | Age: 56
End: 2021-05-03
Attending: PSYCHIATRY & NEUROLOGY
Payer: MEDICARE

## 2021-05-03 DIAGNOSIS — F10.20 ACUTE ALCOHOLISM (HCC): ICD-10-CM

## 2021-05-03 DIAGNOSIS — F31.81 BIPOLAR II DISORDER (HCC): ICD-10-CM

## 2021-05-03 PROCEDURE — 90791 PSYCH DIAGNOSTIC EVALUATION: CPT | Performed by: MARRIAGE & FAMILY THERAPIST

## 2021-05-03 PROCEDURE — 90834 PSYTX W PT 45 MINUTES: CPT | Performed by: MARRIAGE & FAMILY THERAPIST

## 2021-05-03 NOTE — BH THERAPY
Group Therapy Checklist  Attendance: Attended  Attendance Duration (min): (60)  Number of Participants: 4  Program/Group: Intensive Outpatient Program  Topics Covered: Relapse Prevention  Participation: Active verbal participation  Affect/Mood Range: Flexible  Affect/Mood Display: Congruent w/content  Cognition: Oriented, Alert  Evidence of Imminent Suicide Risk: No  Evidence of imminent homicide risk: No  Therapeutic Interventions: Psychoeducation re: (Comment), Cognitive clarification  Progress Toward Treatment Goal: Moderate improvement

## 2021-05-03 NOTE — CARE PLAN
Problem: Substance Induced Symptoms  Goal: Abstinence  Intervention: Personalized Recovery Plan  Note:    [facility]  Therapy Progress Note    Patient Name: Jaclyn Mejia  Patient MRN: [id number]  Today's Date: 5/3/2021     Type of session:Individual psychotherapy  Length of session: 45 minutes  Persons in attendance:Patient    Subjective/New Info: Reviewed Personalized Recovery Plan for discharge planning.  Reviewed Self-Compassion homework and personal mantra.  Pt stated that since coming to the program that her hygiene has improved from one shower weekly to every other day. Positive reframing and positive self talk has increased along with her self esteem.  Pt has 58 days sober and reified her commitment to sobriety and how much better she feels. She has started attending AA twice a week.     Objective/Observations:   Participation: Active verbal participation, Attentive, Engaged, and Open to feedback   Grooming: Casual and Neat   Cognition: Alert and Fully Oriented   Eye contact: Good   Mood: Euthymic   Affect: Flexible   Thought process: Logical and Goal-directed   Speech: Rate within normal limits and Volume within normal limits   Other:     Diagnoses:   Alcohol Use Disorder    Current risk:   SUICIDE: Low   Homicide: Low   Self-harm: Low   Relapse: Low   Other:    Safety Plan reviewed? No   If evidence of imminent risk is present, intervention/plan:     Therapeutic Intervention(s): Cognitive modification, Limit-setting, Maladaptive behavior addressed, Positive behavior reinforced, and Supportive psychotherapy    Treatment Goal(s)/Objective(s) addressed: Sobriety maintenance and mood stabilization.      Progress toward Treatment Goals: Significant improvement    Plan: Pt completes the 5-week IOP Program on 5/7/21.   - Patient is in agreement with the above plan:  YES    JACKELYN Larsen  5/3/2021

## 2021-05-03 NOTE — BH THERAPY
Group Therapy Checklist  Attendance: Attended  Attendance Duration (min): (60)  Number of Participants: (4)  Program/Group: Intensive Outpatient Program  Topics Covered: Relapse Prevention  Participation: Active verbal participation.  Patient shared insights into her recovery with other group members.  Patient appears to have a well-thought-out plan to continue her recovery.    Affect/Mood Range: Normal range  Affect/Mood Display: Congruent w/content  Cognition: Oriented, Alert  Evidence of Imminent Suicide Risk: No  Evidence of imminent homicide risk: No  Therapeutic Interventions: Emotion clarification, Cognitive clarification  Progress Toward Treatment Goal: Moderate improvement

## 2021-05-04 ENCOUNTER — PATIENT OUTREACH (OUTPATIENT)
Dept: MEDICAL GROUP | Facility: PHYSICIAN GROUP | Age: 56
End: 2021-05-04
Payer: MEDICARE

## 2021-05-04 DIAGNOSIS — I10 ESSENTIAL HYPERTENSION: ICD-10-CM

## 2021-05-04 DIAGNOSIS — F10.21 ALCOHOLISM IN RECOVERY (HCC): ICD-10-CM

## 2021-05-04 NOTE — PROGRESS NOTES
Patient outreach for CCM.  Received a VM from Jaclyn, returned call, patient stated she was driving and to call back.  Called back and LVM with contact information.

## 2021-05-05 ENCOUNTER — HOSPITAL ENCOUNTER (OUTPATIENT)
Dept: BEHAVIORAL HEALTH | Facility: MEDICAL CENTER | Age: 56
End: 2021-05-05
Attending: PSYCHIATRY & NEUROLOGY
Payer: MEDICARE

## 2021-05-05 DIAGNOSIS — F10.20 ACUTE ALCOHOLISM (HCC): ICD-10-CM

## 2021-05-05 PROCEDURE — 90853 GROUP PSYCHOTHERAPY: CPT | Performed by: MARRIAGE & FAMILY THERAPIST

## 2021-05-05 NOTE — PROGRESS NOTES
Received a call back from Jaclyn.  She stated she is doing really well.  She has her daughter and grandson back in town for a while and she is really happy to spend time with them.  She is currently staying at her SO place while they are visiting.  She said that to keep her sobriety going she is 2 months sober now, her SO has stopped drinking to support her.  She completed a 5 weeks intensive program for her addiction and stated she is feeling well, she is doing more now and has started eating more healthy meals.  She stated that her HTN is stable now and does not need to be checking her BP as often.  She is following up with all her appointments and taking her medications as prescribed.  Asked if she still had a need for the  and she stated not at this time but does have Annabel's number if any needs should arise.  Will continue with same plan of care, next outreach scheduled for 6/2/21 at 1300.

## 2021-05-05 NOTE — BH THERAPY
Group Therapy Checklist  Attendance: Attended  Attendance Duration (min): (90)  Number of Participants: 6  Program/Group: Intensive Outpatient Program  Topics Covered: (Group therapy)  Participation: Active verbal participation.  Pt processed that she is feeling stable to wrap up her 5 week IOP this Friday and continue with AA meetings and therapy.   Affect/Mood Range: Flexible  Affect/Mood Display: Congruent w/content, Anxious  Cognition: Oriented, Alert  Evidence of Imminent Suicide Risk: No  Evidence of imminent homicide risk: No  Therapeutic Interventions: Emotion clarification, Cognitive clarification  Progress Toward Treatment Goal: Moderate improvement

## 2021-05-05 NOTE — BH THERAPY
Group Therapy Checklist  Attendance: Attended  Attendance Duration (min): (60)  Number of Participants: (6)  Program/Group: Intensive Outpatient Program  Topics Covered: Codependency  Participation: Active verbal participation, Open to feedback  Affect/Mood Range: Normal range, Flexible  Affect/Mood Display: Congruent w/content  Cognition: Oriented, Alert  Evidence of Imminent Suicide Risk: No  Evidence of imminent homicide risk: No  Therapeutic Interventions: Cognitive clarification

## 2021-05-06 DIAGNOSIS — M79.7 FIBROMYALGIA: ICD-10-CM

## 2021-05-06 RX ORDER — MELOXICAM 15 MG/1
TABLET ORAL
Qty: 30 TABLET | Refills: 0 | Status: SHIPPED | OUTPATIENT
Start: 2021-05-06 | End: 2021-06-07

## 2021-05-07 ENCOUNTER — HOSPITAL ENCOUNTER (OUTPATIENT)
Dept: BEHAVIORAL HEALTH | Facility: MEDICAL CENTER | Age: 56
End: 2021-05-07
Attending: PSYCHIATRY & NEUROLOGY
Payer: MEDICARE

## 2021-05-07 DIAGNOSIS — F10.20 ACUTE ALCOHOLISM (HCC): ICD-10-CM

## 2021-05-07 PROCEDURE — 90853 GROUP PSYCHOTHERAPY: CPT

## 2021-05-07 NOTE — BH THERAPY
Group Therapy Checklist  Attendance: Attended  Attendance Duration (min): (60)  Number of Participants: 5  Program/Group: Intensive Outpatient Program  Topics Covered: (Family Roles)  Participation: Active verbal participation, Attentive, Open to feedback  Affect/Mood Range: Normal range, Flexible  Affect/Mood Display: Congruent w/content, Tearful  Cognition: Alert, Oriented  Evidence of Imminent Suicide Risk: No  Evidence of imminent homicide risk: No  Therapeutic Interventions: Cognitive clarification, Values clarification, Emotion clarification  Progress Toward Treatment Goal: Mild improvement

## 2021-05-07 NOTE — CARE PLAN
Pt was discharged successfully from the [facility] Program for Alcohol Use Disorder on 5/7/21.

## 2021-05-07 NOTE — BH THERAPY
Group Therapy Checklist  Attendance: Attended  Attendance Duration (min): (90)  Number of Participants: 6  Program/Group: Intensive Outpatient Program  Topics Covered: (Group THerapy)  Participation: Active verbal participation.  Pt participated in exercise for happiness, queta and dlaughter in recovery.   Affect/Mood Range: Flexible  Affect/Mood Display: Congruent w/content  Cognition: Oriented, Alert  Evidence of Imminent Suicide Risk: No  Evidence of imminent homicide risk: No  Therapeutic Interventions: Emotion clarification, Cognitive clarification  Progress Toward Treatment Goal: Moderate improvement

## 2021-05-07 NOTE — BH THERAPY
[facility]  DISCHARGE SUMMARY FORM    HHPI / SCP:  Other Ins.:      Patient Name: Jaclyn Mejia  Admission Date: 21  Level of Care Attended:  Intens.OP : 1965  Transfer/Discharge Date: MRN: [id number]  21       SIGNIFICANT FINDINGS/CLINICAL IMPRESSION:   DSM Codes:   F10.20    ICD10 Codes:   No diagnosis found.    Additional problems identified via assessment: none at this time    Treatment Components in Which Patient Participated (check all that apply):  Education group(s), 1:1 teaching/therapy, Medication Management, Group Therapy, CD Rehab and 12-Step Group(s)    Summary of Course of Treatment: Pt completed intake for the IOP Program on 21 for Alcohol Use Disorder.  She attended the 5-week Program from 21/ - 21 remaining sober for the duration of this rehab.     Condition at Time of Transfer/Discharge: Met treatment goals.    [] Medications Reviewed with Copy to Patient    Referred to: Refer to [facility]: Community therapist.      Patient is in agreement with discharge plan: yes    YEIMY Larsen.

## 2021-05-14 ENCOUNTER — NURSE TRIAGE (OUTPATIENT)
Dept: HEALTH INFORMATION MANAGEMENT | Facility: OTHER | Age: 56
End: 2021-05-14

## 2021-05-14 NOTE — TELEPHONE ENCOUNTER
"Blood pressure this past week.  150/105 100+days ago prior to quit drinking. Pt is 70 days sober now.  Pt has been dizzy, tired. 99/58 this morning. Pt retook bp now= 107/58 with hr=68.  Pt symptomatic, advised to get enough fluids today, try to avoid caffeine.  PCP is not in the office today, per advice to be seen today, pt scheduled with Md Yamilet Larson in same office for today. Pt will call back with any questions.    Reason for Disposition  • [1] Systolic BP  AND [2] taking blood pressure medications AND [3] dizzy, lightheaded or weak    Additional Information  • Negative: Started suddenly after an allergic medicine, an allergic food, or bee sting  • Negative: Shock suspected (e.g., cold/pale/clammy skin, too weak to stand, low BP, rapid pulse)  • Negative: Difficult to awaken or acting confused (e.g., disoriented, slurred speech)  • Negative: Fainted  • Negative: [1] Systolic BP < 90 AND [2] dizzy, lightheaded, or weak  • Negative: Chest pain  • Negative: Bleeding (e.g., vomiting blood, rectal bleeding or tarry stools, severe vaginal bleeding)(Exception: fainted from sight of small amount of blood; small cut or abrasion)  • Negative: Extra heart beats or heart is beating fast  (i.e., \"palpitations\")  • Negative: Sounds like a life-threatening emergency to the triager  • Negative: [1] Systolic BP < 80 AND [2] NOT dizzy, lightheaded or weak  • Negative: Abdominal pain  • Negative: Fever > 100.5 F (38.1 C)  • Negative: Major surgery in the past month  • Negative: [1] Drinking very little AND [2] dehydration suspected (e.g., no urine > 12 hours, very dry mouth, very lightheaded)  • Negative: [1] Fall in systolic BP > 20 mm Hg from normal AND [2] dizzy, lightheaded, or weak  • Negative: Patient sounds very sick or weak to the triager  • Negative: [1] Systolic BP < 90 AND [2] NOT dizzy, lightheaded or weak  • Negative: [1] Systolic BP  AND [2] taking blood pressure medications AND [3] NOT dizzy, lightheaded " "or weak  • Negative: [1] Fall in systolic BP > 20 mm Hg from normal AND [2] NOT dizzy, lightheaded, or weak  • Negative: Fall in systolic BP > 20 mmHg after standing up  • Negative: Fall in systolic BP > 20 mmHg after eating a meal  • Negative: Diastolic BP < 50 mm Hg  • Negative: Brief (now gone) dizziness or lightheadedness after standing up or eating  • Negative: Wants doctor to measure BP    Answer Assessment - Initial Assessment Questions  1. BLOOD PRESSURE: \"What is the blood pressure?\" \"Did you take at least two measurements 5 minutes apart?\"      99/58, 107/65  2. ONSET: \"When did you take your blood pressure?\"     Last night took lisinopril, this am 7:20= 99/58, now=  3. HOW: \"How did you obtain the blood pressure?\" (e.g., visiting nurse, automatic home BP monitor)      Automatic bp  4. HISTORY: \"Do you have a history of low blood pressure?\" \"What is your blood pressure normally?\"     No, pt just quit drinking 70 days ago  5. MEDICATIONS: \"Are you taking any medications for blood pressure?\" If yes: \"Have they been changed recently?\"      lisinopril  6. PULSE RATE: \"Do you know what your pulse rate is?\"      68  7. OTHER SYMPTOMS: \"Have you been sick recently?\" \"Have you had a recent injury?\"      Dizzy, tired  8. PREGNANCY: \"Is there any chance you are pregnant?\" \"When was your last menstrual period?\"      no    Protocols used: LOW BLOOD PRESSURE-A-AH      "

## 2021-05-19 DIAGNOSIS — M79.7 FIBROMYALGIA: ICD-10-CM

## 2021-05-19 RX ORDER — BACLOFEN 10 MG/1
TABLET ORAL
Qty: 90 TABLET | Refills: 0 | Status: SHIPPED | OUTPATIENT
Start: 2021-05-19 | End: 2021-06-14

## 2021-05-20 ENCOUNTER — OFFICE VISIT (OUTPATIENT)
Dept: MEDICAL GROUP | Facility: PHYSICIAN GROUP | Age: 56
End: 2021-05-20
Payer: MEDICARE

## 2021-05-20 VITALS
WEIGHT: 191.8 LBS | DIASTOLIC BLOOD PRESSURE: 64 MMHG | TEMPERATURE: 98.2 F | BODY MASS INDEX: 30.82 KG/M2 | HEIGHT: 66 IN | OXYGEN SATURATION: 98 % | HEART RATE: 78 BPM | SYSTOLIC BLOOD PRESSURE: 116 MMHG | RESPIRATION RATE: 16 BRPM

## 2021-05-20 DIAGNOSIS — I10 ESSENTIAL HYPERTENSION: ICD-10-CM

## 2021-05-20 DIAGNOSIS — F10.21 ALCOHOLISM IN RECOVERY (HCC): ICD-10-CM

## 2021-05-20 DIAGNOSIS — R73.09 ELEVATED HEMOGLOBIN A1C: ICD-10-CM

## 2021-05-20 DIAGNOSIS — M47.816 FACET ARTHROPATHY, LUMBAR: ICD-10-CM

## 2021-05-20 DIAGNOSIS — M54.31 SCIATICA OF RIGHT SIDE: ICD-10-CM

## 2021-05-20 DIAGNOSIS — R10.12 ABDOMINAL PAIN, LUQ (LEFT UPPER QUADRANT): ICD-10-CM

## 2021-05-20 DIAGNOSIS — M47.812 FACET ARTHROPATHY, CERVICAL: ICD-10-CM

## 2021-05-20 PROBLEM — F10.20 ACUTE ALCOHOLISM (HCC): Status: RESOLVED | Noted: 2021-05-05 | Resolved: 2021-05-20

## 2021-05-20 PROCEDURE — 99214 OFFICE O/P EST MOD 30 MIN: CPT | Performed by: NURSE PRACTITIONER

## 2021-05-20 RX ORDER — FAMOTIDINE 20 MG/1
20 TABLET, FILM COATED ORAL 2 TIMES DAILY
Qty: 180 TABLET | Refills: 0 | Status: SHIPPED | OUTPATIENT
Start: 2021-05-20 | End: 2021-07-06

## 2021-05-20 ASSESSMENT — FIBROSIS 4 INDEX: FIB4 SCORE: 0.83

## 2021-05-20 NOTE — ASSESSMENT & PLAN NOTE
Chronic in nature. After 70 days sober her BP decreased and she was fatigued 2 days when she had an episode of dizziness that prompted her to check her BP which was 96/50. She states it took 2 days for her BP to return to normal after stopping the medication. Her BP is 116/64 today and she is feeling overall better. Denies dizziness, chest pain, palpitations, headache. We will continue to monitor her blood pressure, this may have been a temporary issue related to alcohol and alcohol withdrawal.

## 2021-05-20 NOTE — ASSESSMENT & PLAN NOTE
Started about 1 year ago. States she notices it 5 times over the past year, states over the past 2 weeks it has been bothersome. States pain is severe. States difficult to complete ADLs. States walking is ok, but challenging. ROM is good, but states neck will lock up and she will be unable to move. Sharp pain and nerve pinching sensation in her back. Baclofen is helping some. States walking does help, but she has been very fatigue.

## 2021-05-21 NOTE — ASSESSMENT & PLAN NOTE
Hemoglobin A1c continues to be elevated at 5.9. Patient is making significant changes to her lifestyle and as such we will continue to monitor and hopefully see this improve over time. Plan at this time is to monitor labs again in 6 months.

## 2021-05-21 NOTE — PROGRESS NOTES
Chief Complaint   Patient presents with   • Results     Immaging    • Back Pain     getting worse, thinking about more PT    • Lab Results   • Hypotension       HISTORY OF PRESENT ILLNESS: Patient is a 56 y.o. female established patient who presents today to discuss multiple issues.    Essential hypertension  Chronic in nature. After 70 days sober her BP decreased and she was fatigued 2 days when she had an episode of dizziness that prompted her to check her BP which was 96/50. She states it took 2 days for her BP to return to normal after stopping the medication. Her BP is 116/64 today and she is feeling overall better. Denies dizziness, chest pain, palpitations, headache. We will continue to monitor her blood pressure, this may have been a temporary issue related to alcohol and alcohol withdrawal.    Sciatica of right side  Started about 1 year ago. States she notices it 5 times over the past year, states over the past 2 weeks it has been bothersome. States pain is severe. States difficult to complete ADLs. States walking is ok, but challenging. ROM is good, but states neck will lock up and she will be unable to move. Sharp pain and nerve pinching sensation in her back. Baclofen is helping some. States walking does help, but she has been very fatigue.     Alcoholism in recovery (HCC)  Patient is currently in recovery. She is doing well at this time feels that she has good support today is day 75 of sobriety and patient is congratulated on this. She continues to work hard and has been making significant health improvements. She does continue to have some concerns over image that may have been done by alcohol, as such we will continue to monitor closely. She states she is currently in a much better place emotionally.    Elevated hemoglobin A1c  Hemoglobin A1c continues to be elevated at 5.9. Patient is making significant changes to her lifestyle and as such we will continue to monitor and hopefully see this improve  over time. Plan at this time is to monitor labs again in 6 months.    Facet arthropathy, lumbar (HCC)  Chronic in nature. Patient states that back pain has been much more severe recently. Patient states that pain has been both sharp and achy and she states that it runs the length of her spine. She states that positioning can make it worse she has not been taking consistent over-the-counter medication for this as in the past that has not been particularly helpful. Patient states that she did do physical therapy and that was helpful in the past as such she is requesting a new referral to physical therapy due to help with back pain 7-8 of 10 running the length of her spine.    Abdominal pain, LUQ (left upper quadrant)  This is a continued issue. Patient is having left upper quadrant abdominal pain. She will notice cramping, gas type pain. States that it does seem to be somewhat associated with food. He did discuss the possibility of this being acid related and she plans to try Pepcid 20 mg twice daily and we will discuss this further at her follow-up.      Patient Active Problem List    Diagnosis Date Noted   • Sciatica of right side 05/20/2021   • Elevated hemoglobin A1c 05/20/2021   • Abdominal pain, LUQ (left upper quadrant) 05/20/2021   • Risk for falls 03/18/2021   • Essential hypertension 05/07/2020   • Obesity (BMI 30-39.9) 08/29/2019   • Dupuytren's contracture of right hand 08/29/2019   • Alcoholism in recovery ([facility]) 08/14/2019   • Deliberate self-cutting 08/14/2019   • Flank pain 07/31/2018   • Actinic keratosis 02/06/2018   • Trigger finger, right index finger 01/22/2018   • COPD (chronic obstructive pulmonary disease) with chronic bronchitis ([facility]) 02/09/2017   • H/O hysterectomy with oophorectomy 02/09/2017   • Essential tremor 02/09/2017   • Irritable bowel syndrome with diarrhea 02/09/2017   • Insomnia due to medical condition 02/09/2017   • Bipolar affective disorder, current episode depressed ([facility])  02/09/2017   • Migraine without status migrainosus, not intractable 09/15/2016   • Facet arthropathy, cervical 04/08/2016   • Facet arthropathy, lumbar 04/08/2016   • History of degenerative disc disease 10/05/2015   • KVNG (generalized anxiety disorder) 09/23/2014   • Interstitial lung disease (HCC) 09/05/2014   • Tobacco use disorder 06/07/2014   • Seborrheic keratosis 09/25/2013   • Recurrent major depressive disorder (HCC) 01/27/2010   • Fibromyalgia 01/01/2002       Allergies:Erythromycin and Tetracycline    Current Outpatient Medications   Medication Sig Dispense Refill   • famotidine (PEPCID) 20 MG Tab Take 1 tablet by mouth 2 times a day for 90 days. 180 tablet 0   • baclofen (LIORESAL) 10 MG Tab TAKE 1 TABLET BY MOUTH THREE TIMES A DAY 90 tablet 0   • meloxicam (MOBIC) 15 MG tablet TAKE 1 TABLET BY MOUTH EVERY DAY 30 tablet 0   • mirtazapine (REMERON) 30 MG Tab tablet TAKE 1 TABLET BY MOUTH EVERYDAY AT BEDTIME 90 tablet 3   • SAVELLA 100 MG Tab TAKE 1 TABLET BY MOUTH TWICE A  tablet 0   • Loratadine (CLARITIN PO) Take 1 tablet by mouth every day.     • topiramate (TOPAMAX) 50 MG tablet Take 1 tablet by mouth 3 times a day. 270 tablet 3   • clindamycin (CLEOCIN) 2 % vaginal cream Use vaginally after sexual activity for irritation and odor related to BV. 40 g 0   • ondansetron (ZOFRAN ODT) 4 MG TABLET DISPERSIBLE TAKE 2 TABS BY MOUTH EVERY 8 HOURS AS NEEDED FOR NAUSEA. 30 tablet 0   • gabapentin (NEURONTIN) 600 MG tablet TAKE 1 TABLET BY MOUTH THREE TIMES A  Tab 0   • Diclofenac Sodium (VOLTAREN) 1 % Gel Place 1 Application on the skin 2 times a day as needed. 100 g 0   • Thiamine HCl (VITAMIN B-1 PO) Take 1 Tab by mouth every evening.     • Calcium-Magnesium-Zinc (JUDSON-MAG-ZINC PO) Take 1 Tab by mouth every evening.     • PROAIR  (90 Base) MCG/ACT Aero Soln inhalation aerosol INHALE 2 PUFFS BY MOUTH EVERY 6 HOURS AS NEEDED FOR SHORTNESS OF BREATH. 8.5 Inhaler 3   • hydrOXYzine HCl (ATARAX)  "25 MG Tab Take 1 Tab by mouth 3 times a day as needed for Anxiety. 90 Tab 0   • dicyclomine (BENTYL) 20 MG Tab Take 20 mg by mouth every 6 hours as needed.     • Cyanocobalamin (VITAMIN B-12) 1000 MCG Tab Take 1 Tab by mouth 2 Times a Day. 60 Tab 11     No current facility-administered medications for this visit.       Social History     Tobacco Use   • Smoking status: Current Every Day Smoker     Packs/day: 1.00     Years: 35.00     Pack years: 35.00   • Smokeless tobacco: Never Used   Vaping Use   • Vaping Use: Never used   Substance Use Topics   • Alcohol use: Not Currently     Comment: 3/30/21: 24 days abstinent   • Drug use: Yes     Types: Marijuana     Comment: clean meth x 16 years       Family Status   Relation Name Status   • Mo  Alive   • Fa 54 yrs old    • Sis  Alive   • Bro  Alive   • MGFa  (Not Specified)   • Sis  Alive   • Sis  Alive   • Sis  Alive     Family History   Problem Relation Age of Onset   • Lung Disease Mother         copd   • Cancer Mother         basal/squamous   • Hypertension Mother    • Hyperlipidemia Mother    • Stroke Mother    • Alcohol abuse Mother    • Heart Disease Father         HF   • Arthritis Father         rheumatoid   • Lung Disease Father         emphysema   • Alcohol abuse Father    • Cancer Sister    • Alcohol abuse Sister    • Cancer Maternal Grandfather         colon or bladder not sure    • Cancer Sister         pre cancerous spots    • Alcohol abuse Sister    • Alcohol abuse Sister    • Alcohol abuse Sister        ROS:   Patient denies headache, blurry vision, dizziness, chest pain, shortness of breath, abdominal pain, nausea, vomiting, change in level of consciousness.  All other systems reviewed and are negative except as in HPI.    Exam:  /64 (BP Location: Right arm, Patient Position: Sitting, BP Cuff Size: Adult)   Pulse 78   Temp 36.8 °C (98.2 °F) (Temporal)   Resp 16   Ht 1.676 m (5' 6\")   Wt 87 kg (191 lb 12.8 oz)   SpO2 98% "   Constitutional: Alert, no distress, well-groomed.  Skin: Warm, dry, good turgor, no rashes in visible areas.  Eye: Equal, round and reactive, conjunctiva clear, lids normal.  ENMT: Lips without lesions, good dentition, moist mucous membranes.  Neck: Trachea midline, no masses, no thyromegaly.  Respiratory: Unlabored respiratory effort, no cough.  MSK: Normal gait, moves all extremities.  Neuro: Grossly non-focal.   Psych: Alert and oriented x3, normal affect and mood.  Spine with limited range of motion related to pain. Patient has a normal gait. Tenderness is noted at multiple points along the spine.     PLAN:    1. Essential hypertension  Update labs in 6 months, continue current medication.  - CBC WITH DIFFERENTIAL; Future  - Comp Metabolic Panel; Future    2. Abdominal pain, LUQ (left upper quadrant)  Plan to try Pepcid 20 mg twice daily for 90 days to eliminate acid reflux as a cause for abdominal pain  - famotidine (PEPCID) 20 MG Tab; Take 1 tablet by mouth 2 times a day for 90 days.  Dispense: 180 tablet; Refill: 0    3. Elevated hemoglobin A1c  Recheck in 6 months to see if this has improved based on changes in lifestyle  - HEMOGLOBIN A1C; Future    4. Facet arthropathy, lumbar  Referral for physical therapy per patient request regarding back pain  - REFERRAL TO PHYSICAL THERAPY    5. Facet arthropathy, cervical  - REFERRAL TO PHYSICAL THERAPY    6. Sciatica of right side  - REFERRAL TO PHYSICAL THERAPY    7. Alcoholism in recovery (HCC)  Patient plans to continue sobriety      Return in about 3 months (around 8/20/2021), or if symptoms worsen or fail to improve, for back pain.    Please note that this dictation was created using voice recognition software. I have made every reasonable attempt to correct obvious errors, but I expect that there are errors of grammar and possibly content that I did not discover before finalizing the note.

## 2021-05-21 NOTE — ASSESSMENT & PLAN NOTE
Patient is currently in recovery. She is doing well at this time feels that she has good support today is day 75 of sobriety and patient is congratulated on this. She continues to work hard and has been making significant health improvements. She does continue to have some concerns over image that may have been done by alcohol, as such we will continue to monitor closely. She states she is currently in a much better place emotionally.

## 2021-05-21 NOTE — ASSESSMENT & PLAN NOTE
Chronic in nature. Patient states that back pain has been much more severe recently. Patient states that pain has been both sharp and achy and she states that it runs the length of her spine. She states that positioning can make it worse she has not been taking consistent over-the-counter medication for this as in the past that has not been particularly helpful. Patient states that she did do physical therapy and that was helpful in the past as such she is requesting a new referral to physical therapy due to help with back pain 7-8 of 10 running the length of her spine.

## 2021-05-21 NOTE — ASSESSMENT & PLAN NOTE
This is a continued issue. Patient is having left upper quadrant abdominal pain. She will notice cramping, gas type pain. States that it does seem to be somewhat associated with food. He did discuss the possibility of this being acid related and she plans to try Pepcid 20 mg twice daily and we will discuss this further at her follow-up.

## 2021-06-02 ENCOUNTER — PATIENT OUTREACH (OUTPATIENT)
Dept: MEDICAL GROUP | Facility: PHYSICIAN GROUP | Age: 56
End: 2021-06-02
Payer: MEDICARE

## 2021-06-02 DIAGNOSIS — I10 ESSENTIAL HYPERTENSION: ICD-10-CM

## 2021-06-02 DIAGNOSIS — F10.21 ALCOHOLISM IN RECOVERY (HCC): ICD-10-CM

## 2021-06-02 NOTE — PROGRESS NOTES
Assessment    Patient outreach for CCM follow up.  Patient continues with her sobriety has support from her SO and family visiting.  Her biggest concern at this time is she has a filling that has fallen out and the tooth is cracked, loose and painful.  Jaclyn called her insurance to get a dentist in this area and stated that the list did not include any in network for her insurance and she unfortunately cannot afford a tooth extraction at this time.  Last time she went to the dentist was 8-10 years ago and it was at [facility] when she called there she was told they only cater to children now.      Education    Researched dentists in the area that work on a sliding scale, referred Jaclyn to these practices to research for affordability    Care Plan    Continue with current care plan.  BP still an issue however this time it dropped.  Continue to monitor BP daily.  Follow up on research for dental care.    Progress:    On track        Next outreach:  6/11/2021 at 1300

## 2021-06-07 DIAGNOSIS — M79.7 FIBROMYALGIA: ICD-10-CM

## 2021-06-07 RX ORDER — MELOXICAM 15 MG/1
TABLET ORAL
Qty: 30 TABLET | Refills: 0 | Status: SHIPPED | OUTPATIENT
Start: 2021-06-07 | End: 2021-06-25

## 2021-06-09 DIAGNOSIS — M79.7 FIBROMYALGIA: ICD-10-CM

## 2021-06-10 ENCOUNTER — DOCUMENTATION (OUTPATIENT)
Dept: BEHAVIORAL HEALTH | Facility: CLINIC | Age: 56
End: 2021-06-10

## 2021-06-10 RX ORDER — GABAPENTIN 600 MG/1
TABLET ORAL
Qty: 270 TABLET | Refills: 0 | Status: SHIPPED | OUTPATIENT
Start: 2021-06-10 | End: 2021-07-06

## 2021-06-11 ENCOUNTER — PATIENT OUTREACH (OUTPATIENT)
Dept: MEDICAL GROUP | Facility: PHYSICIAN GROUP | Age: 56
End: 2021-06-11

## 2021-06-11 NOTE — PROGRESS NOTES
Patient outreach for follow up from last call to research dentist options in the area.  LVM with contact information.

## 2021-06-12 DIAGNOSIS — M79.7 FIBROMYALGIA: ICD-10-CM

## 2021-06-14 RX ORDER — BACLOFEN 10 MG/1
TABLET ORAL
Qty: 90 TABLET | Refills: 0 | Status: SHIPPED | OUTPATIENT
Start: 2021-06-14 | End: 2021-07-06

## 2021-06-17 DIAGNOSIS — M79.7 FIBROMYALGIA: ICD-10-CM

## 2021-06-18 ENCOUNTER — APPOINTMENT (OUTPATIENT)
Dept: PHYSICAL THERAPY | Facility: REHABILITATION | Age: 56
End: 2021-06-18
Attending: NURSE PRACTITIONER
Payer: MEDICARE

## 2021-06-25 ENCOUNTER — APPOINTMENT (OUTPATIENT)
Dept: PHYSICAL THERAPY | Facility: REHABILITATION | Age: 56
End: 2021-06-25
Attending: NURSE PRACTITIONER
Payer: MEDICARE

## 2021-06-25 DIAGNOSIS — M79.7 FIBROMYALGIA: ICD-10-CM

## 2021-06-25 RX ORDER — MELOXICAM 15 MG/1
TABLET ORAL
Qty: 30 TABLET | Refills: 0 | Status: SHIPPED | OUTPATIENT
Start: 2021-06-25 | End: 2021-07-26

## 2021-07-01 NOTE — ADDENDUM NOTE
Encounter addended by: Jairo Bustillos M.D. on: 6/30/2021 5:52 PM   Actions taken: Clinical Note Signed

## 2021-07-03 DIAGNOSIS — M79.7 FIBROMYALGIA: ICD-10-CM

## 2021-07-03 DIAGNOSIS — K58.0 IRRITABLE BOWEL SYNDROME WITH DIARRHEA: ICD-10-CM

## 2021-07-03 DIAGNOSIS — R10.12 ABDOMINAL PAIN, LUQ (LEFT UPPER QUADRANT): ICD-10-CM

## 2021-07-06 RX ORDER — GABAPENTIN 600 MG/1
TABLET ORAL
Qty: 270 TABLET | Refills: 0 | Status: SHIPPED | OUTPATIENT
Start: 2021-07-06 | End: 2022-04-21

## 2021-07-06 RX ORDER — BACLOFEN 10 MG/1
TABLET ORAL
Qty: 90 TABLET | Refills: 0 | Status: SHIPPED | OUTPATIENT
Start: 2021-07-06 | End: 2021-08-03

## 2021-07-06 RX ORDER — FAMOTIDINE 20 MG/1
TABLET, FILM COATED ORAL
Qty: 180 TABLET | Refills: 0 | Status: SHIPPED | OUTPATIENT
Start: 2021-07-06 | End: 2022-04-18 | Stop reason: SDUPTHER

## 2021-07-06 RX ORDER — ONDANSETRON 4 MG/1
TABLET, ORALLY DISINTEGRATING ORAL
Qty: 30 TABLET | Refills: 0 | Status: SHIPPED | OUTPATIENT
Start: 2021-07-06 | End: 2021-08-09

## 2021-07-06 RX ORDER — MILNACIPRAN HYDROCHLORIDE 100 MG/1
TABLET, FILM COATED ORAL
Qty: 180 TABLET | Refills: 0 | Status: SHIPPED | OUTPATIENT
Start: 2021-07-06 | End: 2021-10-04

## 2021-07-07 ENCOUNTER — PHYSICAL THERAPY (OUTPATIENT)
Dept: PHYSICAL THERAPY | Facility: REHABILITATION | Age: 56
End: 2021-07-07
Attending: NURSE PRACTITIONER
Payer: MEDICARE

## 2021-07-07 DIAGNOSIS — M47.812 FACET ARTHROPATHY, CERVICAL: ICD-10-CM

## 2021-07-07 DIAGNOSIS — M47.816 FACET ARTHROPATHY, LUMBAR: ICD-10-CM

## 2021-07-07 DIAGNOSIS — M54.31 SCIATICA OF RIGHT SIDE: ICD-10-CM

## 2021-07-07 PROCEDURE — 97161 PT EVAL LOW COMPLEX 20 MIN: CPT

## 2021-07-07 ASSESSMENT — ENCOUNTER SYMPTOMS
PAIN SCALE AT LOWEST: 4
PAIN SCALE: 4
PAIN SCALE AT HIGHEST: 10
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
## 2021-07-14 ENCOUNTER — APPOINTMENT (OUTPATIENT)
Dept: PHYSICAL THERAPY | Facility: REHABILITATION | Age: 56
End: 2021-07-14
Attending: NURSE PRACTITIONER
Payer: MEDICARE

## 2021-07-16 ENCOUNTER — APPOINTMENT (OUTPATIENT)
Dept: PHYSICAL THERAPY | Facility: REHABILITATION | Age: 56
End: 2021-07-16
Attending: NURSE PRACTITIONER
Payer: MEDICARE

## 2021-07-19 ENCOUNTER — APPOINTMENT (OUTPATIENT)
Dept: PHYSICAL THERAPY | Facility: REHABILITATION | Age: 56
End: 2021-07-19
Attending: NURSE PRACTITIONER
Payer: MEDICARE

## 2021-07-21 ENCOUNTER — APPOINTMENT (OUTPATIENT)
Dept: PHYSICAL THERAPY | Facility: REHABILITATION | Age: 56
End: 2021-07-21
Attending: NURSE PRACTITIONER
Payer: MEDICARE

## 2021-07-23 ENCOUNTER — PATIENT OUTREACH (OUTPATIENT)
Dept: MEDICAL GROUP | Facility: PHYSICIAN GROUP | Age: 56
End: 2021-07-23
Payer: MEDICARE

## 2021-07-23 DIAGNOSIS — I10 ESSENTIAL HYPERTENSION: ICD-10-CM

## 2021-07-23 DIAGNOSIS — F10.21 ALCOHOLISM IN RECOVERY (HCC): ICD-10-CM

## 2021-07-26 ENCOUNTER — APPOINTMENT (OUTPATIENT)
Dept: PHYSICAL THERAPY | Facility: REHABILITATION | Age: 56
End: 2021-07-26
Attending: NURSE PRACTITIONER
Payer: MEDICARE

## 2021-07-26 DIAGNOSIS — M79.7 FIBROMYALGIA: ICD-10-CM

## 2021-07-26 RX ORDER — MELOXICAM 15 MG/1
TABLET ORAL
Qty: 30 TABLET | Refills: 0 | Status: SHIPPED | OUTPATIENT
Start: 2021-07-26 | End: 2021-08-23

## 2021-07-26 NOTE — PROGRESS NOTES
"Assessment    Patient outreach for [facility].  Jaclyn is trying to recover from ETOH abuse.  She has stayed off alcohol for over 4 months then had a relapse.  She stated she is around alcohol with her boyfriend and his roommate who drink.  She is staying with them while her daughter and her family stay in her home for a period of time. She started to have drinks with them.  She stated that it is not much, when asked she stated 3-4 shots a day.  Jaclyn stated \"it was my fault I have no one to blame but me\"  Reviewed that recovering from alcohol is not easy and she should utilize her sponsors more often to help her stop drinking again.  She said she \"is the loop with them\"  Goal for Jaclyn is to wean off the alcohol in the next 2 weeks, suggested if unable to she may have to go back to the Behavioral Health Program.  She agreed to try.  At the last outreach Jaclyn was having trouble with her tooth.  She stated she was able to find a dentist that was able to take her insurance and was able to get her tooth pulled and a cleaning done.  She stated she felt much better.      Education    Education regarding her health conditions and alcohol consumption, reviewed to keep checking her BP as she tries to wean off alcohol as this may cause it to increase as it did before when she withdrew from alcohol.      Care Plan    Goal:  Stop Drinking and to stay sober.  Barriers:  1 Recovery alcoholic  2.  Has people drinking around her  3.  Has relapses  Interventions: 1  Keep in touch with sponsors  2.  Behavioral Health programs  3.  Encourage to stay away from people  who are drinking    Start Date: 7/26/21  End Date:     Progress:    Progressing slower than expected.  Had relapse.      Next outreach:  8/9/21 at 2:00p                              "

## 2021-07-27 ENCOUNTER — PATIENT MESSAGE (OUTPATIENT)
Dept: HEALTH INFORMATION MANAGEMENT | Facility: OTHER | Age: 56
End: 2021-07-27

## 2021-07-28 ENCOUNTER — APPOINTMENT (OUTPATIENT)
Dept: PHYSICAL THERAPY | Facility: REHABILITATION | Age: 56
End: 2021-07-28
Attending: NURSE PRACTITIONER
Payer: MEDICARE

## 2021-08-03 DIAGNOSIS — M79.7 FIBROMYALGIA: ICD-10-CM

## 2021-08-03 RX ORDER — BACLOFEN 10 MG/1
TABLET ORAL
Qty: 90 TABLET | Refills: 0 | Status: SHIPPED | OUTPATIENT
Start: 2021-08-03 | End: 2021-08-09

## 2021-08-04 DIAGNOSIS — M79.7 FIBROMYALGIA: ICD-10-CM

## 2021-08-07 DIAGNOSIS — K58.0 IRRITABLE BOWEL SYNDROME WITH DIARRHEA: ICD-10-CM

## 2021-08-09 ENCOUNTER — PATIENT MESSAGE (OUTPATIENT)
Dept: MEDICAL GROUP | Facility: PHYSICIAN GROUP | Age: 56
End: 2021-08-09

## 2021-08-09 ENCOUNTER — PATIENT OUTREACH (OUTPATIENT)
Dept: MEDICAL GROUP | Facility: PHYSICIAN GROUP | Age: 56
End: 2021-08-09
Payer: MEDICARE

## 2021-08-09 DIAGNOSIS — M79.7 FIBROMYALGIA: ICD-10-CM

## 2021-08-09 DIAGNOSIS — J44.89 COPD (CHRONIC OBSTRUCTIVE PULMONARY DISEASE) WITH CHRONIC BRONCHITIS (HCC): ICD-10-CM

## 2021-08-09 DIAGNOSIS — R53.82 CHRONIC FATIGUE: ICD-10-CM

## 2021-08-09 DIAGNOSIS — F10.21 ALCOHOLISM IN RECOVERY (HCC): ICD-10-CM

## 2021-08-09 DIAGNOSIS — J84.9 INTERSTITIAL LUNG DISEASE (HCC): ICD-10-CM

## 2021-08-09 RX ORDER — ONDANSETRON 4 MG/1
TABLET, ORALLY DISINTEGRATING ORAL
Qty: 30 TABLET | Refills: 0 | Status: SHIPPED | OUTPATIENT
Start: 2021-08-09 | End: 2021-11-11

## 2021-08-09 RX ORDER — BACLOFEN 10 MG/1
TABLET ORAL
Qty: 270 TABLET | Refills: 0 | Status: SHIPPED | OUTPATIENT
Start: 2021-08-09 | End: 2022-03-13 | Stop reason: SDUPTHER

## 2021-08-11 DIAGNOSIS — Z00.6 RESEARCH STUDY PATIENT: ICD-10-CM

## 2021-08-12 ENCOUNTER — HOSPITAL ENCOUNTER (OUTPATIENT)
Facility: MEDICAL CENTER | Age: 56
End: 2021-08-12
Attending: PATHOLOGY
Payer: COMMERCIAL

## 2021-08-12 DIAGNOSIS — Z00.6 RESEARCH STUDY PATIENT: ICD-10-CM

## 2021-08-12 NOTE — PROGRESS NOTES
"Assessment    Patient outreach for [facility].  Jaclyn stated after our last conversation she stopped drinking and has been sober.  She stated \"that it was wrong to start and I worked too hard to get off alcohol\"  Jaclyn's main concern right now is her fatigue.  She says she is constantly tired and does not have any energy.  She has sent a message to her PCP who has ordered lab work for her.  She is also having trouble with her IBS.  She says her stomach is \"giving her trouble\"  She nauseated but has not been eating regularly.  Suggested to talk to her PCP about this at her next appointment on 8/24/21.  Jaclyn agrees to abstain from alcohol and is really working towards a sober lifestyle.      Education    Blair diet to assist with her nausea.  Abstaining from alochol.    Care Plan    Continue with plan of care    Progress:    Back on track    Next outreach: 9/16/21 at 2p.                              "

## 2021-08-16 ENCOUNTER — HOSPITAL ENCOUNTER (OUTPATIENT)
Dept: LAB | Facility: MEDICAL CENTER | Age: 56
End: 2021-08-16
Attending: NURSE PRACTITIONER
Payer: MEDICARE

## 2021-08-16 DIAGNOSIS — I10 ESSENTIAL HYPERTENSION: ICD-10-CM

## 2021-08-16 DIAGNOSIS — R53.82 CHRONIC FATIGUE: ICD-10-CM

## 2021-08-16 DIAGNOSIS — R73.09 ELEVATED HEMOGLOBIN A1C: ICD-10-CM

## 2021-08-16 LAB
ALBUMIN SERPL BCP-MCNC: 4.3 G/DL (ref 3.2–4.9)
ALBUMIN/GLOB SERPL: 1.5 G/DL
ALP SERPL-CCNC: 97 U/L (ref 30–99)
ALT SERPL-CCNC: 14 U/L (ref 2–50)
ANION GAP SERPL CALC-SCNC: 14 MMOL/L (ref 7–16)
AST SERPL-CCNC: 17 U/L (ref 12–45)
BASOPHILS # BLD AUTO: 0.9 % (ref 0–1.8)
BASOPHILS # BLD: 0.08 K/UL (ref 0–0.12)
BILIRUB SERPL-MCNC: 0.2 MG/DL (ref 0.1–1.5)
BUN SERPL-MCNC: 22 MG/DL (ref 8–22)
CALCIUM SERPL-MCNC: 9.5 MG/DL (ref 8.5–10.5)
CHLORIDE SERPL-SCNC: 104 MMOL/L (ref 96–112)
CO2 SERPL-SCNC: 22 MMOL/L (ref 20–33)
CREAT SERPL-MCNC: 1.31 MG/DL (ref 0.5–1.4)
EOSINOPHIL # BLD AUTO: 0.14 K/UL (ref 0–0.51)
EOSINOPHIL NFR BLD: 1.6 % (ref 0–6.9)
ERYTHROCYTE [DISTWIDTH] IN BLOOD BY AUTOMATED COUNT: 53.3 FL (ref 35.9–50)
EST. AVERAGE GLUCOSE BLD GHB EST-MCNC: 100 MG/DL
GLOBULIN SER CALC-MCNC: 2.8 G/DL (ref 1.9–3.5)
GLUCOSE SERPL-MCNC: 87 MG/DL (ref 65–99)
HBA1C MFR BLD: 5.1 % (ref 4–5.6)
HCT VFR BLD AUTO: 43.8 % (ref 37–47)
HGB BLD-MCNC: 14.2 G/DL (ref 12–16)
IMM GRANULOCYTES # BLD AUTO: 0.05 K/UL (ref 0–0.11)
IMM GRANULOCYTES NFR BLD AUTO: 0.6 % (ref 0–0.9)
LYMPHOCYTES # BLD AUTO: 3.2 K/UL (ref 1–4.8)
LYMPHOCYTES NFR BLD: 36.6 % (ref 22–41)
MCH RBC QN AUTO: 33 PG (ref 27–33)
MCHC RBC AUTO-ENTMCNC: 32.4 G/DL (ref 33.6–35)
MCV RBC AUTO: 101.9 FL (ref 81.4–97.8)
MONOCYTES # BLD AUTO: 0.5 K/UL (ref 0–0.85)
MONOCYTES NFR BLD AUTO: 5.7 % (ref 0–13.4)
NEUTROPHILS # BLD AUTO: 4.77 K/UL (ref 2–7.15)
NEUTROPHILS NFR BLD: 54.6 % (ref 44–72)
NRBC # BLD AUTO: 0 K/UL
NRBC BLD-RTO: 0 /100 WBC
PLATELET # BLD AUTO: 313 K/UL (ref 164–446)
PMV BLD AUTO: 10.5 FL (ref 9–12.9)
POTASSIUM SERPL-SCNC: 4.4 MMOL/L (ref 3.6–5.5)
PROT SERPL-MCNC: 7.1 G/DL (ref 6–8.2)
RBC # BLD AUTO: 4.3 M/UL (ref 4.2–5.4)
SODIUM SERPL-SCNC: 140 MMOL/L (ref 135–145)
TSH SERPL DL<=0.005 MIU/L-ACNC: 2.05 UIU/ML (ref 0.38–5.33)
WBC # BLD AUTO: 8.7 K/UL (ref 4.8–10.8)

## 2021-08-16 PROCEDURE — 83036 HEMOGLOBIN GLYCOSYLATED A1C: CPT

## 2021-08-16 PROCEDURE — 80053 COMPREHEN METABOLIC PANEL: CPT

## 2021-08-16 PROCEDURE — 84443 ASSAY THYROID STIM HORMONE: CPT

## 2021-08-16 PROCEDURE — 85025 COMPLETE CBC W/AUTO DIFF WBC: CPT

## 2021-08-16 PROCEDURE — 36415 COLL VENOUS BLD VENIPUNCTURE: CPT

## 2021-08-19 LAB
ELF SCORE: 8
RELATIVE RISK: NORMAL
RISK GROUP: NORMAL
RISK: 3.3 %

## 2021-08-23 DIAGNOSIS — M79.7 FIBROMYALGIA: ICD-10-CM

## 2021-08-23 RX ORDER — MELOXICAM 15 MG/1
TABLET ORAL
Qty: 30 TABLET | Refills: 0 | Status: SHIPPED | OUTPATIENT
Start: 2021-08-23 | End: 2021-09-17

## 2021-08-24 ENCOUNTER — OFFICE VISIT (OUTPATIENT)
Dept: MEDICAL GROUP | Facility: PHYSICIAN GROUP | Age: 56
End: 2021-08-24
Payer: MEDICARE

## 2021-08-24 ENCOUNTER — HOSPITAL ENCOUNTER (OUTPATIENT)
Dept: LAB | Facility: MEDICAL CENTER | Age: 56
End: 2021-08-24
Attending: NURSE PRACTITIONER
Payer: MEDICARE

## 2021-08-24 VITALS
DIASTOLIC BLOOD PRESSURE: 56 MMHG | RESPIRATION RATE: 16 BRPM | HEART RATE: 80 BPM | SYSTOLIC BLOOD PRESSURE: 100 MMHG | HEIGHT: 66 IN | OXYGEN SATURATION: 94 % | BODY MASS INDEX: 30.98 KG/M2 | WEIGHT: 192.8 LBS | TEMPERATURE: 97.6 F

## 2021-08-24 DIAGNOSIS — R42 VERTIGO: ICD-10-CM

## 2021-08-24 DIAGNOSIS — J44.1 COPD WITH ACUTE EXACERBATION (HCC): ICD-10-CM

## 2021-08-24 DIAGNOSIS — E53.1 VITAMIN B6 DEFICIENCY: ICD-10-CM

## 2021-08-24 DIAGNOSIS — G47.19 EXCESSIVE DAYTIME SLEEPINESS: ICD-10-CM

## 2021-08-24 DIAGNOSIS — R53.82 CHRONIC FATIGUE: ICD-10-CM

## 2021-08-24 DIAGNOSIS — M47.816 FACET ARTHROPATHY, LUMBAR: ICD-10-CM

## 2021-08-24 DIAGNOSIS — N18.32 STAGE 3B CHRONIC KIDNEY DISEASE: ICD-10-CM

## 2021-08-24 DIAGNOSIS — R06.83 SNORING: ICD-10-CM

## 2021-08-24 DIAGNOSIS — M62.89 PELVIC FLOOR DYSFUNCTION: ICD-10-CM

## 2021-08-24 DIAGNOSIS — E51.9 THIAMIN DEFICIENCY: ICD-10-CM

## 2021-08-24 DIAGNOSIS — F10.21 ALCOHOLISM IN RECOVERY (HCC): ICD-10-CM

## 2021-08-24 DIAGNOSIS — M79.7 FIBROMYALGIA: ICD-10-CM

## 2021-08-24 DIAGNOSIS — E53.8 FOLATE DEFICIENCY: ICD-10-CM

## 2021-08-24 PROBLEM — G47.01 INSOMNIA DUE TO MEDICAL CONDITION: Status: RESOLVED | Noted: 2017-02-09 | Resolved: 2021-08-24

## 2021-08-24 PROBLEM — R10.9 FLANK PAIN: Status: RESOLVED | Noted: 2018-07-31 | Resolved: 2021-08-24

## 2021-08-24 PROBLEM — M65.321 TRIGGER FINGER, RIGHT INDEX FINGER: Status: RESOLVED | Noted: 2018-01-22 | Resolved: 2021-08-24

## 2021-08-24 LAB
25(OH)D3 SERPL-MCNC: 63 NG/ML (ref 30–100)
FERRITIN SERPL-MCNC: 65.6 NG/ML (ref 10–291)
FOLATE SERPL-MCNC: 6.1 NG/ML
HGB RETIC QN AUTO: 34 PG/CELL (ref 29–35)
IMM RETICS NFR: 6.7 % (ref 9.3–17.4)
PTH-INTACT SERPL-MCNC: 28 PG/ML (ref 14–72)
RETICS # AUTO: 0.05 M/UL (ref 0.04–0.06)
RETICS/RBC NFR: 1.1 % (ref 0.8–2.1)
TRANSFERRIN SERPL-MCNC: 259 MG/DL (ref 200–370)

## 2021-08-24 PROCEDURE — 36415 COLL VENOUS BLD VENIPUNCTURE: CPT

## 2021-08-24 PROCEDURE — 85046 RETICYTE/HGB CONCENTRATE: CPT

## 2021-08-24 PROCEDURE — 83970 ASSAY OF PARATHORMONE: CPT

## 2021-08-24 PROCEDURE — 82306 VITAMIN D 25 HYDROXY: CPT

## 2021-08-24 PROCEDURE — 84207 ASSAY OF VITAMIN B-6: CPT

## 2021-08-24 PROCEDURE — 84425 ASSAY OF VITAMIN B-1: CPT

## 2021-08-24 PROCEDURE — 82728 ASSAY OF FERRITIN: CPT

## 2021-08-24 PROCEDURE — 99215 OFFICE O/P EST HI 40 MIN: CPT | Performed by: NURSE PRACTITIONER

## 2021-08-24 PROCEDURE — 82746 ASSAY OF FOLIC ACID SERUM: CPT

## 2021-08-24 PROCEDURE — 84466 ASSAY OF TRANSFERRIN: CPT

## 2021-08-24 RX ORDER — METHYLPREDNISOLONE 4 MG/1
TABLET ORAL
Qty: 21 TABLET | Refills: 0 | Status: SHIPPED | OUTPATIENT
Start: 2021-08-24 | End: 2021-12-28

## 2021-08-24 RX ORDER — LISINOPRIL 20 MG/1
TABLET ORAL
COMMUNITY
Start: 2021-07-08 | End: 2021-08-24

## 2021-08-24 ASSESSMENT — FIBROSIS 4 INDEX: FIB4 SCORE: 0.8

## 2021-08-24 NOTE — ASSESSMENT & PLAN NOTE
"Per patient this is a chronic issue.  States that she mentioned it to the physical therapist when she went for evaluation and they stated that they could not work with her until she was working with physical therapy for her pelvic floor.  Patient states that she has a sensation of having a broom handle shoved up her anus or vagina.  States that this will happen without warning.  Is that the pain is severe and will cause her to jump, move around to hopefully get the pain to resolve.  States that pain will resolve with time.  Patient states that she has not found anything specific that makes it better but states that she does have \"triggers\".  "

## 2021-08-24 NOTE — ASSESSMENT & PLAN NOTE
"This is a new issue that started approximately 6 weeks ago.  States vertigo and fatigue started together. States sleep is poor at night, states waking up a couple times a night. States will then fall asleep on the couch, then has started noticing she will fall asleep in the daytime, has never \"napped\" before. States she'll sit still and feel fatigued and sleeping from 12pm-9pm .  Recent TSH was within normal limits.  Recent CBC does not indicate anemia.  States sleep at night has not changed.  Boyfriend is present at appointment today and does note +snoring, will hear some gagging. Better since sobriety.  With regard to vertigo which patient states started around the same time. +tinnitus left ear couple min then resolved, this was mild. States that she is having more frequent nausea she did have vertigo severe enough to vomit. Within 30 minutes waking up, everyday \"feels high\" and \"she has not even smoked any marijuana yet\". Has not found anything that makes it better or worse. States no energy. States pain has flared.   "

## 2021-08-24 NOTE — ASSESSMENT & PLAN NOTE
Recent labs approximately month 1 month after patient's relapse for alcohol to indicate decreased GFR.  And to continue to monitor this, other labs are ordered as indicated.

## 2021-08-24 NOTE — ASSESSMENT & PLAN NOTE
Is a chronic issue.  She is again in early recovery.  States that she is 1 month sober. +relapse which she states was only for 1 week and she was drinking 1/2 pint/day.  Some concern that this could be linked with symptoms.  Patient states that she was seen neurology in the past and they had determined that B vitamin deficiencies were related to her vertigo, falls at that time.  Relapse could contribute to B vitamin deficiency.

## 2021-08-24 NOTE — ASSESSMENT & PLAN NOTE
"Chronic in nature.  Patient states that she has had some increased pain and feels like she is having a \"flare\" at this time.  Patient states that overall this has been no worse than previous flares she has not noticed any specific swelling, or back pain.  "

## 2021-08-24 NOTE — PROGRESS NOTES
"Chief Complaint   Patient presents with   • Pain     Back pain follow up    • Lab Results   • Dizziness   • Fatigue     no energy to do anything        HISTORY OF PRESENT ILLNESS: Patient is a 56 y.o. female established patient who presents today to discuss multiple issues.    Alcoholism in recovery (HCC)  Is a chronic issue.  She is again in early recovery.  States that she is 1 month sober. +relapse which she states was only for 1 week and she was drinking 1/2 pint/day.  Some concern that this could be linked with symptoms.  Patient states that she was seen neurology in the past and they had determined that B vitamin deficiencies were related to her vertigo, falls at that time.  Relapse could contribute to B vitamin deficiency.    Chronic fatigue  This is a new issue that started approximately 6 weeks ago.  States vertigo and fatigue started together. States sleep is poor at night, states waking up a couple times a night. States will then fall asleep on the couch, then has started noticing she will fall asleep in the daytime, has never \"napped\" before. States she'll sit still and feel fatigued and sleeping from 12pm-9pm .  Recent TSH was within normal limits.  Recent CBC does not indicate anemia.  States sleep at night has not changed.  Boyfriend is present at appointment today and does note +snoring, will hear some gagging. Better since sobriety.  With regard to vertigo which patient states started around the same time. +tinnitus left ear couple min then resolved, this was mild. States that she is having more frequent nausea she did have vertigo severe enough to vomit. Within 30 minutes waking up, everyday \"feels high\" and \"she has not even smoked any marijuana yet\". Has not found anything that makes it better or worse. States no energy. States pain has flared.     Facet arthropathy, lumbar (HCC)  States missed PT intake appointment then she walked in to schedule. States was scheduled and at intake appointment, " "he asked about     COPD (chronic obstructive pulmonary disease) with chronic bronchitis (CMS-HCC)  Chronic in nature. Worsening. States that she is having shortness of breath, wheezing, cough with increased sputum.  He states this is gotten significantly worse with increase the smoke in the air.  No fevers, no chills.  She is also noticing some pretty significant upper respiratory congestion, pressure in her ears and sinuses.  This could be linked to vertigo.    Fibromyalgia  Chronic in nature.  Patient states that she has had some increased pain and feels like she is having a \"flare\" at this time.  Patient states that overall this has been no worse than previous flares she has not noticed any specific swelling, or back pain.    Stage 3b chronic kidney disease (HCC)  Recent labs approximately month 1 month after patient's relapse for alcohol to indicate decreased GFR.  And to continue to monitor this, other labs are ordered as indicated.    Pelvic floor dysfunction  Per patient this is a chronic issue.  States that she mentioned it to the physical therapist when she went for evaluation and they stated that they could not work with her until she was working with physical therapy for her pelvic floor.  Patient states that she has a sensation of having a broom handle shoved up her anus or vagina.  States that this will happen without warning.  Is that the pain is severe and will cause her to jump, move around to hopefully get the pain to resolve.  States that pain will resolve with time.  Patient states that she has not found anything specific that makes it better but states that she does have \"triggers\".      Patient Active Problem List    Diagnosis Date Noted   • Chronic fatigue 08/24/2021   • Pelvic floor dysfunction 08/24/2021   • Stage 3b chronic kidney disease (HCC) 08/24/2021   • Sciatica of right side 05/20/2021   • Elevated hemoglobin A1c 05/20/2021   • Abdominal pain, LUQ (left upper quadrant) 05/20/2021   • " Risk for falls 03/18/2021   • Essential hypertension 05/07/2020   • Obesity (BMI 30-39.9) 08/29/2019   • Dupuytren's contracture of right hand 08/29/2019   • Alcoholism in recovery ([facility]) 08/14/2019   • Deliberate self-cutting 08/14/2019   • Actinic keratosis 02/06/2018   • COPD (chronic obstructive pulmonary disease) with chronic bronchitis ([facility]) 02/09/2017   • H/O hysterectomy with oophorectomy 02/09/2017   • Essential tremor 02/09/2017   • Irritable bowel syndrome with diarrhea 02/09/2017   • Bipolar affective disorder, current episode depressed ([facility]) 02/09/2017   • Migraine without status migrainosus, not intractable 09/15/2016   • Facet arthropathy, cervical 04/08/2016   • Facet arthropathy, lumbar 04/08/2016   • KVNG (generalized anxiety disorder) 09/23/2014   • Interstitial lung disease ([facility]) 09/05/2014   • Tobacco use disorder 06/07/2014   • Seborrheic keratosis 09/25/2013   • Recurrent major depressive disorder ([facility]) 01/27/2010   • Fibromyalgia 01/01/2002       Allergies:Erythromycin and Tetracycline    Current Outpatient Medications   Medication Sig Dispense Refill   • methylPREDNISolone (MEDROL DOSEPAK) 4 MG Tablet Therapy Pack As directed on the packaging label. 21 Tablet 0   • meloxicam (MOBIC) 15 MG tablet TAKE 1 TABLET BY MOUTH EVERY DAY 30 Tablet 0   • ondansetron (ZOFRAN ODT) 4 MG TABLET DISPERSIBLE DISSOLVE 2 TABLETS ON THE TONGUE EVERY 8 HOURS AS NEEDED FOR NAUSEA 30 tablet 0   • baclofen (LIORESAL) 10 MG Tab TAKE 1 TABLET BY MOUTH THREE TIMES A  tablet 0   • famotidine (PEPCID) 20 MG Tab TAKE 1 TABLET BY MOUTH 2 TIMES A  tablet 0   • gabapentin (NEURONTIN) 600 MG tablet TAKE 1 TABLET BY MOUTH THREE TIMES A  tablet 0   • SAVELLA 100 MG Tab TAKE 1 TABLET BY MOUTH TWICE A  tablet 0   • mirtazapine (REMERON) 30 MG Tab tablet TAKE 1 TABLET BY MOUTH EVERYDAY AT BEDTIME 90 tablet 3   • Loratadine (CLARITIN PO) Take 1 tablet by mouth every day.     • topiramate (TOPAMAX) 50 MG  tablet Take 1 tablet by mouth 3 times a day. 270 tablet 3   • clindamycin (CLEOCIN) 2 % vaginal cream Use vaginally after sexual activity for irritation and odor related to BV. 40 g 0   • Thiamine HCl (VITAMIN B-1 PO) Take 1 Tab by mouth every evening.     • PROAIR  (90 Base) MCG/ACT Aero Soln inhalation aerosol INHALE 2 PUFFS BY MOUTH EVERY 6 HOURS AS NEEDED FOR SHORTNESS OF BREATH. 8.5 Inhaler 3   • hydrOXYzine HCl (ATARAX) 25 MG Tab Take 1 Tab by mouth 3 times a day as needed for Anxiety. 90 Tab 0   • dicyclomine (BENTYL) 20 MG Tab Take 20 mg by mouth every 6 hours as needed.     • Cyanocobalamin (VITAMIN B-12) 1000 MCG Tab Take 1 Tab by mouth 2 Times a Day. 60 Tab 11     No current facility-administered medications for this visit.       Social History     Tobacco Use   • Smoking status: Current Every Day Smoker     Packs/day: 1.00     Years: 35.00     Pack years: 35.00   • Smokeless tobacco: Never Used   Vaping Use   • Vaping Use: Never used   Substance Use Topics   • Alcohol use: Not Currently     Comment: 3/30/21: 24 days abstinent   • Drug use: Yes     Types: Marijuana     Comment: clean meth x 16 years       Family Status   Relation Name Status   • Mo  Alive   • Fa 54 yrs old    • Sis  Alive   • Bro  Alive   • MGFa  (Not Specified)   • Sis  Alive   • Sis  Alive   • Sis  Alive     Family History   Problem Relation Age of Onset   • Lung Disease Mother         copd   • Cancer Mother         basal/squamous   • Hypertension Mother    • Hyperlipidemia Mother    • Stroke Mother    • Alcohol abuse Mother    • Heart Disease Father         HF   • Arthritis Father         rheumatoid   • Lung Disease Father         emphysema   • Alcohol abuse Father    • Cancer Sister    • Alcohol abuse Sister    • Cancer Maternal Grandfather         colon or bladder not sure    • Cancer Sister         pre cancerous spots    • Alcohol abuse Sister    • Alcohol abuse Sister    • Alcohol abuse Sister        ROS:   Patient  "denies headache, blurry vision, dizziness, chest pain, shortness of breath, abdominal pain, nausea, vomiting, change in level of consciousness.  All other systems reviewed and are negative except as in HPI.    Exam:  /56 (BP Location: Right arm, Patient Position: Sitting, BP Cuff Size: Large adult)   Pulse 80   Temp 36.4 °C (97.6 °F) (Temporal)   Resp 16   Ht 1.676 m (5' 6\")   Wt 87.5 kg (192 lb 12.8 oz)   SpO2 94%   General:  Normal appearing. No distress.  HEENT:  Normocephalic. Eyes conjunctiva clear lids without ptosis, pupils equal and reactive to light accommodation, ears normal shape and contour, canals are clear bilaterally, tympanic membranes with bilateral effusion nasal mucosa edematous, oropharynx is with erythema, negative edema or exudates.   Pulmonary: Increased effort, positive crackles, positive wheezing throughout.  Cardiovascular: Regular rate and rhythm without murmur. Carotid and radial pulses are intact and equal bilaterally.  Neurologic:  Grossly nonfocal.  Positive 4 beats of nystagmus to the left  Lymph: positive of cervical lymphadenopathy.  Skin:  Warm and dry.  No obvious lesions.  Musculoskeletal:  Normal gait. No extremity cyanosis, clubbing, or edema.  Psych:  Normal mood and affect. Alert and oriented x3. Judgment and insight is normal.      PLAN:    1. Alcoholism in recovery (HCC)  Patient is recommended to continue alcohol cessation.  Discussed that relapse of alcohol could have contributed to changes in labs.  Definitely can contribute to vitamin B deficiencies  - FERRITIN; Future  - TRANSFERRIN; Future  - FOLATE; Future  - VITAMIN B6; Future  - VITAMIN B1; Future    2. Chronic fatigue  - FOLATE; Future  - VITAMIN B6; Future  - VITAMIN B1; Future    3. Facet arthropathy, lumbar  This has not worsened but patient was unable to start physical therapy referral to pelvic floor physical therapy and will discuss with that provider the appropriateness of completing the lower " back physical therapy simultaneously.    4. Pelvic floor dysfunction  Referral to pelvic floor PT  - REFERRAL TO PHYSICAL THERAPY    5. Stage 3b chronic kidney disease (HCC)  - VITAMIN D,25 HYDROXY; Future  - RETICULOCYTES COUNT; Future  - PTH INTACT; Future  - FERRITIN; Future  - TRANSFERRIN; Future    6. Thiamin deficiency  History of vitamin B 1 deficiency  - VITAMIN B1; Future    7. Folate deficiency  History of folate deficiency  - FOLATE; Future    8. Vitamin B6 deficiency  History of B6 deficiency  - VITAMIN B6; Future    9. Vertigo  As it of nystagmus to the left discussed with patient that upper respiratory congestion to be contributing to vertigo  Did discuss meclizine patient contact provider if she would like a prescription.  Discussed options for reducing congestion.,  Patient will treat to COPD exacerbation.  - FERRITIN; Future  - TRANSFERRIN; Future    10. Snoring  - REFERRAL TO PULMONARY AND SLEEP MEDICINE    11. Excessive daytime sleepiness  - REFERRAL TO PULMONARY AND SLEEP MEDICINE    12. COPD with acute exacerbation (HCC)  Patient will complete chest x-ray to determine if she needs an antibiotic, she will Dosepak provided to alleviate COPD exacerbation.  - methylPREDNISolone (MEDROL DOSEPAK) 4 MG Tablet Therapy Pack; As directed on the packaging label.  Dispense: 21 Tablet; Refill: 0  - DX-CHEST-2 VIEWS; Future    13. Fibromyalgia  Continue monitoring.    My total time spent caring for the patient on the day of the encounter was 45 minutes.   This does not include time spent on separately billable procedures/tests.        Return in about 1 month (around 9/24/2021), or if symptoms worsen or fail to improve.    Please note that this dictation was created using voice recognition software. I have made every reasonable attempt to correct obvious errors, but I expect that there are errors of grammar and possibly content that I did not discover before finalizing the note.

## 2021-08-24 NOTE — ASSESSMENT & PLAN NOTE
missed PT intake appointment then she walked in to schedule.  was scheduled and at intake appointment, he asked about

## 2021-08-24 NOTE — ASSESSMENT & PLAN NOTE
Chronic in nature. Worsening. States that she is having shortness of breath, wheezing, cough with increased sputum.  He states this is gotten significantly worse with increase the smoke in the air.  No fevers, no chills.  She is also noticing some pretty significant upper respiratory congestion, pressure in her ears and sinuses.  This could be linked to vertigo.

## 2021-08-25 ENCOUNTER — APPOINTMENT (OUTPATIENT)
Dept: SLEEP MEDICINE | Facility: MEDICAL CENTER | Age: 56
End: 2021-08-25
Payer: MEDICARE

## 2021-08-25 ENCOUNTER — HOSPITAL ENCOUNTER (OUTPATIENT)
Dept: RADIOLOGY | Facility: MEDICAL CENTER | Age: 56
End: 2021-08-25
Attending: NURSE PRACTITIONER
Payer: MEDICARE

## 2021-08-25 DIAGNOSIS — J44.1 COPD WITH ACUTE EXACERBATION (HCC): ICD-10-CM

## 2021-08-25 PROCEDURE — 71046 X-RAY EXAM CHEST 2 VIEWS: CPT

## 2021-08-29 LAB
VIT B1 BLD-MCNC: 232 NMOL/L (ref 70–180)
VIT B6 SERPL-MCNC: 86.4 NMOL/L (ref 20–125)

## 2021-08-30 DIAGNOSIS — M79.7 FIBROMYALGIA: ICD-10-CM

## 2021-09-16 ENCOUNTER — PATIENT OUTREACH (OUTPATIENT)
Dept: MEDICAL GROUP | Facility: PHYSICIAN GROUP | Age: 56
End: 2021-09-16
Payer: MEDICARE

## 2021-09-16 DIAGNOSIS — J44.89 COPD (CHRONIC OBSTRUCTIVE PULMONARY DISEASE) WITH CHRONIC BRONCHITIS (HCC): ICD-10-CM

## 2021-09-16 DIAGNOSIS — I10 ESSENTIAL HYPERTENSION: ICD-10-CM

## 2021-09-16 DIAGNOSIS — F10.21 ALCOHOLISM IN RECOVERY (HCC): ICD-10-CM

## 2021-09-16 DIAGNOSIS — N18.32 STAGE 3B CHRONIC KIDNEY DISEASE: ICD-10-CM

## 2021-09-16 DIAGNOSIS — R53.82 CHRONIC FATIGUE: ICD-10-CM

## 2021-09-16 NOTE — PROGRESS NOTES
Patient outreach for [facility].  Patient very tired was napping.  Will follow up tomorrow 9/17.

## 2021-09-17 DIAGNOSIS — M79.7 FIBROMYALGIA: ICD-10-CM

## 2021-09-17 NOTE — PROGRESS NOTES
Assessment    Patient outreach for [facility].  Jaclyn is still clean and sober.  He chief c/o is exhaustion.  She states she is really tired throughout the day that requires a nap that turns into an 8 hour sleep, then she says she sleeps on and off throughout the night.  Jaclyn got her labs drawn and were reviewed by her PCP.  She was told her GFR is low but otherwise her labs look good.  Jaclyn continues to be in contact with her PCP and stated that she will take each day one at a time.  She continues to monitor he BP and is seems to be stable at this time.  Jaclyn inquired about the shingles vaccine and flu vaccine. Educated on each.  Educated on Air quality and her COPD as a source of being tired, verbalized understanding. Will follow up with Jaclyn next month.    Education    Shingrix vaccine and flu vaccine.  COPD and air quality.    Care Plan    Continue with plan of care    Progress:    On track    Next outreach:  10/22/21 at 2p                             up

## 2021-09-20 RX ORDER — MELOXICAM 15 MG/1
TABLET ORAL
Qty: 30 TABLET | Refills: 0 | Status: SHIPPED | OUTPATIENT
Start: 2021-09-20 | End: 2021-10-14

## 2021-09-24 ENCOUNTER — APPOINTMENT (OUTPATIENT)
Dept: SLEEP MEDICINE | Facility: MEDICAL CENTER | Age: 56
End: 2021-09-24
Attending: NURSE PRACTITIONER
Payer: MEDICARE

## 2021-09-28 ENCOUNTER — NON-PROVIDER VISIT (OUTPATIENT)
Dept: SLEEP MEDICINE | Facility: MEDICAL CENTER | Age: 56
End: 2021-09-28
Attending: NURSE PRACTITIONER

## 2021-09-28 VITALS — BODY MASS INDEX: 29.89 KG/M2 | HEIGHT: 66 IN | WEIGHT: 186 LBS

## 2021-09-28 DIAGNOSIS — J44.89 COPD (CHRONIC OBSTRUCTIVE PULMONARY DISEASE) WITH CHRONIC BRONCHITIS (HCC): ICD-10-CM

## 2021-09-28 DIAGNOSIS — J84.9 INTERSTITIAL LUNG DISEASE (HCC): ICD-10-CM

## 2021-09-28 PROCEDURE — 94726 PLETHYSMOGRAPHY LUNG VOLUMES: CPT | Performed by: INTERNAL MEDICINE

## 2021-09-28 PROCEDURE — 94060 EVALUATION OF WHEEZING: CPT | Performed by: INTERNAL MEDICINE

## 2021-09-28 PROCEDURE — 94729 DIFFUSING CAPACITY: CPT | Performed by: INTERNAL MEDICINE

## 2021-09-28 ASSESSMENT — PULMONARY FUNCTION TESTS
FVC: 3
FVC_PREDICTED: 3.56
FEV1/FVC_PERCENT_PREDICTED: 112
FEV1: 2.66
FVC_PERCENT_PREDICTED: 84
FEV1_PERCENT_PREDICTED: 94
FVC_LLN: 2.97
FVC_PERCENT_PREDICTED: 81
FEV1_PERCENT_CHANGE: -3
FEV1/FVC: 91.41
FVC: 2.91
FEV1_PERCENT_CHANGE: 0
FEV1/FVC_PERCENT_LLN: 67
FEV1/FVC_PERCENT_PREDICTED: 110
FEV1/FVC: 88
FEV1/FVC_PERCENT_PREDICTED: 116
FEV1/FVC_PERCENT_CHANGE: 3
FEV1_LLN: 2.35
FEV1/FVC: 91
FEV1/FVC_PERCENT_CHANGE: 0
FEV1/FVC_PERCENT_PREDICTED: 114
FEV1/FVC: 88
FEV1: 2.65
FEV1/FVC_PREDICTED: 80
FEV1_PERCENT_PREDICTED: 94
FEV1/FVC_PERCENT_PREDICTED: 79
FEV1_PREDICTED: 2.81

## 2021-09-28 ASSESSMENT — FIBROSIS 4 INDEX: FIB4 SCORE: 0.8

## 2021-09-28 NOTE — PROCEDURES
Tech: La Stallings, RRT, CPFT  Tech notes: Good patient effort & cooperation.  Light headedness after FVC.  The results of this test meet the ATS/ERS standards for acceptability & reproducibility.  Test was performed on the [employer] Body Plethysmograph-Elite DX system.  Predicted values were GLI-2012 for spirometry, GLI- 2017 for DLCO, ITS for Lung Volumes.  The DLCO was uncorrected for Hgb.  A bronchodilator of Ventolin HFA -2puffs via spacer administered.  DLCO performed during dilation period.    Interpretation:  Acceptable and reproducible  FEV1 2.65 L [94%], FVC 3 L [84%], ratio 88%  Flow volume loops normal-appearing  TLC 4.91 L [91%]  DLCO 21.56 mL/min millimeter mercury [97%]    Impression  Normal spirometry no response to bronchodilator, normal lung volumes and normal gas transfer.

## 2021-10-04 RX ORDER — MILNACIPRAN HYDROCHLORIDE 100 MG/1
TABLET, FILM COATED ORAL
Qty: 180 TABLET | Refills: 0 | Status: SHIPPED | OUTPATIENT
Start: 2021-10-04 | End: 2022-04-16 | Stop reason: SDUPTHER

## 2021-10-14 DIAGNOSIS — M79.7 FIBROMYALGIA: ICD-10-CM

## 2021-10-14 RX ORDER — MELOXICAM 15 MG/1
TABLET ORAL
Qty: 30 TABLET | Refills: 0 | Status: SHIPPED | OUTPATIENT
Start: 2021-10-14 | End: 2021-11-10

## 2021-10-22 ENCOUNTER — PATIENT OUTREACH (OUTPATIENT)
Dept: MEDICAL GROUP | Facility: PHYSICIAN GROUP | Age: 56
End: 2021-10-22
Payer: MEDICARE

## 2021-10-22 DIAGNOSIS — F10.21 ALCOHOLISM IN RECOVERY (HCC): ICD-10-CM

## 2021-10-22 DIAGNOSIS — R53.82 CHRONIC FATIGUE: ICD-10-CM

## 2021-10-22 DIAGNOSIS — J44.89 COPD (CHRONIC OBSTRUCTIVE PULMONARY DISEASE) WITH CHRONIC BRONCHITIS (HCC): ICD-10-CM

## 2021-10-22 NOTE — PROGRESS NOTES
"Assessment    Patient outreach for follow up CCM.  She stated she has been house sitting/dog sitting for her sister and is feeling a little exhausted.  Enjoying having her daughter and grandson living next door.  She was hoping to do more with them this summer but the smoke made it so they couldn't.  She is hoping to do more with family over the weekends but is still having trouble with exhaustion.  Exhaustion is a little better not needing the nap during the day.  Stated everything is going OK.  She continues to stay sober and keeps herself motivated daily.    Education    Working on motivation.  Stated pretty motivated in general, music helps.  \"Feel good stuff\"    Care Plan    Continue with plan of care    Progress:    Progressing    Next outreach:  11/22/21 at 2p                              "

## 2021-11-10 DIAGNOSIS — M79.7 FIBROMYALGIA: ICD-10-CM

## 2021-11-10 RX ORDER — MELOXICAM 15 MG/1
TABLET ORAL
Qty: 30 TABLET | Refills: 0 | Status: SHIPPED | OUTPATIENT
Start: 2021-11-10 | End: 2021-12-06

## 2021-11-22 ENCOUNTER — PATIENT OUTREACH (OUTPATIENT)
Dept: HEALTH INFORMATION MANAGEMENT | Facility: OTHER | Age: 56
End: 2021-11-22
Payer: MEDICARE

## 2021-11-22 DIAGNOSIS — F10.21 ALCOHOLISM IN RECOVERY (HCC): ICD-10-CM

## 2021-11-22 DIAGNOSIS — J44.89 COPD (CHRONIC OBSTRUCTIVE PULMONARY DISEASE) WITH CHRONIC BRONCHITIS (HCC): ICD-10-CM

## 2021-11-22 DIAGNOSIS — R53.82 CHRONIC FATIGUE: ICD-10-CM

## 2021-11-23 NOTE — PROGRESS NOTES
Assessment    Patient outreach for CCM follow up.  Jaclyn stated she is doing well, she is back at home as her boyfriend she was staying with while her daughter and family stay at her place continues to drink and Jaclyn didn't want to be around while she is trying to stay sober.  She stated that her fatigue is not as bad and she is out with her family getting ready for the holidays.  Suggested to continue with out monthly visits as Jaclyn is a well within a year of sobriety with a relapse.  She agrees to stay with the CCM for now.    Education    Reviewed her decision to stay at home as positive    Care Plan    Continue with plan of care    Progress:    Progressing    Next outreach: 12/21/21 at

## 2021-12-06 DIAGNOSIS — M79.7 FIBROMYALGIA: ICD-10-CM

## 2021-12-06 RX ORDER — MELOXICAM 15 MG/1
TABLET ORAL
Qty: 30 TABLET | Refills: 0 | Status: SHIPPED | OUTPATIENT
Start: 2021-12-06 | End: 2022-01-03

## 2021-12-06 NOTE — TELEPHONE ENCOUNTER
PLEASE CONTACT PATIENT.    Was supposed to follow up with Magdiel 2 months ago. Will need appointment for further refills.

## 2021-12-21 ENCOUNTER — PATIENT OUTREACH (OUTPATIENT)
Dept: HEALTH INFORMATION MANAGEMENT | Facility: OTHER | Age: 56
End: 2021-12-21
Payer: MEDICARE

## 2021-12-21 DIAGNOSIS — R53.82 CHRONIC FATIGUE: ICD-10-CM

## 2021-12-21 DIAGNOSIS — F10.21 ALCOHOLISM IN RECOVERY (HCC): ICD-10-CM

## 2021-12-21 DIAGNOSIS — J44.89 COPD (CHRONIC OBSTRUCTIVE PULMONARY DISEASE) WITH CHRONIC BRONCHITIS (HCC): ICD-10-CM

## 2021-12-21 PROCEDURE — 99999 PR NO CHARGE: CPT | Performed by: NURSE PRACTITIONER

## 2021-12-21 NOTE — PROGRESS NOTES
Assessment    Patient outreach for CCM follow up.  Jaclyn is doing well.  Has family around this holiday season and is happy to have her grandson around for [place].  She is still having some fatigue and weepy in between having periods of energy.  She feels it maybe her bipolar but feels balanced more than having her ups and downs.  Doesn't feel medications are needed at this time.  Still struggling with alcohol did say she had a drink here and there but not continuously.  She stated it is hard this time a year.  She is aware of how much and when she drinks and does not want to get to the point where she was drinking all the time she stated she was not happy then.    Education    Discussed her drinking    Care Plan    Continue with plan of care.    Progress:    On track    Next outreach: 1/20/22 at 230p

## 2021-12-28 ENCOUNTER — OFFICE VISIT (OUTPATIENT)
Dept: MEDICAL GROUP | Facility: PHYSICIAN GROUP | Age: 56
End: 2021-12-28
Payer: MEDICARE

## 2021-12-28 VITALS
TEMPERATURE: 98.4 F | OXYGEN SATURATION: 95 % | HEIGHT: 66 IN | WEIGHT: 196.4 LBS | SYSTOLIC BLOOD PRESSURE: 102 MMHG | BODY MASS INDEX: 31.57 KG/M2 | RESPIRATION RATE: 16 BRPM | HEART RATE: 68 BPM | DIASTOLIC BLOOD PRESSURE: 60 MMHG

## 2021-12-28 DIAGNOSIS — M79.645 THUMB PAIN, LEFT: ICD-10-CM

## 2021-12-28 DIAGNOSIS — Z78.0 POST-MENOPAUSAL: ICD-10-CM

## 2021-12-28 DIAGNOSIS — M25.532 LEFT WRIST PAIN: ICD-10-CM

## 2021-12-28 DIAGNOSIS — M54.31 SCIATICA OF RIGHT SIDE: ICD-10-CM

## 2021-12-28 DIAGNOSIS — M79.7 FIBROMYALGIA: ICD-10-CM

## 2021-12-28 PROCEDURE — 99214 OFFICE O/P EST MOD 30 MIN: CPT | Performed by: NURSE PRACTITIONER

## 2021-12-28 RX ORDER — LIDOCAINE 50 MG/G
1 PATCH TOPICAL EVERY 24 HOURS
Qty: 10 PATCH | Refills: 0 | Status: SHIPPED | OUTPATIENT
Start: 2021-12-28 | End: 2022-04-18 | Stop reason: SDUPTHER

## 2021-12-28 ASSESSMENT — FIBROSIS 4 INDEX: FIB4 SCORE: 0.8

## 2021-12-29 NOTE — ASSESSMENT & PLAN NOTE
-We will discuss potential options to eliminate meloxicam with pharmacist  -We will consider referral to pain management

## 2021-12-29 NOTE — ASSESSMENT & PLAN NOTE
-Symptoms are not consistent with gout, assessment is also not consistent with gout at this time  -Potential for pain related to arthritis versus small fracture from fall  -X-rays ordered to assess further considering pain has continued over the last month and is worsening over the last week

## 2021-12-29 NOTE — PROGRESS NOTES
Subjective:     Chief Complaint   Patient presents with   • Pain     lft wrist and thumb x 1 mo, no numbness, feels hot, burning heat, pain level 10          HPI:   Jaclyn presents today with     Problem   Thumb Pain, Left    This is a new issue. Started approximately 1 month ago. Patient states that she is unsure but she may have fallen approximately 1 month ago. States that she is concerned for fracture related to the fact that the pain has not improved and states that approximately a week after it started hurting she noticed a bruise just inferior to the left thumb. She states that it is tender, does become more swollen at times and states that she has noticed it to be red and warm to touch but states that it does not last. She states that it has become more swollen over the last week. States that it is tender to palpation but that she does feel like she has full range of motion of both her thumb and her wrist to though it does cause her significant pain. She states she has not found anything specific that makes it better or worse she has tried some topical Voltaren but that does not seem to alleviate the discomfort.     Fibromyalgia    Chronic in nature. Patient states that this is overall stable but she is taking meloxicam 15 mg daily and has been doing this over multiple years. She does have decreased kidney function and it would be beneficial to get her off the meloxicam though we are uncertain what we could potentially replace it with as such we discussed potential referral to pain management. To discuss if there are other options.          Current Outpatient Medications Ordered in Epic   Medication Sig Dispense Refill   • lidocaine (LIDODERM) 5 % Patch Place 1 Patch on the skin every 24 hours. 10 Patch 0   • meloxicam (MOBIC) 15 MG tablet TAKE 1 TABLET BY MOUTH EVERY DAY 30 Tablet 0   • ondansetron (ZOFRAN ODT) 4 MG TABLET DISPERSIBLE DISSOLVE 2 TABLETS ON THE TONGUE EVERY 8 HOURS AS NEEDED FOR NAUSEA 15  "Tablet 0   • SAVELLA 100 MG Tab TAKE 1 TABLET BY MOUTH TWICE A  Tablet 0   • diclofenac sodium (VOLTAREN) 1 % Gel PLACE 2 GRAMS ON THE AFFECTED AREA OF THE SKIN 2 TIMES A DAY AS NEEDED. 100 g 0   • baclofen (LIORESAL) 10 MG Tab TAKE 1 TABLET BY MOUTH THREE TIMES A  tablet 0   • famotidine (PEPCID) 20 MG Tab TAKE 1 TABLET BY MOUTH 2 TIMES A  tablet 0   • gabapentin (NEURONTIN) 600 MG tablet TAKE 1 TABLET BY MOUTH THREE TIMES A  tablet 0   • mirtazapine (REMERON) 30 MG Tab tablet TAKE 1 TABLET BY MOUTH EVERYDAY AT BEDTIME 90 tablet 3   • Loratadine (CLARITIN PO) Take 1 tablet by mouth every day.     • topiramate (TOPAMAX) 50 MG tablet Take 1 tablet by mouth 3 times a day. 270 tablet 3   • clindamycin (CLEOCIN) 2 % vaginal cream Use vaginally after sexual activity for irritation and odor related to BV. 40 g 0   • PROAIR  (90 Base) MCG/ACT Aero Soln inhalation aerosol INHALE 2 PUFFS BY MOUTH EVERY 6 HOURS AS NEEDED FOR SHORTNESS OF BREATH. 8.5 Inhaler 3   • hydrOXYzine HCl (ATARAX) 25 MG Tab Take 1 Tab by mouth 3 times a day as needed for Anxiety. 90 Tab 0   • dicyclomine (BENTYL) 20 MG Tab Take 20 mg by mouth every 6 hours as needed.       No current Epic-ordered facility-administered medications on file.     ROS:  Gen: no fevers/chills, no changes in weight  Eyes: no changes in vision  ENT: no sore throat, no hearing loss, no bloody nose  Pulm: no sob, no cough  CV: no chest pain, no palpitations  GI: no nausea/vomiting, no diarrhea  : no dysuria  MSk: no myalgias  Skin: no rash  Neuro: no headaches, no numbness/tingling  Heme/Lymph: no easy bruising      Objective:     Exam:  /60 (BP Location: Right arm, Patient Position: Sitting, BP Cuff Size: Large adult)   Pulse 68   Temp 36.9 °C (98.4 °F) (Temporal)   Resp 16   Ht 1.676 m (5' 6\")   Wt 89.1 kg (196 lb 6.4 oz)   SpO2 95%   BMI 31.70 kg/m²  Body mass index is 31.7 kg/m².    Gen: Alert and oriented, No apparent " distress.  Neck: Neck is supple without lymphadenopathy.  Lungs: Normal effort, CTA bilaterally, no wheezes, rhonchi, or rales  CV: Regular rate and rhythm. No murmurs, rubs, or gallops.  Ext: No clubbing, cyanosis, edema.      Assessment & Plan:     56 y.o. female with the following -     Problem List Items Addressed This Visit     Fibromyalgia     -We will discuss potential options to eliminate meloxicam with pharmacist  -We will consider referral to pain management         Sciatica of right side    Relevant Medications    lidocaine (LIDODERM) 5 % Patch    Thumb pain, left     -Symptoms are not consistent with gout, assessment is also not consistent with gout at this time  -Potential for pain related to arthritis versus small fracture from fall  -X-rays ordered to assess further considering pain has continued over the last month and is worsening over the last week         Relevant Medications    lidocaine (LIDODERM) 5 % Patch    Other Relevant Orders    DX-WRIST-COMPLETE 3+ LEFT    DX-FINGER(S) 2+ LEFT      Other Visit Diagnoses     Post-menopausal        Relevant Orders    DS-BONE DENSITY STUDY (DEXA)    Left wrist pain        Relevant Medications    lidocaine (LIDODERM) 5 % Patch    Other Relevant Orders    DX-WRIST-COMPLETE 3+ LEFT    DX-FINGER(S) 2+ LEFT          I spent a total of 32 minutes with record review, exam, communication with the patient, communication with other providers, and documentation of this encounter.      Return if symptoms worsen or fail to improve.    Please note that this dictation was created using voice recognition software. I have made every reasonable attempt to correct obvious errors, but I expect that there are errors of grammar and possibly content that I did not discover before finalizing the note.

## 2021-12-30 ENCOUNTER — HOSPITAL ENCOUNTER (OUTPATIENT)
Dept: RADIOLOGY | Facility: MEDICAL CENTER | Age: 56
End: 2021-12-30
Attending: NURSE PRACTITIONER
Payer: MEDICARE

## 2021-12-30 DIAGNOSIS — M25.532 LEFT WRIST PAIN: ICD-10-CM

## 2021-12-30 DIAGNOSIS — Z78.0 POST-MENOPAUSAL: ICD-10-CM

## 2021-12-30 DIAGNOSIS — M79.645 THUMB PAIN, LEFT: ICD-10-CM

## 2021-12-30 PROCEDURE — 73110 X-RAY EXAM OF WRIST: CPT | Mod: LT

## 2021-12-30 PROCEDURE — 77080 DXA BONE DENSITY AXIAL: CPT

## 2021-12-30 PROCEDURE — 73140 X-RAY EXAM OF FINGER(S): CPT | Mod: LT

## 2022-01-03 ENCOUNTER — PATIENT MESSAGE (OUTPATIENT)
Dept: MEDICAL GROUP | Facility: PHYSICIAN GROUP | Age: 57
End: 2022-01-03

## 2022-01-03 DIAGNOSIS — M79.7 FIBROMYALGIA: ICD-10-CM

## 2022-01-03 DIAGNOSIS — M25.532 LEFT WRIST PAIN: ICD-10-CM

## 2022-01-03 RX ORDER — MELOXICAM 15 MG/1
TABLET ORAL
Qty: 30 TABLET | Refills: 0 | Status: SHIPPED | OUTPATIENT
Start: 2022-01-03 | End: 2022-01-31

## 2022-01-06 ENCOUNTER — HOSPITAL ENCOUNTER (OUTPATIENT)
Dept: RADIOLOGY | Facility: MEDICAL CENTER | Age: 57
End: 2022-01-06
Attending: NURSE PRACTITIONER
Payer: MEDICARE

## 2022-01-06 ENCOUNTER — PATIENT MESSAGE (OUTPATIENT)
Dept: MEDICAL GROUP | Facility: PHYSICIAN GROUP | Age: 57
End: 2022-01-06

## 2022-01-06 DIAGNOSIS — M25.532 LEFT WRIST PAIN: ICD-10-CM

## 2022-01-06 PROCEDURE — 73200 CT UPPER EXTREMITY W/O DYE: CPT | Mod: LT,ME

## 2022-01-18 DIAGNOSIS — K58.0 IRRITABLE BOWEL SYNDROME WITH DIARRHEA: ICD-10-CM

## 2022-01-19 RX ORDER — ONDANSETRON 4 MG/1
4 TABLET, ORALLY DISINTEGRATING ORAL EVERY 8 HOURS PRN
Qty: 15 TABLET | Refills: 0 | Status: SHIPPED | OUTPATIENT
Start: 2022-01-19 | End: 2022-03-13 | Stop reason: SDUPTHER

## 2022-01-20 ENCOUNTER — PATIENT OUTREACH (OUTPATIENT)
Dept: HEALTH INFORMATION MANAGEMENT | Facility: OTHER | Age: 57
End: 2022-01-20
Payer: MEDICARE

## 2022-01-20 DIAGNOSIS — F31.32 BIPOLAR AFFECTIVE DISORDER, CURRENTLY DEPRESSED, MODERATE (HCC): ICD-10-CM

## 2022-01-20 DIAGNOSIS — F10.21 ALCOHOLISM IN RECOVERY (HCC): ICD-10-CM

## 2022-01-20 PROCEDURE — 99490 CHRNC CARE MGMT STAFF 1ST 20: CPT | Performed by: NURSE PRACTITIONER

## 2022-01-20 NOTE — PROGRESS NOTES
Assessment    Patient outreach for CCM follow up.  Jaclyn stated she is doing well. She had a good holiday, spending with family.  Still struggling with alcohol every now and then, but has been staying away from it as best she can.  Her boyfriend started up drinking again and it has been affecting her and Jaclyn made the hard decision to leave him to take care of herself.  She is working through the emotions of this.  Jaclyn is also having car issues and just feels like there are little issues popping up, but is trying to stay positive and move forward with a new year.  Jaclyn also stated she is seeing [facility] on Monday for a cyst on her wrist that is starting to be painful.  She is hopeful they will be able to help her.  She has her daughter and her family here for support.    Education    Talked about empowerment and doing what is the best for herself and keeping herself healthy.    Care Plan    Continue with plan of care    Progress:    Progressing    Next outreach: 2/15/22 at 230p

## 2022-01-28 ENCOUNTER — PATIENT MESSAGE (OUTPATIENT)
Dept: HEALTH INFORMATION MANAGEMENT | Facility: OTHER | Age: 57
End: 2022-01-28
Payer: MEDICARE

## 2022-01-29 DIAGNOSIS — M79.7 FIBROMYALGIA: ICD-10-CM

## 2022-01-31 RX ORDER — MELOXICAM 15 MG/1
TABLET ORAL
Qty: 30 TABLET | Refills: 0 | Status: SHIPPED | OUTPATIENT
Start: 2022-01-31 | End: 2022-03-01

## 2022-02-01 DIAGNOSIS — G43.809 OTHER MIGRAINE WITHOUT STATUS MIGRAINOSUS, NOT INTRACTABLE: ICD-10-CM

## 2022-02-01 DIAGNOSIS — G47.01 INSOMNIA DUE TO MEDICAL CONDITION: ICD-10-CM

## 2022-02-01 DIAGNOSIS — M79.7 FIBROMYALGIA: ICD-10-CM

## 2022-02-01 RX ORDER — TOPIRAMATE 50 MG/1
TABLET, FILM COATED ORAL
Qty: 270 TABLET | Refills: 0 | Status: SHIPPED | OUTPATIENT
Start: 2022-02-01 | End: 2022-04-21

## 2022-02-01 RX ORDER — MIRTAZAPINE 30 MG/1
TABLET, FILM COATED ORAL
Qty: 90 TABLET | Refills: 0 | Status: SHIPPED | OUTPATIENT
Start: 2022-02-01 | End: 2022-06-10

## 2022-02-15 ENCOUNTER — PATIENT OUTREACH (OUTPATIENT)
Dept: HEALTH INFORMATION MANAGEMENT | Facility: OTHER | Age: 57
End: 2022-02-15

## 2022-02-15 DIAGNOSIS — F10.21 ALCOHOLISM IN RECOVERY (HCC): ICD-10-CM

## 2022-02-15 DIAGNOSIS — R53.82 CHRONIC FATIGUE: ICD-10-CM

## 2022-02-15 DIAGNOSIS — M79.7 FIBROMYALGIA: ICD-10-CM

## 2022-02-15 NOTE — PROGRESS NOTES
Assessment    Patient outreach for CCM follow up.  Jaclyn stated that she thinks she over did it yesterday, she stated her myalgia is flaring up and she is very sore today.  She just took some medication and is laying down so this conversation was a little brief.  She is still moving forward with her sobriety and moving on since breaking up with her boyfriend.  She has her daughter and her family here for support.  She did say that her and her daughter did get into an argument but have had good communication and have worked things out.  Will follow up with Jaclyn next month.    Education    NA this month    Care Plan    Continue with plan of care    Progress:    Progressing    Next outreach:  3/15/22 at 230p

## 2022-02-24 ENCOUNTER — OFFICE VISIT (OUTPATIENT)
Dept: SLEEP MEDICINE | Facility: MEDICAL CENTER | Age: 57
End: 2022-02-24
Payer: MEDICARE

## 2022-02-24 VITALS
SYSTOLIC BLOOD PRESSURE: 116 MMHG | HEART RATE: 80 BPM | BODY MASS INDEX: 30.22 KG/M2 | OXYGEN SATURATION: 92 % | HEIGHT: 66 IN | RESPIRATION RATE: 16 BRPM | WEIGHT: 188 LBS | DIASTOLIC BLOOD PRESSURE: 80 MMHG

## 2022-02-24 DIAGNOSIS — F51.04 CHRONIC INSOMNIA: ICD-10-CM

## 2022-02-24 DIAGNOSIS — G47.30 SLEEP DISORDER BREATHING: Primary | ICD-10-CM

## 2022-02-24 PROCEDURE — 99204 OFFICE O/P NEW MOD 45 MIN: CPT | Performed by: STUDENT IN AN ORGANIZED HEALTH CARE EDUCATION/TRAINING PROGRAM

## 2022-02-24 ASSESSMENT — FIBROSIS 4 INDEX: FIB4 SCORE: 0.81

## 2022-02-24 ASSESSMENT — ENCOUNTER SYMPTOMS: SLEEP DISTURBANCE: 0

## 2022-02-24 NOTE — PROGRESS NOTES
[facility] Consult Note     Date: 2/24/2022 / Time: 12:47 PM      Thank you for requesting a sleep medicine consultation on Jaclyn Mejia at the sleep center. Presents today with the chief complaints of snoring, trouble falling asleep and staying asleep. She is referred by Magdiel Colorado, [facility]  [address] [place] [address] for evaluation and treatment of sleep disorder breathing.     HISTORY OF PRESENT ILLNESS:     Jaclyn Mejia is a 57 y.o. female with depression, anxiety, bipolar disorder, chronic pain syndrome, chronic fatigue, fibromyalgia, alcohol use disorder, COPD, migraines, irritable bowel syndrome, seasonal allergies, and chronic insomnia.  Presents to Sleep Clinic for evaluation of snoring, trouble falling asleep and staying asleep.    She reports for most of her life she has had trouble with sleep initiation as well as sleep maintenance.  States for as long history she can remember she had difficulty with sleep.  She does have a history of substance use disorder and reported that she would be able to sleep when she was drinking alcohol consistently such as 1/5 of alcohol a day.  However she has not used alcohol for the last 3 weeks.  She has not used alcohol regularly for over a year.    She does report her bedtime routine does have a wind down period where she will take her medication as well as prepare for bed.  When she lays down to bed she will often watch TV or watch [employer] on her phone which helps her go to sleep.  When she is asleep she often awakens multiple times in the night.  State her sleep schedule varies greatly as some nights she get to sleep in 10 minutes and other nights it will take 6 hours.  She does report having bouts where she will go a day or 2 without sleep and then sleep for hours.  She states she has never been hospitalized for a manic episode.  However she was told she was bipolar in the past.  She no longer takes any  "medications for her bipolar disorder or see psychiatry.  She states it is difficult to see a psychiatrist due to having co-pays and being on a fixed income with her disability.  She reports her mood has been somewhat stable however this was disrupted due to a break-up with her previous partner.  This occurred in January.  She does have a strong support system and family nearby.    She follows with pain management who is managing her chronic pain syndrome and fibromyalgia.    She has been told by past bed partners that she snores.  In addition she will occasionally awaken screaming.  She also has episodes where she awakens and feels like something is bad is going to happen where she will jump out of bed.  States she is awake and then jumps out of bed and does not awaken when she is out of bed.    As per supplemental questionnaire to be scanned or imported into chart:    Hartline Sleepiness Score: 5    Sleep Schedule  Bedtime: Weekday & Weekend 9-10pm  Wake time: Weekday & Weekend 330am to 230pm  Sleep-onset latency: 10 min to 6 hrs   Awakenings from sleep: 3-6  Difficulty falling back asleep: yes   Bedroom partner: none   Naps: No     DAYTIME SYMPTOMS:   Excessive daytime sleepiness: No   Daytime fatigue: Yes  Difficulty concentrating: Yes  Memory problems: Yes  Irritability:Yes  Work/school performance issues: No   Sleepiness with driving: No   Caffeine/stimulant use: Yes 2 cups coffee sometimes energy drink   Alcohol use:No     SLEEP RELATED SYMPTOMS  Snoring: Yes  Witnessed apnea or gasping/choking: Yes  Dry mouth or mouth breathing: Yes  Sweating: Yes  Teeth grinding/biting: No   Morning headaches: No   Refreshed Upon Awakening: No      SLEEP RELATED BEHAVIORS:  Parasomnias (walking, talking, eating, violence): Yes talking, has had episodes of moving and jumping out of bed.   Leg kicking: Yes  Restless legs - \"urge to move\": Yes  Nightmares: No  Recurrent: No   Dream enactment: No      NARCOLEPSY:  Cataplexy: No "   Sleep paralysis: No   Sleep attacks: No   Hypnagogic/hypnopompic hallucinations: No     MEDICAL HISTORY  Past Medical History:   Diagnosis Date   • Abdominal pain    • Actinic keratosis 2/6/2018   • Alcohol abuse    • Allergy    • Anxiety    • Arthritis    • Back pain    • Back pain    • Bronchitis    • Chronic pain syndrome    • Constipation    • COPD (chronic obstructive pulmonary disease) (HCC)    • Cough    • Daytime sleepiness    • DDD (degenerative disc disease), cervical    • DDD (degenerative disc disease), thoracolumbar    • Depression    • Diarrhea    • Diverticulosis    • Dizziness    • Essential tremor    • Fatigue    • Fibromyalgia    • IBS (irritable bowel syndrome)    • Insomnia    • Migraine    • Nausea    • Painful joint    • Pulmonary emphysema (HCC)    • Restless leg syndrome    • Ringing in ears    • Snoring    • Sore muscles    • Sweat, sweating, excessive    • Weakness    • Wears glasses         SURGICAL HISTORY  Past Surgical History:   Procedure Laterality Date   • ABDOMINAL HYSTERECTOMY TOTAL     • CHOLECYSTECTOMY     • HYSTERECTOMY LAPAROSCOPY     • TONSILLECTOMY          FAMILY HISTORY  Family History   Problem Relation Age of Onset   • Lung Disease Mother         copd   • Cancer Mother         basal/squamous   • Hypertension Mother    • Hyperlipidemia Mother    • Stroke Mother    • Alcohol abuse Mother    • Heart Disease Father         HF   • Arthritis Father         rheumatoid   • Lung Disease Father         emphysema   • Alcohol abuse Father    • Cancer Sister    • Alcohol abuse Sister    • Cancer Maternal Grandfather         Bladder or prostate?   • Cancer Sister         pre cancerous spots    • Alcohol abuse Sister    • Kidney Disease Sister    • Alcohol abuse Sister    • Hypertension Sister    • Alcohol abuse Sister        SOCIAL HISTORY  Social History     Socioeconomic History   • Marital status: Single   Tobacco Use   • Smoking status: Current Every Day Smoker     Packs/day:  1.00     Years: 35.00     Pack years: 35.00     Types: Cigarettes   • Smokeless tobacco: Never Used   Vaping Use   • Vaping Use: Never used   Substance and Sexual Activity   • Alcohol use: Not Currently     Alcohol/week: 0.0 oz     Comment: Not currently drinking alcohol   • Drug use: Yes     Frequency: 7.0 times per week     Types: Marijuana     Comment: Only canibus medicinal for anti spasm, anxiety, ibs, inflamm   • Sexual activity: Yes     Partners: Male, Female     Comment: currently with male   Social History Narrative    Initial Intake Date:  Last Updated:        Financial situation:    SSD $1570/mo w/ $450 savings. We did discuss care credit in regards to behavioral health treatment interventions. Jaclyn        Food resources & insecurities:    Deferred.         Housing & environmental:    Resides in [place] in [facility] ([address]).         Transportation:    Deferred.        Family dynamics & family/friend social supports:    Pt has local family to include 86-year-old mother and sister. She reports family are functioning alcoholics but are supportive in her sobriety. They will provide alternative alcohol free drinks at gatherings. Pt has son currently in inpatient treatment for alcohol use.         Pt has significant other who resides in the same [facility]. He is also pursuing sobriety w/ Jaclyn.        ADLs/IADLs & associated diagnoses:    Pt's ADLs/IADL have previously been affected by her alcohol use. They have cause incontinent issues in the past as well as extended periods of vomiting.         Advanced life planning:    Not on file.         Behavioral/Mental:    Associated diagnoses include migraine without status migrainosus, not intractable; insomnia due to medical condition; recurrent major depressive disorder; alcoholism; deliberate self-cutting; and obesity.        Pt has extensive history of substance use and recovery. In her young adult years pt used methamphatmine for aprox 1 year  "until she sought treatment. At this time she was mother to 2 children. She attended both AA/NA and \"[facility] in the [facility] area. She became sober \"[place]\" and maintained sobriety for 16 years. 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 Pt's daughter then left the home (at age 21) which affected her drinking; at that time she also became disabled. Pt relapsed on methamphetamine with use over 2 years w/ a 9 month period of sobriety. Pt then moved to [place] where her drinking increased (vodka). Pt reports medical symptoms from drinking include every other day vomitting, a period of GI/rectal bleeding and hypertensive events. Per chart review pt drinking 5th vodka daily. Pt's last drink 3/7/2021. She is pursuing sobriety with her significant other, Bradly. Pt is 6 days sober at time of assessment (3/12/2021). She is \"tired of being sick and tired.\"        Pt's reservations include attending inpatient; she would need to take her pet dog with her as no one else can care for the dog. Dog is  animal. And the allotment of time required for intensive out patient (3 days a week); she is concerned her fibromyalgia will affect her attendance. Thre is a general concern of cost which may direct pt towards low income treatment options.         Assessments completed by :    n/a        Collaterals:        Occupation: disability     CURRENT MEDICATIONS  Current Outpatient Medications   Medication Sig Dispense Refill   • topiramate (TOPAMAX) 50 MG tablet TAKE 1 TABLET BY MOUTH THREE TIMES A  Tablet 0   • mirtazapine (REMERON) 30 MG Tab tablet TAKE 1 TABLET BY MOUTH EVERYDAY AT BEDTIME 90 Tablet 0   • meloxicam (MOBIC) 15 MG tablet TAKE 1 TABLET BY MOUTH EVERY DAY 30 Tablet 0   • ondansetron (ZOFRAN ODT) 4 MG TABLET DISPERSIBLE Take 1 Tablet by mouth every 8 hours as needed for Nausea. 15 Tablet 0   • " "lidocaine (LIDODERM) 5 % Patch Place 1 Patch on the skin every 24 hours. 10 Patch 0   • SAVELLA 100 MG Tab TAKE 1 TABLET BY MOUTH TWICE A  Tablet 0   • diclofenac sodium (VOLTAREN) 1 % Gel PLACE 2 GRAMS ON THE AFFECTED AREA OF THE SKIN 2 TIMES A DAY AS NEEDED. 100 g 0   • baclofen (LIORESAL) 10 MG Tab TAKE 1 TABLET BY MOUTH THREE TIMES A  tablet 0   • famotidine (PEPCID) 20 MG Tab TAKE 1 TABLET BY MOUTH 2 TIMES A  tablet 0   • gabapentin (NEURONTIN) 600 MG tablet TAKE 1 TABLET BY MOUTH THREE TIMES A  tablet 0   • Loratadine (CLARITIN PO) Take 1 tablet by mouth every day.     • clindamycin (CLEOCIN) 2 % vaginal cream Use vaginally after sexual activity for irritation and odor related to BV. 40 g 0   • PROAIR  (90 Base) MCG/ACT Aero Soln inhalation aerosol INHALE 2 PUFFS BY MOUTH EVERY 6 HOURS AS NEEDED FOR SHORTNESS OF BREATH. 8.5 Inhaler 3   • hydrOXYzine HCl (ATARAX) 25 MG Tab Take 1 Tab by mouth 3 times a day as needed for Anxiety. 90 Tab 0   • dicyclomine (BENTYL) 20 MG Tab Take 20 mg by mouth every 6 hours as needed.       No current facility-administered medications for this visit.       REVIEW OF SYSTEMS  Constitutional: Denies fevers, Denies weight changes  Ears/Nose/Throat/Mouth: Denies nasal congestion or sore throat   Cardiovascular: Denies chest pain  Respiratory: Denies shortness of breath, Denies cough  Gastrointestinal/Hepatic: Nausea Denies  vomiting  Sleep: see HPI    Physical Examination:  Vitals/ General Appearance:   Weight/BMI: Body mass index is 30.34 kg/m².  /80 (BP Location: Left arm, Patient Position: Sitting, BP Cuff Size: Adult)   Pulse 80   Resp 16   Ht 1.676 m (5' 6\")   Wt 85.3 kg (188 lb)   SpO2 92%   Vitals:    02/24/22 1243   BP: 116/80   BP Location: Left arm   Patient Position: Sitting   BP Cuff Size: Adult   Pulse: 80   Resp: 16   SpO2: 92%   Weight: 85.3 kg (188 lb)   Height: 1.676 m (5' 6\")       Pt. is alert and oriented to time, " place and person. Cooperative and in no apparent distress.     Constitutional: Alert, no distress, well-groomed.  Skin: No rashes in visible areas.  Eye: Round. Conjunctiva clear, lids normal. No icterus.   ENT EXAM  Nasal alae/valves collapsible: No   Nasal septum deviation: Yes  Nasal turbinate hypertrophy: Left: Grade 1   Right: Grade 1  Hard palate narrow: No   Hard palate high: No   Soft palate/uvula (Mallampati score): 3  Tongue Scalloping: Yes  Retrognathia: No   Micrognathia: No   Cardiovascular:no murmus/gallops/rubs, normal S1 and S2 heart sounds, regular rate and rhythm  Pulmonary:Clear to auscultation, No wheezes, No crackles.  Neurologic:Awake, alert and oriented x 3, Normal age appropriate gait, No involuntary motions.  Extremities: No clubbing, cyanosis, or edema       ASSESSMENT AND PLAN   Jaclyn Mejia is a 57 y.o. female with depression, anxiety, bipolar disorder, chronic pain syndrome, chronic fatigue, fibromyalgia, alcohol use disorder, COPD, migraines, irritable bowel syndrome, seasonal allergies, and chronic insomnia.  Presents to Sleep Clinic for evaluation of snoring, trouble falling asleep and staying asleep.      1. Jaclyn Mejia  has symptoms of Obstructive Sleep Apnea (DORA). Jaclyn Mejia has symptoms of snoring, witnessed apnea, dry mouth, unrefreshed upon awakening. These  interfere with activities of daily living.     Pt has risk factors for DORA include obesity, age and crowded oropharynx.     The pathophysiology of DORA and the increased risk of cardiovascular morbidity from untreated DORA is discussed in detail with the patient. She  also has depression, anxiety, fibromyalgia, migraines, insomnia which can be worsened by DORA.      We have discussed diagnostic options including in-laboratory, attended polysomnography and home sleep testing. We have also discussed treatment options including airway pressurization, reconstructive otolaryngologic surgery, dental  appliances and weight management.     Subsequently,treatment options will be discussed based on the diagnostic study. Meanwhile, She is urged to avoid supine sleep, weight gain and alcoholic beverages since all of these can worsen DORA. She is cautioned against drowsy driving. If She feels sleepy while driving, advised must pull over for a break/nap, rather than persist on the road, in the interest of Pt's own safety and that of others on the road.    Plan  -  She  will be scheduled for an overnight  HST to assess sleep related breathing disorder.  - Follow up 1-2 weeks after sleep study to discuss results and treatment options moving forward   -Advised to reach out via MyChart or by phone with any questions or concerns.     2.  Chronic Insomnia   With prolonged sleep latency, frequent awakenings with difficulty falling back asleep and feeling this is impacting daily functioning for decades meets criteria for chronic insomnia. Discussed potential causes of insomnia and importance of having a routine around bedtime. Advised to avoid laying in bed for more then 20-30 min if unable to fall asleep. Dicussed cognitive behavioral therapy for insomnia (CBTi) which is first line treatment for insomnia. Recommended pt strongly consider talking to PCP regarding management of her anxiety, depression and bipolar disorder.  Advised her to consider seeing psychiatry or behavioral medicine to help with her mood disorder as this may be impacting her insomnia.    RECOMMENDATIONS  -Advised to establish with psychiatry  -We also discussed a few interventions noted below to help with the insomnia:  -Maintain a regular sleep schedule  -Avoid caffeinated beverages after lunch, and alcohol in late afternoon and evening  -Avoid prolonged use of light-emitting screens before bedtime  -Exercise regularly for at least 20 minutes, preferably more than four to five hours prior to bedtime  -Avoid daytime naps, especially if they are longer than  20 to 30 minutes or occur late in the day  -Advised to contact our office or myself with any questions via [employer]    Regarding treatment of other past medical problems and general health maintenance,  Pt is urged to follow up with PCP.      Please note portions of this record was created using voice recognition software. I have made every reasonable attempt to correct obvious errors, but I expect that there are errors of grammar and possibly content I did not discover before finalizing the note.

## 2022-03-01 DIAGNOSIS — M79.7 FIBROMYALGIA: ICD-10-CM

## 2022-03-01 RX ORDER — MELOXICAM 15 MG/1
TABLET ORAL
Qty: 30 TABLET | Refills: 0 | Status: SHIPPED | OUTPATIENT
Start: 2022-03-01 | End: 2022-03-28

## 2022-03-13 DIAGNOSIS — M79.7 FIBROMYALGIA: ICD-10-CM

## 2022-03-13 DIAGNOSIS — K58.0 IRRITABLE BOWEL SYNDROME WITH DIARRHEA: ICD-10-CM

## 2022-03-14 RX ORDER — BACLOFEN 10 MG/1
10 TABLET ORAL 3 TIMES DAILY
Qty: 270 TABLET | Refills: 0 | Status: SHIPPED | OUTPATIENT
Start: 2022-03-14 | End: 2022-04-18 | Stop reason: SDUPTHER

## 2022-03-14 RX ORDER — ONDANSETRON 4 MG/1
4 TABLET, ORALLY DISINTEGRATING ORAL EVERY 8 HOURS PRN
Qty: 15 TABLET | Refills: 0 | Status: SHIPPED | OUTPATIENT
Start: 2022-03-14 | End: 2022-04-16 | Stop reason: SDUPTHER

## 2022-03-15 ENCOUNTER — PATIENT OUTREACH (OUTPATIENT)
Dept: HEALTH INFORMATION MANAGEMENT | Facility: OTHER | Age: 57
End: 2022-03-15
Payer: MEDICARE

## 2022-03-15 DIAGNOSIS — F10.21 ALCOHOLISM IN RECOVERY (HCC): ICD-10-CM

## 2022-03-15 DIAGNOSIS — J44.89 COPD (CHRONIC OBSTRUCTIVE PULMONARY DISEASE) WITH CHRONIC BRONCHITIS (HCC): ICD-10-CM

## 2022-03-15 DIAGNOSIS — R53.82 CHRONIC FATIGUE: ICD-10-CM

## 2022-03-16 PROCEDURE — 99490 CHRNC CARE MGMT STAFF 1ST 20: CPT | Performed by: NURSE PRACTITIONER

## 2022-03-16 NOTE — PROGRESS NOTES
Assessment    Patient returned call for [facility] follow up.  Jaclyn is doing well, but is having trouble with her car and is having from frustration from this.  Then she is having trouble with the electrical in her trailer as well.  Just more to add to her to her frustration.  She does have an occasional drink on the weekend with a friend but stays away from alcohol during the week.  Still has her family for support.  Still has the fatigue but is taking each day one at a time.  She is scheduled for a sleep study at the beginning of April.  Also having to deal with some chronic pain in her joints.  Had an injection in her thumb for help.      Education    Reviewed plan    Care Plan    Continue with plan of care    Progress:    Slow progress    Next outreach:  4/20/22 at

## 2022-03-26 DIAGNOSIS — M79.7 FIBROMYALGIA: ICD-10-CM

## 2022-03-28 RX ORDER — MELOXICAM 15 MG/1
TABLET ORAL
Qty: 30 TABLET | Refills: 0 | Status: SHIPPED | OUTPATIENT
Start: 2022-03-28 | End: 2022-04-29

## 2022-04-04 ENCOUNTER — APPOINTMENT (OUTPATIENT)
Dept: SLEEP MEDICINE | Facility: MEDICAL CENTER | Age: 57
End: 2022-04-04
Attending: STUDENT IN AN ORGANIZED HEALTH CARE EDUCATION/TRAINING PROGRAM
Payer: MEDICARE

## 2022-04-16 DIAGNOSIS — R10.12 ABDOMINAL PAIN, LUQ (LEFT UPPER QUADRANT): ICD-10-CM

## 2022-04-16 DIAGNOSIS — B96.89 BV (BACTERIAL VAGINOSIS): ICD-10-CM

## 2022-04-16 DIAGNOSIS — M25.532 LEFT WRIST PAIN: ICD-10-CM

## 2022-04-16 DIAGNOSIS — K58.0 IRRITABLE BOWEL SYNDROME WITH DIARRHEA: ICD-10-CM

## 2022-04-16 DIAGNOSIS — N76.0 BV (BACTERIAL VAGINOSIS): ICD-10-CM

## 2022-04-16 DIAGNOSIS — M79.7 FIBROMYALGIA: ICD-10-CM

## 2022-04-16 DIAGNOSIS — M54.31 SCIATICA OF RIGHT SIDE: ICD-10-CM

## 2022-04-16 DIAGNOSIS — M79.645 THUMB PAIN, LEFT: ICD-10-CM

## 2022-04-18 RX ORDER — FAMOTIDINE 20 MG/1
20 TABLET, FILM COATED ORAL 2 TIMES DAILY
Qty: 180 TABLET | Refills: 0 | Status: SHIPPED | OUTPATIENT
Start: 2022-04-18 | End: 2022-06-01

## 2022-04-18 RX ORDER — BACLOFEN 10 MG/1
10 TABLET ORAL 3 TIMES DAILY
Qty: 270 TABLET | Refills: 0 | Status: SHIPPED | OUTPATIENT
Start: 2022-04-18 | End: 2022-12-08 | Stop reason: SDUPTHER

## 2022-04-18 RX ORDER — MILNACIPRAN HYDROCHLORIDE 100 MG/1
1 TABLET, FILM COATED ORAL 2 TIMES DAILY
Qty: 180 TABLET | Refills: 0 | Status: SHIPPED | OUTPATIENT
Start: 2022-04-18 | End: 2022-12-08 | Stop reason: SDUPTHER

## 2022-04-18 RX ORDER — LIDOCAINE 50 MG/G
1 PATCH TOPICAL EVERY 24 HOURS
Qty: 10 PATCH | Refills: 0 | Status: SHIPPED | OUTPATIENT
Start: 2022-04-18 | End: 2023-06-20

## 2022-04-18 RX ORDER — ONDANSETRON 4 MG/1
4 TABLET, ORALLY DISINTEGRATING ORAL EVERY 8 HOURS PRN
Qty: 15 TABLET | Refills: 0 | Status: SHIPPED | OUTPATIENT
Start: 2022-04-18 | End: 2022-06-09

## 2022-04-18 RX ORDER — CLINDAMYCIN PHOSPHATE 20 MG/G
CREAM VAGINAL
Qty: 40 G | Refills: 0 | Status: SHIPPED | OUTPATIENT
Start: 2022-04-18 | End: 2023-06-20

## 2022-04-19 DIAGNOSIS — G43.809 OTHER MIGRAINE WITHOUT STATUS MIGRAINOSUS, NOT INTRACTABLE: ICD-10-CM

## 2022-04-19 DIAGNOSIS — M79.7 FIBROMYALGIA: ICD-10-CM

## 2022-04-20 ENCOUNTER — APPOINTMENT (OUTPATIENT)
Dept: SLEEP MEDICINE | Facility: MEDICAL CENTER | Age: 57
End: 2022-04-20
Payer: MEDICARE

## 2022-04-20 ENCOUNTER — PATIENT OUTREACH (OUTPATIENT)
Dept: HEALTH INFORMATION MANAGEMENT | Facility: OTHER | Age: 57
End: 2022-04-20
Payer: MEDICARE

## 2022-04-20 DIAGNOSIS — F10.21 ALCOHOLISM IN RECOVERY (HCC): ICD-10-CM

## 2022-04-20 DIAGNOSIS — J44.89 COPD (CHRONIC OBSTRUCTIVE PULMONARY DISEASE) WITH CHRONIC BRONCHITIS (HCC): ICD-10-CM

## 2022-04-20 PROCEDURE — 99490 CHRNC CARE MGMT STAFF 1ST 20: CPT | Performed by: NURSE PRACTITIONER

## 2022-04-20 NOTE — PROGRESS NOTES
Patient outreach for CCM follow up.  Jaclyn was at the pharmacy and could not talk.  Will follow up on Friday 4/22.

## 2022-04-21 RX ORDER — TOPIRAMATE 50 MG/1
TABLET, FILM COATED ORAL
Qty: 270 TABLET | Refills: 0 | Status: SHIPPED | OUTPATIENT
Start: 2022-04-21 | End: 2022-06-09

## 2022-04-21 RX ORDER — GABAPENTIN 600 MG/1
TABLET ORAL
Qty: 270 TABLET | Refills: 0 | Status: SHIPPED | OUTPATIENT
Start: 2022-04-21 | End: 2022-06-09

## 2022-04-22 NOTE — PROGRESS NOTES
Assessment    Patient outreach for CCM follow up.  Jaclyn stated that she is doing well.  Had a bad day yesterday with trying to get her prescriptions filled and was running into issues that made her upset.  She stated that she did get all her prescriptions filled and is doing better.  She has to follow up with another sleep study as her first one did not record.  She also stated that she was told by her family staying with her that she is sleep walking at night.  She was told that she called her son, brushes her teeth, lets the dogs out and she has no recollection of any of this.  Jaclyn stated she will be following up with her physician about this situation.  Jaclyn stated that she has not had a drink in the last month.  Her only issues have been with her car.  She is able to borrow her neighbors for the time being to get to her appointments and errands done.    Education    Reviewed medications    Care Plan    Continue with plan of care    Progress:    On Track    Next outreach:  5/20/22 at

## 2022-04-27 ENCOUNTER — OFFICE VISIT (OUTPATIENT)
Dept: URGENT CARE | Facility: CLINIC | Age: 57
End: 2022-04-27
Payer: MEDICARE

## 2022-04-27 VITALS
BODY MASS INDEX: 30.53 KG/M2 | HEIGHT: 66 IN | HEART RATE: 78 BPM | WEIGHT: 190 LBS | RESPIRATION RATE: 16 BRPM | TEMPERATURE: 97 F | SYSTOLIC BLOOD PRESSURE: 116 MMHG | DIASTOLIC BLOOD PRESSURE: 80 MMHG | OXYGEN SATURATION: 98 %

## 2022-04-27 DIAGNOSIS — R42 LIGHTHEADEDNESS: ICD-10-CM

## 2022-04-27 DIAGNOSIS — F41.8 ANXIETY ABOUT HEALTH: ICD-10-CM

## 2022-04-27 DIAGNOSIS — I10 PRIMARY HYPERTENSION: ICD-10-CM

## 2022-04-27 LAB
APPEARANCE UR: CLEAR
BILIRUB UR STRIP-MCNC: NORMAL MG/DL
COLOR UR AUTO: YELLOW
GLUCOSE UR STRIP.AUTO-MCNC: NORMAL MG/DL
KETONES UR STRIP.AUTO-MCNC: NORMAL MG/DL
LEUKOCYTE ESTERASE UR QL STRIP.AUTO: NORMAL
NITRITE UR QL STRIP.AUTO: NORMAL
PH UR STRIP.AUTO: 7 [PH] (ref 5–8)
PROT UR QL STRIP: NORMAL MG/DL
RBC UR QL AUTO: NORMAL
SP GR UR STRIP.AUTO: 1.02
UROBILINOGEN UR STRIP-MCNC: 0.2 MG/DL

## 2022-04-27 PROCEDURE — 93000 ELECTROCARDIOGRAM COMPLETE: CPT | Performed by: NURSE PRACTITIONER

## 2022-04-27 PROCEDURE — 99214 OFFICE O/P EST MOD 30 MIN: CPT | Performed by: NURSE PRACTITIONER

## 2022-04-27 PROCEDURE — 81002 URINALYSIS NONAUTO W/O SCOPE: CPT | Performed by: NURSE PRACTITIONER

## 2022-04-27 ASSESSMENT — FIBROSIS 4 INDEX: FIB4 SCORE: 0.81

## 2022-04-28 NOTE — PROGRESS NOTES
Subjective:   Jaclyn Mejia is a 57 y.o. female who presents for Blood Pressure Problem (X today, high blood pressure, today 149/107, dizziness, little chest pain)       HPI  Patient presents for evaluation of possible high blood pressure over the past 2 days.  Patient has a history of hypertension, however states that she quit drinking alcohol, and her blood pressure significantly improved, which allowed her primary care provider to discontinue lisinopril.  Over the past few days, patient has noticed increased stress and heart rate.  She is monitoring her blood pressure with a wrist cuff at home, has noticed that her blood pressure ranges 120s to 140s over 80s to 107.  Patient denies chest pain, chest pressure, shortness of breath, lower extremity swelling.    Patient always reports associated intermittent lightheadedness and dizziness, states that it will come and go and she is unable to reproduce it at any time.  Patient is aware that she has several comorbidities, however would like to begin evaluation in the urgent care setting.    ROS  All other systems are negative except as documented above within HPI.    MEDS:   Current Outpatient Medications:   •  gabapentin (NEURONTIN) 600 MG tablet, TAKE 1 TABLET BY MOUTH THREE TIMES A DAY, Disp: 270 Tablet, Rfl: 0  •  topiramate (TOPAMAX) 50 MG tablet, TAKE 1 TABLET BY MOUTH THREE TIMES A DAY, Disp: 270 Tablet, Rfl: 0  •  Milnacipran HCl (SAVELLA) 100 MG Tab, Take 1 Tablet by mouth 2 times a day., Disp: 180 Tablet, Rfl: 0  •  ondansetron (ZOFRAN ODT) 4 MG TABLET DISPERSIBLE, Take 1 Tablet by mouth every 8 hours as needed for Nausea., Disp: 15 Tablet, Rfl: 0  •  clindamycin (CLEOCIN) 2 % vaginal cream, Use vaginally after sexual activity for irritation and odor related to BV., Disp: 40 g, Rfl: 0  •  famotidine (PEPCID) 20 MG Tab, Take 1 Tablet by mouth 2 times a day., Disp: 180 Tablet, Rfl: 0  •  lidocaine (LIDODERM) 5 % Patch, Place 1 Patch on the skin every 24  "hours., Disp: 10 Patch, Rfl: 0  •  baclofen (LIORESAL) 10 MG Tab, Take 1 Tablet by mouth 3 times a day., Disp: 270 Tablet, Rfl: 0  •  meloxicam (MOBIC) 15 MG tablet, TAKE 1 TABLET BY MOUTH EVERY DAY, Disp: 30 Tablet, Rfl: 0  •  diclofenac sodium (VOLTAREN) 1 % Gel, Apply 2 g topically 4 times a day as needed., Disp: 100 g, Rfl: 0  •  mirtazapine (REMERON) 30 MG Tab tablet, TAKE 1 TABLET BY MOUTH EVERYDAY AT BEDTIME, Disp: 90 Tablet, Rfl: 0  •  Loratadine (CLARITIN PO), Take 1 tablet by mouth every day., Disp: , Rfl:   •  PROAIR  (90 Base) MCG/ACT Aero Soln inhalation aerosol, INHALE 2 PUFFS BY MOUTH EVERY 6 HOURS AS NEEDED FOR SHORTNESS OF BREATH., Disp: 8.5 Inhaler, Rfl: 3  •  hydrOXYzine HCl (ATARAX) 25 MG Tab, Take 1 Tab by mouth 3 times a day as needed for Anxiety., Disp: 90 Tab, Rfl: 0  •  dicyclomine (BENTYL) 20 MG Tab, Take 20 mg by mouth every 6 hours as needed., Disp: , Rfl:   ALLERGIES:   Allergies   Allergen Reactions   • Erythromycin Rash     Chest rash   • Tetracycline Rash     Chest rash       Patient's PMH, SocHx, SurgHx, FamHx, Drug allergies and medications were reviewed.     Objective:   /80 (BP Location: Left arm, Patient Position: Sitting, BP Cuff Size: Large adult)   Pulse 78   Temp 36.1 °C (97 °F) (Temporal)   Resp 16   Ht 1.676 m (5' 6\")   Wt 86.2 kg (190 lb)   SpO2 98%   BMI 30.67 kg/m²     Physical Exam  Vitals and nursing note reviewed.   Constitutional:       General: She is awake.      Appearance: Normal appearance. She is well-developed and normal weight.   HENT:      Head: Normocephalic and atraumatic.      Right Ear: Tympanic membrane, ear canal and external ear normal.      Left Ear: Tympanic membrane, ear canal and external ear normal.      Nose: Nose normal.      Mouth/Throat:      Lips: Pink.      Mouth: Mucous membranes are moist.      Pharynx: Oropharynx is clear. Uvula midline.   Eyes:      Extraocular Movements: Extraocular movements intact.      " Conjunctiva/sclera: Conjunctivae normal.      Pupils: Pupils are equal, round, and reactive to light.   Neck:      Thyroid: No thyromegaly.      Trachea: Trachea normal.   Cardiovascular:      Rate and Rhythm: Normal rate and regular rhythm.      Pulses: Normal pulses.      Heart sounds: Normal heart sounds, S1 normal and S2 normal.   Pulmonary:      Effort: Pulmonary effort is normal. No respiratory distress.      Breath sounds: Normal breath sounds. No wheezing, rhonchi or rales.   Abdominal:      General: Bowel sounds are normal.      Palpations: Abdomen is soft.   Musculoskeletal:         General: Normal range of motion.      Cervical back: Full passive range of motion without pain, normal range of motion and neck supple.   Lymphadenopathy:      Cervical: No cervical adenopathy.   Skin:     General: Skin is warm and dry.      Capillary Refill: Capillary refill takes less than 2 seconds.   Neurological:      General: No focal deficit present.      Mental Status: She is alert and oriented to person, place, and time.      Gait: Gait is intact.   Psychiatric:         Attention and Perception: Attention and perception normal.         Mood and Affect: Mood normal.         Speech: Speech normal.         Behavior: Behavior normal. Behavior is cooperative.         Thought Content: Thought content normal.         Judgment: Judgment normal.         Assessment/Plan:   Assessment    1. Lightheadedness  - EKG - Clinic Performed  - POCT Urinalysis    2. Primary hypertension  - EKG - Clinic Performed    3. Anxiety about health      Vital signs stable at today's acute urgent care visit.  Reviewed test results that were completed in the clinic.  EKG NSR w/o acute changes, UA WNL. Discussed management options as indicated.  Recommend continue to monitor in general blood pressure.  As well as dietary intake and output.    Advised the patient to follow-up with the primary care provider for recheck, reevaluation, and/or consideration  of further management.   STRICT red flags discussed and indications to immediately call 911 or present to the ED.  All questions were encouraged and answered to the patient's satisfaction and understanding, and they agree to the plan of care.     I personally reviewed prior external notes and test results pertinent to today's visit.  I have independently reviewed and interpreted all diagnostics ordered during this urgent care acute visit. Time spent evaluating this patient was a minimum of 30 minutes and includes preparing for visit, counseling/education, exam, evaluation, obtaining history, and ordering lab/test/procedures.      Please note that this dictation was created using voice recognition software. I have made a reasonable attempt to correct obvious errors, but I expect that there are errors of grammar and possibly content that I did not discover before finalizing the note.

## 2022-04-29 DIAGNOSIS — M79.7 FIBROMYALGIA: ICD-10-CM

## 2022-05-02 RX ORDER — MELOXICAM 15 MG/1
TABLET ORAL
Qty: 30 TABLET | Refills: 0 | Status: SHIPPED | OUTPATIENT
Start: 2022-05-02 | End: 2022-06-06

## 2022-05-12 DIAGNOSIS — M79.7 FIBROMYALGIA: ICD-10-CM

## 2022-05-18 ENCOUNTER — HOME STUDY (OUTPATIENT)
Dept: SLEEP MEDICINE | Facility: MEDICAL CENTER | Age: 57
End: 2022-05-18
Payer: MEDICARE

## 2022-05-18 PROCEDURE — 95806 SLEEP STUDY UNATT&RESP EFFT: CPT | Performed by: STUDENT IN AN ORGANIZED HEALTH CARE EDUCATION/TRAINING PROGRAM

## 2022-05-20 ENCOUNTER — PATIENT OUTREACH (OUTPATIENT)
Dept: HEALTH INFORMATION MANAGEMENT | Facility: OTHER | Age: 57
End: 2022-05-20
Payer: MEDICARE

## 2022-05-20 DIAGNOSIS — J44.89 COPD (CHRONIC OBSTRUCTIVE PULMONARY DISEASE) WITH CHRONIC BRONCHITIS (HCC): ICD-10-CM

## 2022-05-20 DIAGNOSIS — I10 ESSENTIAL HYPERTENSION: ICD-10-CM

## 2022-05-20 PROCEDURE — 99999 PR NO CHARGE: CPT | Performed by: NURSE PRACTITIONER

## 2022-05-20 NOTE — PROGRESS NOTES
Assessment    Patient outreach for CCM follow up.  Jaclyn stated she is doing well, just did her sleep study a couple days ago.  She is dealing with some plumbing issues with her trailer. Having friends and family help her fix everything.  She also said that she went to  because her BP started to elevate.  At home she said her BP was 149/107 but when she got to  is was 116/80.  She did bring her BP machine to  and the staff instructed her how to take her BP and continue to record her measurements.  Her BP has been stable for the last few weeks.  Jaclyn will continue to monitor her BP.    Education    Educated on BP    Care Plan    Continue with plan of care    Progress:    On track    Next outreach: 6/27/2022 at 230p

## 2022-05-23 NOTE — PROCEDURES
DIAGNOSTIC HOME SLEEP TEST (HST) REPORT       PATIENT ID:  NAME:  Jaclyn Mejia  MRN:               [id number]  YOB: 1965  DATE OF STUDY: 05/18/2022      Impression:     This study shows evidence of:     1.Mild obstructive sleep apnea with  4% Respiratory Event Index (NING) of 14.8 per hour and worse in supine sleep with NING at 16.6. These findings are based on the recording time (flow evaluation time). It is not possible with this device to determine a traditional apnea+hypopnea index (AHI) for total sleep time since EEG channels are not available.     2. Oxygenation O2 Sat. pati was 84% and mean O2 sat was 92% and baseline O2 at 98 %. O2 sat was below 88% for 13 min of the flow evaluation time. Oxygen Desaturation (> 4%) Index was elevated at 15/hr. AVG HR was 81 BPM.      TECHNICAL DESCRIPTION: [employer] apnea link air device used was a type-III home study device. Home sleep study recording included: Airflow recording by nasal pressure transducer; Respiratory Effort by 1 RIP Band; Oxygen Saturation and heart rate by finger oximetry. A position sensor and a snore channel was also used.    Scoring Criteria: A modification of the the AASM Manual for the Scoring of Sleep and Associated Events, 2012, was used.   Obstructive apnea was scored by cessation of airflow for at least 10 seconds with continuing respiratory effort.  Central apnea was scored by cessation of airflow for at least 10 seconds with no effort.  Hypopnea was scored by a 30% or more reduction in airflow for at least 10 seconds accompanied by an arterial oxygen desaturation of 3% or more.  (For Medicare patients, hypopneas were scored by a 30% or more reduction in airflow for at least 10 seconds accompanied by an arterial oxygen saturation of 4% or more, as required by their insurance, CMS.        General sleep summary: . Total recording time is 8 hours and 2 minutes and total flow evaluation time is 7 hours and 49 minutes. The  patient spent 6 hours and 47 minutes in the supine position and 0 hours and 47 minutes in the nonsupine position.    Respiratory events:    Apneas: Total 26 (Obstructive apnea index 3.1/hr, Central apnea index 0.3 /hr, mixed 0 /hour)  Hypopneas: Total 90 with a Hypopnea index of 11.5 /hr    Recommendations:    1. CPAP titration study vs Auto CPAP trial .   2.   In general patients with sleep apnea are advised to avoid alcohol and sedatives and to not operate a motor vehicle while drowsy. In some cases alternative treatment options may prove effective in resolving sleep apnea in these options include upper airway surgery, the use of a dental orthotic or weight loss and positional therapy. Clinical correlation is required.         Rafy Cavanaugh MD

## 2022-05-31 ENCOUNTER — TELEPHONE (OUTPATIENT)
Dept: HEALTH INFORMATION MANAGEMENT | Facility: OTHER | Age: 57
End: 2022-05-31

## 2022-06-01 PROBLEM — E66.9 CLASS 1 OBESITY WITH SERIOUS COMORBIDITY AND BODY MASS INDEX (BMI) OF 31.0 TO 31.9 IN ADULT: Status: ACTIVE | Noted: 2022-06-01

## 2022-06-01 PROBLEM — E66.811 CLASS 1 OBESITY WITH SERIOUS COMORBIDITY AND BODY MASS INDEX (BMI) OF 31.0 TO 31.9 IN ADULT: Status: ACTIVE | Noted: 2022-06-01

## 2022-06-01 PROBLEM — M51.369 DDD (DEGENERATIVE DISC DISEASE), LUMBAR: Status: ACTIVE | Noted: 2022-06-01

## 2022-06-01 PROBLEM — M51.36 DDD (DEGENERATIVE DISC DISEASE), LUMBAR: Status: ACTIVE | Noted: 2022-06-01

## 2022-06-01 PROBLEM — F39 MOOD DISORDER (HCC): Status: ACTIVE | Noted: 2022-06-01

## 2022-06-01 NOTE — TELEPHONE ENCOUNTER
Verified HIPAA- member called to schedule COMPREHENSIVE HEALTH ASSESSMENT and AWV, member stated she has chest pain and she denies symptoms on active symptom protocol, she stated she has a chronic pain condition.   Also provded member hour   [facility] is available from 8 a.m. - 10 p.m., seven days a week, 365 days a year including holidays.  and tel # for [employer]-  [employer] website, access the free mobile shukri or call [phone]

## 2022-06-04 DIAGNOSIS — M79.7 FIBROMYALGIA: ICD-10-CM

## 2022-06-06 RX ORDER — MELOXICAM 15 MG/1
TABLET ORAL
Qty: 30 TABLET | Refills: 0 | Status: SHIPPED | OUTPATIENT
Start: 2022-06-06 | End: 2022-07-06

## 2022-06-09 ENCOUNTER — OFFICE VISIT (OUTPATIENT)
Dept: MEDICAL GROUP | Facility: PHYSICIAN GROUP | Age: 57
End: 2022-06-09
Payer: MEDICARE

## 2022-06-09 VITALS
OXYGEN SATURATION: 97 % | TEMPERATURE: 97.8 F | WEIGHT: 190 LBS | BODY MASS INDEX: 30.53 KG/M2 | DIASTOLIC BLOOD PRESSURE: 76 MMHG | HEIGHT: 66 IN | SYSTOLIC BLOOD PRESSURE: 130 MMHG | HEART RATE: 77 BPM

## 2022-06-09 DIAGNOSIS — K58.0 IRRITABLE BOWEL SYNDROME WITH DIARRHEA: ICD-10-CM

## 2022-06-09 DIAGNOSIS — M79.7 FIBROMYALGIA: ICD-10-CM

## 2022-06-09 DIAGNOSIS — G43.809 OTHER MIGRAINE WITHOUT STATUS MIGRAINOSUS, NOT INTRACTABLE: ICD-10-CM

## 2022-06-09 DIAGNOSIS — Z91.81 RISK FOR FALLS: ICD-10-CM

## 2022-06-09 DIAGNOSIS — F31.32 BIPOLAR AFFECTIVE DISORDER, CURRENTLY DEPRESSED, MODERATE (HCC): ICD-10-CM

## 2022-06-09 DIAGNOSIS — J44.89 COPD (CHRONIC OBSTRUCTIVE PULMONARY DISEASE) WITH CHRONIC BRONCHITIS (HCC): ICD-10-CM

## 2022-06-09 DIAGNOSIS — Z71.6 ENCOUNTER FOR TOBACCO USE CESSATION COUNSELING: ICD-10-CM

## 2022-06-09 DIAGNOSIS — Z23 NEED FOR VACCINATION: ICD-10-CM

## 2022-06-09 DIAGNOSIS — M62.89 PELVIC FLOOR DYSFUNCTION: ICD-10-CM

## 2022-06-09 DIAGNOSIS — J84.9 INTERSTITIAL LUNG DISEASE (HCC): ICD-10-CM

## 2022-06-09 DIAGNOSIS — F10.21 ALCOHOLISM IN RECOVERY (HCC): ICD-10-CM

## 2022-06-09 DIAGNOSIS — I10 ESSENTIAL HYPERTENSION: ICD-10-CM

## 2022-06-09 PROCEDURE — 99214 OFFICE O/P EST MOD 30 MIN: CPT | Performed by: NURSE PRACTITIONER

## 2022-06-09 PROCEDURE — 8041 PR SCP AHA: Performed by: NURSE PRACTITIONER

## 2022-06-09 RX ORDER — TOPIRAMATE 50 MG/1
TABLET, FILM COATED ORAL
Qty: 270 TABLET | Refills: 0 | Status: SHIPPED | OUTPATIENT
Start: 2022-06-09 | End: 2022-09-12

## 2022-06-09 RX ORDER — ONDANSETRON 4 MG/1
4 TABLET, ORALLY DISINTEGRATING ORAL EVERY 8 HOURS PRN
Qty: 15 TABLET | Refills: 0 | Status: SHIPPED | OUTPATIENT
Start: 2022-06-09 | End: 2022-09-09 | Stop reason: SDUPTHER

## 2022-06-09 RX ORDER — GABAPENTIN 600 MG/1
TABLET ORAL
Qty: 270 TABLET | Refills: 0 | Status: SHIPPED | OUTPATIENT
Start: 2022-06-09 | End: 2022-09-12

## 2022-06-09 ASSESSMENT — ENCOUNTER SYMPTOMS
COUGH: 0
NECK PAIN: 1
HEADACHES: 0
ABDOMINAL PAIN: 1
PALPITATIONS: 0
DIZZINESS: 0
MYALGIAS: 1
CHILLS: 0
SHORTNESS OF BREATH: 1
FEVER: 0
BACK PAIN: 1
BLURRED VISION: 0

## 2022-06-09 ASSESSMENT — FIBROSIS 4 INDEX: FIB4 SCORE: 0.81
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
## 2022-06-10 ENCOUNTER — OFFICE VISIT (OUTPATIENT)
Dept: SLEEP MEDICINE | Facility: MEDICAL CENTER | Age: 57
End: 2022-06-10
Payer: MEDICARE

## 2022-06-10 VITALS
SYSTOLIC BLOOD PRESSURE: 122 MMHG | HEIGHT: 66 IN | BODY MASS INDEX: 30.26 KG/M2 | RESPIRATION RATE: 16 BRPM | WEIGHT: 188.3 LBS | HEART RATE: 73 BPM | DIASTOLIC BLOOD PRESSURE: 82 MMHG | OXYGEN SATURATION: 94 %

## 2022-06-10 DIAGNOSIS — G47.33 OSA (OBSTRUCTIVE SLEEP APNEA): Primary | ICD-10-CM

## 2022-06-10 DIAGNOSIS — G47.01 INSOMNIA DUE TO MEDICAL CONDITION: ICD-10-CM

## 2022-06-10 PROCEDURE — 99213 OFFICE O/P EST LOW 20 MIN: CPT | Performed by: STUDENT IN AN ORGANIZED HEALTH CARE EDUCATION/TRAINING PROGRAM

## 2022-06-10 RX ORDER — MIRTAZAPINE 30 MG/1
TABLET, FILM COATED ORAL
Qty: 90 TABLET | Refills: 0 | Status: SHIPPED | OUTPATIENT
Start: 2022-06-10 | End: 2022-09-09

## 2022-06-10 ASSESSMENT — FIBROSIS 4 INDEX: FIB4 SCORE: 0.81

## 2022-06-10 ASSESSMENT — PATIENT HEALTH QUESTIONNAIRE - PHQ9
CLINICAL INTERPRETATION OF PHQ2 SCORE: 6
5. POOR APPETITE OR OVEREATING: 0 - NOT AT ALL
SUM OF ALL RESPONSES TO PHQ QUESTIONS 1-9: 17

## 2022-06-10 NOTE — ASSESSMENT & PLAN NOTE
"- Recommend 5-minute walk test in office, patient declines.  -Patient did recently complete an overnight oximetry study and has follow-up with Dr. Celeste in pulmonology tomorrow  -Referred to general pulmonology related to patient complaints of her \"O2 saturation being all over the place\", further work-up for interstitial lung disease.  -Patient is working on smoking cessation.  States today is her quit date.  -Patient is currently still been smoking a pack a day  "

## 2022-06-10 NOTE — PROGRESS NOTES
[facility] Follow-up Visit    CC: Follow-up to discuss sleep study results      HPI:  Jaclyn Mejia is a 57 y.o.female  with depression, anxiety, bipolar disorder, chronic pain, chronic fatigue, fibromyalgia, alcohol use disorder, COPD, migraines, ureteral bowel syndrome, seasonal allergies, chronic insomnia and obstructive sleep apnea.  Presents today to discuss recent sleep study results.    She states recently she has been having trouble regarding her fibromyalgia.  She is following regularly with her PCP.  She continues to have trouble falling asleep and staying asleep.  She continues to have trouble with low energy during the day.      At last visit was discussed that she may benefit from seeing behavioral medicine.  Since last visit she has not seen behavioral medicine and states she has no desire to see behavioral medicine at this time.  Reports she has been dealing with anxiety and depression for majority of her life and she feels she can cope on her own.    Patient Active Problem List    Diagnosis Date Noted   • Mood disorder (HCC) 06/01/2022   • DDD (degenerative disc disease), lumbar 06/01/2022   • Class 1 obesity with serious comorbidity and body mass index (BMI) of 31.0 to 31.9 in adult 06/01/2022   • Thumb pain, left 12/28/2021   • Chronic fatigue 08/24/2021   • Pelvic floor dysfunction 08/24/2021   • Stage 3b chronic kidney disease ([facility]) 08/24/2021   • Sciatica of right side 05/20/2021   • Elevated hemoglobin A1c 05/20/2021   • Abdominal pain, LUQ (left upper quadrant) 05/20/2021   • Risk for falls 03/18/2021   • Essential hypertension 05/07/2020   • BMI 31.0-31.9,adult 08/29/2019   • Dupuytren's contracture of right hand 08/29/2019   • Alcoholism in recovery ([facility]) 08/14/2019   • Deliberate self-cutting 08/14/2019   • Actinic keratosis 02/06/2018   • COPD (chronic obstructive pulmonary disease) with chronic bronchitis ([facility]) 02/09/2017   • H/O hysterectomy with oophorectomy 02/09/2017    • Essential tremor 02/09/2017   • Irritable bowel syndrome with diarrhea 02/09/2017   • Bipolar affective disorder, current episode depressed ([facility]) 02/09/2017   • Migraine without status migrainosus, not intractable 09/15/2016   • Facet arthropathy, cervical 04/08/2016   • Facet arthropathy, lumbar 04/08/2016   • KVNG (generalized anxiety disorder) 09/23/2014   • Interstitial lung disease ([facility]) 09/05/2014   • Encounter for tobacco use cessation counseling 06/07/2014   • Seborrheic keratosis 09/25/2013   • Recurrent major depressive disorder ([facility]) 01/27/2010   • Fibromyalgia 01/01/2002       Past Medical History:   Diagnosis Date   • Abdominal pain    • Actinic keratosis 2/6/2018   • Alcohol abuse    • Allergy    • Anxiety    • Arthritis    • Back pain    • Back pain    • Bronchitis    • Chronic pain syndrome    • Constipation    • COPD (chronic obstructive pulmonary disease) ([facility])    • Cough    • Daytime sleepiness    • DDD (degenerative disc disease), cervical    • DDD (degenerative disc disease), thoracolumbar    • Depression    • Diarrhea    • Diverticulosis    • Dizziness    • Essential tremor    • Fatigue    • Fibromyalgia    • IBS (irritable bowel syndrome)    • Insomnia    • Migraine    • Nausea    • Painful joint    • Pulmonary emphysema ([facility])    • Restless leg syndrome    • Ringing in ears    • Snoring    • Sore muscles    • Sweat, sweating, excessive    • Weakness    • Wears glasses         Past Surgical History:   Procedure Laterality Date   • ABDOMINAL HYSTERECTOMY TOTAL     • CHOLECYSTECTOMY     • HYSTERECTOMY LAPAROSCOPY     • TONSILLECTOMY         Family History   Problem Relation Age of Onset   • Lung Disease Mother         copd   • Cancer Mother         basal/squamous   • Hypertension Mother    • Hyperlipidemia Mother    • Stroke Mother    • Alcohol abuse Mother    • Heart Disease Father         HF   • Arthritis Father         rheumatoid   • Lung Disease Father         emphysema   • Alcohol abuse  Father    • Cancer Sister    • Alcohol abuse Sister    • Cancer Maternal Grandfather         Bladder or prostate?   • Cancer Sister         pre cancerous spots    • Alcohol abuse Sister    • Kidney Disease Sister    • Alcohol abuse Sister    • Hypertension Sister    • Alcohol abuse Sister        Social History     Socioeconomic History   • Marital status: Single     Spouse name: Not on file   • Number of children: Not on file   • Years of education: Not on file   • Highest education level: Not on file   Occupational History   • Not on file   Tobacco Use   • Smoking status: Current Every Day Smoker     Packs/day: 1.00     Years: 35.00     Pack years: 35.00     Types: Cigarettes   • Smokeless tobacco: Never Used   Vaping Use   • Vaping Use: Every day   • Substances: THC, CBD   Substance and Sexual Activity   • Alcohol use: Not Currently     Alcohol/week: 0.0 oz     Comment: Not currently drinking alcohol   • Drug use: Yes     Frequency: 7.0 times per week     Types: Marijuana, Inhaled     Comment: Only canibus medicinal for anti spasm, anxiety, ibs, inflamm   • Sexual activity: Yes     Partners: Male, Female     Comment: currently with male   Other Topics Concern   • Not on file   Social History Narrative    Initial Intake Date:  Last Updated:        Financial situation:    SSD $1570/mo w/ $450 savings. We did discuss care credit in regards to behavioral health treatment interventions. Jaclyn        Food resources & insecurities:    Deferred.         Housing & environmental:    Resides in [place] in [facility] ([address]).         Transportation:    Deferred.        Family dynamics & family/friend social supports:    Pt has local family to include 86-year-old mother and sister. She reports family are functioning alcoholics but are supportive in her sobriety. They will provide alternative alcohol free drinks at gatherings. Pt has son currently in inpatient treatment for alcohol use.         Pt has significant other  "who resides in the same [facility]. He is also pursuing sobriety w/ Jaclyn.        ADLs/IADLs & associated diagnoses:    Pt's ADLs/IADL have previously been affected by her alcohol use. They have cause incontinent issues in the past as well as extended periods of vomiting.         Advanced life planning:    Not on file.         Behavioral/Mental:    Associated diagnoses include migraine without status migrainosus, not intractable; insomnia due to medical condition; recurrent major depressive disorder; alcoholism; deliberate self-cutting; and obesity.        Pt has extensive history of substance use and recovery. In her young adult years pt used methamphatmine for aprox 1 year until she sought treatment. At this time she was mother to 2 children. She attended both AA/NA and \"[facility] in the [facility] area. She became sober \"[place]\" and maintained sobriety for 16 years. 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 Pt's daughter then left the home (at age 21) which affected her drinking; at that time she also became disabled. Pt relapsed on methamphetamine with use over 2 years w/ a 9 month period of sobriety. Pt then moved to [place] where her drinking increased (vodka). Pt reports medical symptoms from drinking include every other day vomitting, a period of GI/rectal bleeding and hypertensive events. Per chart review pt drinking 5th vodka daily. Pt's last drink 3/7/2021. She is pursuing sobriety with her significant other, Bradly. Pt is 6 days sober at time of assessment (3/12/2021). She is \"tired of being sick and tired.\"        Pt's reservations include attending inpatient; she would need to take her pet dog with her as no one else can care for the dog. Dog is  animal. And the allotment of time required for intensive out patient (3 days a week); she is concerned her fibromyalgia will affect her " attendance. Thre is a general concern of cost which may direct pt towards low income treatment options.         Assessments completed by :    n/a        Collaterals:     Social Determinants of Health     Financial Resource Strain: Not on file   Food Insecurity: Not on file   Transportation Needs: Not on file   Physical Activity: Not on file   Stress: Not on file   Social Connections: Not on file   Intimate Partner Violence: Not on file   Housing Stability: Not on file       Current Outpatient Medications   Medication Sig Dispense Refill   • topiramate (TOPAMAX) 50 MG tablet TAKE 1 TABLET BY MOUTH THREE TIMES A  Tablet 0   • gabapentin (NEURONTIN) 600 MG tablet TAKE 1 TABLET BY MOUTH THREE TIMES A  Tablet 0   • ondansetron (ZOFRAN ODT) 4 MG TABLET DISPERSIBLE TAKE 1 TABLET BY MOUTH EVERY 8 HOURS AS NEEDED FOR NAUSEA 15 Tablet 0   • meloxicam (MOBIC) 15 MG tablet TAKE 1 TABLET BY MOUTH EVERY DAY 30 Tablet 0   • Vitamin D, Cholecalciferol, 50 MCG (2000 UT) Cap Take 1 Capsule by mouth every day.     • magnesium oxide (MAG-OX) 400 MG Tab tablet Take 400 mg by mouth every day.     • diclofenac sodium (VOLTAREN) 1 % Gel APPLY 2 G TOPICALLY 4 TIMES A DAY AS NEEDED. 100 g 0   • Milnacipran HCl (SAVELLA) 100 MG Tab Take 1 Tablet by mouth 2 times a day. 180 Tablet 0   • clindamycin (CLEOCIN) 2 % vaginal cream Use vaginally after sexual activity for irritation and odor related to BV. 40 g 0   • lidocaine (LIDODERM) 5 % Patch Place 1 Patch on the skin every 24 hours. 10 Patch 0   • baclofen (LIORESAL) 10 MG Tab Take 1 Tablet by mouth 3 times a day. 270 Tablet 0   • mirtazapine (REMERON) 30 MG Tab tablet TAKE 1 TABLET BY MOUTH EVERYDAY AT BEDTIME 90 Tablet 0   • Loratadine (CLARITIN PO) Take 1 tablet by mouth every day.     • PROAIR  (90 Base) MCG/ACT Aero Soln inhalation aerosol INHALE 2 PUFFS BY MOUTH EVERY 6 HOURS AS NEEDED FOR SHORTNESS OF BREATH. 8.5 Inhaler 3   • hydrOXYzine HCl (ATARAX) 25  "MG Tab Take 1 Tab by mouth 3 times a day as needed for Anxiety. 90 Tab 0   • dicyclomine (BENTYL) 20 MG Tab Take 20 mg by mouth every 6 hours as needed.       No current facility-administered medications for this visit.        ALLERGIES: Erythromycin and Tetracycline    ROS  Constitutional: Denies fevers, Denies weight changes  Ears/Nose/Throat/Mouth: Denies nasal congestion or sore throat   Cardiovascular: Denies chest pain  Respiratory: Denies shortness of breath, Denies cough  Gastrointestinal/Hepatic: Denies nausea, vomiting  Sleep: see HPI      PHYSICAL EXAM  /82 (BP Location: Left arm, Patient Position: Sitting, BP Cuff Size: Adult)   Pulse 73   Resp 16   Ht 1.664 m (5' 5.5\")   Wt 85.4 kg (188 lb 4.8 oz)   SpO2 94%   BMI 30.86 kg/m²   Appearance: Well-nourished, well-developed, no acute distress  Eyes:  No scleral icterus , EOMI  ENMT: masked  Musculoskeletal:  Grossly normal; gait and station normal; digits and nails normal  Skin:  No rashes, petechiae, cyanosis  Neurologic: without focal signs; oriented to person, time, place, and purpose; judgement intact      Medical Decision Making   Assessment and Plan  Jaclyn Mejia is a 57 y.o.female  with depression, anxiety, bipolar disorder, chronic pain, chronic fatigue, fibromyalgia, alcohol use disorder, COPD, migraines, ureteral bowel syndrome, seasonal allergies, chronic insomnia and obstructive sleep apnea.  Presents today to discuss recent sleep study results.    Obstructive sleep apnea   Reviewed recent HST with patient showing an AHI of 14.8, and Min Oxygen saturation of 84%.   Based on sleep study and symptoms meets criteria for Mild/moderate obstructive sleep apnea.   We discussed the pathophysiology of obstructive sleep apnea (DORA) and risk factors for the disease. We also discussed possible consequences of untreated DORA, including excessive daytime sleepiness and fatigue, cognitive dysfunction, cardiovascular complications such as " elevated blood pressure, heart attacks, cardiac arrhythmias, and strokes. We discussed how DORA typically gets worse with age. We discussed treatment options for DORA, including the gold standard therapy (PAP), alternative options such as a mandibular advancement device (custom-made oral appliances) and surgeries. We will proceed CPAP therapy.     RECOMMENDATIONS  -Start Auto CPAP at pressures 4-7 cm H2O  -Discussed importance of adherence/compliance   -Prescription generated for supplies   -Patient counseled to avoid driving when sleepy. Encouraged to anticipate sleepiness, consider taking a 10 min nap prior to driving, alternate with another , or pull over if sleepy while driving  -Advised to contact our office or myself with any questions via [employer]    Positive airway pressure will favorably impact many of the adverse conditions and effects provoked by DORA.    Have advised the patient to follow up with the appropriate healthcare practitioners for all other medical problems and issues.    Return for 1-2 months after starting CPAP .      Please note portions of this record was created using voice recognition software. I have made every reasonable attempt to correct obvious errors, but I expect that there are errors of grammar and possibly content I did not discover before finalizing the note.

## 2022-07-01 ENCOUNTER — PATIENT OUTREACH (OUTPATIENT)
Dept: HEALTH INFORMATION MANAGEMENT | Facility: OTHER | Age: 57
End: 2022-07-01
Payer: MEDICARE

## 2022-07-01 ENCOUNTER — HOSPITAL ENCOUNTER (OUTPATIENT)
Dept: LAB | Facility: MEDICAL CENTER | Age: 57
End: 2022-07-01
Attending: NURSE PRACTITIONER
Payer: MEDICARE

## 2022-07-01 DIAGNOSIS — J44.89 COPD (CHRONIC OBSTRUCTIVE PULMONARY DISEASE) WITH CHRONIC BRONCHITIS (HCC): ICD-10-CM

## 2022-07-01 DIAGNOSIS — I10 ESSENTIAL HYPERTENSION: ICD-10-CM

## 2022-07-01 DIAGNOSIS — N18.32 STAGE 3B CHRONIC KIDNEY DISEASE: ICD-10-CM

## 2022-07-01 LAB
ALBUMIN SERPL BCP-MCNC: 4.8 G/DL (ref 3.2–4.9)
ALBUMIN/GLOB SERPL: 1.8 G/DL
ALP SERPL-CCNC: 98 U/L (ref 30–99)
ALT SERPL-CCNC: 10 U/L (ref 2–50)
ANION GAP SERPL CALC-SCNC: 13 MMOL/L (ref 7–16)
AST SERPL-CCNC: 17 U/L (ref 12–45)
BASOPHILS # BLD AUTO: 0.7 % (ref 0–1.8)
BASOPHILS # BLD: 0.06 K/UL (ref 0–0.12)
BILIRUB SERPL-MCNC: 0.2 MG/DL (ref 0.1–1.5)
BUN SERPL-MCNC: 27 MG/DL (ref 8–22)
CALCIUM SERPL-MCNC: 9.5 MG/DL (ref 8.5–10.5)
CHLORIDE SERPL-SCNC: 105 MMOL/L (ref 96–112)
CHOLEST SERPL-MCNC: 166 MG/DL (ref 100–199)
CO2 SERPL-SCNC: 22 MMOL/L (ref 20–33)
CREAT SERPL-MCNC: 1.37 MG/DL (ref 0.5–1.4)
EOSINOPHIL # BLD AUTO: 0.17 K/UL (ref 0–0.51)
EOSINOPHIL NFR BLD: 1.9 % (ref 0–6.9)
ERYTHROCYTE [DISTWIDTH] IN BLOOD BY AUTOMATED COUNT: 46.6 FL (ref 35.9–50)
GFR SERPLBLD CREATININE-BSD FMLA CKD-EPI: 45 ML/MIN/1.73 M 2
GLOBULIN SER CALC-MCNC: 2.6 G/DL (ref 1.9–3.5)
GLUCOSE SERPL-MCNC: 79 MG/DL (ref 65–99)
HCT VFR BLD AUTO: 43.9 % (ref 37–47)
HDLC SERPL-MCNC: 40 MG/DL
HGB BLD-MCNC: 14.4 G/DL (ref 12–16)
IMM GRANULOCYTES # BLD AUTO: 0.03 K/UL (ref 0–0.11)
IMM GRANULOCYTES NFR BLD AUTO: 0.3 % (ref 0–0.9)
LDLC SERPL CALC-MCNC: 109 MG/DL
LYMPHOCYTES # BLD AUTO: 3.93 K/UL (ref 1–4.8)
LYMPHOCYTES NFR BLD: 43.1 % (ref 22–41)
MCH RBC QN AUTO: 31.5 PG (ref 27–33)
MCHC RBC AUTO-ENTMCNC: 32.8 G/DL (ref 33.6–35)
MCV RBC AUTO: 96.1 FL (ref 81.4–97.8)
MONOCYTES # BLD AUTO: 0.61 K/UL (ref 0–0.85)
MONOCYTES NFR BLD AUTO: 6.7 % (ref 0–13.4)
NEUTROPHILS # BLD AUTO: 4.31 K/UL (ref 2–7.15)
NEUTROPHILS NFR BLD: 47.3 % (ref 44–72)
NRBC # BLD AUTO: 0 K/UL
NRBC BLD-RTO: 0 /100 WBC
PLATELET # BLD AUTO: 289 K/UL (ref 164–446)
PMV BLD AUTO: 11.1 FL (ref 9–12.9)
POTASSIUM SERPL-SCNC: 4.1 MMOL/L (ref 3.6–5.5)
PROT SERPL-MCNC: 7.4 G/DL (ref 6–8.2)
RBC # BLD AUTO: 4.57 M/UL (ref 4.2–5.4)
SODIUM SERPL-SCNC: 140 MMOL/L (ref 135–145)
TRIGL SERPL-MCNC: 87 MG/DL (ref 0–149)
WBC # BLD AUTO: 9.1 K/UL (ref 4.8–10.8)

## 2022-07-01 PROCEDURE — 80061 LIPID PANEL: CPT

## 2022-07-01 PROCEDURE — 80053 COMPREHEN METABOLIC PANEL: CPT

## 2022-07-01 PROCEDURE — 85025 COMPLETE CBC W/AUTO DIFF WBC: CPT

## 2022-07-01 PROCEDURE — 36415 COLL VENOUS BLD VENIPUNCTURE: CPT

## 2022-07-01 NOTE — PROGRESS NOTES
7/1/22 phone outreach with patient for a CCM monthly follow-up visit & introduction, left voicemail message with contact information.

## 2022-07-06 DIAGNOSIS — M79.7 FIBROMYALGIA: ICD-10-CM

## 2022-07-06 RX ORDER — MELOXICAM 15 MG/1
TABLET ORAL
Qty: 30 TABLET | Refills: 0 | Status: SHIPPED | OUTPATIENT
Start: 2022-07-06 | End: 2022-07-07

## 2022-07-07 ENCOUNTER — PATIENT OUTREACH (OUTPATIENT)
Dept: HEALTH INFORMATION MANAGEMENT | Facility: OTHER | Age: 57
End: 2022-07-07

## 2022-07-07 ENCOUNTER — OFFICE VISIT (OUTPATIENT)
Dept: MEDICAL GROUP | Facility: PHYSICIAN GROUP | Age: 57
End: 2022-07-07
Payer: MEDICARE

## 2022-07-07 VITALS
OXYGEN SATURATION: 94 % | HEART RATE: 73 BPM | HEIGHT: 66 IN | BODY MASS INDEX: 29.89 KG/M2 | TEMPERATURE: 97 F | WEIGHT: 186 LBS | SYSTOLIC BLOOD PRESSURE: 110 MMHG | DIASTOLIC BLOOD PRESSURE: 70 MMHG

## 2022-07-07 DIAGNOSIS — Z72.0 CURRENTLY ATTEMPTING TO QUIT SMOKING: ICD-10-CM

## 2022-07-07 DIAGNOSIS — F10.21 ALCOHOLISM IN RECOVERY (HCC): ICD-10-CM

## 2022-07-07 DIAGNOSIS — N18.32 STAGE 3B CHRONIC KIDNEY DISEASE: ICD-10-CM

## 2022-07-07 DIAGNOSIS — J44.89 COPD (CHRONIC OBSTRUCTIVE PULMONARY DISEASE) WITH CHRONIC BRONCHITIS (HCC): ICD-10-CM

## 2022-07-07 PROCEDURE — 99214 OFFICE O/P EST MOD 30 MIN: CPT | Performed by: NURSE PRACTITIONER

## 2022-07-07 RX ORDER — CHLORAL HYDRATE 500 MG
1000 CAPSULE ORAL
COMMUNITY

## 2022-07-07 ASSESSMENT — FIBROSIS 4 INDEX: FIB4 SCORE: 1.04

## 2022-07-07 NOTE — PROGRESS NOTES
7/7/22 this was my second phone outreach to patient, left voicemail message with contact information, will plan to introduce self and meet with patient following her next PCP visit on 8/9/22.

## 2022-07-08 ENCOUNTER — PATIENT OUTREACH (OUTPATIENT)
Dept: HEALTH INFORMATION MANAGEMENT | Facility: OTHER | Age: 57
End: 2022-07-08
Payer: MEDICARE

## 2022-07-08 DIAGNOSIS — N18.32 STAGE 3B CHRONIC KIDNEY DISEASE: ICD-10-CM

## 2022-07-08 DIAGNOSIS — F10.21 ALCOHOLISM IN RECOVERY (HCC): ICD-10-CM

## 2022-07-08 DIAGNOSIS — J44.89 COPD (CHRONIC OBSTRUCTIVE PULMONARY DISEASE) WITH CHRONIC BRONCHITIS (HCC): ICD-10-CM

## 2022-07-08 PROBLEM — E66.9 CLASS 1 OBESITY WITH SERIOUS COMORBIDITY AND BODY MASS INDEX (BMI) OF 31.0 TO 31.9 IN ADULT: Status: RESOLVED | Noted: 2022-06-01 | Resolved: 2022-07-08

## 2022-07-08 PROBLEM — Z72.0 CURRENTLY ATTEMPTING TO QUIT SMOKING: Status: ACTIVE | Noted: 2022-07-08

## 2022-07-08 PROBLEM — E66.811 CLASS 1 OBESITY WITH SERIOUS COMORBIDITY AND BODY MASS INDEX (BMI) OF 31.0 TO 31.9 IN ADULT: Status: RESOLVED | Noted: 2022-06-01 | Resolved: 2022-07-08

## 2022-07-08 PROCEDURE — 99490 CHRNC CARE MGMT STAFF 1ST 20: CPT | Performed by: NURSE PRACTITIONER

## 2022-07-08 ASSESSMENT — ENCOUNTER SYMPTOMS
DIZZINESS: 0
NAUSEA: 0
PALPITATIONS: 0
CHILLS: 0
DEPRESSION: 0
HEARTBURN: 0
HEADACHES: 0
COUGH: 0
SHORTNESS OF BREATH: 0
FEVER: 0

## 2022-07-08 NOTE — PROGRESS NOTES
"Assessment     Patient returned my prior phone calls re: outreach for CCM follow-up.  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  Jaclyn seems to be doing well.  She is getting a blood test on the Friday before her appointment.  She is working to get her kidney working \"right again.\"  She has cut table salt out of her diet, is drinking lots of fluid including electrolytes, and has not been drinking alcohol. She is trying to stop smoking, she is smoking less.  She is walking for exercise.  She is stopping or trying to stop taking Meloxicam, which she takes to treat her shoulder and fibromyalgia pain.  She has taken Meloxicam for 7 to 8 years.  She is opposed to narcotics, she is a recovering addict.  She uses medicinal cannibals and finds CBD helpful.  She has been seeing Magdiel (her PCP) for 5 years and \"loves\" her, Magdiel takes time and listens to her.  The CCM program had been a wonderful support system for her.  She is interested in receiving massage therapy directed at her fascia tissue to help with her fibromyalgia pain. [facility], she cannot afford to pay out of pocket.  This may be something the [facility] could investigate.  She & Magdiel also discussed PT for her pelvic floor issue but the co-pay is $20.00.  She has reached out to [facility] to apply for financial aide, this is another area where we could use the [facility].  Her son is an alcoholic and is in recovery at [facility].            Education     Will discuss smoking cessation at next meeting on 8/9/22     Care Plan     Continue with plan of care     Progress:     On track     Next outreach: 8/9/2022 at 1100  "

## 2022-08-03 DIAGNOSIS — M79.7 FIBROMYALGIA: ICD-10-CM

## 2022-08-04 RX ORDER — MELOXICAM 15 MG/1
TABLET ORAL
Qty: 30 TABLET | Refills: 0 | OUTPATIENT
Start: 2022-08-04

## 2022-08-08 ENCOUNTER — HOSPITAL ENCOUNTER (OUTPATIENT)
Dept: LAB | Facility: MEDICAL CENTER | Age: 57
End: 2022-08-08
Attending: NURSE PRACTITIONER
Payer: MEDICARE

## 2022-08-08 DIAGNOSIS — N18.32 STAGE 3B CHRONIC KIDNEY DISEASE: ICD-10-CM

## 2022-08-08 LAB
ANION GAP SERPL CALC-SCNC: 14 MMOL/L (ref 7–16)
BUN SERPL-MCNC: 24 MG/DL (ref 8–22)
CALCIUM SERPL-MCNC: 9.5 MG/DL (ref 8.5–10.5)
CHLORIDE SERPL-SCNC: 106 MMOL/L (ref 96–112)
CO2 SERPL-SCNC: 21 MMOL/L (ref 20–33)
CREAT SERPL-MCNC: 1.44 MG/DL (ref 0.5–1.4)
GFR SERPLBLD CREATININE-BSD FMLA CKD-EPI: 43 ML/MIN/1.73 M 2
GLUCOSE SERPL-MCNC: 93 MG/DL (ref 65–99)
POTASSIUM SERPL-SCNC: 4.1 MMOL/L (ref 3.6–5.5)
SODIUM SERPL-SCNC: 141 MMOL/L (ref 135–145)

## 2022-08-08 PROCEDURE — 36415 COLL VENOUS BLD VENIPUNCTURE: CPT

## 2022-08-08 PROCEDURE — 80048 BASIC METABOLIC PNL TOTAL CA: CPT

## 2022-08-09 ENCOUNTER — OFFICE VISIT (OUTPATIENT)
Dept: MEDICAL GROUP | Facility: PHYSICIAN GROUP | Age: 57
End: 2022-08-09
Payer: MEDICARE

## 2022-08-09 VITALS
BODY MASS INDEX: 29.57 KG/M2 | WEIGHT: 184 LBS | DIASTOLIC BLOOD PRESSURE: 62 MMHG | TEMPERATURE: 98.7 F | OXYGEN SATURATION: 94 % | SYSTOLIC BLOOD PRESSURE: 98 MMHG | HEART RATE: 75 BPM | HEIGHT: 66 IN

## 2022-08-09 DIAGNOSIS — F10.21 ALCOHOLISM IN RECOVERY (HCC): ICD-10-CM

## 2022-08-09 DIAGNOSIS — M25.551 BILATERAL HIP PAIN: ICD-10-CM

## 2022-08-09 DIAGNOSIS — M51.36 DDD (DEGENERATIVE DISC DISEASE), LUMBAR: ICD-10-CM

## 2022-08-09 DIAGNOSIS — M25.552 BILATERAL HIP PAIN: ICD-10-CM

## 2022-08-09 DIAGNOSIS — Z12.31 ENCOUNTER FOR SCREENING MAMMOGRAM FOR MALIGNANT NEOPLASM OF BREAST: ICD-10-CM

## 2022-08-09 DIAGNOSIS — N18.32 STAGE 3B CHRONIC KIDNEY DISEASE: ICD-10-CM

## 2022-08-09 DIAGNOSIS — I10 ESSENTIAL HYPERTENSION: ICD-10-CM

## 2022-08-09 PROCEDURE — 99214 OFFICE O/P EST MOD 30 MIN: CPT | Performed by: NURSE PRACTITIONER

## 2022-08-09 ASSESSMENT — ENCOUNTER SYMPTOMS
COUGH: 0
SHORTNESS OF BREATH: 0
DIZZINESS: 1
CHILLS: 0
HEADACHES: 0
MYALGIAS: 1
BACK PAIN: 1
NERVOUS/ANXIOUS: 0
FEVER: 0
ABDOMINAL PAIN: 0
DEPRESSION: 0
PALPITATIONS: 0

## 2022-08-09 ASSESSMENT — FIBROSIS 4 INDEX: FIB4 SCORE: 1.04

## 2022-08-09 NOTE — ASSESSMENT & PLAN NOTE
- Lumbar spine x-ray and hip x-ray to assess significant increase of pain  -We did discuss that some of the increase in pain may be related to stopping the anti-inflammatories, she and I both agree that it is best to stop the anti-inflammatories related to changes in her kidney function.

## 2022-08-10 NOTE — PROGRESS NOTES
Subjective:     Chief Complaint   Patient presents with   • Lab Results   • Medication Management       HPI:   Jaclyn presents today with     Problem   Ddd (Degenerative Disc Disease), Lumbar    Chronic in nature.  States that she has been off of meloxicam pain has been more severe.  She is noticing it more specifically in her mid low back approximately L5, states that it radiates across and down her right hip into her groin states more recently she is also noted to radiating across her left lower back and into her left hip and groin states that she has been noticing more pain with walking and states she is not sure if the hip pain is coming from her low back or if she is having back pain related to her hips.  She does have appointment to establish with physical therapy.  Patient denies any numbness, tingling, bowel or bladder function.     Stage 3b Chronic Kidney Disease (Hcc)    Chronic in nature. Recent GFR is low at 43..  She has significantly increased her hydration, has been working hard to change her diet and eliminate nephrotoxic substances.  BP is currently well-controlled/low since patient is no longer drinking. We discussed exercise, healthy food.  Patient is no longer taking meloxicam.       Essential Hypertension    Blood pressure today is low at 98/62.  Patient states that it has been running in the 110s at home.  She does continue to have dizziness, unchanged.  Denies any headaches, blurry vision, chest pain or palpitations.  Consider referral to neurology.     Alcoholism in Recovery (Hcc)    Chronic in nature. Stable.  Patient states that she continues to be sober and does keep an alcohol free residence.  States she is doing well.         Current Outpatient Medications Ordered in Epic   Medication Sig Dispense Refill   • Omega-3 Fatty Acids (FISH OIL) 1000 MG Cap capsule Take 1,000 mg by mouth 1 time a day as needed.     • mirtazapine (REMERON) 30 MG Tab tablet TAKE 1 TABLET BY MOUTH EVERYDAY AT  BEDTIME 90 Tablet 0   • topiramate (TOPAMAX) 50 MG tablet TAKE 1 TABLET BY MOUTH THREE TIMES A  Tablet 0   • gabapentin (NEURONTIN) 600 MG tablet TAKE 1 TABLET BY MOUTH THREE TIMES A  Tablet 0   • ondansetron (ZOFRAN ODT) 4 MG TABLET DISPERSIBLE TAKE 1 TABLET BY MOUTH EVERY 8 HOURS AS NEEDED FOR NAUSEA 15 Tablet 0   • magnesium oxide (MAG-OX) 400 MG Tab tablet Take 400 mg by mouth every day.     • diclofenac sodium (VOLTAREN) 1 % Gel APPLY 2 G TOPICALLY 4 TIMES A DAY AS NEEDED. 100 g 0   • Milnacipran HCl (SAVELLA) 100 MG Tab Take 1 Tablet by mouth 2 times a day. 180 Tablet 0   • clindamycin (CLEOCIN) 2 % vaginal cream Use vaginally after sexual activity for irritation and odor related to BV. 40 g 0   • lidocaine (LIDODERM) 5 % Patch Place 1 Patch on the skin every 24 hours. 10 Patch 0   • baclofen (LIORESAL) 10 MG Tab Take 1 Tablet by mouth 3 times a day. 270 Tablet 0   • Loratadine (CLARITIN PO) Take 1 tablet by mouth every day.     • PROAIR  (90 Base) MCG/ACT Aero Soln inhalation aerosol INHALE 2 PUFFS BY MOUTH EVERY 6 HOURS AS NEEDED FOR SHORTNESS OF BREATH. 8.5 Inhaler 3   • hydrOXYzine HCl (ATARAX) 25 MG Tab Take 1 Tab by mouth 3 times a day as needed for Anxiety. 90 Tab 0   • dicyclomine (BENTYL) 20 MG Tab Take 20 mg by mouth every 6 hours as needed.       No current Epic-ordered facility-administered medications on file.       Health Maintenance: Completed    Review of Systems   Constitutional: Negative for chills, fever and malaise/fatigue.   Respiratory: Negative for cough and shortness of breath.    Cardiovascular: Negative for chest pain, palpitations and leg swelling.   Gastrointestinal: Negative for abdominal pain.   Musculoskeletal: Positive for back pain and myalgias.   Neurological: Positive for dizziness. Negative for headaches.   Psychiatric/Behavioral: Negative for depression and suicidal ideas. The patient is not nervous/anxious.          Objective:     Exam:  BP (!) 98/62  "(BP Location: Right arm, Patient Position: Sitting, BP Cuff Size: Adult)   Pulse 75   Temp 37.1 °C (98.7 °F) (Temporal)   Ht 1.664 m (5' 5.5\") Comment: pt reported  Wt 83.5 kg (184 lb)   SpO2 94%   BMI 30.15 kg/m²  Body mass index is 30.15 kg/m².    Physical Exam  Constitutional:       Appearance: Normal appearance.   HENT:      Head: Normocephalic and atraumatic.   Eyes:      Pupils: Pupils are equal, round, and reactive to light.   Cardiovascular:      Rate and Rhythm: Normal rate and regular rhythm.      Heart sounds: Normal heart sounds.   Pulmonary:      Effort: Pulmonary effort is normal.      Breath sounds: Normal breath sounds.   Musculoskeletal:      Cervical back: No swelling.      Lumbar back: Spasms and tenderness present. No swelling or bony tenderness. Decreased range of motion.        Back:    Skin:     General: Skin is warm and dry.      Capillary Refill: Capillary refill takes less than 2 seconds.   Neurological:      General: No focal deficit present.      Mental Status: She is alert and oriented to person, place, and time.       Assessment & Plan:     57 y.o. female with the following -     Problem List Items Addressed This Visit     Alcoholism in recovery (HCC)     - Continue sobriety.           DDD (degenerative disc disease), lumbar     - Lumbar spine x-ray and hip x-ray to assess significant increase of pain  -We did discuss that some of the increase in pain may be related to stopping the anti-inflammatories, she and I both agree that it is best to stop the anti-inflammatories related to changes in her kidney function.           Relevant Orders    DX-LUMBAR SPINE-2 OR 3 VIEWS    Essential hypertension     - We will continue to monitor.  -Consider neurology referral for dizziness  -We discussed considering evaluation of medications that may cause decrease BP or dizziness.  At this time patient would like to continue current medications.           Stage 3b chronic kidney disease (HCC)     " - Referral to nephrology for evaluation and recommendations.           Relevant Orders    Referral to Nephrology      Other Visit Diagnoses     Bilateral hip pain        Relevant Orders    DX-HIP-BILATERAL-WITH PELVIS-2 VIEWS    Encounter for screening mammogram for malignant neoplasm of breast        Relevant Orders    MA-SCREENING MAMMO BILAT W/TOMOSYNTHESIS W/CAD            Return in about 1 month (around 9/9/2022), or if symptoms worsen or fail to improve.    I have placed the below orders and discussed them with an approved delegating provider. The MA is performing the below orders under the direction of Dr. Hernandez.     Please note that this dictation was created using voice recognition software. I have made every reasonable attempt to correct obvious errors, but I expect that there are errors of grammar and possibly content that I did not discover before finalizing the note.

## 2022-08-10 NOTE — ASSESSMENT & PLAN NOTE
- We will continue to monitor.  -Consider neurology referral for dizziness  -We discussed considering evaluation of medications that may cause decrease BP or dizziness.  At this time patient would like to continue current medications.

## 2022-08-16 ENCOUNTER — PATIENT OUTREACH (OUTPATIENT)
Dept: HEALTH INFORMATION MANAGEMENT | Facility: OTHER | Age: 57
End: 2022-08-16
Payer: MEDICARE

## 2022-08-16 DIAGNOSIS — J44.89 COPD (CHRONIC OBSTRUCTIVE PULMONARY DISEASE) WITH CHRONIC BRONCHITIS (HCC): ICD-10-CM

## 2022-08-16 SDOH — ECONOMIC STABILITY: FOOD INSECURITY: WITHIN THE PAST 12 MONTHS, YOU WORRIED THAT YOUR FOOD WOULD RUN OUT BEFORE YOU GOT MONEY TO BUY MORE.: NEVER TRUE

## 2022-08-16 SDOH — ECONOMIC STABILITY: INCOME INSECURITY: IN THE LAST 12 MONTHS, WAS THERE A TIME WHEN YOU WERE NOT ABLE TO PAY THE MORTGAGE OR RENT ON TIME?: NO

## 2022-08-16 SDOH — ECONOMIC STABILITY: HOUSING INSECURITY
IN THE LAST 12 MONTHS, WAS THERE A TIME WHEN YOU DID NOT HAVE A STEADY PLACE TO SLEEP OR SLEPT IN A SHELTER (INCLUDING NOW)?: NO

## 2022-08-16 SDOH — ECONOMIC STABILITY: FOOD INSECURITY: WITHIN THE PAST 12 MONTHS, THE FOOD YOU BOUGHT JUST DIDN'T LAST AND YOU DIDN'T HAVE MONEY TO GET MORE.: NEVER TRUE

## 2022-08-16 SDOH — ECONOMIC STABILITY: TRANSPORTATION INSECURITY
IN THE PAST 12 MONTHS, HAS LACK OF TRANSPORTATION KEPT YOU FROM MEETINGS, WORK, OR FROM GETTING THINGS NEEDED FOR DAILY LIVING?: NO

## 2022-08-16 SDOH — ECONOMIC STABILITY: HOUSING INSECURITY: IN THE LAST 12 MONTHS, HOW MANY PLACES HAVE YOU LIVED?: 1

## 2022-08-16 SDOH — HEALTH STABILITY: PHYSICAL HEALTH: ON AVERAGE, HOW MANY MINUTES DO YOU ENGAGE IN EXERCISE AT THIS LEVEL?: 20 MIN

## 2022-08-16 SDOH — HEALTH STABILITY: PHYSICAL HEALTH: ON AVERAGE, HOW MANY DAYS PER WEEK DO YOU ENGAGE IN MODERATE TO STRENUOUS EXERCISE (LIKE A BRISK WALK)?: 6 DAYS

## 2022-08-16 SDOH — ECONOMIC STABILITY: TRANSPORTATION INSECURITY
IN THE PAST 12 MONTHS, HAS THE LACK OF TRANSPORTATION KEPT YOU FROM MEDICAL APPOINTMENTS OR FROM GETTING MEDICATIONS?: YES

## 2022-08-16 ASSESSMENT — SOCIAL DETERMINANTS OF HEALTH (SDOH)
HOW OFTEN DO YOU ATTEND CHURCH OR RELIGIOUS SERVICES?: MORE THAN 4 TIMES PER YEAR
IN A TYPICAL WEEK, HOW MANY TIMES DO YOU TALK ON THE PHONE WITH FAMILY, FRIENDS, OR NEIGHBORS?: MORE THAN THREE TIMES A WEEK
HOW OFTEN DO YOU ATTENT MEETINGS OF THE CLUB OR ORGANIZATION YOU BELONG TO?: NEVER
DO YOU BELONG TO ANY CLUBS OR ORGANIZATIONS SUCH AS CHURCH GROUPS UNIONS, FRATERNAL OR ATHLETIC GROUPS, OR SCHOOL GROUPS?: NO
HOW OFTEN DO YOU GET TOGETHER WITH FRIENDS OR RELATIVES?: MORE THAN THREE TIMES A WEEK
ARE YOU MARRIED, WIDOWED, DIVORCED, SEPARATED, NEVER MARRIED, OR LIVING WITH A PARTNER?: NEVER MARRIED
HOW HARD IS IT FOR YOU TO PAY FOR THE VERY BASICS LIKE FOOD, HOUSING, MEDICAL CARE, AND HEATING?: VERY HARD

## 2022-08-16 ASSESSMENT — LIFESTYLE VARIABLES
HOW MANY STANDARD DRINKS CONTAINING ALCOHOL DO YOU HAVE ON A TYPICAL DAY: 1 OR 2
SKIP TO QUESTIONS 9-10: 0
AUDIT-C TOTAL SCORE: 2
HOW OFTEN DO YOU HAVE SIX OR MORE DRINKS ON ONE OCCASION: LESS THAN MONTHLY
HOW OFTEN DO YOU HAVE A DRINK CONTAINING ALCOHOL: MONTHLY OR LESS

## 2022-08-16 NOTE — PROGRESS NOTES
CCM Community Health Worker Intake    Social determinates of health intake complete.   Identified barriers to food, finances, transportation, and medical equipment.  Contact information provided to Jaclyn Mejia.  Has PCP appointment scheduled for 09/06/22 at 1:20pm.  Scheduled CHW Follow-Up: Next Week  Type of Follow-Up (phone, virtual, clinic, home): Phone  Case Management General Assessment in [facility] completed: by RN.  Review of care plan goals: Yes  Internal Referral to SW: Yes for consult    CHW called patient to complete SDOH intake. Patient is currently living in a trailer in [place], [place] that needs repairs completed. Patient has lived there for 6 years and previously lived in [place], [place]. Patient plans on moving into a house in a few months in [place]. Patient asked about homemaker services and CHW explained [facility] homemaker program only takes referrals for 60 and over and that the wait list is 3-4 years long. CHW spoke with patient about [employer] and other private out of pocket companies. CHW spoke with patient about [employer] personal care. Patient does not want to fill out FE Waiver until after she moves to a house. Patient asked about daughter and son getting paid to be her caregivers. Patient does not have Medicaid FFS, only low income medicare (CHW confirmed on [place] Medicaid site) and may not qualify for family to be care givers. CHW will check in with [employer] and social work.    Patient currently receives food stamps at $20/month. She receives extra currently due to COVID relief help. Patient visits food pantries as needed with her daughter. CHW spoke with patient about food pantries, food pantry prescription, and mobile harvest. CHW will mail out info and sign patient up for pantry prescription. Patient has a small mini freezer and does not have great access for fresh food or frozen food storage. Patient is on a renal diet and has questions about what she can eat. CHW spoke with patient about  renal diet and encouraged low protein intake and low potassium, sodium and phosphorus intake. CHW will send a general list of foods patient can and cannot eat on diet and encouraged patient look at Kidney Diet shukri to see if she can log in her foods and fluids for free. Patient will look into shukri. Patient has a referral to nephrology.    Patient in need of appointments with PT and Nephrology but patient is unable to cover co-pays. Patient currently has SCP and does not pay a monthly premium and has Medicare Extra Help. Patient pays about $3 for medications. Patient has a monthly income of $1600 per month and does not qualify for Medicaid in [place] as she is a single household and the income limit per month is $1396. Patient previously had Medi-Lei and is on Medicare. CHW explained the difference between Medicare, Medicaid, and Medi-Lei. Patient is currently in contact with [facility] to see if there is a better insurance plan for her. Patient in need of x-rays for hip and worried about co-pay. Patient in need of CPAP machine that was ordered from [facility] on 06/10/22. Patient has not heard from them. CHW will call to check in. Patient will have a 20% co-pay for machine (per pt). Patient currently having trouble sleeping due to breathing, pain, and extreme stress. Patient feels as if she is not doing everything for her health.    Patient is an active  and car is currently broken down. CHW spoke with patient about [employer] with [facility] and provided contact info for SCP and  to be able to schedule rides. Patient understands [employer] is for medical appointments and [facility] pantry use only. CHW asked patient how she currently receives medications. Patient's daughter will drive into [place] to  medications for patient or patient will use neighbor/friends car. CHW spoke with patient about medication delivery and asked if patient wanted to look into seeing if she can utilize service based on location. Patient is worried  about switching over medications and having a problem refilling some of them. Patient reports her daughter will go pick them up if it is urgent.     Patient currently feels extreme stress with health, chronic pain, and finances. Patient tearful talking about pain and quality of life. Patient previously involved with pain support group in [facility] but has not looked for any here. CHW talked about free support groups online. Patient reports she has been smoking cigarettes and is back to smoking a pack a day. Patient feels she is failing and bought a carton of cigarettes this morning. Patient was previously slowly discontinuing smoking and using nicotine patch, but feels extreme stress and does not want to worry about stopping at this time. Patient not currently using patch. Patient aware smoking is not good for health and wants to quit when she is less stressed. CHW spoke with patient about SMART Goals and encouraged patient to focus on what she can do, such as smoking slightly less than a pack a day and then slowly start smoking less and less to ensure a sustainable and achievable goal.  Patient reports she currently drinks about one alcoholic beverage a month. Patient was previously an alcoholic about a year ago and went to an intensive outpatient rehabilitation program. Patient reports she does not have any cravings for alcohol and is proud of her accomplishments to sobriety. Patient reports there are AA meetings walking distance from her home that she feels comfortable going to if needed.     Patient reports strong support from her children, friends, neighbors, Mosque, and frida. Patient very hopeful about getting better. Patient goes to [employer]que when she is not in too much pain. Patient reports she is awake all night with stress and has been sleeping a lot during the day. Patient feels a lot of guilt having children care for her and stresses about medical bills, medical conditions, and pain. Patient reports she has been  trying to go for walks for exercises and goes on 20-30 minute slow walks almost daily.    Action Plan:   CHW will check in with social work and [employer] to see if patient's family can qualify to be her caregivers without Medicaid. CHW will mail out food pantry list, mobile harvest list, and food pantry prescription. CHW will mail common foods to avoid and common foods to eat as recommended by the Kidney Diet. CHW will call [facility] to check-in about CPAP machine. CHW will follow up with patient early next week to go over resources and to go over care plan goals.

## 2022-08-18 ENCOUNTER — PATIENT OUTREACH (OUTPATIENT)
Dept: HEALTH INFORMATION MANAGEMENT | Facility: OTHER | Age: 57
End: 2022-08-18
Payer: MEDICARE

## 2022-08-18 DIAGNOSIS — J44.89 COPD (CHRONIC OBSTRUCTIVE PULMONARY DISEASE) WITH CHRONIC BRONCHITIS (HCC): ICD-10-CM

## 2022-08-18 NOTE — PROGRESS NOTES
8/18/22  [facility] and confirmed with [facility] intake patient's daughter will be able to be her caregiver if she qualifies with the waiver, even though she does not have Medicaid.    CHW called [facility]. [facility] reports they have a CPAP machine available for patient and they will submit an insurance authorization today. CHW contacted [facility] case management for assistance with escalating authorization and to talk about patient's co-pays. CHW called patient and reported updates.     CHW spoke with SCP about co-pays. SCP recommended patient apply for hardship program with [facility]. CHW spoke with patient about Hardship program at food pnatry when she came in.

## 2022-08-22 ENCOUNTER — PATIENT OUTREACH (OUTPATIENT)
Dept: HEALTH INFORMATION MANAGEMENT | Facility: OTHER | Age: 57
End: 2022-08-22
Payer: MEDICARE

## 2022-08-22 DIAGNOSIS — J44.89 COPD (CHRONIC OBSTRUCTIVE PULMONARY DISEASE) WITH CHRONIC BRONCHITIS (HCC): ICD-10-CM

## 2022-08-23 ENCOUNTER — HOSPITAL ENCOUNTER (OUTPATIENT)
Dept: RADIOLOGY | Facility: MEDICAL CENTER | Age: 57
End: 2022-08-23
Attending: NURSE PRACTITIONER
Payer: MEDICARE

## 2022-08-23 ENCOUNTER — OFFICE VISIT (OUTPATIENT)
Dept: NEPHROLOGY | Facility: MEDICAL CENTER | Age: 57
End: 2022-08-23
Payer: MEDICARE

## 2022-08-23 VITALS
HEART RATE: 60 BPM | TEMPERATURE: 98.4 F | OXYGEN SATURATION: 98 % | HEIGHT: 66 IN | WEIGHT: 185 LBS | DIASTOLIC BLOOD PRESSURE: 66 MMHG | BODY MASS INDEX: 29.73 KG/M2 | SYSTOLIC BLOOD PRESSURE: 106 MMHG

## 2022-08-23 DIAGNOSIS — M25.551 BILATERAL HIP PAIN: ICD-10-CM

## 2022-08-23 DIAGNOSIS — M25.552 BILATERAL HIP PAIN: ICD-10-CM

## 2022-08-23 DIAGNOSIS — N18.32 STAGE 3B CHRONIC KIDNEY DISEASE: ICD-10-CM

## 2022-08-23 DIAGNOSIS — G89.29 OTHER CHRONIC PAIN: ICD-10-CM

## 2022-08-23 DIAGNOSIS — M79.7 FIBROMYALGIA: ICD-10-CM

## 2022-08-23 DIAGNOSIS — M51.36 DDD (DEGENERATIVE DISC DISEASE), LUMBAR: ICD-10-CM

## 2022-08-23 PROCEDURE — 72100 X-RAY EXAM L-S SPINE 2/3 VWS: CPT

## 2022-08-23 PROCEDURE — 99204 OFFICE O/P NEW MOD 45 MIN: CPT | Performed by: INTERNAL MEDICINE

## 2022-08-23 PROCEDURE — 73521 X-RAY EXAM HIPS BI 2 VIEWS: CPT

## 2022-08-23 ASSESSMENT — ENCOUNTER SYMPTOMS
FEVER: 0
NAUSEA: 0
VOMITING: 0
SHORTNESS OF BREATH: 0
COUGH: 0
BACK PAIN: 1
CHILLS: 0

## 2022-08-23 ASSESSMENT — FIBROSIS 4 INDEX: FIB4 SCORE: 1.04

## 2022-08-23 NOTE — PROGRESS NOTES
"Subjective     Jaclyn Mejia is a 57 y.o. female who presents with Chronic Kidney Disease            Patient is an unfortunate 57-year-old lady with a past medical history significant for chronic pain secondary to fibromyalgia and degenerative joint disease, she was on chronic NSAIDs(meloxicam) which was stopped about 4 weeks ago, patient continues to use NSAIDs gel.  Patient has no hematuria, no dysuria.  She was diagnosed recently with obstructive sleep apnea and she is scheduled to have her CPAP machine delivered.  Her creatinine has been elevated for over a year and recently been fluctuating.    Chronic Kidney Disease  This is a chronic problem. The current episode started more than 1 year ago. The problem occurs constantly. The problem has been waxing and waning. Pertinent negatives include no chest pain, chills, coughing, fever, nausea, urinary symptoms or vomiting. Exacerbated by: NSAIDs. She has tried nothing for the symptoms.     Review of Systems   Constitutional:  Positive for malaise/fatigue. Negative for chills and fever.   Respiratory:  Negative for cough and shortness of breath.    Cardiovascular:  Negative for chest pain and leg swelling.   Gastrointestinal:  Negative for nausea and vomiting.   Genitourinary:  Negative for dysuria, frequency and urgency.   Musculoskeletal:  Positive for back pain.   All other systems reviewed and are negative.           Objective     /66 (BP Location: Right arm, Patient Position: Sitting, BP Cuff Size: Adult)   Pulse 60   Temp 36.9 °C (98.4 °F) (Temporal)   Ht 1.664 m (5' 5.5\")   Wt 83.9 kg (185 lb)   SpO2 98%   BMI 30.32 kg/m²      Physical Exam  Vitals and nursing note reviewed.   Constitutional:       General: She is awake. She is not in acute distress.     Appearance: She is well-developed. She is not ill-appearing or diaphoretic.   HENT:      Head: Normocephalic and atraumatic.      Right Ear: External ear normal.      Left Ear: External ear " normal.      Nose: Nose normal. No rhinorrhea.      Mouth/Throat:      Pharynx: No oropharyngeal exudate or posterior oropharyngeal erythema.   Eyes:      General: No scleral icterus.        Right eye: No discharge.         Left eye: No discharge.      Conjunctiva/sclera: Conjunctivae normal.   Neck:      Vascular: No carotid bruit.   Cardiovascular:      Rate and Rhythm: Normal rate and regular rhythm.      Heart sounds: No murmur heard.  Pulmonary:      Effort: Pulmonary effort is normal. No respiratory distress.      Breath sounds: Normal breath sounds.   Abdominal:      General: Abdomen is flat. There is no distension.      Palpations: Abdomen is soft. There is no mass.   Musculoskeletal:         General: No tenderness.      Cervical back: No rigidity. No muscular tenderness.      Right lower leg: No edema.      Left lower leg: No edema.   Skin:     General: Skin is warm and dry.      Coloration: Skin is not jaundiced.   Neurological:      General: No focal deficit present.      Mental Status: She is alert and oriented to person, place, and time. Mental status is at baseline.   Psychiatric:         Mood and Affect: Mood normal.         Behavior: Behavior normal.         Thought Content: Thought content normal.     Past Medical History:   Diagnosis Date    Abdominal pain     Actinic keratosis 2/6/2018    Alcohol abuse     Allergy     Anxiety     Arthritis     Back pain     Back pain     Bronchitis     Chronic pain syndrome     Constipation     COPD (chronic obstructive pulmonary disease) (HCC)     Cough     Daytime sleepiness     DDD (degenerative disc disease), cervical     DDD (degenerative disc disease), thoracolumbar     Depression     Diarrhea     Diverticulosis     Dizziness     Essential tremor     Fatigue     Fibromyalgia     IBS (irritable bowel syndrome)     Insomnia     Migraine     Nausea     Painful joint     Pulmonary emphysema (HCC)     Restless leg syndrome     Ringing in ears     Snoring     Sore  muscles     Sweat, sweating, excessive     Weakness     Wears glasses      Social History     Socioeconomic History    Marital status: Single     Spouse name: Not on file    Number of children: Not on file    Years of education: Not on file    Highest education level: Not on file   Occupational History    Not on file   Tobacco Use    Smoking status: Every Day     Packs/day: 0.75     Years: 35.00     Pack years: 26.25     Types: Cigarettes     Last attempt to quit: 2022     Years since quittin.1    Smokeless tobacco: Never   Vaping Use    Vaping Use: Every day    Substances: THC, CBD    Devices: Pre-filled or refillable cartridge   Substance and Sexual Activity    Alcohol use: Not Currently     Alcohol/week: 0.0 oz     Comment: Not currently drinking alcohol    Drug use: Yes     Frequency: 7.0 times per week     Types: Marijuana, Inhaled, Oral     Comment: Only canibus medicinal for anti spasm, anxiety, ibs, inflamm    Sexual activity: Not Currently     Partners: Male, Female     Comment: currently with male   Other Topics Concern    Not on file   Social History Narrative    Initial Intake Date:  Last Updated:        Financial situation:    SSD $1570/mo w/ $450 savings. We did discuss care credit in regards to behavioral health treatment interventions. Jaclyn        Food resources & insecurities:    Deferred.         Housing & environmental:    Resides in [place] in [facility] ([address]th wheel).         Transportation:    Deferred.        Family dynamics & family/friend social supports:    Pt has local family to include 86-year-old mother and sister. She reports family are functioning alcoholics but are supportive in her sobriety. They will provide alternative alcohol free drinks at gatherings. Pt has son currently in inpatient treatment for alcohol use.         Pt has significant other who resides in the same [facility]. He is also pursuing sobriety w/ Jaclyn.        ADLs/IADLs & associated diagnoses:  "   Pt's ADLs/IADL have previously been affected by her alcohol use. They have cause incontinent issues in the past as well as extended periods of vomiting.         Advanced life planning:    Not on file.         Behavioral/Mental:    Associated diagnoses include migraine without status migrainosus, not intractable; insomnia due to medical condition; recurrent major depressive disorder; alcoholism; deliberate self-cutting; and obesity.        Pt has extensive history of substance use and recovery. In her young adult years pt used methamphatmine for aprox 1 year until she sought treatment. At this time she was mother to 2 children. She attended both AA/NA and \"[facility]\" in the [facility] area. She became sober \"[place]\" and maintained sobriety for 16 years. 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 Pt's daughter then left the home (at age 21) which affected her drinking; at that time she also became disabled. Pt relapsed on methamphetamine with use over 2 years w/ a 9 month period of sobriety. Pt then moved to [place] where her drinking increased (vodka). Pt reports medical symptoms from drinking include every other day vomitting, a period of GI/rectal bleeding and hypertensive events. Per chart review pt drinking 5th vodka daily. Pt's last drink 3/7/2021. She is pursuing sobriety with her significant other, Bradly. Pt is 6 days sober at time of assessment (3/12/2021). She is \"tired of being sick and tired.\"        Pt's reservations include attending inpatient; she would need to take her pet dog with her as no one else can care for the dog. Dog is  animal. And the allotment of time required for intensive out patient (3 days a week); she is concerned her fibromyalgia will affect her attendance. Thre is a general concern of cost which may direct pt towards low income treatment options.         Assessments completed " by :    n/a        Collaterals:     Social Determinants of Health     Financial Resource Strain: High Risk    Difficulty of Paying Living Expenses: Very hard   Food Insecurity: No Food Insecurity    Worried About Running Out of Food in the Last Year: Never true    Ran Out of Food in the Last Year: Never true   Transportation Needs: Unmet Transportation Needs    Lack of Transportation (Medical): Yes    Lack of Transportation (Non-Medical): No   Physical Activity: Insufficiently Active    Days of Exercise per Week: 6 days    Minutes of Exercise per Session: 20 min   Stress: Stress Concern Present    Feeling of Stress : Very much   Social Connections: Moderately Isolated    Frequency of Communication with Friends and Family: More than three times a week    Frequency of Social Gatherings with Friends and Family: More than three times a week    Attends Mormonism Services: More than 4 times per year    Active Member of Clubs or Organizations: No    Attends Club or Organization Meetings: Never    Marital Status: Never    Intimate Partner Violence: Not on file   Housing Stability: Low Risk     Unable to Pay for Housing in the Last Year: No    Number of Places Lived in the Last Year: 1    Unstable Housing in the Last Year: No     Family History   Problem Relation Age of Onset    Lung Disease Mother         copd    Cancer Mother         basal/squamous    Hypertension Mother     Hyperlipidemia Mother     Stroke Mother     Alcohol abuse Mother     Heart Disease Father         HF    Arthritis Father         rheumatoid    Lung Disease Father         emphysema    Alcohol abuse Father     Cancer Sister     Alcohol abuse Sister     Cancer Maternal Grandfather         Bladder or prostate?    Cancer Sister         pre cancerous spots     Alcohol abuse Sister     Kidney Disease Sister     Alcohol abuse Sister     Hypertension Sister     Alcohol abuse Sister      Recent Labs     07/01/22  1501 08/08/22  1342    ALBUMIN 4.8  --    HDL 40  --    TRIGLYCERIDE 87  --    SODIUM 140 141   POTASSIUM 4.1 4.1   CHLORIDE 105 106   CO2 22 21   BUN 27* 24*   CREATININE 1.37 1.44*                           Assessment & Plan        1. Stage 3b chronic kidney disease (HCC)  The etiology is most likely normal aging process, however her recent worsening creatinine is most likely secondary to NSAIDs and prerenal component  I recommend to avoid NSAIDs gel  Recheck labs in 2 to 3 months after hydration  Renal dose on medication  Avoid nephrotoxins    2. Other chronic pain  Avoid nephrotoxins like NSAIDs    3. Fibromyalgia  Follow-up with primary care provider

## 2022-08-25 ENCOUNTER — PATIENT OUTREACH (OUTPATIENT)
Dept: HEALTH INFORMATION MANAGEMENT | Facility: OTHER | Age: 57
End: 2022-08-25
Payer: MEDICARE

## 2022-08-25 DIAGNOSIS — J44.89 COPD (CHRONIC OBSTRUCTIVE PULMONARY DISEASE) WITH CHRONIC BRONCHITIS (HCC): ICD-10-CM

## 2022-08-25 NOTE — PROGRESS NOTES
Patient called CHW stating [facility] called patient and reported CPAP is authorized. Patient asked them about Hardship Program and they informed her she needs to pay $78 first for the appointment. Patient would then have a co-pay of $12 a month.    CHW called [facility]. [facility] reports patient needs to fill out a form first. [facility] reports they will send it to patient or she can come in and fill it out but that they have to mail it to another place and it can take up to 30 days.      CHW updated patient. Patient reports she will call CHW when she gets the form sent in to decide where to go from there.

## 2022-08-30 ENCOUNTER — HOSPITAL ENCOUNTER (OUTPATIENT)
Dept: RADIOLOGY | Facility: MEDICAL CENTER | Age: 57
End: 2022-08-30
Attending: NURSE PRACTITIONER
Payer: MEDICARE

## 2022-08-30 DIAGNOSIS — Z12.31 ENCOUNTER FOR SCREENING MAMMOGRAM FOR MALIGNANT NEOPLASM OF BREAST: ICD-10-CM

## 2022-08-30 PROCEDURE — 77063 BREAST TOMOSYNTHESIS BI: CPT

## 2022-09-02 NOTE — PROGRESS NOTES
Outreach to patient for PCM follow-up, we will complete her follow-up on 9/6/2 following her PCP visit @ 1320.

## 2022-09-06 ENCOUNTER — PATIENT OUTREACH (OUTPATIENT)
Dept: MEDICAL GROUP | Facility: PHYSICIAN GROUP | Age: 57
End: 2022-09-06
Payer: MEDICARE

## 2022-09-06 DIAGNOSIS — N18.32 STAGE 3B CHRONIC KIDNEY DISEASE: ICD-10-CM

## 2022-09-06 NOTE — PROGRESS NOTES
Patient outreach by phone, left voicemail message with contact information, we were going to complete our monthly outreach for the PCM program today following her PCP visit; however, that appointment was moved to 9/22/22 @ 0900.  Will plan on seeing her then unless she calls me before that date.

## 2022-09-07 ENCOUNTER — PATIENT OUTREACH (OUTPATIENT)
Dept: HEALTH INFORMATION MANAGEMENT | Facility: OTHER | Age: 57
End: 2022-09-07
Payer: MEDICARE

## 2022-09-07 DIAGNOSIS — J44.89 COPD (CHRONIC OBSTRUCTIVE PULMONARY DISEASE) WITH CHRONIC BRONCHITIS (HCC): ICD-10-CM

## 2022-09-07 NOTE — PROGRESS NOTES
Patient returned my call from yesterday & left message, I returned her call & we rescheduled her monthly PCM follow-up for the same day she sees her PCP on 9/22 @ 0900.

## 2022-09-09 ENCOUNTER — PATIENT OUTREACH (OUTPATIENT)
Dept: HEALTH INFORMATION MANAGEMENT | Facility: OTHER | Age: 57
End: 2022-09-09
Payer: MEDICARE

## 2022-09-09 DIAGNOSIS — J44.89 COPD (CHRONIC OBSTRUCTIVE PULMONARY DISEASE) WITH CHRONIC BRONCHITIS (HCC): ICD-10-CM

## 2022-09-09 DIAGNOSIS — M79.7 FIBROMYALGIA: ICD-10-CM

## 2022-09-09 DIAGNOSIS — K58.0 IRRITABLE BOWEL SYNDROME WITH DIARRHEA: ICD-10-CM

## 2022-09-09 DIAGNOSIS — G43.809 OTHER MIGRAINE WITHOUT STATUS MIGRAINOSUS, NOT INTRACTABLE: ICD-10-CM

## 2022-09-09 DIAGNOSIS — G47.01 INSOMNIA DUE TO MEDICAL CONDITION: ICD-10-CM

## 2022-09-09 RX ORDER — MIRTAZAPINE 30 MG/1
TABLET, FILM COATED ORAL
Qty: 90 TABLET | Refills: 0 | Status: SHIPPED | OUTPATIENT
Start: 2022-09-09 | End: 2022-12-08

## 2022-09-09 NOTE — PROGRESS NOTES
09/09/22  CHW called patient to check in about Hardship Program with [facility] and to see if she filled out application. Patient reports she has not yet received application. CHW suggested patient go in person to [facility] and to fill it out there and have them mail it out for her. CHW sent address to patient via text. CHW will check in with patient next week.

## 2022-09-12 RX ORDER — ONDANSETRON 4 MG/1
4 TABLET, ORALLY DISINTEGRATING ORAL EVERY 8 HOURS PRN
Qty: 15 TABLET | Refills: 0 | Status: SHIPPED | OUTPATIENT
Start: 2022-09-12 | End: 2023-02-23

## 2022-09-12 RX ORDER — TOPIRAMATE 50 MG/1
TABLET, FILM COATED ORAL
Qty: 270 TABLET | Refills: 0 | Status: SHIPPED | OUTPATIENT
Start: 2022-09-12 | End: 2022-12-19

## 2022-09-12 RX ORDER — TOPIRAMATE 50 MG/1
50 TABLET, FILM COATED ORAL 3 TIMES DAILY
Qty: 270 TABLET | Refills: 0 | Status: SHIPPED | OUTPATIENT
Start: 2022-09-12 | End: 2023-04-11

## 2022-09-12 RX ORDER — GABAPENTIN 600 MG/1
600 TABLET ORAL 3 TIMES DAILY
Qty: 270 TABLET | Refills: 0 | Status: SHIPPED | OUTPATIENT
Start: 2022-09-12 | End: 2023-04-11

## 2022-09-12 RX ORDER — GABAPENTIN 600 MG/1
TABLET ORAL
Qty: 270 TABLET | Refills: 0 | Status: SHIPPED | OUTPATIENT
Start: 2022-09-12 | End: 2023-12-04 | Stop reason: SDUPTHER

## 2022-09-16 ENCOUNTER — PATIENT OUTREACH (OUTPATIENT)
Dept: HEALTH INFORMATION MANAGEMENT | Facility: OTHER | Age: 57
End: 2022-09-16
Payer: MEDICARE

## 2022-09-16 DIAGNOSIS — J44.89 COPD (CHRONIC OBSTRUCTIVE PULMONARY DISEASE) WITH CHRONIC BRONCHITIS (HCC): ICD-10-CM

## 2022-09-16 NOTE — PROGRESS NOTES
CHW called patient to check in to see if she contacted [facility] about applying for the Hardship program. CHW had recommended patient go in person to apply to prevent delays in mail. CHW left a voice message with contact info for CCM and informed patient to call if she has any other questions or needs and speak with any available CHW. CHW Charmaine will update CELINA García. CHW will not follow.

## 2022-09-22 ENCOUNTER — PATIENT OUTREACH (OUTPATIENT)
Dept: HEALTH INFORMATION MANAGEMENT | Facility: OTHER | Age: 57
End: 2022-09-22

## 2022-09-22 ENCOUNTER — PATIENT MESSAGE (OUTPATIENT)
Dept: HEALTH INFORMATION MANAGEMENT | Facility: OTHER | Age: 57
End: 2022-09-22

## 2022-09-22 ENCOUNTER — OFFICE VISIT (OUTPATIENT)
Dept: MEDICAL GROUP | Facility: PHYSICIAN GROUP | Age: 57
End: 2022-09-22
Payer: MEDICARE

## 2022-09-22 VITALS
HEIGHT: 66 IN | TEMPERATURE: 97 F | SYSTOLIC BLOOD PRESSURE: 102 MMHG | WEIGHT: 186 LBS | HEART RATE: 77 BPM | BODY MASS INDEX: 29.89 KG/M2 | DIASTOLIC BLOOD PRESSURE: 66 MMHG | OXYGEN SATURATION: 97 %

## 2022-09-22 DIAGNOSIS — I10 ESSENTIAL HYPERTENSION: ICD-10-CM

## 2022-09-22 DIAGNOSIS — N18.32 STAGE 3B CHRONIC KIDNEY DISEASE: ICD-10-CM

## 2022-09-22 DIAGNOSIS — F17.200 TOBACCO DEPENDENCE: ICD-10-CM

## 2022-09-22 DIAGNOSIS — M79.7 FIBROMYALGIA: ICD-10-CM

## 2022-09-22 DIAGNOSIS — M51.36 DDD (DEGENERATIVE DISC DISEASE), LUMBAR: ICD-10-CM

## 2022-09-22 DIAGNOSIS — F10.21 ALCOHOLISM IN RECOVERY (HCC): ICD-10-CM

## 2022-09-22 DIAGNOSIS — F31.32 BIPOLAR AFFECTIVE DISORDER, CURRENTLY DEPRESSED, MODERATE (HCC): ICD-10-CM

## 2022-09-22 DIAGNOSIS — G43.809 OTHER MIGRAINE WITHOUT STATUS MIGRAINOSUS, NOT INTRACTABLE: ICD-10-CM

## 2022-09-22 DIAGNOSIS — M62.89 PELVIC FLOOR DYSFUNCTION: ICD-10-CM

## 2022-09-22 PROCEDURE — 99490 CHRNC CARE MGMT STAFF 1ST 20: CPT | Performed by: NURSE PRACTITIONER

## 2022-09-22 PROCEDURE — 99214 OFFICE O/P EST MOD 30 MIN: CPT | Performed by: NURSE PRACTITIONER

## 2022-09-22 ASSESSMENT — ENCOUNTER SYMPTOMS
NAUSEA: 0
VOMITING: 0
BACK PAIN: 1
NECK PAIN: 1
WEIGHT LOSS: 0
CHILLS: 0
HEADACHES: 0
SHORTNESS OF BREATH: 0
FEVER: 0
DIZZINESS: 1
DIARRHEA: 0
PALPITATIONS: 0
MYALGIAS: 1
BLURRED VISION: 1

## 2022-09-22 ASSESSMENT — FIBROSIS 4 INDEX: FIB4 SCORE: 1.04

## 2022-09-22 NOTE — PROGRESS NOTES
"Assessment:    In-person outreach to patient following her PCP visit to complete her monthly follow-up for the PCM program.  This was the first time we have had the opportunity to meet in person.  Jaclyn is currently receiving PT for pelvic floor dysfunction.  Her BP is better but still a little on the low side.  She has a slipped vertebra, her PCP (Magdiel) gave her some exercises to help with that.  She is holding off on her goal to quit smoking, she will tackle this goal in a bit.  She has smoking cessation patches at home.  She has a lot going on in her life right now, she just got pre-approved for a home loan to move in with her daughter & family.  Jaclyn was using a cane at her appointment today.  She has used a cane for many years because of dizziness.  According to Jaclyn, the dizziness is caused in part by \"Fibro Fog.\"  Jaclyn suffers with Fibromyalgia.  She still needs to apply for the hardship program at [facility] to cover the co-pay for her CPAP.   She needs to go in & fill out the application.  I will ask the CHW to assist with this.      Education:    Provided [facility]'s \"Smoking Cessation\" booklet to patient.      Care Plan:    No changes at this time.    Progress:    Progressing    Next Outreach: 10/25/22, Call @ [phone].    "

## 2022-09-22 NOTE — ASSESSMENT & PLAN NOTE
-Continue gabapentin 600 mg PO three times daily.  -Continue voltaren as needed 4 times daily.  -Encourage ice and heat.

## 2022-09-22 NOTE — NON-PROVIDER
Chronic in nature. Stable. Reports baseline dizziness and blurred vision and some new headaches with the recent smoke. Denies chest pain and peripheral edema. States occasional shortness of breath and cough with COPD. In-office blood pressure today is 102/66. States that she has not been checking her blood pressure regularly, usually only monitors blood pressure at home when she is symptomatic.   -Encourage monitoring blood pressure at home.    Chronic in nature. Stable. Not taking medication regularly. Continues smoking a pack of cigarettes daily. Reports that she is feeling overwhelmed and is not motivated to quit at this time.  -Encourage smoking cessation.   -Will continue to assess at next visit.    Chronic in nature. Stable. States that she has an alcoholic drink once every 3 weeks. Reports that she is not regularly drinking. States that her blood pressure increases with alcohol and she has been avoiding it. Reports family and friend support with alcohol cessation and that she is doing well.  -Encourage alcohol cessation.    Chronic in nature. Stable. Continues taking remeron 30 mg PO nightly. States that medication helps and mood is stable.  -Continue taking remeron 30 mg PO nightly.    Chronic in nature. Stable. Continues taking topamax 50 mg PO three times daily. States that medication helps.  -Continue taking topamax 50 mg PO three times daily.     Chronic in nature. Followed by Dr. Najjar, last seen 8/23/2022. Reports that she has a regular diet with adequate intake of fruits and vegetables. States that she has increased her hydration significantly and is drinking armor light, 8 bottles daily. Next appointment scheduled in October and will repeat labs at that time. Avoiding all anti-inflammatory medications.   -Continue to follow with Dr. Najjar.    Chronic in nature. Stable. States that she has been going to physical therapy once a week for the last 4 weeks for prolapse rectal and bladder. Reports that  physical therapy is helping a lot. States that she had a radical hysterectomy 1995 with complete removal of uterus and ovaries and has not been seen by a gynecologist regularly.   -Continue weekly physical therapy for pelvic floor strengthening.    Chronic in nature. Stable. Continues taking gabapentin 600 mg PO three times daily and voltaren 4 times daily. States pain is well-managed with current regimen. States that she attempts to walk for 30 minutes daily. Reports that her fibromyalgia is sensitive to cold and that her symptoms have increased this week due to a change in weather.  -Continue gabapentin 600 mg PO three times daily.  -Continue voltaren 4 times daily.  -Encourage ice and heat.    Chronic in nature. Stable. Lumbar spine x-ray from 8/23/2022 demonstrates mild degenerative disc disease and facet arthropathy. Reports that she recently fell on her right hip and may be related to her slipped disc. States that she takes tumeric daily for anti-inflammatory. States that she is not interested in physical therapy at this time. Provided educational handouts for stretching for neck spasm, neck strain, and low back pain.   -Encourage stretching, ice, and heat.

## 2022-09-22 NOTE — PROGRESS NOTES
"Subjective:     Chief Complaint   Patient presents with    Follow-Up     1 month       HPI:   Jaclyn presents today with     Problem   Tobacco Dependence    Chronic in nature. Stable. Continues smoking a pack of cigarettes daily. Reports that she is feeling overwhelmed and is not motivated to quit at this time.     Ddd (Degenerative Disc Disease), Lumbar    Chronic in nature. Stable. Lumbar spine x-ray from 8/23/2022 demonstrates mild degenerative disc disease and facet arthropathy. Reports that she recently fell on her right hip and may be related to her \"slipped disc.\" Reports that she takes tumeric daily as an  anti-inflammatory. States that she is not interested in physical therapy at this time. Provided educational handouts for stretching for neck spasm, neck strain, and low back pain.      Pelvic Floor Dysfunction    Chronic in nature. Stable. States that she has been going to physical therapy once a week for the last four weeks for rectal and bladder prolapse. Reports that physical therapy is helping a lot. States that she had a radical hysterectomy 1995 with complete removal of uterus and ovaries and has not been seen by a gynecologist regularly.      Stage 3b Chronic Kidney Disease (Hcc)    Chronic in nature. Followed by Dr. Najjar, last seen 8/23/2022. Most recent GFR is low at 43. Reports that she has a regular diet with adequate intake of fruits and vegetables. States that she has increased her hydration significantly and is drinking armor light, 8 bottles daily. Next appointment scheduled in October and will repeat labs at that time. Avoiding all anti-inflammatory medications.    Reports baseline dizziness and blurred vision and some new headaches with the recent smoke. Denies chest pain and peripheral edema. States occasional shortness of breath and cough with COPD. In-office blood pressure today is 102/66. States that she has not been checking her blood pressure regularly and usually only monitors " her blood pressure at home when she is symptomatic.          Alcoholism in Recovery (Hcc)    Chronic in nature. Stable. States that she has an alcoholic drink once every three weeks. Reports that she is not drinking regularly. States that her blood pressure increases with alcohol and she has been avoiding it. Reports family and friend support with alcohol cessation and that she is doing well.     Bipolar Affective Disorder, Current Episode Depressed (Hcc)    Chronic in nature. Stable. Continues taking remeron 30 mg PO nightly. States that medication helps and mood is stable.     Migraine Without Status Migrainosus, Not Intractable    Chronic in nature. Stable. Continues taking topamax 50 mg PO three times daily. States that medication helps.     Fibromyalgia    Chronic in nature. Stable. Continues taking gabapentin 600 mg PO three times daily and voltaren 4 times daily. States pain is well-managed with current regimen. States that she attempts to walk for 30 minutes daily. Reports that her fibromyalgia is sensitive to cold and that her symptoms have increased this week due to a change in weather.         Current Outpatient Medications Ordered in Epic   Medication Sig Dispense Refill    topiramate (TOPAMAX) 50 MG tablet TAKE 1 TABLET BY MOUTH THREE TIMES A  Tablet 0    gabapentin (NEURONTIN) 600 MG tablet TAKE 1 TABLET BY MOUTH THREE TIMES A  Tablet 0    diclofenac sodium (VOLTAREN) 1 % Gel Apply 2 g topically 4 times a day as needed (pain). 100 g 0    topiramate (TOPAMAX) 50 MG tablet Take 1 Tablet by mouth 3 times a day. 270 Tablet 0    gabapentin (NEURONTIN) 600 MG tablet Take 1 Tablet by mouth 3 times a day. 270 Tablet 0    ondansetron (ZOFRAN ODT) 4 MG TABLET DISPERSIBLE Take 1 Tablet by mouth every 8 hours as needed for Nausea. 15 Tablet 0    mirtazapine (REMERON) 30 MG Tab tablet TAKE 1 TABLET BY MOUTH EVERYDAY AT BEDTIME 90 Tablet 0    Omega-3 Fatty Acids (FISH OIL) 1000 MG Cap capsule Take  "1,000 mg by mouth 1 time a day as needed.      magnesium oxide (MAG-OX) 400 MG Tab tablet Take 400 mg by mouth every day.      Milnacipran HCl (SAVELLA) 100 MG Tab Take 1 Tablet by mouth 2 times a day. 180 Tablet 0    clindamycin (CLEOCIN) 2 % vaginal cream Use vaginally after sexual activity for irritation and odor related to BV. 40 g 0    lidocaine (LIDODERM) 5 % Patch Place 1 Patch on the skin every 24 hours. 10 Patch 0    baclofen (LIORESAL) 10 MG Tab Take 1 Tablet by mouth 3 times a day. 270 Tablet 0    Loratadine (CLARITIN PO) Take 1 tablet by mouth every day.      PROAIR  (90 Base) MCG/ACT Aero Soln inhalation aerosol INHALE 2 PUFFS BY MOUTH EVERY 6 HOURS AS NEEDED FOR SHORTNESS OF BREATH. 8.5 Inhaler 3    hydrOXYzine HCl (ATARAX) 25 MG Tab Take 1 Tab by mouth 3 times a day as needed for Anxiety. 90 Tab 0    dicyclomine (BENTYL) 20 MG Tab Take 20 mg by mouth every 6 hours as needed.       No current Epic-ordered facility-administered medications on file.       Health Maintenance: Completed    Review of Systems   Constitutional:  Positive for malaise/fatigue. Negative for chills, fever and weight loss.   Eyes:  Positive for blurred vision.   Respiratory:  Negative for shortness of breath.    Cardiovascular:  Negative for chest pain, palpitations and leg swelling.   Gastrointestinal:  Negative for diarrhea, nausea and vomiting.   Musculoskeletal:  Positive for back pain, joint pain, myalgias and neck pain.   Neurological:  Positive for dizziness. Negative for headaches.   All other systems reviewed and are negative.      Objective:     Exam:  /66 (BP Location: Left arm, Patient Position: Sitting, BP Cuff Size: Adult)   Pulse 77   Temp 36.1 °C (97 °F) (Temporal)   Ht 1.664 m (5' 5.5\")   Wt 84.4 kg (186 lb)   SpO2 97%   BMI 30.48 kg/m²  Body mass index is 30.48 kg/m².    Physical Exam  Constitutional:       Appearance: Normal appearance.   Cardiovascular:      Rate and Rhythm: Normal rate and " regular rhythm.      Pulses: Normal pulses.      Heart sounds: Normal heart sounds.   Pulmonary:      Effort: Pulmonary effort is normal.      Breath sounds: Normal breath sounds.   Skin:     General: Skin is warm and dry.      Capillary Refill: Capillary refill takes less than 2 seconds.   Neurological:      General: No focal deficit present.      Mental Status: She is alert and oriented to person, place, and time.   Psychiatric:         Mood and Affect: Mood normal.     Assessment & Plan:     57 y.o. female with the following -     Problem List Items Addressed This Visit       Alcoholism in recovery ([facility])     -Encourage alcohol cessation.         Bipolar affective disorder, current episode depressed ([facility])     -Continue taking remeron 30 mg PO nightly.         DDD (degenerative disc disease), lumbar     -Encourage stretching, ice, and heat.             Fibromyalgia     -Continue gabapentin 600 mg PO three times daily.  -Continue voltaren as needed 4 times daily.  -Encourage ice and heat.         Migraine without status migrainosus, not intractable     -Continue taking topamax 50 mg PO three times daily.          Pelvic floor dysfunction     -Continue weekly physical therapy for pelvic floor strengthening.         Stage 3b chronic kidney disease ([facility])     -Continue to follow with Dr. Najjar.  -Encourage monitoring blood pressure at home.         Tobacco dependence     -Encourage smoking cessation.   -Will continue to assess at next visit.              Return in about 3 months (around 12/22/2022), or if symptoms worsen or fail to improve.    Please note that this dictation was created using voice recognition software. I have made every reasonable attempt to correct obvious errors, but I expect that there are errors of grammar and possibly content that I did not discover before finalizing the note.

## 2022-09-26 ENCOUNTER — PATIENT OUTREACH (OUTPATIENT)
Dept: HEALTH INFORMATION MANAGEMENT | Facility: OTHER | Age: 57
End: 2022-09-26
Payer: MEDICARE

## 2022-09-26 DIAGNOSIS — J44.89 COPD (CHRONIC OBSTRUCTIVE PULMONARY DISEASE) WITH CHRONIC BRONCHITIS (HCC): ICD-10-CM

## 2022-09-26 NOTE — PROGRESS NOTES
9/26/22  JUNW Сергей reached out to patient to assist with [employer] application. CHW left voicemail and contact information, and will reach out again on 9/28.     9/28/22  LYNN Rushing attempted to reach out to patient to assist with [employer] application, but was unable to leave voicemail and contact information. CHW will attempt to reach out on 9/30.     9/30/22  JUNW Сергей reached out to patient to see if she can assist with [employer] application. CHW spoke with patient about some of her options to complete application but recommended that patient go to the [employer] office to complete application. CHW will report to CELINA Carrera with details and CHW will not continue to follow.

## 2022-10-25 ENCOUNTER — PATIENT OUTREACH (OUTPATIENT)
Dept: HEALTH INFORMATION MANAGEMENT | Facility: OTHER | Age: 57
End: 2022-10-25
Payer: MEDICARE

## 2022-10-25 DIAGNOSIS — F17.200 TOBACCO DEPENDENCE: ICD-10-CM

## 2022-10-25 DIAGNOSIS — I10 ESSENTIAL HYPERTENSION: ICD-10-CM

## 2022-10-25 DIAGNOSIS — J44.89 COPD (CHRONIC OBSTRUCTIVE PULMONARY DISEASE) WITH CHRONIC BRONCHITIS (HCC): ICD-10-CM

## 2022-10-25 DIAGNOSIS — N18.32 STAGE 3B CHRONIC KIDNEY DISEASE: ICD-10-CM

## 2022-10-25 PROCEDURE — 99490 CHRNC CARE MGMT STAFF 1ST 20: CPT | Performed by: NURSE PRACTITIONER

## 2022-10-25 NOTE — PROGRESS NOTES
"Assessment:    Phone outreach to patient to complete her monthly PCM follow-up visit.  Left voicemail message with contact information.  Jaclyn returned my call.  She is having a \"fibro flare-up.\"  The flare-up started yesterday.  She ran the  in her 5th wheel yesterday and has been working around her house (the 5th wheel is her home).  She is selling her 5th wheel and buying a home with her daughter & son-in-law.  She has been living in the 5th wheel for 6 years.  The flare-ups put her down for 3 days, she is usually back on feet thereafter.  She feels like there is a wet wool blanket over her.  The flare-ups cause extreme fatigue, she cannot get up to take a shower, she is off balance, her entire body is achy, it feels like a horrible flu.  It even hurts to think.  With house packing, she is able to do work for one day and then is laid out for two days.  This is her normal.  She has been diabled since 2010.  The dizziness she experiences is still about the same.  Fibro fog causes light headediness. PT is continuing to help with her pelvic floor dysfunction.  She has been going for 1.5 months and having some improvement.  The plan is to do PT for 3 months.  She is going to PT one time per week this month so she will not overdo it with her schedule.  She has PT tomorrow. She has not done anything about getting her cpap.    She is not drinking alcohol.  She has not been able to do exercises at home.  She is still smoking because she has so many balls in air.  She has a potential  for her 5th wheel.  Her son-in-law just got a job @ [employer], which will help them quality for a home loan.        Education:    No updates at this time.    Care Plan:    No updates at this time.      Progress:    Slow progress    Next Outreach: 11/29/22, call @ 1100  "

## 2022-11-08 ENCOUNTER — APPOINTMENT (OUTPATIENT)
Dept: NEPHROLOGY | Facility: MEDICAL CENTER | Age: 57
End: 2022-11-08
Payer: MEDICARE

## 2022-11-10 ENCOUNTER — HOSPITAL ENCOUNTER (OUTPATIENT)
Dept: LAB | Facility: MEDICAL CENTER | Age: 57
End: 2022-11-10
Attending: INTERNAL MEDICINE
Payer: MEDICARE

## 2022-11-10 DIAGNOSIS — G89.29 OTHER CHRONIC PAIN: ICD-10-CM

## 2022-11-10 DIAGNOSIS — N18.32 STAGE 3B CHRONIC KIDNEY DISEASE: ICD-10-CM

## 2022-11-10 LAB
ANION GAP SERPL CALC-SCNC: 12 MMOL/L (ref 7–16)
BUN SERPL-MCNC: 21 MG/DL (ref 8–22)
CALCIUM SERPL-MCNC: 10 MG/DL (ref 8.5–10.5)
CHLORIDE SERPL-SCNC: 107 MMOL/L (ref 96–112)
CO2 SERPL-SCNC: 25 MMOL/L (ref 20–33)
CREAT SERPL-MCNC: 1.37 MG/DL (ref 0.5–1.4)
CREAT UR-MCNC: 131.66 MG/DL
ERYTHROCYTE [DISTWIDTH] IN BLOOD BY AUTOMATED COUNT: 45.3 FL (ref 35.9–50)
GFR SERPLBLD CREATININE-BSD FMLA CKD-EPI: 45 ML/MIN/1.73 M 2
GLUCOSE SERPL-MCNC: 94 MG/DL (ref 65–99)
HCT VFR BLD AUTO: 46.3 % (ref 37–47)
HGB BLD-MCNC: 15 G/DL (ref 12–16)
MCH RBC QN AUTO: 30.7 PG (ref 27–33)
MCHC RBC AUTO-ENTMCNC: 32.4 G/DL (ref 33.6–35)
MCV RBC AUTO: 94.7 FL (ref 81.4–97.8)
MICROALBUMIN UR-MCNC: <1.2 MG/DL
MICROALBUMIN/CREAT UR: NORMAL MG/G (ref 0–30)
PLATELET # BLD AUTO: 331 K/UL (ref 164–446)
PMV BLD AUTO: 10.6 FL (ref 9–12.9)
POTASSIUM SERPL-SCNC: 4.5 MMOL/L (ref 3.6–5.5)
RBC # BLD AUTO: 4.89 M/UL (ref 4.2–5.4)
SODIUM SERPL-SCNC: 144 MMOL/L (ref 135–145)
WBC # BLD AUTO: 8.6 K/UL (ref 4.8–10.8)

## 2022-11-10 PROCEDURE — 36415 COLL VENOUS BLD VENIPUNCTURE: CPT

## 2022-11-10 PROCEDURE — 82043 UR ALBUMIN QUANTITATIVE: CPT

## 2022-11-10 PROCEDURE — 82570 ASSAY OF URINE CREATININE: CPT

## 2022-11-10 PROCEDURE — 85027 COMPLETE CBC AUTOMATED: CPT

## 2022-11-10 PROCEDURE — 80048 BASIC METABOLIC PNL TOTAL CA: CPT

## 2022-11-29 ENCOUNTER — PATIENT OUTREACH (OUTPATIENT)
Dept: HEALTH INFORMATION MANAGEMENT | Facility: OTHER | Age: 57
End: 2022-11-29
Payer: MEDICARE

## 2022-11-29 DIAGNOSIS — N18.32 STAGE 3B CHRONIC KIDNEY DISEASE: ICD-10-CM

## 2022-11-29 DIAGNOSIS — J44.89 COPD (CHRONIC OBSTRUCTIVE PULMONARY DISEASE) WITH CHRONIC BRONCHITIS (HCC): ICD-10-CM

## 2022-11-29 DIAGNOSIS — I10 ESSENTIAL HYPERTENSION: ICD-10-CM

## 2022-11-29 PROCEDURE — 99490 CHRNC CARE MGMT STAFF 1ST 20: CPT | Performed by: NURSE PRACTITIONER

## 2022-11-29 NOTE — PROGRESS NOTES
Assessment:    Phone outreach to patient to complete her monthly PCM follow-up.  Jaclyn reports she had a wonderful Thanksgiving w/her daughter, son-in-law, and grandson.  They are progressing on getting their three bedroom house, the inspection was completed and she has a potential  for her trailer.  It is a stressful time. They have been working on getting this house for about one year. She is not sleeping well, her back is killing her but she thinks she will probably sleep well tonight.  She alternates between a bad night sleep & a good night sleep.  She takes her dog outside to use the bathroom @ 0300 in morning and has trouble falling back to sleep.  She had blood work done recently.  She is going to PT one time a week, it is going better than she thought.  They are working on her pelvic floor dysfunction. The therapy is helping and her pain is getting less. 12/8/22 provided patient with free digital scale & BP machine.          Education:    No updates at this time    Care Plan:    No updates at this time    Progress:    Progressing    Next Outreach: 12/29/22, call @ 1100

## 2022-11-30 ENCOUNTER — OFFICE VISIT (OUTPATIENT)
Dept: NEPHROLOGY | Facility: MEDICAL CENTER | Age: 57
End: 2022-11-30
Payer: MEDICARE

## 2022-11-30 VITALS
BODY MASS INDEX: 29.83 KG/M2 | SYSTOLIC BLOOD PRESSURE: 122 MMHG | OXYGEN SATURATION: 97 % | RESPIRATION RATE: 14 BRPM | WEIGHT: 182 LBS | TEMPERATURE: 97.5 F | DIASTOLIC BLOOD PRESSURE: 62 MMHG | HEART RATE: 89 BPM

## 2022-11-30 DIAGNOSIS — N18.32 STAGE 3B CHRONIC KIDNEY DISEASE: ICD-10-CM

## 2022-11-30 DIAGNOSIS — G89.29 OTHER CHRONIC PAIN: ICD-10-CM

## 2022-11-30 DIAGNOSIS — M79.7 FIBROMYALGIA: ICD-10-CM

## 2022-11-30 PROCEDURE — 99214 OFFICE O/P EST MOD 30 MIN: CPT | Performed by: INTERNAL MEDICINE

## 2022-11-30 ASSESSMENT — ENCOUNTER SYMPTOMS
SHORTNESS OF BREATH: 0
CHILLS: 0
NAUSEA: 0
VOMITING: 0
COUGH: 0
FEVER: 0

## 2022-11-30 ASSESSMENT — FIBROSIS 4 INDEX: FIB4 SCORE: 0.91

## 2022-11-30 NOTE — PROGRESS NOTES
Subjective     Jaclyn Mejia is a 57 y.o. female who presents with Chronic Kidney Disease            Patient has a history of fibromyalgia, chronic pain  She is using NSAIDs infrequently as topical cream    Chronic Kidney Disease  This is a chronic problem. The current episode started more than 1 year ago. The problem occurs constantly. The problem has been gradually improving. Pertinent negatives include no chest pain, chills, coughing, fever, nausea, urinary symptoms or vomiting.     Review of Systems   Constitutional:  Negative for chills, fever and malaise/fatigue.   Respiratory:  Negative for cough and shortness of breath.    Cardiovascular:  Negative for chest pain and leg swelling.   Gastrointestinal:  Negative for nausea and vomiting.   Genitourinary:  Negative for dysuria, frequency and urgency.   Musculoskeletal:  Positive for joint pain.            Objective     /62   Pulse 89   Temp 36.4 °C (97.5 °F) (Temporal)   Resp 14   Wt 82.6 kg (182 lb)   SpO2 97%   BMI 29.83 kg/m²      Physical Exam  Vitals and nursing note reviewed.   Constitutional:       General: She is not in acute distress.     Appearance: Normal appearance. She is well-developed. She is not diaphoretic.   HENT:      Head: Normocephalic and atraumatic.      Right Ear: External ear normal.      Left Ear: External ear normal.      Nose: Nose normal.   Eyes:      General: No scleral icterus.        Right eye: No discharge.         Left eye: No discharge.      Conjunctiva/sclera: Conjunctivae normal.   Cardiovascular:      Rate and Rhythm: Normal rate and regular rhythm.      Heart sounds: No murmur heard.  Pulmonary:      Effort: Pulmonary effort is normal. No respiratory distress.      Breath sounds: Normal breath sounds.   Musculoskeletal:         General: No tenderness.      Right lower leg: No edema.      Left lower leg: No edema.   Skin:     General: Skin is warm and dry.      Findings: No erythema.   Neurological:       General: No focal deficit present.      Mental Status: She is alert and oriented to person, place, and time.      Cranial Nerves: No cranial nerve deficit.   Psychiatric:         Mood and Affect: Mood normal.         Behavior: Behavior normal.     Past Medical History:   Diagnosis Date    Abdominal pain     Actinic keratosis 2018    Alcohol abuse     Allergy     Anxiety     Arthritis     Back pain     Back pain     Bronchitis     Chronic pain syndrome     Constipation     COPD (chronic obstructive pulmonary disease) (HCC)     Cough     Daytime sleepiness     DDD (degenerative disc disease), cervical     DDD (degenerative disc disease), thoracolumbar     Depression     Diarrhea     Diverticulosis     Dizziness     Essential tremor     Fatigue     Fibromyalgia     IBS (irritable bowel syndrome)     Insomnia     Migraine     Nausea     Painful joint     Pulmonary emphysema (HCC)     Restless leg syndrome     Ringing in ears     Snoring     Sore muscles     Sweat, sweating, excessive     Weakness     Wears glasses      Social History     Socioeconomic History    Marital status: Single     Spouse name: Not on file    Number of children: Not on file    Years of education: Not on file    Highest education level: Not on file   Occupational History    Not on file   Tobacco Use    Smoking status: Every Day     Packs/day: 0.75     Years: 35.00     Pack years: 26.25     Types: Cigarettes     Last attempt to quit: 2022     Years since quittin.4    Smokeless tobacco: Never   Vaping Use    Vaping Use: Every day    Substances: THC, CBD    Devices: Pre-filled or refillable cartridge   Substance and Sexual Activity    Alcohol use: Not Currently     Alcohol/week: 0.0 oz     Comment: Not currently drinking alcohol    Drug use: Yes     Frequency: 7.0 times per week     Types: Marijuana, Inhaled, Oral     Comment: Only canibus medicinal for anti spasm, anxiety, ibs, inflamm    Sexual activity: Not Currently     Partners:  "Male, Female     Comment: currently with male   Other Topics Concern    Not on file   Social History Narrative    Initial Intake Date:  Last Updated:        Financial situation:    SSD $1570/mo w/ $450 savings. We did discuss care credit in regards to behavioral health treatment interventions. Jaclyn        Food resources & insecurities:    Deferred.         Housing & environmental:    Resides in [place] in [facility] ([address]).         Transportation:    Deferred.        Family dynamics & family/friend social supports:    Pt has local family to include 86-year-old mother and sister. She reports family are functioning alcoholics but are supportive in her sobriety. They will provide alternative alcohol free drinks at gatherings. Pt has son currently in inpatient treatment for alcohol use.         Pt has significant other who resides in the same [facility]. He is also pursuing sobriety w/ Jaclyn.        ADLs/IADLs & associated diagnoses:    Pt's ADLs/IADL have previously been affected by her alcohol use. They have cause incontinent issues in the past as well as extended periods of vomiting.         Advanced life planning:    Not on file.         Behavioral/Mental:    Associated diagnoses include migraine without status migrainosus, not intractable; insomnia due to medical condition; recurrent major depressive disorder; alcoholism; deliberate self-cutting; and obesity.        Pt has extensive history of substance use and recovery. In her young adult years pt used methamphatmine for aprox 1 year until she sought treatment. At this time she was mother to 2 children. She attended both AA/NA and \"[facility]\" in the [facility] area. She became sober \"[place]\" and maintained sobriety for 16 years. 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 Pt's daughter then left the home (at age 21) which affected her drinking; at " "that time she also became disabled. Pt relapsed on methamphetamine with use over 2 years w/ a 9 month period of sobriety. Pt then moved to [place] where her drinking increased (vodka). Pt reports medical symptoms from drinking include every other day vomitting, a period of GI/rectal bleeding and hypertensive events. Per chart review pt drinking 5th vodka daily. Pt's last drink 3/7/2021. She is pursuing sobriety with her significant other, Bradly. Pt is 6 days sober at time of assessment (3/12/2021). She is \"tired of being sick and tired.\"        Pt's reservations include attending inpatient; she would need to take her pet dog with her as no one else can care for the dog. Dog is  animal. And the allotment of time required for intensive out patient (3 days a week); she is concerned her fibromyalgia will affect her attendance. Thre is a general concern of cost which may direct pt towards low income treatment options.         Assessments completed by :    n/a        Collaterals:     Social Determinants of Health     Financial Resource Strain: High Risk    Difficulty of Paying Living Expenses: Very hard   Food Insecurity: No Food Insecurity    Worried About Running Out of Food in the Last Year: Never true    Ran Out of Food in the Last Year: Never true   Transportation Needs: Unmet Transportation Needs    Lack of Transportation (Medical): Yes    Lack of Transportation (Non-Medical): No   Physical Activity: Insufficiently Active    Days of Exercise per Week: 6 days    Minutes of Exercise per Session: 20 min   Stress: Stress Concern Present    Feeling of Stress : Very much   Social Connections: Moderately Isolated    Frequency of Communication with Friends and Family: More than three times a week    Frequency of Social Gatherings with Friends and Family: More than three times a week    Attends Sikhism Services: More than 4 times per year    Active Member of Clubs or Organizations: No    Attends Club or " Organization Meetings: Never    Marital Status: Never    Intimate Partner Violence: Not on file   Housing Stability: Low Risk     Unable to Pay for Housing in the Last Year: No    Number of Places Lived in the Last Year: 1    Unstable Housing in the Last Year: No     Family History   Problem Relation Age of Onset    Lung Disease Mother         copd    Cancer Mother         basal/squamous    Hypertension Mother     Hyperlipidemia Mother     Stroke Mother     Alcohol abuse Mother     Heart Disease Father         HF    Arthritis Father         rheumatoid    Lung Disease Father         emphysema    Alcohol abuse Father     Cancer Sister     Alcohol abuse Sister     Cancer Maternal Grandfather         Bladder or prostate?    Cancer Sister         pre cancerous spots     Alcohol abuse Sister     Kidney Disease Sister     Alcohol abuse Sister     Hypertension Sister     Alcohol abuse Sister      Recent Labs     07/01/22  1501 08/08/22  1342 11/10/22  1337   ALBUMIN 4.8  --   --    HDL 40  --   --    TRIGLYCERIDE 87  --   --    SODIUM 140 141 144   POTASSIUM 4.1 4.1 4.5   CHLORIDE 105 106 107   CO2 22 21 25   BUN 27* 24* 21   CREATININE 1.37 1.44* 1.37                           Assessment & Plan        1. Stage 3b chronic kidney disease (HCC)  Creatinine slightly better  No uremic symptoms  Renal dose of medication  Avoid nephrotoxins  Continue same medication regimen  Check labs annually    2. Fibromyalgia  Continue to avoid NSAIDs    3. Other chronic pain  Continue to avoid NSAIDs

## 2022-12-08 ENCOUNTER — OFFICE VISIT (OUTPATIENT)
Dept: MEDICAL GROUP | Facility: PHYSICIAN GROUP | Age: 57
End: 2022-12-08
Payer: MEDICARE

## 2022-12-08 VITALS
BODY MASS INDEX: 29.25 KG/M2 | HEIGHT: 66 IN | SYSTOLIC BLOOD PRESSURE: 102 MMHG | OXYGEN SATURATION: 95 % | TEMPERATURE: 98.7 F | DIASTOLIC BLOOD PRESSURE: 68 MMHG | HEART RATE: 82 BPM | WEIGHT: 182 LBS

## 2022-12-08 DIAGNOSIS — G47.01 INSOMNIA DUE TO MEDICAL CONDITION: ICD-10-CM

## 2022-12-08 DIAGNOSIS — J01.11 ACUTE RECURRENT FRONTAL SINUSITIS: ICD-10-CM

## 2022-12-08 DIAGNOSIS — J44.89 COPD (CHRONIC OBSTRUCTIVE PULMONARY DISEASE) WITH CHRONIC BRONCHITIS (HCC): ICD-10-CM

## 2022-12-08 DIAGNOSIS — J02.9 SORE THROAT: ICD-10-CM

## 2022-12-08 LAB
EXTERNAL QUALITY CONTROL: NORMAL
FLUAV+FLUBV AG SPEC QL IA: NEGATIVE
INT CON NEG: NEGATIVE
INT CON POS: POSITIVE
S PYO AG THROAT QL: NEGATIVE
SARS-COV+SARS-COV-2 AG RESP QL IA.RAPID: NEGATIVE

## 2022-12-08 PROCEDURE — 99213 OFFICE O/P EST LOW 20 MIN: CPT | Mod: CS | Performed by: NURSE PRACTITIONER

## 2022-12-08 PROCEDURE — 87880 STREP A ASSAY W/OPTIC: CPT | Performed by: NURSE PRACTITIONER

## 2022-12-08 PROCEDURE — 87426 SARSCOV CORONAVIRUS AG IA: CPT | Performed by: NURSE PRACTITIONER

## 2022-12-08 PROCEDURE — 87804 INFLUENZA ASSAY W/OPTIC: CPT | Performed by: NURSE PRACTITIONER

## 2022-12-08 RX ORDER — DOXYCYCLINE 100 MG/1
100 CAPSULE ORAL 2 TIMES DAILY
Qty: 10 CAPSULE | Refills: 0 | Status: SHIPPED | OUTPATIENT
Start: 2022-12-08 | End: 2022-12-13

## 2022-12-08 RX ORDER — ALBUTEROL SULFATE 90 UG/1
2 AEROSOL, METERED RESPIRATORY (INHALATION) EVERY 4 HOURS PRN
Qty: 8.5 G | Refills: 0 | Status: SHIPPED | OUTPATIENT
Start: 2022-12-08 | End: 2023-06-20

## 2022-12-08 RX ORDER — MIRTAZAPINE 30 MG/1
TABLET, FILM COATED ORAL
Qty: 90 TABLET | Refills: 0 | Status: SHIPPED | OUTPATIENT
Start: 2022-12-08 | End: 2023-04-11

## 2022-12-08 ASSESSMENT — FIBROSIS 4 INDEX: FIB4 SCORE: 0.91

## 2022-12-08 NOTE — TELEPHONE ENCOUNTER
Received request via: Pharmacy    Was the patient seen in the last year in this department? Yes    Does the patient have an active prescription (recently filled or refills available) for medication(s) requested? No    Does the patient have [facility] Plus and need 100 day supply (blood pressure, diabetes and cholesterol meds only)? Medication is not for cholesterol, blood pressure or diabetes

## 2022-12-08 NOTE — PROGRESS NOTES
Chief Complaint   Patient presents with    Other     - Started with runny nose (about 1 month ago) - ongoing - PT took Theraflu and Dayquil (12/3/22) did not help  - Body aches/ headaches and cough which moved to chest (11/30/22)  - Cough is productive (per pt, she has COPD and Emphysema and Bronchitis)    - everyone in her home is sick   - pt took home covid test about 4-5 days ago - negative        HPI: Patient is a 57 y.o. female complaining of 1 month  of illness including: chills, shortness of breath, facial pain, fever, nasal congestion, rhinorrhea.   Mucus is: increased green mucous.  Similarly ill exposures: yes.  Treatments tried: OTC cough and cold.  She  reports that she has been smoking cigarettes. She has a 35.00 pack-year smoking history. She has never used smokeless tobacco..     ROS:  yes fever, cough, no nausea, changes in bowel movements or skin rash.      I reviewed the patient's medications, allergies and medical history:  Current Outpatient Medications   Medication Sig Dispense Refill    albuterol (PROAIR HFA) 108 (90 Base) MCG/ACT Aero Soln inhalation aerosol Inhale 2 Puffs every four hours as needed for Shortness of Breath. 8.5 g 0    doxycycline (MONODOX) 100 MG capsule Take 1 Capsule by mouth 2 times a day for 5 days. 10 Capsule 0    topiramate (TOPAMAX) 50 MG tablet TAKE 1 TABLET BY MOUTH THREE TIMES A  Tablet 0    gabapentin (NEURONTIN) 600 MG tablet TAKE 1 TABLET BY MOUTH THREE TIMES A  Tablet 0    diclofenac sodium (VOLTAREN) 1 % Gel Apply 2 g topically 4 times a day as needed (pain). 100 g 0    topiramate (TOPAMAX) 50 MG tablet Take 1 Tablet by mouth 3 times a day. 270 Tablet 0    gabapentin (NEURONTIN) 600 MG tablet Take 1 Tablet by mouth 3 times a day. 270 Tablet 0    ondansetron (ZOFRAN ODT) 4 MG TABLET DISPERSIBLE Take 1 Tablet by mouth every 8 hours as needed for Nausea. 15 Tablet 0    mirtazapine (REMERON) 30 MG Tab tablet TAKE 1 TABLET BY MOUTH EVERYDAY AT BEDTIME  90 Tablet 0    Omega-3 Fatty Acids (FISH OIL) 1000 MG Cap capsule Take 1,000 mg by mouth 1 time a day as needed.      magnesium oxide (MAG-OX) 400 MG Tab tablet Take 400 mg by mouth every day.      clindamycin (CLEOCIN) 2 % vaginal cream Use vaginally after sexual activity for irritation and odor related to BV. 40 g 0    lidocaine (LIDODERM) 5 % Patch Place 1 Patch on the skin every 24 hours. 10 Patch 0    Loratadine (CLARITIN PO) Take 1 tablet by mouth every day.      hydrOXYzine HCl (ATARAX) 25 MG Tab Take 1 Tab by mouth 3 times a day as needed for Anxiety. 90 Tab 0    dicyclomine (BENTYL) 20 MG Tab Take 20 mg by mouth every 6 hours as needed.      Milnacipran HCl (SAVELLA) 100 MG Tab Take 1 Tablet by mouth 2 times a day. 180 Tablet 2    baclofen (LIORESAL) 10 MG Tab Take 1 Tablet by mouth 3 times a day. 270 Tablet 3    PROAIR  (90 Base) MCG/ACT Aero Soln inhalation aerosol INHALE 2 PUFFS BY MOUTH EVERY 6 HOURS AS NEEDED FOR SHORTNESS OF BREATH. (Patient not taking: Reported on 12/8/2022) 8.5 Inhaler 3     No current facility-administered medications for this visit.     Erythromycin and Tetracycline  Past Medical History:   Diagnosis Date    Abdominal pain     Actinic keratosis 2/6/2018    Alcohol abuse     Allergy     Anxiety     Arthritis     Back pain     Back pain     Bronchitis     Chronic pain syndrome     Constipation     COPD (chronic obstructive pulmonary disease) (HCC)     Cough     Daytime sleepiness     DDD (degenerative disc disease), cervical     DDD (degenerative disc disease), thoracolumbar     Depression     Diarrhea     Diverticulosis     Dizziness     Essential tremor     Fatigue     Fibromyalgia     IBS (irritable bowel syndrome)     Insomnia     Migraine     Nausea     Painful joint     Pulmonary emphysema (HCC)     Restless leg syndrome     Ringing in ears     Snoring     Sore muscles     Sweat, sweating, excessive     Weakness     Wears glasses         EXAM:  /68 (BP Location:  "Left arm, Patient Position: Sitting, BP Cuff Size: Adult)   Pulse 82   Temp 37.1 °C (98.7 °F) (Temporal)   Ht 1.664 m (5' 5.5\")   Wt 82.6 kg (182 lb)   SpO2 95%   General: Alert, no conversational dyspnea or audible wheeze, non-toxic appearance.  Eyes: PERRL, conjunctiva slightly injected, no eye discharge.  Ears: Normal pinnae,TM's bulging bilaterally.  Nares: Patent with green mucus.  Sinuses: tender over maxillary / frontal sinuses.  Throat: Erythematous injection without exudate.   Neck: Supple, with moderately enlarged cervical nodes.  Lungs: Clear to auscultation bilaterally, no wheeze, crackles or rhonchi.   Heart: Regular rate without murmur.  Skin: Warm and dry without rash.    Results for orders placed or performed in visit on 12/08/22   POCT Rapid Strep A   Result Value Ref Range    Rapid Strep Screen Negative     Internal Control Positive Positive     Internal Control Negative Negative    POCT SARS-COV Antigen LYDIA (Symptomatic only)   Result Value Ref Range    Internal  Valid     SARS-COV ANTIGEN LYDIA Negative Negative, Indeterminate, None Detected, Not Detected, Detected, NotDetected, Valid, Invalid, Pass    Internal Control Positive Positive     Internal Control Negative Negative    POCT Influenza A/B   Result Value Ref Range    Rapid Influenza A-B Negative     Internal Control Positive Positive     Internal Control Negative Negative           ASSESSMENT:     Problem List Items Addressed This Visit       COPD (chronic obstructive pulmonary disease) with chronic bronchitis (HCC)    Relevant Medications    albuterol (PROAIR HFA) 108 (90 Base) MCG/ACT Aero Soln inhalation aerosol    doxycycline (MONODOX) 100 MG capsule     Other Visit Diagnoses       Acute recurrent frontal sinusitis        Relevant Medications    albuterol (PROAIR HFA) 108 (90 Base) MCG/ACT Aero Soln inhalation aerosol    doxycycline (MONODOX) 100 MG capsule    Sore throat                   PLAN:  1. Educated patient " that majority of upper respiratory infections are viral and do not need antibiotics.  This patient has had congestion, illness for over 1 month we will treat with antibiotics, doxycycline 100 mg PO twice daily x 10 days.  2. Twice daily use of nasal saline rinse or Neti-Pot.  3. OTC anti-pyretics and decongestants as needed.  4. Follow-up in office or urgent care for worsening symptoms, difficulty breathing, lack of expected recovery, or should new symptoms or problems arise.

## 2022-12-17 DIAGNOSIS — G43.809 OTHER MIGRAINE WITHOUT STATUS MIGRAINOSUS, NOT INTRACTABLE: ICD-10-CM

## 2022-12-17 DIAGNOSIS — M79.7 FIBROMYALGIA: ICD-10-CM

## 2022-12-19 ENCOUNTER — OFFICE VISIT (OUTPATIENT)
Dept: MEDICAL GROUP | Facility: PHYSICIAN GROUP | Age: 57
End: 2022-12-19
Payer: MEDICARE

## 2022-12-19 ENCOUNTER — PATIENT OUTREACH (OUTPATIENT)
Dept: HEALTH INFORMATION MANAGEMENT | Facility: OTHER | Age: 57
End: 2022-12-19

## 2022-12-19 VITALS
SYSTOLIC BLOOD PRESSURE: 112 MMHG | BODY MASS INDEX: 29.8 KG/M2 | TEMPERATURE: 97.8 F | DIASTOLIC BLOOD PRESSURE: 70 MMHG | WEIGHT: 185.4 LBS | HEIGHT: 66 IN | HEART RATE: 84 BPM | OXYGEN SATURATION: 93 %

## 2022-12-19 DIAGNOSIS — M62.89 PELVIC FLOOR DYSFUNCTION: ICD-10-CM

## 2022-12-19 DIAGNOSIS — G25.0 ESSENTIAL TREMOR: ICD-10-CM

## 2022-12-19 DIAGNOSIS — J44.89 COPD (CHRONIC OBSTRUCTIVE PULMONARY DISEASE) WITH CHRONIC BRONCHITIS (HCC): ICD-10-CM

## 2022-12-19 DIAGNOSIS — N18.32 STAGE 3B CHRONIC KIDNEY DISEASE: ICD-10-CM

## 2022-12-19 DIAGNOSIS — I10 ESSENTIAL HYPERTENSION: ICD-10-CM

## 2022-12-19 DIAGNOSIS — F31.32 BIPOLAR AFFECTIVE DISORDER, CURRENTLY DEPRESSED, MODERATE (HCC): ICD-10-CM

## 2022-12-19 DIAGNOSIS — M79.7 FIBROMYALGIA: ICD-10-CM

## 2022-12-19 DIAGNOSIS — M54.31 SCIATICA OF RIGHT SIDE: ICD-10-CM

## 2022-12-19 DIAGNOSIS — M47.816 FACET ARTHROPATHY, LUMBAR: ICD-10-CM

## 2022-12-19 PROBLEM — R10.12 ABDOMINAL PAIN, LUQ (LEFT UPPER QUADRANT): Status: RESOLVED | Noted: 2021-05-20 | Resolved: 2022-12-19

## 2022-12-19 PROBLEM — F39 MOOD DISORDER (HCC): Status: RESOLVED | Noted: 2022-06-01 | Resolved: 2022-12-19

## 2022-12-19 PROCEDURE — 99214 OFFICE O/P EST MOD 30 MIN: CPT | Performed by: NURSE PRACTITIONER

## 2022-12-19 RX ORDER — TOPIRAMATE 50 MG/1
TABLET, FILM COATED ORAL
Qty: 270 TABLET | Refills: 0 | Status: SHIPPED | OUTPATIENT
Start: 2022-12-19 | End: 2023-05-26

## 2022-12-19 ASSESSMENT — FIBROSIS 4 INDEX: FIB4 SCORE: 0.91

## 2022-12-19 NOTE — ASSESSMENT & PLAN NOTE
-states continue current medications.   -states that she is continuing exercise which is going well. Will continue to monitor.   -Referral to pain management.

## 2022-12-19 NOTE — TELEPHONE ENCOUNTER
Received request via: Pharmacy    Was the patient seen in the last year in this department? Yes    Does the patient have an active prescription (recently filled or refills available) for medication(s) requested? No    Does the patient have [facility] Plus and need 100 day supply (blood pressure, diabetes and cholesterol meds only)? Yes, quantity updated to 100 days

## 2022-12-19 NOTE — PROGRESS NOTES
Saw patient briefly during her PCP visit today, 12/19/22, gave her a [employer] (November 2022) magazine and an article about resistance training from same (December 2022).

## 2022-12-20 PROBLEM — I10 ESSENTIAL HYPERTENSION: Status: RESOLVED | Noted: 2020-05-07 | Resolved: 2022-12-20

## 2022-12-20 ASSESSMENT — ENCOUNTER SYMPTOMS
PALPITATIONS: 0
HEARTBURN: 0
COUGH: 0
DIZZINESS: 0
CHILLS: 0
FEVER: 0
MYALGIAS: 0
DEPRESSION: 0

## 2022-12-29 ENCOUNTER — PATIENT OUTREACH (OUTPATIENT)
Dept: MEDICAL GROUP | Facility: PHYSICIAN GROUP | Age: 57
End: 2022-12-29
Payer: MEDICARE

## 2022-12-29 DIAGNOSIS — N18.32 STAGE 3B CHRONIC KIDNEY DISEASE: ICD-10-CM

## 2022-12-29 DIAGNOSIS — J44.89 COPD (CHRONIC OBSTRUCTIVE PULMONARY DISEASE) WITH CHRONIC BRONCHITIS (HCC): ICD-10-CM

## 2022-12-29 PROCEDURE — 99490 CHRNC CARE MGMT STAFF 1ST 20: CPT | Performed by: NURSE PRACTITIONER

## 2022-12-29 NOTE — PROGRESS NOTES
Assessment:    Phone outreach to patient to complete her monthly PCM follow-up, left voicemail message w/contact information.  Jaclyn returned my call & left voicemail message.  I reached back out to patient and we completed our visit.  She has signed the closing papers on the house she is buying.  She hopes to get the key on Saturday, 12/31/22. They signed all the papers last week.  She was up from 2 a.m. to 7 a.m. this morning but was able to fall back to sleep.  She is not sleeping well right now.  Jaclyn reports health damon she is feeling okay, hanging in there.  She is avoiding NSAIDS, got good report from kidney doctor, will see him again in one year.  She uses heat, ice, and CBD to treat inflammation.  I will send her $50.00 [employer] gift card tomorrow by mail.  She has a slipped vertebra which is causing her pain, her PCP put in a referral for pain management.  Will revisit issue of getting some assistance from [facility] to help with her co-pays and also need to discuss getting assistance with her CPAP machine/supplies.       Education:    Sending (via mail) Laura information on osteopenia     Care Plan:    No updates @ this time    Progress:    Progressing    Next Outreach:  1/27/23, call @ [phone]

## 2023-01-25 ENCOUNTER — PATIENT OUTREACH (OUTPATIENT)
Dept: HEALTH INFORMATION MANAGEMENT | Facility: OTHER | Age: 58
End: 2023-01-25
Payer: MEDICARE

## 2023-01-25 DIAGNOSIS — N18.32 STAGE 3B CHRONIC KIDNEY DISEASE: ICD-10-CM

## 2023-01-25 DIAGNOSIS — I10 ESSENTIAL HYPERTENSION: ICD-10-CM

## 2023-01-25 DIAGNOSIS — J44.89 COPD (CHRONIC OBSTRUCTIVE PULMONARY DISEASE) WITH CHRONIC BRONCHITIS (HCC): ICD-10-CM

## 2023-01-25 NOTE — PROGRESS NOTES
Assessment:    Phone outreach to patient to rescheduled her PCM visit from 1/27/23 to 2/3/23, left voicemail message with contact information.       Education:    Care Plan:    Progress:    Next Outreach:

## 2023-02-03 ENCOUNTER — PATIENT OUTREACH (OUTPATIENT)
Dept: HEALTH INFORMATION MANAGEMENT | Facility: OTHER | Age: 58
End: 2023-02-03
Payer: MEDICARE

## 2023-02-03 DIAGNOSIS — I10 ESSENTIAL HYPERTENSION: ICD-10-CM

## 2023-02-03 DIAGNOSIS — F17.200 TOBACCO DEPENDENCE: ICD-10-CM

## 2023-02-03 DIAGNOSIS — J44.89 COPD (CHRONIC OBSTRUCTIVE PULMONARY DISEASE) WITH CHRONIC BRONCHITIS (HCC): ICD-10-CM

## 2023-02-03 DIAGNOSIS — N18.32 STAGE 3B CHRONIC KIDNEY DISEASE: ICD-10-CM

## 2023-02-03 PROCEDURE — 99490 CHRNC CARE MGMT STAFF 1ST 20: CPT | Performed by: NURSE PRACTITIONER

## 2023-02-03 PROCEDURE — 99439 CHRNC CARE MGMT STAF EA ADDL: CPT | Performed by: NURSE PRACTITIONER

## 2023-02-03 NOTE — PROGRESS NOTES
Assessment:    2/3/23 phone outreach to patient to complete her monthly PCM follow-up, left voicemail message with contact information. 2/15/23 second phone outreach with patient.  Sharon (brie duke) is Jaclyn's service dog. Jaclyn reports she was up & down all night long.  Finally got up & has been hanging drapes & curtains.  They (Jaclyn, her daughter, & son-in-law) got keys to their new (new to them) home on 1/4/23.  Jaclyn's portion of the move was completed on 1/17/23.  Her car (2005 [employer]) has been one hassle after another.  She recently purchased a used 2021 [employer] with 60K miles.  She is on her second attempt to quit smoking.  She put her nicotine patch on last night.  October, November, and December 2022 she had heavy phlegm & it has not let up.  Her chest is hurting from the smoking.  She is using a smoke free shukri that tracks how long she does not smoke.  Using edible marijuana to treat her fibromyalgia pain.  Has muscle spasms & uses baclofen for same. Intestines are bad, has IBSD, wears depends.  Got an adjustable bed and a new mattress (Awara).  Has lost a few pounds.  We reviewed her upcoming appointment w/[facility].            Education:    Discussed the importance of the shingles vaccine, she is not sure if she had chicken pox, her son had chicken pox.  Magdiel (her PCP) said better to be safe than sorry in regards to her getting the vaccine.  She plans to get same.      Care Plan:    No updates at this time    Progress:    Progressing    Next Outreach:  3/16/23, call @ [phone]

## 2023-02-08 ENCOUNTER — APPOINTMENT (OUTPATIENT)
Dept: SLEEP MEDICINE | Facility: MEDICAL CENTER | Age: 58
End: 2023-02-08
Payer: MEDICARE

## 2023-02-20 DIAGNOSIS — K58.0 IRRITABLE BOWEL SYNDROME WITH DIARRHEA: ICD-10-CM

## 2023-02-22 NOTE — TELEPHONE ENCOUNTER
Received request via: Pharmacy    Was the patient seen in the last year in this department? Yes    Does the patient have an active prescription (recently filled or refills available) for medication(s) requested? No    Does the patient have [facility] Plus and need 100 day supply (blood pressure, diabetes and cholesterol meds only)? Medication is not for cholesterol, blood pressure or diabetes

## 2023-02-23 RX ORDER — ONDANSETRON 4 MG/1
4 TABLET, ORALLY DISINTEGRATING ORAL EVERY 8 HOURS PRN
Qty: 15 TABLET | Refills: 0 | Status: SHIPPED | OUTPATIENT
Start: 2023-02-23 | End: 2023-02-25 | Stop reason: SDUPTHER

## 2023-02-25 DIAGNOSIS — K58.0 IRRITABLE BOWEL SYNDROME WITH DIARRHEA: ICD-10-CM

## 2023-02-27 RX ORDER — ONDANSETRON 4 MG/1
4 TABLET, ORALLY DISINTEGRATING ORAL EVERY 8 HOURS PRN
Qty: 15 TABLET | Refills: 0 | Status: SHIPPED | OUTPATIENT
Start: 2023-02-27 | End: 2023-09-26

## 2023-03-23 ENCOUNTER — PATIENT OUTREACH (OUTPATIENT)
Dept: HEALTH INFORMATION MANAGEMENT | Facility: OTHER | Age: 58
End: 2023-03-23
Payer: MEDICARE

## 2023-03-23 DIAGNOSIS — I10 ESSENTIAL HYPERTENSION: ICD-10-CM

## 2023-03-23 DIAGNOSIS — F17.200 TOBACCO DEPENDENCE: ICD-10-CM

## 2023-03-23 DIAGNOSIS — J44.89 COPD (CHRONIC OBSTRUCTIVE PULMONARY DISEASE) WITH CHRONIC BRONCHITIS (HCC): ICD-10-CM

## 2023-03-23 DIAGNOSIS — N18.32 STAGE 3B CHRONIC KIDNEY DISEASE: ICD-10-CM

## 2023-03-23 PROCEDURE — 99490 CHRNC CARE MGMT STAFF 1ST 20: CPT | Performed by: NURSE PRACTITIONER
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
## 2023-04-11 ENCOUNTER — OFFICE VISIT (OUTPATIENT)
Dept: MEDICAL GROUP | Facility: PHYSICIAN GROUP | Age: 58
End: 2023-04-11
Payer: MEDICARE

## 2023-04-11 ENCOUNTER — TELEPHONE (OUTPATIENT)
Dept: HEALTH INFORMATION MANAGEMENT | Facility: OTHER | Age: 58
End: 2023-04-11

## 2023-04-11 VITALS
WEIGHT: 186.4 LBS | TEMPERATURE: 97.1 F | DIASTOLIC BLOOD PRESSURE: 70 MMHG | OXYGEN SATURATION: 97 % | HEIGHT: 66 IN | HEART RATE: 54 BPM | BODY MASS INDEX: 29.96 KG/M2 | SYSTOLIC BLOOD PRESSURE: 100 MMHG

## 2023-04-11 DIAGNOSIS — F33.1 MODERATE EPISODE OF RECURRENT MAJOR DEPRESSIVE DISORDER (HCC): ICD-10-CM

## 2023-04-11 DIAGNOSIS — G47.33 OBSTRUCTIVE SLEEP APNEA SYNDROME: ICD-10-CM

## 2023-04-11 DIAGNOSIS — R42 DIZZINESS: ICD-10-CM

## 2023-04-11 DIAGNOSIS — R07.9 CHEST PAIN, UNSPECIFIED TYPE: ICD-10-CM

## 2023-04-11 PROBLEM — F32.9 MAJOR DEPRESSIVE DISORDER: Status: ACTIVE | Noted: 2023-04-11

## 2023-04-11 PROCEDURE — 99214 OFFICE O/P EST MOD 30 MIN: CPT

## 2023-04-11 PROCEDURE — 93000 ELECTROCARDIOGRAM COMPLETE: CPT

## 2023-04-11 RX ORDER — MECLIZINE HYDROCHLORIDE 25 MG/1
25 TABLET ORAL 3 TIMES DAILY PRN
Qty: 30 TABLET | Refills: 0 | Status: SHIPPED | OUTPATIENT
Start: 2023-04-11 | End: 2023-06-20

## 2023-04-11 ASSESSMENT — FIBROSIS 4 INDEX: FIB4 SCORE: 0.93

## 2023-04-11 ASSESSMENT — PATIENT HEALTH QUESTIONNAIRE - PHQ9
SUM OF ALL RESPONSES TO PHQ QUESTIONS 1-9: 22
CLINICAL INTERPRETATION OF PHQ2 SCORE: 6
5. POOR APPETITE OR OVEREATING: 2 - MORE THAN HALF THE DAYS

## 2023-04-11 NOTE — TELEPHONE ENCOUNTER
"Returned patient's call from last yesterday afternoon, left voicemail message with contact information.      Jaclyn left message saying she wanted to \"chit chat,\" has been having trouble sleeping and sleeping for long periods of time.  She reached out to [facility] on 4/10/23 to see about getting a CPAP.  When she wakes from sleeping she is dizzy.  After being awake for a couple of hours her face & arm are tingly.  Her BP was 118/76 and pulse was 62.      I returned her call after she returned her call.      She is afraid she is going to die in her sleep.  Last night she went to sleep and woke up & used her pulse ox, pulse went down to 47, oxygen was not that bad.  She woke up yesterday morning and felt dizzy, almost passed out.  Two days in a roll she has felt dizzy when waking from sleep.  Sleeping 13 to 14 hours a night.  She has appointment to get her CPAP machine, they will schedule her with the respiratory therapist the end of May.      Pulse @ 1545 on 4/11/23 was 55 and O2 saturation was 97%.      Face & arms were tingling yesterday, all day.  Surface of skin was tingly.  Tingling yesterday and a little today.  Feeling tired.  BP yesterday was normal.  Dizziness is not an usual symptom for her.  Waking up & being dizzy & feeling like she is going to pass out is not normal.  This has been occurring for the last few days when she wakes up.  Having a hard time waking up, oversleeps her alarms.  Scheduled her an appointment in her primary care office for this afternoon with Rod Sandoval.            "

## 2023-04-12 NOTE — ASSESSMENT & PLAN NOTE
Chronic condition uncontrolled  -We will send DME for CPAP and results of sleep study to [facility]

## 2023-04-12 NOTE — ASSESSMENT & PLAN NOTE
Chronic condition  -Declines referral to behavioral health or further medication management at this time  -ED precautions given unknown for worsening or progression of this condition including any SI

## 2023-04-19 ENCOUNTER — HOSPITAL ENCOUNTER (OUTPATIENT)
Dept: LAB | Facility: MEDICAL CENTER | Age: 58
End: 2023-04-19
Payer: MEDICARE

## 2023-04-19 DIAGNOSIS — R42 DIZZINESS: ICD-10-CM

## 2023-04-19 DIAGNOSIS — R07.9 CHEST PAIN, UNSPECIFIED TYPE: ICD-10-CM

## 2023-04-19 LAB
ERYTHROCYTE [DISTWIDTH] IN BLOOD BY AUTOMATED COUNT: 41.6 FL (ref 35.9–50)
HCT VFR BLD AUTO: 46.5 % (ref 37–47)
HGB BLD-MCNC: 15.6 G/DL (ref 12–16)
MCH RBC QN AUTO: 30.5 PG (ref 27–33)
MCHC RBC AUTO-ENTMCNC: 33.5 G/DL (ref 33.6–35)
MCV RBC AUTO: 91 FL (ref 81.4–97.8)
PLATELET # BLD AUTO: 289 K/UL (ref 164–446)
PMV BLD AUTO: 11.1 FL (ref 9–12.9)
RBC # BLD AUTO: 5.11 M/UL (ref 4.2–5.4)
WBC # BLD AUTO: 7 K/UL (ref 4.8–10.8)

## 2023-04-19 PROCEDURE — 85027 COMPLETE CBC AUTOMATED: CPT

## 2023-04-19 PROCEDURE — 36415 COLL VENOUS BLD VENIPUNCTURE: CPT

## 2023-04-19 PROCEDURE — 80053 COMPREHEN METABOLIC PANEL: CPT

## 2023-04-20 LAB
ALBUMIN SERPL BCP-MCNC: 4.6 G/DL (ref 3.2–4.9)
ALBUMIN/GLOB SERPL: 1.6 G/DL
ALP SERPL-CCNC: 94 U/L (ref 30–99)
ALT SERPL-CCNC: 17 U/L (ref 2–50)
ANION GAP SERPL CALC-SCNC: 14 MMOL/L (ref 7–16)
AST SERPL-CCNC: 23 U/L (ref 12–45)
BILIRUB SERPL-MCNC: 0.4 MG/DL (ref 0.1–1.5)
BUN SERPL-MCNC: 25 MG/DL (ref 8–22)
CALCIUM ALBUM COR SERPL-MCNC: 9.2 MG/DL (ref 8.5–10.5)
CALCIUM SERPL-MCNC: 9.7 MG/DL (ref 8.5–10.5)
CHLORIDE SERPL-SCNC: 106 MMOL/L (ref 96–112)
CO2 SERPL-SCNC: 19 MMOL/L (ref 20–33)
CREAT SERPL-MCNC: 1.24 MG/DL (ref 0.5–1.4)
GFR SERPLBLD CREATININE-BSD FMLA CKD-EPI: 51 ML/MIN/1.73 M 2
GLOBULIN SER CALC-MCNC: 2.9 G/DL (ref 1.9–3.5)
GLUCOSE SERPL-MCNC: 87 MG/DL (ref 65–99)
POTASSIUM SERPL-SCNC: 4.6 MMOL/L (ref 3.6–5.5)
PROT SERPL-MCNC: 7.5 G/DL (ref 6–8.2)
SODIUM SERPL-SCNC: 139 MMOL/L (ref 135–145)

## 2023-04-26 ENCOUNTER — PATIENT OUTREACH (OUTPATIENT)
Dept: HEALTH INFORMATION MANAGEMENT | Facility: OTHER | Age: 58
End: 2023-04-26
Payer: MEDICARE

## 2023-04-26 DIAGNOSIS — F17.200 TOBACCO DEPENDENCE: ICD-10-CM

## 2023-04-26 DIAGNOSIS — R53.82 CHRONIC FATIGUE: ICD-10-CM

## 2023-04-26 DIAGNOSIS — N18.32 STAGE 3B CHRONIC KIDNEY DISEASE: ICD-10-CM

## 2023-04-26 DIAGNOSIS — J44.89 COPD (CHRONIC OBSTRUCTIVE PULMONARY DISEASE) WITH CHRONIC BRONCHITIS (HCC): ICD-10-CM

## 2023-04-26 DIAGNOSIS — F10.21 ALCOHOLISM IN RECOVERY (HCC): ICD-10-CM

## 2023-04-26 DIAGNOSIS — I10 ESSENTIAL HYPERTENSION: ICD-10-CM

## 2023-04-26 PROCEDURE — 99490 CHRNC CARE MGMT STAFF 1ST 20: CPT | Performed by: NURSE PRACTITIONER

## 2023-04-26 NOTE — PROGRESS NOTES
"Assessment:    Phone outreach to patient to complete her monthly PCM follow-up.  Jaclyn reports she is \"hanging in there\" today.  Still dealing vertigo. Feels confusion & lack of focus. Has been doing the Epley maneuver, had it done one time in her PCP office on 4/11/23 and performed it one time at home.  The maneuver makes her head spin.  Dizziness started over the last month. Has PT appointment on 5/10/23 to address the dizziness.  The dizziness makes her feel queasy, taking Meclizine but it is not helping.  Has nausea medication but has not taken it.  Still getting tingly in her arms & face but not all the time.  Has an appointment on 5/3/23 to get a CPAP @ [facility], she will meet w/RT & get set up on the CPAP machine.  Sleeping with her bed elevated & arms elevated on throw pillows, it seems to be helping some.  Waking up earlier in the day & falling asleep earlier in the evening.  Not in bed all day & has been more active which is helping with her sleep.  Smoking about one cigarette a day.  We reviewed her upcoming medical appointments. She declined earlier appointment to see her PCP about her vertigo, wants to work on doing the Epley maneuver on a more consistent basis and work with PT.         Education:    Mailing her newspaper article from the [employer] about eating like a centenarian.      Care Plan:    No updates @ this time    Progress:    Slow progress    Next Outreach:  5/24/23, call @ [phone]  "

## 2023-05-10 ENCOUNTER — APPOINTMENT (OUTPATIENT)
Dept: PHYSICAL THERAPY | Facility: MEDICAL CENTER | Age: 58
End: 2023-05-10
Payer: MEDICARE

## 2023-05-24 ENCOUNTER — PATIENT OUTREACH (OUTPATIENT)
Dept: HEALTH INFORMATION MANAGEMENT | Facility: OTHER | Age: 58
End: 2023-05-24
Payer: MEDICARE

## 2023-05-24 DIAGNOSIS — F17.200 TOBACCO DEPENDENCE: ICD-10-CM

## 2023-05-24 DIAGNOSIS — J44.89 COPD (CHRONIC OBSTRUCTIVE PULMONARY DISEASE) WITH CHRONIC BRONCHITIS (HCC): ICD-10-CM

## 2023-05-24 DIAGNOSIS — N18.32 STAGE 3B CHRONIC KIDNEY DISEASE: ICD-10-CM

## 2023-05-24 DIAGNOSIS — I10 ESSENTIAL HYPERTENSION: ICD-10-CM

## 2023-05-24 PROCEDURE — 99999 PR NO CHARGE: CPT | Performed by: NURSE PRACTITIONER

## 2023-05-24 NOTE — PROGRESS NOTES
Assessment    1333 on 5/24/23 phone outreach to patient to complete her monthly PCM follow-up and annual case review, left voicemail message with contact information.  1418 on 5/24/23 left 2nd voicemail message with contact information.  Patient returned my call & left phone message.  I returned her call on 5/24.  Jacyln got new appliances through [employer], got a new dryer & refrigerator at no cost.  Her family has been working on her backyard.  She is exhausted.  Reviewed her upcoming appointments.  She has been moving around more, living life more, and dizziness has mostly subsided.  Has been using her CPAP, waking up earlier, she is up & functioning, & not dizzy all the time.  Needs referral to dermatology, will address with PCP @ upcoming visit.  Annual review included review of Care Plan, medications, and decision to remain active in PCM program.          Education    Mailing her stroke [employer] card & stroke educational booklet.  Discussed stroke signs & symptoms and the importance of calling 911.      Plan of Care and Goals    Updated     Barriers:    Nicotine dependence    Progress:    Progressing    Next outreach:  6/29/23, call @ [phone]

## 2023-05-25 PROBLEM — J44.89 COPD (CHRONIC OBSTRUCTIVE PULMONARY DISEASE) WITH CHRONIC BRONCHITIS (HCC): Status: RESOLVED | Noted: 2017-02-09 | Resolved: 2023-05-25

## 2023-05-25 PROBLEM — M85.88 OSTEOPENIA OF LUMBAR SPINE: Status: ACTIVE | Noted: 2023-05-25

## 2023-05-25 PROBLEM — G63 POLYNEUROPATHY IN OTHER DISEASES CLASSIFIED ELSEWHERE (HCC): Status: ACTIVE | Noted: 2023-05-25

## 2023-05-25 PROBLEM — F15.21 METHAMPHETAMINE DEPENDENCE IN REMISSION (HCC): Status: ACTIVE | Noted: 2023-05-25

## 2023-05-26 DIAGNOSIS — G43.809 OTHER MIGRAINE WITHOUT STATUS MIGRAINOSUS, NOT INTRACTABLE: ICD-10-CM

## 2023-05-26 DIAGNOSIS — M79.7 FIBROMYALGIA: ICD-10-CM

## 2023-05-26 RX ORDER — TOPIRAMATE 50 MG/1
TABLET, FILM COATED ORAL
Qty: 270 TABLET | Refills: 0 | Status: SHIPPED | OUTPATIENT
Start: 2023-05-26 | End: 2023-08-24

## 2023-06-13 ENCOUNTER — APPOINTMENT (OUTPATIENT)
Dept: PHYSICAL THERAPY | Facility: MEDICAL CENTER | Age: 58
End: 2023-06-13
Payer: MEDICARE

## 2023-06-13 NOTE — PROGRESS NOTES
Subjective:     CC: Dizziness    HPI:   Jaclyn presents today with    Problem   Dizziness    Reports a 3 month history of progressive dizziness strongly related to position changes. Reports dizziness is constant   -Yesterday she sat up and had a near syncopal event.  --Other associated symptoms include fatigue, brain fog, occasional chest pain however she does report to have fibromyalgia and does not know if this is a component as she usually always has chest pain.  -She is taking gabapentin 600 mg 3 times daily as well as Remeron 30 mg nightly.  She felt this could be a contributing factor and therefore stopped taking her Remeron last night.  She has not noticed any difference with her fatigue or dizziness.  -She does admit to drinking 48-64 oz water daily  -blood pressure today in clinic as low at 100/70  -Does report chronic tinitis     Chest Pain    This is a chronic condition and vague in nature  -Patient reports years of chest pain and intercostal pain as well as epigastric pain.  More recently she reports slight chest heaviness with associated symptoms of dizziness, facial tingling, and fatigue.     Obstructive Sleep Apnea Syndrome    Patient diagnosed with sleep apnea May 2022 with 3 apneic events during her sleep study and sats as low as 84%.  She was prescribed a CPAP machine but was unable to get the CPAP machine due to administrative difficulties.  She does report to have extreme fatigue that seems to be associated with this dizziness as well as facial tingling. She sleeps approximately 12 to 14 hours/day and still does not wake up feeling rested.        Major Depressive Disorder    This is a chronic condition for which patient was taking mirtazapine although it is unclear if she was taking this for depression/bi-polar as she states she only used this medication to help her sleep.  She denies suicidal ideations.  She declines referral to behavioral health for further treatment recommendations at this  "time.  -She is going to continue holding mirtazapine at night to determine if symptoms of fatigue decline.         Health Maintenance: Recommend follow-up with PCP for health maintenance topics    ROS: See HPI      Objective:     Exam:  /70 (BP Location: Left arm, Patient Position: Sitting, BP Cuff Size: Adult)   Pulse (!) 54   Temp 36.2 °C (97.1 °F) (Temporal)   Ht 1.664 m (5' 5.5\")   Wt 84.6 kg (186 lb 6.4 oz)   LMP 12/01/2000   SpO2 97%   BMI 30.55 kg/m²  Body mass index is 30.55 kg/m².    Physical Exam  Constitutional:       General: She is not in acute distress.     Appearance: Normal appearance. She is not ill-appearing or toxic-appearing.   HENT:      Head: Normocephalic.   Eyes:      Extraocular Movements:      Right eye: Nystagmus present.      Left eye: No nystagmus.      Conjunctiva/sclera: Conjunctivae normal.   Cardiovascular:      Rate and Rhythm: Normal rate and regular rhythm.      Pulses: Normal pulses.      Heart sounds: Normal heart sounds. No murmur heard.  Pulmonary:      Effort: Pulmonary effort is normal. No respiratory distress.      Breath sounds: Normal breath sounds.   Skin:     General: Skin is warm and dry.   Neurological:      General: No focal deficit present.      Mental Status: She is alert and oriented to person, place, and time.   Psychiatric:         Mood and Affect: Mood normal.         Behavior: Behavior normal.       Labs:   Lab Results   Component Value Date/Time    CHOLSTRLTOT 166 07/01/2022 03:01 PM     (H) 07/01/2022 03:01 PM    HDL 40 07/01/2022 03:01 PM    TRIGLYCERIDE 87 07/01/2022 03:01 PM       Lab Results   Component Value Date/Time    SODIUM 144 11/10/2022 01:37 PM    POTASSIUM 4.5 11/10/2022 01:37 PM    CHLORIDE 107 11/10/2022 01:37 PM    CO2 25 11/10/2022 01:37 PM    GLUCOSE 94 11/10/2022 01:37 PM    BUN 21 11/10/2022 01:37 PM    CREATININE 1.37 11/10/2022 01:37 PM     Lab Results   Component Value Date/Time    ALKPHOSPHAT 98 07/01/2022 03:01 PM "    ASTSGOT 17 07/01/2022 03:01 PM    ALTSGPT 10 07/01/2022 03:01 PM    TBILIRUBIN 0.2 07/01/2022 03:01 PM      Lab Results   Component Value Date/Time    HBA1C 5.1 08/16/2021 08:54 AM    HBA1C 5.9 (H) 03/18/2021 12:16 PM     No results found for: TSH  Lab Results   Component Value Date/Time    FREET4 0.67 09/05/2019 10:57 AM    FREET4 0.79 07/12/2018 11:16 AM         Assessment & Plan: Medical Decision Making     58 y.o. female with the following -     1. Dizziness  This is a subacute condition likely BPPV given presence of nystagmus and strong correlation to positional changes.  -Epley maneuver attempted in clinic which did not yield resolution of symptoms  -Meclizine 25 mg 3 times daily for dizziness.  Side effects discussed including possible worsening of fatigue.  -I have STRONGLY URGED patient to seek emergency treatment if events include syncopal episodes or if conditions worsens including visual changes and facial numbness or tingling.  - EKG - Clinic Performed  - CBC WITHOUT DIFFERENTIAL; Future  - Comp Metabolic Panel; Future  - Referral to Physical Therapy-vestibular therapy    2. Chest pain, unspecified type  Chronic condition uncontrolled  - EKG - Clinic Performed  -Chronic condition uncontrolled  -EKG done in clinic  EKG Interpretation   Ordered and interpreted by YEIMY Reid  Rhythm: normal sinus   Rate: 57 normal   Axis: normal   Ectopy: none   Conduction: normal   ST Segments: no acute change   T Waves: no acute change   Q Waves: none   Clinical Impression: no acute changes and normal EKG    - CBC WITHOUT DIFFERENTIAL; Future  - Comp Metabolic Panel; Future    AGAIN ED precautions enforced    3. Obstructive sleep apnea syndrome  Chronic condition uncontrolled  -We will send DME for CPAP and results of sleep study to [facility]     4. Moderate episode of recurrent major depressive disorder (HCC)  Chronic condition  -Declines referral to behavioral health or further medication management at this  time  -ED precautions given unknown for worsening or progression of this condition including any SI  - Patient has been identified as having a positive depression screening. Appropriate orders and counseling have been given.    Differential diagnosis, natural history, supportive care, and indications for immediate follow-up discussed.  Shared decision making approach utilized, and patient is amendable with plan of care.  Patient understands to return to clinic or go to the emergency department if symptoms worsen. All questions and concerns addressed to the best of my knowledge.    Return if symptoms worsen or fail to improve.    Please note that this dictation was created using voice recognition software. I have made every reasonable attempt to correct obvious errors, but I expect that there are errors of grammar and possibly content that I did not discover before finalizing the note.         Bedside

## 2023-06-14 ENCOUNTER — OFFICE VISIT (OUTPATIENT)
Dept: SLEEP MEDICINE | Facility: MEDICAL CENTER | Age: 58
End: 2023-06-14
Attending: PHYSICIAN ASSISTANT
Payer: MEDICARE

## 2023-06-14 VITALS
DIASTOLIC BLOOD PRESSURE: 74 MMHG | SYSTOLIC BLOOD PRESSURE: 114 MMHG | WEIGHT: 170 LBS | OXYGEN SATURATION: 96 % | RESPIRATION RATE: 16 BRPM | HEIGHT: 65 IN | HEART RATE: 75 BPM | BODY MASS INDEX: 28.32 KG/M2

## 2023-06-14 DIAGNOSIS — G47.30 SLEEP DISORDER BREATHING: ICD-10-CM

## 2023-06-14 DIAGNOSIS — F17.200 TOBACCO DEPENDENCE: ICD-10-CM

## 2023-06-14 DIAGNOSIS — G47.33 OSA (OBSTRUCTIVE SLEEP APNEA): ICD-10-CM

## 2023-06-14 PROCEDURE — 99213 OFFICE O/P EST LOW 20 MIN: CPT | Performed by: PHYSICIAN ASSISTANT

## 2023-06-14 PROCEDURE — 3074F SYST BP LT 130 MM HG: CPT | Performed by: PHYSICIAN ASSISTANT

## 2023-06-14 PROCEDURE — 3078F DIAST BP <80 MM HG: CPT | Performed by: PHYSICIAN ASSISTANT

## 2023-06-14 ASSESSMENT — ENCOUNTER SYMPTOMS
TREMORS: 1
INSOMNIA: 1
SHORTNESS OF BREATH: 1
WHEEZING: 1
ORTHOPNEA: 1
COUGH: 1
PALPITATIONS: 0
CHILLS: 0
SPUTUM PRODUCTION: 1
SINUS PAIN: 0
SORE THROAT: 0
FEVER: 0
DIZZINESS: 1
HEARTBURN: 0
WEIGHT LOSS: 0
HEADACHES: 1

## 2023-06-14 ASSESSMENT — FIBROSIS 4 INDEX: FIB4 SCORE: 1.1

## 2023-06-14 NOTE — PATIENT INSTRUCTIONS
1-reviewed compliance  2-Today we reviewed equipment cleaning  once weekly minimum  mask, tubing and water chamber  use dedicated container  use mild soap and water  SoClean or other ozone  are not recommended  white vinegar and water solution is no longer recommended  hang tubing to dry  mask sanitizing wipes are an option for use   3-As a reminder use distilled water only in humidifier chamber.   4-Equipment replacement schedule : Mask cushion every month, Mask every 6 months, Head gear every 6 months, Tubing every 3 months, Ultra-fine filters 2 times per month, Humidifier chamber every 6 months   5-will pull compliance to verify therapeutic goals are being met

## 2023-06-14 NOTE — PROGRESS NOTES
"Chief Complaint   Patient presents with    Follow-Up     06/10/22 Afshan    Apnea     1st com       HPI:  Jaclyn Mejia is a 58 y.o. year old female here today for follow-up on obstructive sleep apnea and first compliance.  Patient is reporting improvement in her chronic fatigue and headaches.  Patient is a someday smoker with previously reported quit date July 2022, 35-pack-year history and current use some but not every day.  She is accompanied by her small service dog. Patient previously evaluated by Dr. Rafy Cavanaugh, 6/10/22.    Past Medical History: Migraines, essential tremor, IBS with diarrhea, bipolar affective disorder, generalized anxiety disorder, alcohol dependence in remission, fibromyalgia, stage III chronic kidney disease, degenerative disc disease, methamphetamine use in remission.    Vitals:  /74   Pulse 75   Resp 16   Ht 1.651 m (5' 5\")   Wt 77.1 kg (170 lb)   SpO2 96%  on room air.      Recent Imaging: No recent chest imaging, chest x-ray 8/25/2021 demonstrated no acute cardiopulmonary process.    Currently using  Resmed auto CPAP @4-7 cm H20 pressure; compliance reviewed for 5/15/2023 through 6/13/2023, days used 30/30, average daily usage 9 hours 26 minutes, 100% of days greater than or equal to 4 hours, mask leak at 13.2 LPM at 95th percentile, AHI 2.3 per hour.  Patient does demonstrate consistent use of pressures at the high end of range 6.9 at 95th percentile.  See media for full report.    Device obtained May 2023  DME provider preferred  Mask interface fullface mask  Overnight home sleep study obtained 5/18/2022 demonstrated mild obstructive sleep apnea with NING of 14.8/h increasing to 16.6 when supine.  Low O2 sat of 84% with sats at or below 88% for 13 minutes of flow evaluation time.  Recommendation auto CPAP trial versus titration study.      [place] Sleepiness Scale 5/24 as reported 2/24/2022  Stop Bang Score 5 (6/14/2023  2:09 PM)         Review of Systems "   Constitutional:  Positive for malaise/fatigue. Negative for chills, fever and weight loss.   HENT:  Positive for congestion (allergies) and tinnitus (constant chronic). Negative for hearing loss, nosebleeds, sinus pain and sore throat.    Eyes:         Presc glasses   Respiratory:  Positive for cough, sputum production (white to green/brown), shortness of breath (with exertion) and wheezing.    Cardiovascular:  Positive for chest pain (fibromyalgia, COPD between ribs) and orthopnea. Negative for palpitations and leg swelling.   Gastrointestinal:  Negative for heartburn.        No dentures, missing 4 teeth, no swallowing issues   Neurological:  Positive for dizziness (BPPV but doing better using cane), tremors and headaches.   Psychiatric/Behavioral:  The patient has insomnia (staying asleep but improved).        Past Medical History:   Diagnosis Date    Abdominal pain     Actinic keratosis 2/6/2018    Alcohol abuse     Allergy     Anxiety     Arthritis     Back pain     Back pain     Bronchitis     Chronic pain syndrome     Constipation     COPD (chronic obstructive pulmonary disease) (HCC)     Cough     Daytime sleepiness     DDD (degenerative disc disease), cervical     DDD (degenerative disc disease), thoracolumbar     Depression     Diarrhea     Diverticulosis     Dizziness     Essential tremor     Fatigue     Fibromyalgia     IBS (irritable bowel syndrome)     Insomnia     Migraine     Nausea     Painful joint     Pulmonary emphysema (HCC)     Restless leg syndrome     Ringing in ears     Snoring     Sore muscles     Sweat, sweating, excessive     Weakness     Wears glasses        Past Surgical History:   Procedure Laterality Date    ABDOMINAL HYSTERECTOMY TOTAL      CHOLECYSTECTOMY      HYSTERECTOMY LAPAROSCOPY      TONSILLECTOMY         Family History   Problem Relation Age of Onset    Lung Disease Mother         copd    Cancer Mother         basal/squamous    Hypertension Mother     Hyperlipidemia Mother      Stroke Mother     Alcohol abuse Mother     Heart Disease Father         HF    Arthritis Father         rheumatoid    Lung Disease Father         emphysema    Alcohol abuse Father     Cancer Sister     Alcohol abuse Sister     Cancer Maternal Grandfather         Bladder or prostate?    Cancer Sister         pre cancerous spots     Alcohol abuse Sister     Kidney Disease Sister     Alcohol abuse Sister     Hypertension Sister     Alcohol abuse Sister        Social History     Socioeconomic History    Marital status: Single     Spouse name: Not on file    Number of children: Not on file    Years of education: Not on file    Highest education level: Not on file   Occupational History    Not on file   Tobacco Use    Smoking status: Every Day     Packs/day: 1.00     Years: 35.00     Pack years: 35.00     Types: Cigarettes     Last attempt to quit: 2022     Years since quittin.9    Smokeless tobacco: Never   Vaping Use    Vaping Use: Every day    Substances: THC, CBD    Devices: Disposable, Pre-filled or refillable cartridge, Refillable tank, Pre-filled pod   Substance and Sexual Activity    Alcohol use: Not Currently     Alcohol/week: 0.0 oz     Comment: Not currently drinking alcohol    Drug use: Yes     Frequency: 7.0 times per week     Types: Marijuana, Inhaled, Oral     Comment: Only canibus medicinal for anti spasm, anxiety, ibs, inflamm    Sexual activity: Not Currently     Partners: Male, Female     Comment: currently with male   Other Topics Concern    Not on file   Social History Narrative    Initial Intake Date:  Last Updated:        Financial situation:    SSD $1570/mo w/ $450 savings. We did discuss care credit in regards to behavioral health treatment interventions. Jaclyn        Food resources & insecurities:    Deferred.         Housing & environmental:    Resides in [place] in [facility] ([address]).         Transportation:    Deferred.        Family dynamics & family/friend social supports:  "   Pt has local family to include 86-year-old mother and sister. She reports family are functioning alcoholics but are supportive in her sobriety. They will provide alternative alcohol free drinks at gatherings. Pt has son currently in inpatient treatment for alcohol use.         Pt has significant other who resides in the same [facility]. He is also pursuing sobriety w/ Jaclyn.        ADLs/IADLs & associated diagnoses:    Pt's ADLs/IADL have previously been affected by her alcohol use. They have cause incontinent issues in the past as well as extended periods of vomiting.         Advanced life planning:    Not on file.         Behavioral/Mental:    Associated diagnoses include migraine without status migrainosus, not intractable; insomnia due to medical condition; recurrent major depressive disorder; alcoholism; deliberate self-cutting; and obesity.        Pt has extensive history of substance use and recovery. In her young adult years pt used methamphatmine for aprox 1 year until she sought treatment. At this time she was mother to 2 children. She attended both AA[facility]NA and [facility] in the [facility], [place] area. She became sober \"[place]\" and maintained sobriety for 16 years. 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 Pt's daughter then left the home (at age 21) which affected her drinking; at that time she also became disabled. Pt relapsed on methamphetamine with use over 2 years w/ a 9 month period of sobriety. Pt then moved to [place] where her drinking increased (vodka). Pt reports medical symptoms from drinking include every other day vomitting, a period of GI/rectal bleeding and hypertensive events. Per chart review pt drinking 5th vodka daily. Pt's last drink 3/7/2021. She is pursuing sobriety with her significant other, Bradly. Pt is 6 days sober at time of assessment (3/12/2021). She is \"tired of being sick and " "tired.\"        Pt's reservations include attending inpatient; she would need to take her pet dog with her as no one else can care for the dog. Dog is  animal. And the allotment of time required for intensive out patient (3 days a week); she is concerned her fibromyalgia will affect her attendance. Thre is a general concern of cost which may direct pt towards low income treatment options.         Assessments completed by :    n/a        Collaterals:     Social Determinants of Health     Financial Resource Strain: High Risk (8/16/2022)    Overall Financial Resource Strain (CARDIA)     Difficulty of Paying Living Expenses: Very hard   Food Insecurity: No Food Insecurity (8/16/2022)    Hunger Vital Sign     Worried About Running Out of Food in the Last Year: Never true     Ran Out of Food in the Last Year: Never true   Transportation Needs: Unmet Transportation Needs (8/16/2022)    PRAPARE - Transportation     Lack of Transportation (Medical): Yes     Lack of Transportation (Non-Medical): No   Physical Activity: Insufficiently Active (8/16/2022)    Exercise Vital Sign     Days of Exercise per Week: 6 days     Minutes of Exercise per Session: 20 min   Stress: Stress Concern Present (8/16/2022)    [employer] - Occupational Stress Questionnaire     Feeling of Stress : Very much   Social Connections: Moderately Isolated (8/16/2022)    Social Connection and Isolation Panel [NHANES]     Frequency of Communication with Friends and Family: More than three times a week     Frequency of Social Gatherings with Friends and Family: More than three times a week     Attends Hinduism Services: More than 4 times per year     Active Member of Clubs or Organizations: No     Attends Club or Organization Meetings: Never     Marital Status: Never    Intimate Partner Violence: Not on file   Housing Stability: Low Risk  (8/16/2022)    Housing Stability Vital Sign     Unable to Pay for " Housing in the Last Year: No     Number of Places Lived in the Last Year: 1     Unstable Housing in the Last Year: No       Allergies as of 06/14/2023 - Reviewed 06/14/2023   Allergen Reaction Noted    Erythromycin Rash 09/15/2016    Tetracycline Rash 09/15/2016          Current medications as of today   Current Outpatient Medications   Medication Sig Dispense Refill    topiramate (TOPAMAX) 50 MG tablet TAKE 1 TABLET BY MOUTH THREE TIMES A  Tablet 0    NON SPECIFIED Take  by mouth. Flax oil gel cap, one capsule daily      meclizine (ANTIVERT) 25 MG Tab Take 1 Tablet by mouth 3 times a day as needed for Dizziness or Vertigo. 30 Tablet 0    ondansetron (ZOFRAN ODT) 4 MG TABLET DISPERSIBLE Take 1 Tablet by mouth every 8 hours as needed for Nausea/Vomiting. 15 Tablet 0    Milnacipran HCl (SAVELLA) 100 MG Tab Take 1 Tablet by mouth 2 times a day. 180 Tablet 2    baclofen (LIORESAL) 10 MG Tab Take 1 Tablet by mouth 3 times a day. 270 Tablet 3    gabapentin (NEURONTIN) 600 MG tablet TAKE 1 TABLET BY MOUTH THREE TIMES A  Tablet 0    diclofenac sodium (VOLTAREN) 1 % Gel Apply 2 g topically 4 times a day as needed (pain). 100 g 0    Omega-3 Fatty Acids (FISH OIL) 1000 MG Cap capsule Take 1,000 mg by mouth 1 time a day as needed.      magnesium oxide (MAG-OX) 400 MG Tab tablet Take 400 mg by mouth every day.      clindamycin (CLEOCIN) 2 % vaginal cream Use vaginally after sexual activity for irritation and odor related to BV. (Patient taking differently: Use vaginally after sexual activity for irritation and odor related to BV.    5/24/23 using as needed) 40 g 0    lidocaine (LIDODERM) 5 % Patch Place 1 Patch on the skin every 24 hours. (Patient taking differently: Place 1 Patch on the skin every 24 hours. Using OTC product as needed) 10 Patch 0    Loratadine (CLARITIN PO) Take 1 tablet by mouth every day.      hydrOXYzine HCl (ATARAX) 25 MG Tab Take 1 Tab by mouth 3 times a day as needed for Anxiety. 90 Tab 0     dicyclomine (BENTYL) 20 MG Tab Take 20 mg by mouth every 6 hours as needed.      Non Formulary Request Step 2nicotine patch (Patient not taking: Reported on 5/24/2023)      albuterol (PROAIR HFA) 108 (90 Base) MCG/ACT Aero Soln inhalation aerosol Inhale 2 Puffs every four hours as needed for Shortness of Breath. (Patient not taking: Reported on 5/24/2023) 8.5 g 0    PROAIR  (90 Base) MCG/ACT Aero Soln inhalation aerosol INHALE 2 PUFFS BY MOUTH EVERY 6 HOURS AS NEEDED FOR SHORTNESS OF BREATH. (Patient not taking: Reported on 5/24/2023) 8.5 Inhaler 3     No current facility-administered medications for this visit.         Physical Exam:   Gen:           Alert and oriented, No apparent distress. Mood and affect appropriate, normal interaction with examiner.  Accompanied by service animal  Hearing:     Grossly intact.  Nose:          Normal, no lesions or deformities.  Dentition:    Fair dentition.   Oropharynx:   Tongue normal, posterior pharynx without erythema or exudate.  Mallampati Classification: III  Neck:        Supple, trachea midline, no masses.  Respiratory Effort: No intercostal retractions or use of accessory muscles.   Gait and Station: Requires cane  Digits and Nails: No clubbing, cyanosis, petechiae, or nodes.   Skin:        No rashes, lesions or ulcers noted.               Ext:           No cyanosis or edema.      Immunizations:  Flu: 12/7/2021  Pneumovax 23: 1/22/2018, 10/24/2014  Prevnar 13: 10/24/2014  PCV 20: Recommended  Pfizer SARS CoV2 Vaccine: 12/7/2021, 4/15/2021, 3/23/2021    Assessment / Plan:  1. DORA (obstructive sleep apnea)  - DME Other  - DME Mask and Supplies    Reviewed first compliance, reviewed equipment cleaning, reviewed supply replacement schedule, advised to use only distilled water and humidifier chamber.  Given patient pressures and infrequent sense of there not being quite enough airflow will increase pressure range slightly to 5 to 8 cm H2O pressure and pull compliance  and about 30 days to verify tolerance.  We will schedule patient for shorter term follow-up to review.    2. Sleep disorder breathing    States benefit with PAP therapy    3. Tobacco dependence    Support given for efforts to quit smoking, complete smoking cessation however is advised.  Some change language elicited.      Follow-up:   Return in about 6 months (around 12/14/2023) for Return with Cherri ePreira PA-C.    Please note that this dictation was created using voice recognition software. I have made every reasonable attempt to correct obvious errors, but it is possible there are errors of grammar and possibly content that I did not discover before finalizing the note.

## 2023-06-20 ENCOUNTER — OFFICE VISIT (OUTPATIENT)
Dept: MEDICAL GROUP | Facility: PHYSICIAN GROUP | Age: 58
End: 2023-06-20
Payer: MEDICARE

## 2023-06-20 VITALS
HEIGHT: 66 IN | SYSTOLIC BLOOD PRESSURE: 120 MMHG | DIASTOLIC BLOOD PRESSURE: 60 MMHG | BODY MASS INDEX: 28.8 KG/M2 | WEIGHT: 179.2 LBS | TEMPERATURE: 98.2 F | HEART RATE: 60 BPM | OXYGEN SATURATION: 97 %

## 2023-06-20 DIAGNOSIS — L98.9 NON-HEALING SKIN LESION: ICD-10-CM

## 2023-06-20 DIAGNOSIS — I10 ESSENTIAL HYPERTENSION: ICD-10-CM

## 2023-06-20 DIAGNOSIS — R07.9 CHEST PAIN, UNSPECIFIED TYPE: ICD-10-CM

## 2023-06-20 DIAGNOSIS — Z72.0 CURRENTLY ATTEMPTING TO QUIT SMOKING: ICD-10-CM

## 2023-06-20 DIAGNOSIS — M62.89 PELVIC FLOOR DYSFUNCTION: ICD-10-CM

## 2023-06-20 DIAGNOSIS — F17.200 TOBACCO DEPENDENCE: ICD-10-CM

## 2023-06-20 DIAGNOSIS — G43.809 OTHER MIGRAINE WITHOUT STATUS MIGRAINOSUS, NOT INTRACTABLE: ICD-10-CM

## 2023-06-20 DIAGNOSIS — K21.9 GASTROESOPHAGEAL REFLUX DISEASE WITHOUT ESOPHAGITIS: ICD-10-CM

## 2023-06-20 DIAGNOSIS — Z00.00 MEDICARE ANNUAL WELLNESS VISIT, SUBSEQUENT: Primary | ICD-10-CM

## 2023-06-20 DIAGNOSIS — M79.7 FIBROMYALGIA: ICD-10-CM

## 2023-06-20 DIAGNOSIS — G47.33 OBSTRUCTIVE SLEEP APNEA SYNDROME: ICD-10-CM

## 2023-06-20 DIAGNOSIS — G63 POLYNEUROPATHY IN OTHER DISEASES CLASSIFIED ELSEWHERE (HCC): ICD-10-CM

## 2023-06-20 DIAGNOSIS — F41.1 GENERALIZED ANXIETY DISORDER: ICD-10-CM

## 2023-06-20 DIAGNOSIS — R53.82 CHRONIC FATIGUE: ICD-10-CM

## 2023-06-20 DIAGNOSIS — F31.31 BIPOLAR AFFECTIVE DISORDER, CURRENTLY DEPRESSED, MILD (HCC): ICD-10-CM

## 2023-06-20 DIAGNOSIS — R07.89 CHEST PRESSURE: ICD-10-CM

## 2023-06-20 DIAGNOSIS — N18.32 STAGE 3B CHRONIC KIDNEY DISEASE: ICD-10-CM

## 2023-06-20 DIAGNOSIS — K58.0 IRRITABLE BOWEL SYNDROME WITH DIARRHEA: ICD-10-CM

## 2023-06-20 DIAGNOSIS — M51.36 DDD (DEGENERATIVE DISC DISEASE), LUMBAR: ICD-10-CM

## 2023-06-20 DIAGNOSIS — G25.0 ESSENTIAL TREMOR: ICD-10-CM

## 2023-06-20 DIAGNOSIS — F10.21 ALCOHOLISM IN RECOVERY (HCC): ICD-10-CM

## 2023-06-20 DIAGNOSIS — F15.21 METHAMPHETAMINE DEPENDENCE IN REMISSION (HCC): ICD-10-CM

## 2023-06-20 DIAGNOSIS — F10.21 ALCOHOL DEPENDENCE, IN REMISSION (HCC): ICD-10-CM

## 2023-06-20 DIAGNOSIS — R73.09 ELEVATED HEMOGLOBIN A1C: ICD-10-CM

## 2023-06-20 DIAGNOSIS — R42 DIZZINESS: ICD-10-CM

## 2023-06-20 PROBLEM — M54.31 SCIATICA OF RIGHT SIDE: Status: RESOLVED | Noted: 2021-05-20 | Resolved: 2023-06-20

## 2023-06-20 PROBLEM — F32.9 MAJOR DEPRESSIVE DISORDER: Status: RESOLVED | Noted: 2023-04-11 | Resolved: 2023-06-20

## 2023-06-20 PROBLEM — M79.645 THUMB PAIN, LEFT: Status: RESOLVED | Noted: 2021-12-28 | Resolved: 2023-06-20

## 2023-06-20 PROCEDURE — 99214 OFFICE O/P EST MOD 30 MIN: CPT | Mod: 25 | Performed by: NURSE PRACTITIONER

## 2023-06-20 PROCEDURE — 3078F DIAST BP <80 MM HG: CPT | Performed by: NURSE PRACTITIONER

## 2023-06-20 PROCEDURE — 93000 ELECTROCARDIOGRAM COMPLETE: CPT | Performed by: NURSE PRACTITIONER

## 2023-06-20 PROCEDURE — 3074F SYST BP LT 130 MM HG: CPT | Performed by: NURSE PRACTITIONER

## 2023-06-20 PROCEDURE — G0439 PPPS, SUBSEQ VISIT: HCPCS | Performed by: NURSE PRACTITIONER

## 2023-06-20 RX ORDER — FAMOTIDINE 40 MG/1
40 TABLET, FILM COATED ORAL DAILY
Qty: 30 TABLET | Refills: 0 | Status: SHIPPED | OUTPATIENT
Start: 2023-06-20 | End: 2023-07-13

## 2023-06-20 RX ORDER — DICYCLOMINE HCL 20 MG
20 TABLET ORAL EVERY 6 HOURS PRN
Qty: 120 TABLET | Refills: 1 | Status: SHIPPED | OUTPATIENT
Start: 2023-06-20 | End: 2023-07-13

## 2023-06-20 ASSESSMENT — PATIENT HEALTH QUESTIONNAIRE - PHQ9: CLINICAL INTERPRETATION OF PHQ2 SCORE: 0

## 2023-06-20 ASSESSMENT — ACTIVITIES OF DAILY LIVING (ADL): BATHING_REQUIRES_ASSISTANCE: 0

## 2023-06-20 ASSESSMENT — FIBROSIS 4 INDEX: FIB4 SCORE: 1.1

## 2023-06-20 ASSESSMENT — ENCOUNTER SYMPTOMS: GENERAL WELL-BEING: FAIR

## 2023-06-22 NOTE — PROGRESS NOTES
Chief Complaint   Patient presents with    Annual Exam       HPI:  Jaclyn is a 58 y.o. here for Medicare Annual Wellness Visit    Problem   Bmi 29.0-29.9,Adult    Chronic in nature. Stable. Working on healthy diet and exercise.     Polyneuropathy in Other Diseases Classified Elsewhere ([facility])    Chronic in nature.  Patient has bilateral lower extremity symptoms.  States she will frequently have sharp nerve pain.  She does take gabapentin which is prescribed for her fibromyalgia.  She does also have significant spine changes that she follows with specialty regarding.     Methamphetamine Dependence in Remission ([facility])    Continues methamphetamine remission     Dizziness    States mild sometimes, but better with increased activity. Not having severe episodes.      Chest Pain    States she is having chest discomfort. States feels heavy. Almost feels like heartburn. States more substernal. States is also having epigastric discomfort. There is some burning. EKG is WNL.    EKG Interpretation   Interpreted by me   Rhythm: normal sinus   Rate: normal   Conduction: normal   ST Segments: no acute change   T Waves: no acute change   Q Waves: none   Clinical Impression: no acute changes and normal EKG       Obstructive Sleep Apnea Syndrome    Chronic in nature. States that she is doing ok with this but noting more apneas since they increased her pressure. States sleeping better with cpap. States sleeping 8-10 hours.     Tobacco Dependence    Chronic in nature. Stable. States she is on day 3 of quitting. States was noting some chest pressure with smoking.      Currently Attempting to Quit Smoking    States that she is again working on smoking cessation.  She has been off cigarettes currently for about 5 days.     Ddd (Degenerative Disc Disease), Lumbar    Has had some worsening back pain, states that she does have follow-up with specialist. Chronic in nature.      Chronic Fatigue    States has been a lot worse. Starting to  improve with CPAP, better from winter. States being more active improved the vertigo.      Alcohol Dependence, in Remission ([facility])    No alcohol recently. 2 drinks 2 weeks ago.     Essential Tremor    Stable.  Patient states that overall this has been well controlled.Chronic in nature.      Irritable Bowel Syndrome With Diarrhea    Chronic in nature.  Stable.  Increased nausea, requesting refill of Bentyl.     Bipolar Affective Disorder, Current Episode Depressed ([facility])    Chronic in nature. Stable. Has been going well. Continues taking remeron 30 mg PO nightly. States medication is working well.  Treatment continues to work well without side effects.     Migraine Without Status Migrainosus, Not Intractable    Chronic in nature. Stable. Continues taking topamax 50 mg PO three times daily.   States that migraine frequency is unchanged.  States that symptoms are much less with Topamax.       KVNG (generalized anxiety disorder)    Chronic in nature.  Stable.  Symptoms are overall doing well.  That recently she has gotten onto plenty of fish.     Fibromyalgia    Chronic in nature. Stable. Has had some increased pain but states that she feels she is overall doing well. continues taking gabapentin 600 mg PO three times daily and voltaren 4 times daily. States pain is well-managed with current regimen. States that she attempts to walk for 30 minutes daily.      Major Depressive Disorder (Resolved)    This is a chronic condition for which patient was taking mirtazapine although it is unclear if she was taking this for depression/bi-polar as she states she only used this medication to help her sleep.  She denies suicidal ideations.  She declines referral to behavioral health for further treatment recommendations at this time.  -She is going to continue holding mirtazapine at night to determine if symptoms of fatigue decline.     Thumb Pain, Left (Resolved)    This is a new issue. Started approximately 1 month ago. Patient  "states that she is unsure but she may have fallen approximately 1 month ago. States that she is concerned for fracture related to the fact that the pain has not improved and states that approximately a week after it started hurting she noticed a bruise just inferior to the left thumb. She states that it is tender, does become more swollen at times and states that she has noticed it to be red and warm to touch but states that it does not last. She states that it has become more swollen over the last week. States that it is tender to palpation but that she does feel like she has full range of motion of both her thumb and her wrist to though it does cause her significant pain. She states she has not found anything specific that makes it better or worse she has tried some topical Voltaren but that does not seem to alleviate the discomfort.     Sciatica of Right Side (Resolved)    Chronic in nature. States the \"overlapping vertebra\" and she has had flares of sciatica pain. States needs to continue current PT prior to trying another PT.     Bmi 30.0-30.9,Adult (Resolved)    Chronic in nature. Stable. She has been working on healthy diet and exercise.            Patient Active Problem List    Diagnosis Date Noted    BMI 29.0-29.9,adult 05/25/2023    Polyneuropathy in other diseases classified elsewhere ([facility]) 05/25/2023    Methamphetamine dependence in remission ([facility]) 05/25/2023    Osteopenia of lumbar spine 05/25/2023    Dizziness 04/11/2023    Chest pain 04/11/2023    Obstructive sleep apnea syndrome 04/11/2023    Tobacco dependence 09/22/2022    Currently attempting to quit smoking 07/08/2022    DDD (degenerative disc disease), lumbar 06/01/2022    Chronic fatigue 08/24/2021    Pelvic floor dysfunction 08/24/2021    Stage 3b chronic kidney disease (HCC) 08/24/2021    Elevated hemoglobin A1c 05/20/2021    Risk for falls 03/18/2021    Dupuytren's contracture of right hand 08/29/2019    Alcohol dependence, in remission " ([facility]) 08/14/2019    Deliberate self-cutting 08/14/2019    Actinic keratosis 02/06/2018    H/O hysterectomy with oophorectomy 02/09/2017    Essential tremor 02/09/2017    Irritable bowel syndrome with diarrhea 02/09/2017    Bipolar affective disorder, current episode depressed ([facility]) 02/09/2017    Migraine without status migrainosus, not intractable 09/15/2016    Facet arthropathy, cervical 04/08/2016    Facet arthropathy, lumbar 04/08/2016    KVNG (generalized anxiety disorder) 09/23/2014    Seborrheic keratosis 09/25/2013    Fibromyalgia 01/01/2002       Current Outpatient Medications   Medication Sig Dispense Refill    famotidine (PEPCID) 40 MG Tab Take 1 Tablet by mouth every day for 30 days. 30 Tablet 0    dicyclomine (BENTYL) 20 MG Tab Take 1 Tablet by mouth every 6 hours as needed (IBS). 120 Tablet 1    topiramate (TOPAMAX) 50 MG tablet TAKE 1 TABLET BY MOUTH THREE TIMES A  Tablet 0    NON SPECIFIED Take  by mouth. Flax oil gel cap, one capsule daily      ondansetron (ZOFRAN ODT) 4 MG TABLET DISPERSIBLE Take 1 Tablet by mouth every 8 hours as needed for Nausea/Vomiting. 15 Tablet 0    Milnacipran HCl (SAVELLA) 100 MG Tab Take 1 Tablet by mouth 2 times a day. 180 Tablet 2    baclofen (LIORESAL) 10 MG Tab Take 1 Tablet by mouth 3 times a day. 270 Tablet 3    gabapentin (NEURONTIN) 600 MG tablet TAKE 1 TABLET BY MOUTH THREE TIMES A  Tablet 0    diclofenac sodium (VOLTAREN) 1 % Gel Apply 2 g topically 4 times a day as needed (pain). 100 g 0    Omega-3 Fatty Acids (FISH OIL) 1000 MG Cap capsule Take 1,000 mg by mouth 1 time a day as needed.      magnesium oxide (MAG-OX) 400 MG Tab tablet Take 400 mg by mouth every day.      Loratadine (CLARITIN PO) Take 1 tablet by mouth every day.      hydrOXYzine HCl (ATARAX) 25 MG Tab Take 1 Tab by mouth 3 times a day as needed for Anxiety. 90 Tab 0     No current facility-administered medications for this visit.        Patient is taking medications as noted in  medication list.  Current supplements as per medication list.     Allergies: Erythromycin and Tetracycline    Current social contact/activities: walking dog, gardening, visit with family     Is patient current with immunizations? Yes.    She  reports that she has been smoking cigarettes. She has a 35.00 pack-year smoking history. She has never used smokeless tobacco. She reports that she does not currently use alcohol. She reports current drug use. Frequency: 7.00 times per week. Drugs: Marijuana, Inhaled, and Oral.  Ready to quit: No  Counseling given: Yes      ROS:    Gait: Uses a cane   Ostomy: No   Other tubes: No   Amputations: No   Chronic oxygen use Yes   Last eye exam 3 months ago   Wears hearing aids: No   : Denies any urinary leakage during the last 6 months    Screening:    Depression Screening  Little interest or pleasure in doing things?  0 - not at all  Feeling down, depressed, or hopeless? 0 - not at all  Patient Health Questionnaire Score: 0    If depressive symptoms identified deferred to follow up visit unless specifically addressed in assessment and plan.    Interpretation of PHQ-9 Total Score   Score Severity   1-4 No Depression   5-9 Mild Depression   10-14 Moderate Depression   15-19 Moderately Severe Depression   20-27 Severe Depression    Screening for Cognitive Impairment  Three Minute Recall (daughter, heaven, mountain)  2/3    Doroteo clock face with all 12 numbers and set the hands to show 10 past 11.  Yes    If cognitive concerns identified, deferred for follow up unless specifically addressed in assessment and plan.    Fall Risk Assessment  Has the patient had two or more falls in the last year or any fall with injury in the last year?  No  If fall risk identified, deferred for follow up unless specifically addressed in assessment and plan.    Safety Assessment  Throw rugs on floor.  Yes  Handrails on all stairs.  Yes  Good lighting in all hallways.  Yes  Difficulty hearing.  No  Patient  counseled about all safety risks that were identified.    Functional Assessment ADLs  Are there any barriers preventing you from cooking for yourself or meeting nutritional needs?  No.    Are there any barriers preventing you from driving safely or obtaining transportation?  No.    Are there any barriers preventing you from using a telephone or calling for help?  No.    Are there any barriers preventing you from shopping?  No.    Are there any barriers preventing you from taking care of your own finances?  No.    Are there any barriers preventing you from managing your medications?  No.    Are there any barriers preventing you from showering, bathing or dressing yourself?  No. Pt states it is hard to bath or shower when she is sick  Are you currently engaging in any exercise or physical activity?  Yes.     What is your perception of your health?  Fair.    Advance Care Planning  Do you have an Advance Directive, Living Will, Durable Power of , or POLST? No               Health Maintenance Summary            Overdue - HEPATITIS C SCREENING (Once) Overdue - never done      No completion history exists for this topic.              Overdue - IMM ZOSTER VACCINES (1 of 2) Overdue - never done      No completion history exists for this topic.              Overdue - COVID-19 Vaccine (4 - Pfizer series) Overdue since 2/1/2022 12/07/2021  Imm Admin: PFIZER PURPLE CAP SARS-COV-2 VACCINATION (12+)    04/15/2021  Imm Admin: PFIZER PURPLE CAP SARS-COV-2 VACCINATION (12+)    03/23/2021  Imm Admin: PFIZER PURPLE CAP SARS-COV-2 VACCINATION (12+)              Overdue - Annual Pulmonary Function Test / Spirometry (Yearly) Overdue since 9/28/2022 09/28/2021  PULMONARY FUNCTION TESTS -Test requested: Complete Pulmonary Function Test              Postponed - IMM INFLUENZA (Season Ended) Postponed until 12/8/2023 12/07/2021  Imm Admin: Influenza, Unspecified - HISTORICAL DATA    10/02/2019  Imm Admin: Influenza  Vaccine Quad Inj (Pf)    12/01/2017  Imm Admin: Influenza Vaccine Quad Inj (Pf)    10/14/2015  Imm Admin: Influenza Seasonal Injectable - Historical Data    10/14/2015  Imm Admin: Influenza, Unspecified - HISTORICAL DATA    Only the first 5 history entries have been loaded, but more history exists.              IMM DTaP/Tdap/Td Vaccine (2 - Td or Tdap) Next due on 1/14/2024 01/14/2014  Imm Admin: Tdap Vaccine              Annual Wellness Visit (Every 366 Days) Next due on 6/20/2024 06/20/2023  Level of Service: ANNUAL WELLNESS VISIT-INCLUDES PPPS SUBSEQUE*    06/20/2023  Visit Dx: Medicare annual wellness visit, subsequent    05/25/2023  Level of Service: ANNUAL WELLNESS VISIT-INCLUDES PPPS SUBSEQUE*    06/01/2022  Level of Service: [place] ANNUAL WELLNESS VISIT-INCLUDES PPPS SUBSEQUE*    03/18/2021  Subsequent Annual Wellness Visit - Includes PPPS ()    Only the first 5 history entries have been loaded, but more history exists.              MAMMOGRAM (Every 2 Years) Next due on 8/30/2024 08/30/2022  MA-SCREENING MAMMO BILAT W/TOMOSYNTHESIS W/CAD    04/16/2021  MA-SCREENING MAMMO BILAT W/TOMOSYNTHESIS W/CAD    09/13/2019  MA-SCREENING MAMMO BILAT W/TOMOSYNTHESIS W/CAD              COLORECTAL CANCER SCREENING (COLONOSCOPY - Every 10 Years) Next due on 9/18/2029 09/18/2019  REFERRAL TO GI FOR COLONOSCOPY    01/15/2019  OCCULT BLOOD FECES IMMUNOASSAY              IMM PNEUMOCOCCAL VACCINE: 0-64 Years (3 - PPSV23 if available, else PCV20) Next due on 1/3/2031      01/22/2018  Imm Admin: Pneumococcal polysaccharide vaccine (PPSV-23)    10/24/2014  Imm Admin: Pneumococcal polysaccharide vaccine (PPSV-23)    10/24/2014  Imm Admin: Pneumococcal Conjugate Vaccine (Prevnar/PCV-13)              HPV Vaccines (Series Information) Aged Out      No completion history exists for this topic.              IMM MENINGOCOCCAL ACWY VACCINE (Series Information) Aged Out      No completion history exists for this topic.               Discontinued - IMM HEP B VACCINE  Discontinued      No completion history exists for this topic.                    Patient Care Team:  JAN Rose as PCP - General (Family Medicine)  JAN Rose as PCP - [facility] Paneled  [facility] as [facility]   [facility] (DME Supplier)  Debi Steward R.N. as Chronic Care Manager (Registered Nurse)  Jo Walters, PT, DPT as Physical Therapist (Physical Therapy)    Social History     Tobacco Use    Smoking status: Every Day     Packs/day: 1.00     Years: 35.00     Pack years: 35.00     Types: Cigarettes     Last attempt to quit: 2022     Years since quittin.9    Smokeless tobacco: Never   Vaping Use    Vaping Use: Every day    Substances: THC, CBD    Devices: Disposable, Pre-filled or refillable cartridge, Refillable tank, Pre-filled pod   Substance Use Topics    Alcohol use: Not Currently     Alcohol/week: 0.0 oz     Comment: Not currently drinking alcohol    Drug use: Yes     Frequency: 7.0 times per week     Types: Marijuana, Inhaled, Oral     Comment: Only canibus medicinal for anti spasm, anxiety, ibs, inflamm     Family History   Problem Relation Age of Onset    Lung Disease Mother         copd    Cancer Mother         basal/squamous    Hypertension Mother     Hyperlipidemia Mother     Stroke Mother     Alcohol abuse Mother     Heart Disease Father         HF    Arthritis Father         rheumatoid    Lung Disease Father         emphysema    Alcohol abuse Father     Cancer Sister     Alcohol abuse Sister     Cancer Maternal Grandfather         Bladder or prostate?    Cancer Sister         pre cancerous spots     Alcohol abuse Sister     Kidney Disease Sister     Alcohol abuse Sister     Hypertension Sister     Alcohol abuse Sister      She  has a past medical history of Abdominal pain, Actinic keratosis (2018), Alcohol abuse, Allergy, Anxiety, Arthritis,  "Back pain, Back pain, Bronchitis, Chronic pain syndrome, Constipation, COPD (chronic obstructive pulmonary disease) (HCC), Cough, Daytime sleepiness, DDD (degenerative disc disease), cervical, DDD (degenerative disc disease), thoracolumbar, Depression, Diarrhea, Diverticulosis, Dizziness, Essential tremor, Fatigue, Fibromyalgia, IBS (irritable bowel syndrome), Insomnia, Migraine, Nausea, Painful joint, Pulmonary emphysema (HCC), Restless leg syndrome, Ringing in ears, Snoring, Sore muscles, Sweat, sweating, excessive, Weakness, and Wears glasses.    She has no past medical history of GERD (gastroesophageal reflux disease) or Hyperlipidemia.   Past Surgical History:   Procedure Laterality Date    ABDOMINAL HYSTERECTOMY TOTAL      CHOLECYSTECTOMY      HYSTERECTOMY LAPAROSCOPY      TONSILLECTOMY         Exam:   /60 (BP Location: Left arm, Patient Position: Sitting, BP Cuff Size: Adult)   Pulse 60   Temp 36.8 °C (98.2 °F) (Temporal)   Ht 1.664 m (5' 5.5\")   Wt 81.3 kg (179 lb 3.2 oz)   SpO2 97%  Body mass index is 29.37 kg/m².    Hearing good.    Dentition poor  Alert, oriented in no acute distress.  Eye contact is good, speech goal directed, affect calm      Assessment and Plan. The following treatment and monitoring plan is recommended:      Problem List Items Addressed This Visit       Alcohol dependence, in remission ([facility])     -continue alcohol cessation.         Bipolar affective disorder, current episode depressed ([facility])     -continued Remeron 30 mg PO nightly.          BMI 29.0-29.9,adult     -working on diet and exercise.         Chest pain     -likely acid reflux recommend pepcid 20 mg twice daily or omeprazole.            Chronic fatigue     -improving         Currently attempting to quit smoking     - Continue smoking cessation.         DDD (degenerative disc disease), lumbar     - Follow-up with specialist.         Dizziness     - Improved         Elevated hemoglobin A1c    Relevant Orders    " HEMOGLOBIN A1C    Essential tremor     - Continue monitoring.         Fibromyalgia     -continue Savella 100 mg PO daily  -continue gabapentin 600 mg PO 3 times per day         KVNG (generalized anxiety disorder)     -continue current medication         Irritable bowel syndrome with diarrhea     - refill dicyclomine         Relevant Medications    dicyclomine (BENTYL) 20 MG Tab    Methamphetamine dependence in remission ([facility])     - Continue monitoring         Migraine without status migrainosus, not intractable     - Continue Topamax.         Obstructive sleep apnea syndrome     -continued CPAP  -Pulmonology follow-up         Pelvic floor dysfunction    Polyneuropathy in other diseases classified elsewhere ([facility])     - Continue gabapentin.         Stage 3b chronic kidney disease ([facility])    Tobacco dependence     -using patches now.           Other Visit Diagnoses       Medicare annual wellness visit, subsequent    -  Primary    Chest pressure        Relevant Orders    EKG - Clinic Performed (Completed)    Alcoholism in recovery ([facility])        Essential hypertension        Relevant Orders    Lipid Profile    Non-healing skin lesion        Relevant Orders    Referral to Dermatology    Gastroesophageal reflux disease without esophagitis        Relevant Medications    famotidine (PEPCID) 40 MG Tab    dicyclomine (BENTYL) 20 MG Tab              Services suggested: No services needed at this time  Health Care Screening recommendations as per orders if indicated.  Referrals offered: PT/OT/Nutrition counseling/Behavioral Health/Smoking cessation as per orders if indicated.    Discussion today about general wellness and lifestyle habits:    Prevent falls and reduce trip hazards; Cautioned about securing or removing rugs.  Have a working fire alarm and carbon monoxide detector;   Engage in regular physical activity and social activities     Follow-up: Return in about 1 month (around 7/20/2023), or if symptoms worsen or fail to  improve, for GERD.

## 2023-06-29 ENCOUNTER — PATIENT OUTREACH (OUTPATIENT)
Dept: HEALTH INFORMATION MANAGEMENT | Facility: OTHER | Age: 58
End: 2023-06-29
Payer: MEDICARE

## 2023-06-29 DIAGNOSIS — J44.89 COPD (CHRONIC OBSTRUCTIVE PULMONARY DISEASE) WITH CHRONIC BRONCHITIS (HCC): ICD-10-CM

## 2023-06-29 DIAGNOSIS — I10 ESSENTIAL HYPERTENSION: ICD-10-CM

## 2023-06-29 DIAGNOSIS — N18.32 STAGE 3B CHRONIC KIDNEY DISEASE: ICD-10-CM

## 2023-06-29 DIAGNOSIS — R07.9 CHEST PAIN, UNSPECIFIED TYPE: ICD-10-CM

## 2023-06-29 DIAGNOSIS — F17.200 TOBACCO DEPENDENCE: ICD-10-CM

## 2023-06-29 PROCEDURE — 99490 CHRNC CARE MGMT STAFF 1ST 20: CPT | Performed by: NURSE PRACTITIONER

## 2023-06-29 NOTE — PROGRESS NOTES
"Assessment    1336 on 6/29/23 phone outreach to patient to complete her monthly PCM follow-up, she asked that I call her back later this afternoon, she was driving in her car with a friend when I called. 1634 on 6/29/23 left voicemail message with contact information. Playing telephone tag with patient, she returned my call & left message & I returned her call & left message.  1442 on 7/25/23 left voicemail message w/contact information.   Patient returned my call on 7/25/23.  Today she is a little \"freaked out.\"  Saw her PCP one month ago and was given heartburn medicine.  Saw her PCP today for the same symptoms (still having chest pain) & PCP gave her referral to cardiology.  Used pulse oximeter one time and her pulse dropped to 52 at rest.  Chest pain can last a couple of minutes to an hour or longer.   PCP told her if pain lasts > an hour she needs to go to ED.  The thought of leaving her dog gives her w/anxiety.  January of 2016 she was having chest pain & went to ED & was hospitalized.  A Lexiscan was done & showed a valve abnormality but it was nothing to be concerned about.  The chest pain comes at rest.  Still has chest pain.  She has a lot happening in her life & wants to maintain her health.   Just went to pain management appointment & rescheduled with another doctor because the appointment was cancelled but patient was unaware of same.  Hip & back pain is killing her, 8/10 pain level.  Using ice packs a lot.  Using her cane a lot more, hobbling really badly.  Movement aggravates the situation.        Education    Mailing her newspaper article from [employer] about tips to rev up your energy.    Plan of Care and Goals    No updates @ this time    Barriers:    Chest, hip & back pain    Progress:    Slow progress    Next outreach:  8/25/23  "

## 2023-06-30 ENCOUNTER — HOSPITAL ENCOUNTER (OUTPATIENT)
Dept: LAB | Facility: MEDICAL CENTER | Age: 58
End: 2023-06-30
Attending: NURSE PRACTITIONER
Payer: MEDICARE

## 2023-06-30 DIAGNOSIS — R73.09 ELEVATED HEMOGLOBIN A1C: ICD-10-CM

## 2023-06-30 DIAGNOSIS — N18.32 STAGE 3B CHRONIC KIDNEY DISEASE: ICD-10-CM

## 2023-06-30 DIAGNOSIS — I10 ESSENTIAL HYPERTENSION: ICD-10-CM

## 2023-06-30 DIAGNOSIS — M79.7 FIBROMYALGIA: ICD-10-CM

## 2023-06-30 LAB
CHOLEST SERPL-MCNC: 160 MG/DL (ref 100–199)
FASTING STATUS PATIENT QL REPORTED: NORMAL
HDLC SERPL-MCNC: 43 MG/DL
LDLC SERPL CALC-MCNC: 104 MG/DL
TRIGL SERPL-MCNC: 67 MG/DL (ref 0–149)

## 2023-06-30 PROCEDURE — 36415 COLL VENOUS BLD VENIPUNCTURE: CPT

## 2023-06-30 PROCEDURE — 80061 LIPID PANEL: CPT

## 2023-06-30 PROCEDURE — 83036 HEMOGLOBIN GLYCOSYLATED A1C: CPT

## 2023-07-01 LAB
EST. AVERAGE GLUCOSE BLD GHB EST-MCNC: 111 MG/DL
HBA1C MFR BLD: 5.5 % (ref 4–5.6)

## 2023-07-12 DIAGNOSIS — K21.9 GASTROESOPHAGEAL REFLUX DISEASE WITHOUT ESOPHAGITIS: ICD-10-CM

## 2023-07-12 DIAGNOSIS — K58.0 IRRITABLE BOWEL SYNDROME WITH DIARRHEA: ICD-10-CM

## 2023-07-13 ENCOUNTER — TELEPHONE (OUTPATIENT)
Dept: HEALTH INFORMATION MANAGEMENT | Facility: OTHER | Age: 58
End: 2023-07-13
Payer: MEDICARE

## 2023-07-13 RX ORDER — FAMOTIDINE 40 MG/1
TABLET, FILM COATED ORAL
Qty: 30 TABLET | Refills: 0 | Status: SHIPPED | OUTPATIENT
Start: 2023-07-13 | End: 2023-08-14

## 2023-07-13 RX ORDER — DICYCLOMINE HCL 20 MG
20 TABLET ORAL EVERY 6 HOURS PRN
Qty: 120 TABLET | Refills: 0 | Status: SHIPPED | OUTPATIENT
Start: 2023-07-13 | End: 2023-08-14

## 2023-07-25 ENCOUNTER — OFFICE VISIT (OUTPATIENT)
Dept: MEDICAL GROUP | Facility: PHYSICIAN GROUP | Age: 58
End: 2023-07-25
Payer: MEDICARE

## 2023-07-25 ENCOUNTER — APPOINTMENT (OUTPATIENT)
Dept: PHYSICAL MEDICINE AND REHAB | Facility: MEDICAL CENTER | Age: 58
End: 2023-07-25
Payer: MEDICARE

## 2023-07-25 VITALS
TEMPERATURE: 97.8 F | DIASTOLIC BLOOD PRESSURE: 70 MMHG | BODY MASS INDEX: 29.44 KG/M2 | HEART RATE: 62 BPM | WEIGHT: 183.2 LBS | SYSTOLIC BLOOD PRESSURE: 118 MMHG | HEIGHT: 66 IN | OXYGEN SATURATION: 96 %

## 2023-07-25 DIAGNOSIS — R00.1 BRADYCARDIA: ICD-10-CM

## 2023-07-25 DIAGNOSIS — R07.9 CHEST PAIN AT REST: ICD-10-CM

## 2023-07-25 DIAGNOSIS — E66.9 OBESITY (BMI 30-39.9): ICD-10-CM

## 2023-07-25 DIAGNOSIS — R07.9 CHEST PAIN, UNSPECIFIED TYPE: ICD-10-CM

## 2023-07-25 PROCEDURE — 99214 OFFICE O/P EST MOD 30 MIN: CPT | Performed by: NURSE PRACTITIONER

## 2023-07-25 PROCEDURE — 3078F DIAST BP <80 MM HG: CPT | Performed by: NURSE PRACTITIONER

## 2023-07-25 PROCEDURE — 3074F SYST BP LT 130 MM HG: CPT | Performed by: NURSE PRACTITIONER

## 2023-07-25 ASSESSMENT — ENCOUNTER SYMPTOMS
COUGH: 0
WHEEZING: 0
DIZZINESS: 0
SHORTNESS OF BREATH: 0

## 2023-07-25 ASSESSMENT — FIBROSIS 4 INDEX: FIB4 SCORE: 1.1

## 2023-07-25 NOTE — ASSESSMENT & PLAN NOTE
- Refer to cardiology  -Stress test ordered  -Discussed symptoms of chest pain, recommended that patient follow-up in the emergency room when the chest pain is happening.  Discussed concern for severe disability or death related to unevaluated chest pain.  We did discuss that since the symptoms are mostly at rest and she does not notice them when she is active that this could definitely be anxiety or fibromyalgia related.  Patient states that she will present to the ER for further episodes of chest pain.

## 2023-07-25 NOTE — PROGRESS NOTES
"Subjective:     Chief Complaint   Patient presents with    Follow-Up     GERD       HPI:   Jaclyn presents today with     Problem   Obesity (Bmi 30-39.9)   Chest Pain    This is a chronic condition which poses a potential threat to life or bodily function.  She states that the incidents of chest pain are becoming more frequent. It now seems to be every other day. She describes the pain as \"pinching\" and \"heavy\" and states that it feels more deep than the pain she has between her ribs. It is non-radiating. She describes it as pressure.  She has been using her pulse oximeter during the episodes and it does not seem to be correlated with a high or low HR. He oxygenation is usually above 96% during the episode and it is not associated with any SOB. She does say she is always at rest when she experiences the pain.  The pain can last from 5 minutes to several hours.  EKG was within normal limits and consistent with previous EKG at her last appointment.  Patient at this point is requesting referral to cardiology related to continued symptoms and concern with chest pain.         Current Outpatient Medications Ordered in Epic   Medication Sig Dispense Refill    Multiple Vitamin (MULTIVITAMIN ADULT PO)       dicyclomine (BENTYL) 20 MG Tab TAKE 1 TABLET BY MOUTH EVERY 6 HOURS AS NEEDED (IBS). 120 Tablet 0    famotidine (PEPCID) 40 MG Tab TAKE 1 TABLET BY MOUTH EVERY DAY 30 Tablet 0    topiramate (TOPAMAX) 50 MG tablet TAKE 1 TABLET BY MOUTH THREE TIMES A  Tablet 0    NON SPECIFIED Take  by mouth. Flax oil gel cap, one capsule daily      ondansetron (ZOFRAN ODT) 4 MG TABLET DISPERSIBLE Take 1 Tablet by mouth every 8 hours as needed for Nausea/Vomiting. 15 Tablet 0    Milnacipran HCl (SAVELLA) 100 MG Tab Take 1 Tablet by mouth 2 times a day. 180 Tablet 2    baclofen (LIORESAL) 10 MG Tab Take 1 Tablet by mouth 3 times a day. 270 Tablet 3    gabapentin (NEURONTIN) 600 MG tablet TAKE 1 TABLET BY MOUTH THREE TIMES A DAY " "270 Tablet 0    diclofenac sodium (VOLTAREN) 1 % Gel Apply 2 g topically 4 times a day as needed (pain). 100 g 0    Omega-3 Fatty Acids (FISH OIL) 1000 MG Cap capsule Take 1,000 mg by mouth 1 time a day as needed.      magnesium oxide (MAG-OX) 400 MG Tab tablet Take 400 mg by mouth every day.      Loratadine (CLARITIN PO) Take 1 tablet by mouth every day.      hydrOXYzine HCl (ATARAX) 25 MG Tab Take 1 Tab by mouth 3 times a day as needed for Anxiety. 90 Tab 0     No current Epic-ordered facility-administered medications on file.       Health Maintenance: Defer to follow up related to chest pain at this visit.    Review of Systems   Respiratory:  Negative for cough, shortness of breath and wheezing.    Cardiovascular:  Negative for leg swelling.   Neurological:  Negative for dizziness.         Objective:     Exam:  /70 (BP Location: Left arm, Patient Position: Sitting, BP Cuff Size: Adult)   Pulse 62   Temp 36.6 °C (97.8 °F) (Temporal)   Ht 1.664 m (5' 5.5\")   Wt 83.1 kg (183 lb 3.2 oz)   LMP 12/01/2000   SpO2 96%   BMI 30.02 kg/m²  Body mass index is 30.02 kg/m².    Physical Exam  Constitutional:       Appearance: Normal appearance.   Cardiovascular:      Rate and Rhythm: Normal rate.      Heart sounds: Normal heart sounds.   Pulmonary:      Breath sounds: Normal breath sounds.   Psychiatric:         Behavior: Behavior is cooperative.       Assessment & Plan:     58 y.o. female with the following -     Problem List Items Addressed This Visit       Chest pain     - Refer to cardiology  -Stress test ordered  -Discussed symptoms of chest pain, recommended that patient follow-up in the emergency room when the chest pain is happening.  Discussed concern for severe disability or death related to unevaluated chest pain.  We did discuss that since the symptoms are mostly at rest and she does not notice them when she is active that this could definitely be anxiety or fibromyalgia related.  Patient states that " she will present to the ER for further episodes of chest pain.           Obesity (BMI 30-39.9)    Relevant Orders    Patient identified as having weight management issue.  Appropriate orders and counseling given.     Other Visit Diagnoses       Chest pain at rest        Relevant Orders    REFERRAL TO CARDIOLOGY    NM-CARDIAC STRESS TEST    Bradycardia        Relevant Orders    REFERRAL TO CARDIOLOGY                Return in about 1 month (around 8/25/2023) for chronic conditions.    I have placed the below orders and discussed them with an approved delegating provider. The MA is performing the below orders under the direction of Dr. Almonte.     Please note that this dictation was created using voice recognition software. I have made every reasonable attempt to correct obvious errors, but I expect that there are errors of grammar and possibly content that I did not discover before finalizing the note.

## 2023-07-28 ENCOUNTER — HOSPITAL ENCOUNTER (OUTPATIENT)
Dept: RADIOLOGY | Facility: MEDICAL CENTER | Age: 58
End: 2023-07-28
Attending: NURSE PRACTITIONER
Payer: MEDICARE

## 2023-07-28 DIAGNOSIS — R07.9 CHEST PAIN AT REST: ICD-10-CM

## 2023-07-28 PROCEDURE — 78452 HT MUSCLE IMAGE SPECT MULT: CPT

## 2023-07-29 ENCOUNTER — HOSPITAL ENCOUNTER (EMERGENCY)
Facility: MEDICAL CENTER | Age: 58
End: 2023-07-29
Attending: EMERGENCY MEDICINE
Payer: MEDICARE

## 2023-07-29 ENCOUNTER — APPOINTMENT (OUTPATIENT)
Dept: RADIOLOGY | Facility: MEDICAL CENTER | Age: 58
End: 2023-07-29
Payer: MEDICARE

## 2023-07-29 ENCOUNTER — APPOINTMENT (OUTPATIENT)
Dept: RADIOLOGY | Facility: MEDICAL CENTER | Age: 58
End: 2023-07-29
Attending: EMERGENCY MEDICINE
Payer: MEDICARE

## 2023-07-29 VITALS
BODY MASS INDEX: 30.49 KG/M2 | TEMPERATURE: 98 F | HEART RATE: 62 BPM | DIASTOLIC BLOOD PRESSURE: 76 MMHG | HEIGHT: 65 IN | RESPIRATION RATE: 18 BRPM | SYSTOLIC BLOOD PRESSURE: 130 MMHG | OXYGEN SATURATION: 96 % | WEIGHT: 183 LBS

## 2023-07-29 DIAGNOSIS — R07.9 CHEST PAIN, UNSPECIFIED TYPE: ICD-10-CM

## 2023-07-29 LAB
ALBUMIN SERPL BCP-MCNC: 4.5 G/DL (ref 3.2–4.9)
ALBUMIN/GLOB SERPL: 1.5 G/DL
ALP SERPL-CCNC: 93 U/L (ref 30–99)
ALT SERPL-CCNC: 9 U/L (ref 2–50)
ANION GAP SERPL CALC-SCNC: 11 MMOL/L (ref 7–16)
AST SERPL-CCNC: 12 U/L (ref 12–45)
BASOPHILS # BLD AUTO: 0.6 % (ref 0–1.8)
BASOPHILS # BLD: 0.05 K/UL (ref 0–0.12)
BILIRUB SERPL-MCNC: 0.2 MG/DL (ref 0.1–1.5)
BUN SERPL-MCNC: 17 MG/DL (ref 8–22)
CALCIUM ALBUM COR SERPL-MCNC: 9.3 MG/DL (ref 8.5–10.5)
CALCIUM SERPL-MCNC: 9.7 MG/DL (ref 8.5–10.5)
CHLORIDE SERPL-SCNC: 109 MMOL/L (ref 96–112)
CO2 SERPL-SCNC: 23 MMOL/L (ref 20–33)
CREAT SERPL-MCNC: 1.05 MG/DL (ref 0.5–1.4)
EKG IMPRESSION: NORMAL
EOSINOPHIL # BLD AUTO: 0.11 K/UL (ref 0–0.51)
EOSINOPHIL NFR BLD: 1.4 % (ref 0–6.9)
ERYTHROCYTE [DISTWIDTH] IN BLOOD BY AUTOMATED COUNT: 44.5 FL (ref 35.9–50)
GFR SERPLBLD CREATININE-BSD FMLA CKD-EPI: 62 ML/MIN/1.73 M 2
GLOBULIN SER CALC-MCNC: 3 G/DL (ref 1.9–3.5)
GLUCOSE SERPL-MCNC: 110 MG/DL (ref 65–99)
HCT VFR BLD AUTO: 43.9 % (ref 37–47)
HGB BLD-MCNC: 14.5 G/DL (ref 12–16)
IMM GRANULOCYTES # BLD AUTO: 0.03 K/UL (ref 0–0.11)
IMM GRANULOCYTES NFR BLD AUTO: 0.4 % (ref 0–0.9)
LYMPHOCYTES # BLD AUTO: 3.58 K/UL (ref 1–4.8)
LYMPHOCYTES NFR BLD: 45.7 % (ref 22–41)
MCH RBC QN AUTO: 30.9 PG (ref 27–33)
MCHC RBC AUTO-ENTMCNC: 33 G/DL (ref 32.2–35.5)
MCV RBC AUTO: 93.6 FL (ref 81.4–97.8)
MONOCYTES # BLD AUTO: 0.39 K/UL (ref 0–0.85)
MONOCYTES NFR BLD AUTO: 5 % (ref 0–13.4)
NEUTROPHILS # BLD AUTO: 3.67 K/UL (ref 1.82–7.42)
NEUTROPHILS NFR BLD: 46.9 % (ref 44–72)
NRBC # BLD AUTO: 0 K/UL
NRBC BLD-RTO: 0 /100 WBC (ref 0–0.2)
PLATELET # BLD AUTO: 294 K/UL (ref 164–446)
PMV BLD AUTO: 10.3 FL (ref 9–12.9)
POTASSIUM SERPL-SCNC: 4.4 MMOL/L (ref 3.6–5.5)
PROT SERPL-MCNC: 7.5 G/DL (ref 6–8.2)
RBC # BLD AUTO: 4.69 M/UL (ref 4.2–5.4)
SODIUM SERPL-SCNC: 143 MMOL/L (ref 135–145)
TROPONIN T SERPL-MCNC: <6 NG/L (ref 6–19)
TROPONIN T SERPL-MCNC: <6 NG/L (ref 6–19)
WBC # BLD AUTO: 7.8 K/UL (ref 4.8–10.8)

## 2023-07-29 PROCEDURE — 80053 COMPREHEN METABOLIC PANEL: CPT

## 2023-07-29 PROCEDURE — 93005 ELECTROCARDIOGRAM TRACING: CPT | Performed by: EMERGENCY MEDICINE

## 2023-07-29 PROCEDURE — 84484 ASSAY OF TROPONIN QUANT: CPT | Mod: 91

## 2023-07-29 PROCEDURE — 99285 EMERGENCY DEPT VISIT HI MDM: CPT

## 2023-07-29 PROCEDURE — 36415 COLL VENOUS BLD VENIPUNCTURE: CPT

## 2023-07-29 PROCEDURE — 71045 X-RAY EXAM CHEST 1 VIEW: CPT

## 2023-07-29 PROCEDURE — 85025 COMPLETE CBC W/AUTO DIFF WBC: CPT

## 2023-07-29 PROCEDURE — 93005 ELECTROCARDIOGRAM TRACING: CPT

## 2023-07-29 ASSESSMENT — FIBROSIS 4 INDEX: FIB4 SCORE: 1.1

## 2023-07-29 ASSESSMENT — PAIN DESCRIPTION - DESCRIPTORS: DESCRIPTORS: OTHER (COMMENT)

## 2023-07-29 NOTE — ED PROVIDER NOTES
ED PHYSICIAN NOTE    CHIEF COMPLAINT  Chief Complaint   Patient presents with    Chest Pain     Patient with chest pain for a month, pt. States that she had a stress test yesterday and pain is similar to the past       EXTERNAL RECORDS REVIEWED  Outpatient Notes patient seen by primary provider 7/25.  Evaluated for chest pain and suspected GERD.  She was referred to cardiology.  Nuclear stress test was ordered.    HPI/ROS      Jaclyn Mejia is a 58 y.o. female who presents to the emergency department chest pain.  This started about a month ago.  Seems to come and go.  Last from a few minutes to hours.  She describes as a pinching and a pressure sensation.  Located center of her chest.  It does not radiate.  Associated with shortness of breath.  She notices the symptoms more when she is at rest.  Seems to get better when she is up and walking around.  Today she started to have an episode of pain.  She was sitting down.  She felt breathless she put her oximeter on.  Read a heart rate of 49 oxygen saturation 96%.  She was anxious about these results and came to the ER.    She has not had any pleuritic pain or hemoptysis.  No new leg swelling pain or leg swelling but she does suffer from chronic pain and fibromyalgia.  No travel immobilization surgery.  No tearing pain or radiation of the back.  Has not had a fever.  She denies abdominal pain, nausea vomiting but she is currently being treated for GERD for the same pain.  She has been taking famotidine once daily for about a month.  She reports she has had several upper endoscopies in the past but not for years.    Patient had a nuclear stress test yesterday.  She does not know the results.    PAST MEDICAL HISTORY  Past Medical History:   Diagnosis Date    Abdominal pain     Actinic keratosis 2/6/2018    Alcohol abuse     Allergy     Anxiety     Arthritis     Back pain     Back pain     Bronchitis     Chronic pain syndrome     Constipation     COPD (chronic  obstructive pulmonary disease) (HCC)     Cough     Daytime sleepiness     DDD (degenerative disc disease), cervical     DDD (degenerative disc disease), thoracolumbar     Depression     Diarrhea     Diverticulosis     Dizziness     Essential tremor     Fatigue     Fibromyalgia     IBS (irritable bowel syndrome)     Insomnia     Migraine     Nausea     Painful joint     Pulmonary emphysema (HCC)     Restless leg syndrome     Ringing in ears     Snoring     Sore muscles     Sweat, sweating, excessive     Weakness     Wears glasses        SOCIAL HISTORY  Social History     Tobacco Use    Smoking status: Every Day     Packs/day: 1.00     Years: 35.00     Pack years: 35.00     Types: Cigarettes     Last attempt to quit: 2022     Years since quittin.0    Smokeless tobacco: Never   Vaping Use    Vaping Use: Every day    Substances: THC, CBD    Devices: Disposable, Pre-filled or refillable cartridge, Refillable tank, Pre-filled pod   Substance Use Topics    Alcohol use: Not Currently     Alcohol/week: 0.0 oz     Comment: Not currently drinking alcohol    Drug use: Yes     Frequency: 7.0 times per week     Types: Marijuana, Inhaled, Oral     Comment: Only canibus medicinal for anti spasm, anxiety, ibs, inflamm       CURRENT MEDICATIONS  Home Medications       Reviewed by Enid Santacruz R.N. (Registered Nurse) on 23 at 1315  Med List Status: Partial     Medication Last Dose Status   baclofen (LIORESAL) 10 MG Tab  Active   diclofenac sodium (VOLTAREN) 1 % Gel  Active   dicyclomine (BENTYL) 20 MG Tab  Active   famotidine (PEPCID) 40 MG Tab  Active   gabapentin (NEURONTIN) 600 MG tablet  Active   hydrOXYzine HCl (ATARAX) 25 MG Tab  Active   Loratadine (CLARITIN PO)  Active   magnesium oxide (MAG-OX) 400 MG Tab tablet  Active   Milnacipran HCl (SAVELLA) 100 MG Tab  Active   Multiple Vitamin (MULTIVITAMIN ADULT PO)  Active   NON SPECIFIED  Active   Omega-3 Fatty Acids (FISH OIL) 1000 MG Cap capsule  Active  "  ondansetron (ZOFRAN ODT) 4 MG TABLET DISPERSIBLE  Active   topiramate (TOPAMAX) 50 MG tablet  Active                    ALLERGIES  Allergies   Allergen Reactions    Erythromycin Rash     Chest rash    Tetracycline Rash     Chest rash       PHYSICAL EXAM  VITAL SIGNS: /62   Pulse 79   Temp 36.8 °C (98.2 °F) (Temporal)   Resp 20   Ht 1.651 m (5' 5\")   Wt 83 kg (183 lb)   LMP 12/01/2000   SpO2 98%   BMI 30.45 kg/m²    Constitutional: Awake and alert  HENT: Normal inspection  Eyes: Normal inspection  Neck: Grossly normal range of motion.  Cardiovascular: Normal heart rate, Normal rhythm.  Symmetric peripheral pulses.   Thorax & Lungs: No respiratory distress, No wheezing, No rales, No rhonchi, No chest tenderness.   Abdomen: Bowel sounds normal, soft, non-distended, nontender, no mass  Skin: No obvious rash.  Back: No tenderness, No CVA tenderness.   Extremities: No clubbing, cyanosis, edema, no Homans or cords.  Neurologic: Grossly normal   Psychiatric: Normal for situation     DIAGNOSTIC STUDIES / PROCEDURES  LABS/EKG  Results for orders placed or performed during the hospital encounter of 07/29/23   CBC with Differential   Result Value Ref Range    WBC 7.8 4.8 - 10.8 K/uL    RBC 4.69 4.20 - 5.40 M/uL    Hemoglobin 14.5 12.0 - 16.0 g/dL    Hematocrit 43.9 37.0 - 47.0 %    MCV 93.6 81.4 - 97.8 fL    MCH 30.9 27.0 - 33.0 pg    MCHC 33.0 32.2 - 35.5 g/dL    RDW 44.5 35.9 - 50.0 fL    Platelet Count 294 164 - 446 K/uL    MPV 10.3 9.0 - 12.9 fL    Neutrophils-Polys 46.90 44.00 - 72.00 %    Lymphocytes 45.70 (H) 22.00 - 41.00 %    Monocytes 5.00 0.00 - 13.40 %    Eosinophils 1.40 0.00 - 6.90 %    Basophils 0.60 0.00 - 1.80 %    Immature Granulocytes 0.40 0.00 - 0.90 %    Nucleated RBC 0.00 0.00 - 0.20 /100 WBC    Neutrophils (Absolute) 3.67 1.82 - 7.42 K/uL    Lymphs (Absolute) 3.58 1.00 - 4.80 K/uL    Monos (Absolute) 0.39 0.00 - 0.85 K/uL    Eos (Absolute) 0.11 0.00 - 0.51 K/uL    Baso (Absolute) 0.05 0.00 " - 0.12 K/uL    Immature Granulocytes (abs) 0.03 0.00 - 0.11 K/uL    NRBC (Absolute) 0.00 K/uL   Complete Metabolic Panel (CMP)   Result Value Ref Range    Sodium 143 135 - 145 mmol/L    Potassium 4.4 3.6 - 5.5 mmol/L    Chloride 109 96 - 112 mmol/L    Co2 23 20 - 33 mmol/L    Anion Gap 11.0 7.0 - 16.0    Glucose 110 (H) 65 - 99 mg/dL    Bun 17 8 - 22 mg/dL    Creatinine 1.05 0.50 - 1.40 mg/dL    Calcium 9.7 8.5 - 10.5 mg/dL    Correct Calcium 9.3 8.5 - 10.5 mg/dL    AST(SGOT) 12 12 - 45 U/L    ALT(SGPT) 9 2 - 50 U/L    Alkaline Phosphatase 93 30 - 99 U/L    Total Bilirubin 0.2 0.1 - 1.5 mg/dL    Albumin 4.5 3.2 - 4.9 g/dL    Total Protein 7.5 6.0 - 8.2 g/dL    Globulin 3.0 1.9 - 3.5 g/dL    A-G Ratio 1.5 g/dL   Troponins NOW   Result Value Ref Range    Troponin T <6 6 - 19 ng/L   Troponins in two (2) hours   Result Value Ref Range    Troponin T <6 6 - 19 ng/L   ESTIMATED GFR   Result Value Ref Range    GFR (CKD-EPI) 62 >60 mL/min/1.73 m 2   EKG   Result Value Ref Range    Report       [facility] Emergency Dept.    Test Date:  2023  Pt Name:    JAMES BLUE              Department: ER  MRN:        [id number]                      Room:       [facility]  Gender:     Female                       Technician: [id number]  :        1965                   Requested By:ER TRIAGE PROTOCOL  Order #:    [id number]                    Reading MD: NIXON AIKEN MD    Measurements  Intervals                                Axis  Rate:       67                           P:          66  WV:         190                          QRS:        57  QRSD:       98                           T:          38  QT:         403  QTc:        426    Interpretive Statements  Sinus arrhythmia  Probable left atrial enlargement  Compared to ECG 2020 13:56:04  Sinus rhythm no longer present  Electronically Signed On 2023 14:34:27 PDT by NIXON AIKEN MD        I have independently interpreted this  EKG      RADIOLOGY  I have independently interpreted the diagnostic imaging associated with this visit and am waiting the final reading from the radiologist.   My preliminary interpretation is as follows: Chest x-ray without infiltrate cardiomegaly  Radiologist interpretation:   DX-CHEST-PORTABLE (1 VIEW)   Final Result      1.  There is no acute cardiopulmonary process.            COURSE & MEDICAL DECISION MAKING    ED Observation Status? Yes; I am placing the patient in to an observation status due to a diagnostic uncertainty as well as therapeutic intensity. Patient placed in observation status at 1:32 PM, 7/29/2023.     Observation plan is as follows: Obtain diagnostic testing, monitoring, serial exams    Upon Reevaluation, the patient's condition has: Improved; and will be discharged.    Patient discharged from ED Observation status at 352pm 7/29/2023    INITIAL ASSESSMENT, COURSE AND PLAN  Care Narrative: Patient presents with with chest pain.  EKG on arrival does not show any ischemia.  She just had a stress test but the results are not available.  Ordered laboratory data and chest x-ray.  She is appears quite anxious.  She does have a history of GERD and this is a possibility.  She reports having a low heart rate but this was taken only on a pulse oximeter and I am not sure how reliable this is.  Her heart rate is normal here.  Her exam is otherwise normal here.    I spoke with the radiologist, Dr. Owen.  She reviewed nuclear imaging.  There was no reversible ischemia or abnormalities identified.    Chest x-ray was negative.    Laboratory data returned reassuring.  Initial troponin is normal.  Will obtain serial troponins.    Second troponin returned normal.  At this point I suspect most likely chest pain is gastroesophageal.  She has no clinical suggestion of pulmonary embolism or aortic dissection.  She has a benign abdominal exam.  I will escalate the patient's antacid to daily PPI Prilosec.  She will  continue with plan for follow-up with cardiology.  I would also recommend follow-up with GI for completion.  Patient will be discharged in precaution to return to the ER for any difficulty breathing, worsening symptoms, not improving or concern.        ADDITIONAL PROBLEM LIST  Past Medical History:   Diagnosis Date    Abdominal pain     Actinic keratosis 2/6/2018    Alcohol abuse     Allergy     Anxiety     Arthritis     Back pain     Back pain     Bronchitis     Chronic pain syndrome     Constipation     COPD (chronic obstructive pulmonary disease) (HCC)     Cough     Daytime sleepiness     DDD (degenerative disc disease), cervical     DDD (degenerative disc disease), thoracolumbar     Depression     Diarrhea     Diverticulosis     Dizziness     Essential tremor     Fatigue     Fibromyalgia     IBS (irritable bowel syndrome)     Insomnia     Migraine     Nausea     Painful joint     Pulmonary emphysema (HCC)     Restless leg syndrome     Ringing in ears     Snoring     Sore muscles     Sweat, sweating, excessive     Weakness     Wears glasses        DISPOSITION AND DISCUSSIONS    Discussion of management with other QHP or appropriate source(s): Radiologist reviewed nuclear stress test results      Escalation of care considered, and ultimately not performed:acute inpatient care management, however at this time, the patient is most appropriate for outpatient management      Decision tools: Low risk heart score.    Prescription drugs considered and/or prescribed: Prilosec    FINAL IMPRESSION  1.  Chest pain    This dictation was created using voice recognition software. The accuracy of the dictation is limited to the abilities of the software. I expect there may be some errors of grammar and possibly content. The nursing notes were reviewed and certain aspects of this information were incorporated into this note.    Electronically signed by: Bradly Lewis M.D., 7/29/2023

## 2023-07-29 NOTE — ED NOTES
Pt amb to [facility] with steady gait. Urine sample obtained and sent to lab. Pt placed in gown and on monitor. ERP and x-ray @ [facility]

## 2023-07-29 NOTE — ED TRIAGE NOTES
.  Chief Complaint   Patient presents with    Chest Pain     Patient with chest pain for a month, pt. States that she had a stress test yesterday and pain is similar to the past           .Pt is alert and oriented, speaking in full sentences, follows commands and responds appropriately to questions Resp are even and unlabored.  Skin pink warm and dry     Pt placed in lobby. Pt educated on triage process. Pt encouraged to alert staff for any changes.

## 2023-08-10 ENCOUNTER — OFFICE VISIT (OUTPATIENT)
Dept: PHYSICAL MEDICINE AND REHAB | Facility: MEDICAL CENTER | Age: 58
End: 2023-08-10
Payer: MEDICARE

## 2023-08-10 VITALS
TEMPERATURE: 98 F | SYSTOLIC BLOOD PRESSURE: 128 MMHG | HEART RATE: 73 BPM | DIASTOLIC BLOOD PRESSURE: 76 MMHG | OXYGEN SATURATION: 94 % | WEIGHT: 179.01 LBS | HEIGHT: 66 IN | BODY MASS INDEX: 28.77 KG/M2

## 2023-08-10 DIAGNOSIS — M53.3 SI (SACROILIAC) JOINT DYSFUNCTION: ICD-10-CM

## 2023-08-10 DIAGNOSIS — Z99.89 AMBULATES WITH CANE: ICD-10-CM

## 2023-08-10 DIAGNOSIS — M43.16 SPONDYLOLISTHESIS OF LUMBAR REGION: ICD-10-CM

## 2023-08-10 DIAGNOSIS — M54.41 CHRONIC BILATERAL LOW BACK PAIN WITH RIGHT-SIDED SCIATICA: ICD-10-CM

## 2023-08-10 DIAGNOSIS — M54.16 LUMBAR RADICULOPATHY: ICD-10-CM

## 2023-08-10 DIAGNOSIS — G89.29 CHRONIC BILATERAL LOW BACK PAIN WITH RIGHT-SIDED SCIATICA: ICD-10-CM

## 2023-08-10 DIAGNOSIS — Z91.81 RISK FOR FALLS: ICD-10-CM

## 2023-08-10 DIAGNOSIS — G62.9 PERIPHERAL POLYNEUROPATHY: ICD-10-CM

## 2023-08-10 PROCEDURE — 3074F SYST BP LT 130 MM HG: CPT | Performed by: PHYSICAL MEDICINE & REHABILITATION

## 2023-08-10 PROCEDURE — 99204 OFFICE O/P NEW MOD 45 MIN: CPT | Performed by: PHYSICAL MEDICINE & REHABILITATION

## 2023-08-10 PROCEDURE — 1125F AMNT PAIN NOTED PAIN PRSNT: CPT | Performed by: PHYSICAL MEDICINE & REHABILITATION

## 2023-08-10 PROCEDURE — 3078F DIAST BP <80 MM HG: CPT | Performed by: PHYSICAL MEDICINE & REHABILITATION

## 2023-08-10 ASSESSMENT — FIBROSIS 4 INDEX: FIB4 SCORE: .7755102040816326531

## 2023-08-10 ASSESSMENT — PAIN SCALES - GENERAL: PAINLEVEL: 8=MODERATE-SEVERE PAIN

## 2023-08-10 ASSESSMENT — PATIENT HEALTH QUESTIONNAIRE - PHQ9
CLINICAL INTERPRETATION OF PHQ2 SCORE: 1
5. POOR APPETITE OR OVEREATING: 0 - NOT AT ALL
SUM OF ALL RESPONSES TO PHQ QUESTIONS 1-9: 8

## 2023-08-10 ASSESSMENT — ENCOUNTER SYMPTOMS: BACK PAIN: 1

## 2023-08-10 NOTE — PROGRESS NOTES
New patient note    Interventional spine and Pain  Physiatry (physical medicine and  Rehabilitation)     Date of service: See epic    Chief complaint:   Chief Complaint   Patient presents with    Leg Pain     Sciatica, Fibromyalgia        Referring provider: JAN Rose     HISTORY    HPI: Jaclyn Mejia 58 y.o.  who presents today with Diagnoses of Chronic bilateral low back pain with right-sided sciatica, Lumbar radiculopathy, Spondylolisthesis of lumbar region, SI (sacroiliac) joint dysfunction, Ambulates with cane, Peripheral polyneuropathy, and Risk for falls were pertinent to this visit.    Back Pain  This is a chronic problem. The current episode started more than 1 year ago. The problem occurs constantly. The problem has been gradually worsening since onset. The pain is present in the gluteal and lumbar spine. The quality of the pain is described as aching, cramping, shooting and stabbing. The pain radiates to the right thigh and right knee (posterior leg). The pain is at a severity of 8/10. The symptoms are aggravated by bending, lying down, sitting, standing, twisting and position.     The patient is having no acute chest pain or dyspnea today.    Medications: cannot use oral NSAIDs because of CKD. Uses topical diclofenac. Takes baclofen with improvement. Uses gabapentin with improvement. Uses vape.  marijuana which she reports helps with anxiety, IBS.     Medical records review:  I reviewed the note from the referring provider Madeline Finnegan* including the note dated 7/25/2023.  Chest pain.  EKG was reviewed.  Referred to cardiology and stress test ordered..           ROS:   Red Flags ROS:   Fever, Chills, Sweats: Denies  Involuntary Weight Loss: Denies  Bladder Incontinence: Denies  Bowel Incontinence: denies  Saddle Anesthesia: Denies    All other systems reviewed and negative.       PMHx:   Past Medical History:   Diagnosis Date    Abdominal pain     Actinic  keratosis 02/06/2018    Alcohol abuse     Allergy     Anxiety     Arthritis     Back pain     Back pain     Bronchitis     Chronic pain syndrome     Constipation     COPD (chronic obstructive pulmonary disease) (HCC)     Cough     Daytime sleepiness     DDD (degenerative disc disease), cervical     DDD (degenerative disc disease), thoracolumbar     Depression     Diarrhea     Diverticulosis     Dizziness     Essential tremor     Fatigue     Fibromyalgia     IBS (irritable bowel syndrome)     Insomnia     Migraine     Nausea     Painful joint     Pulmonary emphysema (HCC)     Restless leg syndrome     Ringing in ears     Snoring     SOB (shortness of breath)     Sore muscles     Sweat, sweating, excessive     Weakness     Wears glasses          Current Outpatient Medications on File Prior to Visit   Medication Sig Dispense Refill    Multiple Vitamin (MULTIVITAMIN ADULT PO)       dicyclomine (BENTYL) 20 MG Tab TAKE 1 TABLET BY MOUTH EVERY 6 HOURS AS NEEDED (IBS). 120 Tablet 0    topiramate (TOPAMAX) 50 MG tablet TAKE 1 TABLET BY MOUTH THREE TIMES A  Tablet 0    NON SPECIFIED Take  by mouth. Flax oil gel cap, one capsule daily      ondansetron (ZOFRAN ODT) 4 MG TABLET DISPERSIBLE Take 1 Tablet by mouth every 8 hours as needed for Nausea/Vomiting. 15 Tablet 0    Milnacipran HCl (SAVELLA) 100 MG Tab Take 1 Tablet by mouth 2 times a day. 180 Tablet 2    baclofen (LIORESAL) 10 MG Tab Take 1 Tablet by mouth 3 times a day. 270 Tablet 3    gabapentin (NEURONTIN) 600 MG tablet TAKE 1 TABLET BY MOUTH THREE TIMES A  Tablet 0    diclofenac sodium (VOLTAREN) 1 % Gel Apply 2 g topically 4 times a day as needed (pain). 100 g 0    Omega-3 Fatty Acids (FISH OIL) 1000 MG Cap capsule Take 1,000 mg by mouth 1 time a day as needed.      Loratadine (CLARITIN PO) Take 1 tablet by mouth every day.      hydrOXYzine HCl (ATARAX) 25 MG Tab Take 1 Tab by mouth 3 times a day as needed for Anxiety. 90 Tab 0    famotidine (PEPCID) 40  MG Tab TAKE 1 TABLET BY MOUTH EVERY DAY (Patient not taking: Reported on 8/10/2023) 30 Tablet 0    magnesium oxide (MAG-OX) 400 MG Tab tablet Take 400 mg by mouth every day.       No current facility-administered medications on file prior to visit.        PSHx:   Past Surgical History:   Procedure Laterality Date    ABDOMINAL HYSTERECTOMY TOTAL      CHOLECYSTECTOMY      HYSTERECTOMY LAPAROSCOPY      OTHER ABDOMINAL SURGERY      TONSILLECTOMY         Family history   Family History   Problem Relation Age of Onset    Lung Disease Mother         copd    Cancer Mother         basal/squamous    Hypertension Mother     Hyperlipidemia Mother     Stroke Mother     Alcohol abuse Mother     Heart Disease Father         HF    Arthritis Father         rheumatoid    Lung Disease Father         emphysema    Alcohol abuse Father     Cancer Sister     Alcohol abuse Sister     Cancer Maternal Grandfather         Bladder or prostate?    Cancer Sister         pre cancerous spots     Alcohol abuse Sister     Kidney Disease Sister     Alcohol abuse Sister     Hypertension Sister     Alcohol abuse Sister          Medications: reviewed on epic.   Outpatient Medications Marked as Taking for the 8/10/23 encounter (Office Visit) with Elmo Donaldson M.D.   Medication Sig Dispense Refill    Multiple Vitamin (MULTIVITAMIN ADULT PO)       dicyclomine (BENTYL) 20 MG Tab TAKE 1 TABLET BY MOUTH EVERY 6 HOURS AS NEEDED (IBS). 120 Tablet 0    topiramate (TOPAMAX) 50 MG tablet TAKE 1 TABLET BY MOUTH THREE TIMES A  Tablet 0    NON SPECIFIED Take  by mouth. Flax oil gel cap, one capsule daily      ondansetron (ZOFRAN ODT) 4 MG TABLET DISPERSIBLE Take 1 Tablet by mouth every 8 hours as needed for Nausea/Vomiting. 15 Tablet 0    Milnacipran HCl (SAVELLA) 100 MG Tab Take 1 Tablet by mouth 2 times a day. 180 Tablet 2    baclofen (LIORESAL) 10 MG Tab Take 1 Tablet by mouth 3 times a day. 270 Tablet 3    gabapentin (NEURONTIN) 600 MG tablet TAKE 1  TABLET BY MOUTH THREE TIMES A  Tablet 0    diclofenac sodium (VOLTAREN) 1 % Gel Apply 2 g topically 4 times a day as needed (pain). 100 g 0    Omega-3 Fatty Acids (FISH OIL) 1000 MG Cap capsule Take 1,000 mg by mouth 1 time a day as needed.      Loratadine (CLARITIN PO) Take 1 tablet by mouth every day.      hydrOXYzine HCl (ATARAX) 25 MG Tab Take 1 Tab by mouth 3 times a day as needed for Anxiety. 90 Tab 0        Allergies:   Allergies   Allergen Reactions    Erythromycin Rash     Chest rash    Tetracycline Rash     Chest rash       Social Hx:   Social History     Socioeconomic History    Marital status: Single     Spouse name: Not on file    Number of children: Not on file    Years of education: Not on file    Highest education level: Not on file   Occupational History    Not on file   Tobacco Use    Smoking status: Every Day     Packs/day: 1.00     Years: 35.00     Pack years: 35.00     Types: Cigarettes     Last attempt to quit: 2022     Years since quittin.1    Smokeless tobacco: Never   Vaping Use    Vaping Use: Every day    Substances: THC, CBD    Devices: Disposable, Pre-filled or refillable cartridge, Refillable tank, Pre-filled pod   Substance and Sexual Activity    Alcohol use: Yes     Comment: occasionally    Drug use: Yes     Frequency: 7.0 times per week     Types: Marijuana, Inhaled, Oral     Comment: Only canibus medicinal for anti spasm, anxiety, ibs, inflamm    Sexual activity: Not Currently     Partners: Male, Female     Comment: currently with male   Other Topics Concern    Not on file   Social History Narrative    Initial Intake Date:  Last Updated:        Financial situation:    SSD $1570/mo w/ $450 savings. We did discuss care credit in regards to behavioral health treatment interventions. Jaclyn        Food resources & insecurities:    Deferred.         Housing & environmental:    Resides in [place] in [facility] ([address]).         Transportation:    Deferred.         "Family dynamics & family/friend social supports:    Pt has local family to include 86-year-old mother and sister. She reports family are functioning alcoholics but are supportive in her sobriety. They will provide alternative alcohol free drinks at gatherings. Pt has son currently in inpatient treatment for alcohol use.         Pt has significant other who resides in the same [facility]. He is also pursuing sobriety w/ Jaclyn.        ADLs/IADLs & associated diagnoses:    Pt's ADLs/IADL have previously been affected by her alcohol use. They have cause incontinent issues in the past as well as extended periods of vomiting.         Advanced life planning:    Not on file.         Behavioral/Mental:    Associated diagnoses include migraine without status migrainosus, not intractable; insomnia due to medical condition; recurrent major depressive disorder; alcoholism; deliberate self-cutting; and obesity.        Pt has extensive history of substance use and recovery. In her young adult years pt used methamphatmine for aprox 1 year until she sought treatment. At this time she was mother to 2 children. She attended both AA/NA and \"[facility]\" in the [facility] area. She became sober \"[place]\" and maintained sobriety for 16 years. 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 Pt's daughter then left the home (at age 21) which affected her drinking; at that time she also became disabled. Pt relapsed on methamphetamine with use over 2 years w/ a 9 month period of sobriety. Pt then moved to [place] where her drinking increased (vodka). Pt reports medical symptoms from drinking include every other day vomitting, a period of GI/rectal bleeding and hypertensive events. Per chart review pt drinking 5th vodka daily. Pt's last drink 3/7/2021. She is pursuing sobriety with her significant other, Bradly. Pt is 6 days sober at time of " "assessment (3/12/2021). She is \"tired of being sick and tired.\"        Pt's reservations include attending inpatient; she would need to take her pet dog with her as no one else can care for the dog. Dog is  animal. And the allotment of time required for intensive out patient (3 days a week); she is concerned her fibromyalgia will affect her attendance. Thre is a general concern of cost which may direct pt towards low income treatment options.         Assessments completed by :    n/a        Collaterals:     Social Determinants of Health     Financial Resource Strain: High Risk (8/16/2022)    Overall Financial Resource Strain (CARDIA)     Difficulty of Paying Living Expenses: Very hard   Food Insecurity: No Food Insecurity (8/16/2022)    Hunger Vital Sign     Worried About Running Out of Food in the Last Year: Never true     Ran Out of Food in the Last Year: Never true   Transportation Needs: Unmet Transportation Needs (8/16/2022)    PRAPARE - Transportation     Lack of Transportation (Medical): Yes     Lack of Transportation (Non-Medical): No   Physical Activity: Insufficiently Active (8/16/2022)    Exercise Vital Sign     Days of Exercise per Week: 6 days     Minutes of Exercise per Session: 20 min   Stress: Stress Concern Present (8/16/2022)    [place] of Occup[employer] - Occupational Stress Questionnaire     Feeling of Stress : Very much   Social Connections: Moderately Isolated (8/16/2022)    Social Connection and Isolation Panel [NHANES]     Frequency of Communication with Friends and Family: More than three times a week     Frequency of Social Gatherings with Friends and Family: More than three times a week     Attends Orthodoxy Services: More than 4 times per year     Active Member of Clubs or Organizations: No     Attends Club or Organization Meetings: Never     Marital Status: Never    Intimate Partner Violence: Not on file   Housing Stability: Low Risk  " "(8/16/2022)    Housing Stability Vital Sign     Unable to Pay for Housing in the Last Year: No     Number of Places Lived in the Last Year: 1     Unstable Housing in the Last Year: No         EXAMINATION     Physical Exam:   Vitals: /76 (BP Location: Right arm, Patient Position: Sitting, BP Cuff Size: Adult)   Pulse 73   Temp 36.7 °C (98 °F) (Temporal)   Ht 1.664 m (5' 5.5\")   Wt 81.2 kg (179 lb 0.2 oz)   SpO2 94%     Constitutional:   Body Habitus: Body mass index is 29.34 kg/m².  Cooperation: Fully cooperates with exam  Appearance: Well-groomed, well-nourished, not disheveled     Eyes: No scleral icterus to suggest severe liver disease, no proptosis to suggest severe hyperthyroid    ENT -no obvious auditory deficits, no obvious tongue lesions, tongue midline, no facial droop     Skin -no rashes or lesions noted     Respiratory-  breathing comfortable on room air, no audible wheezing    Cardiovascular- capillary refills less than 2 seconds.     Psychiatric- alert and oriented ×3. Normal affect.       Musculoskeletal and Neuro -              Thoracic/Lumbar Spine/Sacral Spine/Hips   Inspection: No evidence of atrophy in bilateral lower extremities throughout     ROM: decreased active range of motion with flexion, lateral flexion, and rotation bilaterally.   There is decreased active range of motion with lumbar extension with pain.    There is pain with facet loading maneuver (extension rotation) with axial low back pain on the BILATERAL side(s)    Palpation:   Diffuse nonfocal tenderness to palpation which limits the exam utility.  No areas of specific tenderness palpation over any bony prominence.    Lumbar spine Special tests  Neuro tension  Straight leg test positive right, negative left    Slump test positive right, negative left      HIP  FAIR test negative bilaterally    Range of motion in the RIGHT hip is full  in flexion, extension, abduction, internal rotation, external rotation.  Range of motion " in the LEFT hip is full  in flexion, extension, abduction, internal rotation, external rotation.      SI joint tests  Observation patient sits on one buttocks: Negative  SI joint compression positive bilaterally    SI joint distraction positive bilaterally    Thigh thrust test positive bilaterally    JIM test positive bilaterally           Key points for the international standards for neurological classification of spinal cord injury (ISNCSCI) to light touch.     Dermatome R L                                      L2 2 2   L3 2 2   L4 1 1   L5 1 1   S1 1 1   S2 2 2     Paresthesias are in a distribution consistent with peripheral neuropathy    Motor Exam Lower Extremities    ? Myotome R L   Hip flexion L2 5 5   Knee extension L3 5 5   Ankle dorsiflexion L4 5 5   Toe extension L5 5 5   Ankle plantarflexion S1 5 5         Reflexes       Babinski sign negative bilaterally   Clonus of the ankle negative bilaterally       MEDICAL DECISION MAKING    Medical records review: see under HPI section.     DATA    Labs:   Lab Results   Component Value Date/Time    SODIUM 143 07/29/2023 01:22 PM    POTASSIUM 4.4 07/29/2023 01:22 PM    CHLORIDE 109 07/29/2023 01:22 PM    CO2 23 07/29/2023 01:22 PM    ANION 11.0 07/29/2023 01:22 PM    GLUCOSE 110 (H) 07/29/2023 01:22 PM    BUN 17 07/29/2023 01:22 PM    CREATININE 1.05 07/29/2023 01:22 PM    CALCIUM 9.7 07/29/2023 01:22 PM    ASTSGOT 12 07/29/2023 01:22 PM    ALTSGPT 9 07/29/2023 01:22 PM    TBILIRUBIN 0.2 07/29/2023 01:22 PM    ALBUMIN 4.5 07/29/2023 01:22 PM    TOTPROTEIN 7.5 07/29/2023 01:22 PM    GLOBULIN 3.0 07/29/2023 01:22 PM    AGRATIO 1.5 07/29/2023 01:22 PM   ]    No results found for: PROTHROMBTM, INR     Lab Results   Component Value Date/Time    WBC 7.8 07/29/2023 01:22 PM    RBC 4.69 07/29/2023 01:22 PM    HEMOGLOBIN 14.5 07/29/2023 01:22 PM    HEMATOCRIT 43.9 07/29/2023 01:22 PM    MCV 93.6 07/29/2023 01:22 PM    MCH 30.9 07/29/2023 01:22 PM    [facility] 33.0 07/29/2023  01:22 PM    MPV 10.3 07/29/2023 01:22 PM    NEUTSPOLYS 46.90 07/29/2023 01:22 PM    LYMPHOCYTES 45.70 (H) 07/29/2023 01:22 PM    MONOCYTES 5.00 07/29/2023 01:22 PM    EOSINOPHILS 1.40 07/29/2023 01:22 PM    BASOPHILS 0.60 07/29/2023 01:22 PM        Lab Results   Component Value Date/Time    HBA1C 5.5 06/30/2023 02:57 PM        Imaging:   I personally reviewed following images, these are my reads  Right lumbar spine 8/23/2022  Grade 1 spondylolisthesis L3-4.  Facet arthropathy worse in the lower lumbar levels.        IMAGING radiology reads. I reviewed the following radiology reads                                                                    Results for orders placed during the hospital encounter of 08/25/21    DX-CHEST-2 VIEWS    Impression  1.  No acute cardiopulmonary disease evident.  2.  No acute findings and no significant change from 8/31/2020.       Results for orders placed during the hospital encounter of 12/30/21    DX-FINGER(S) 2+ LEFT    Impression  1.  Cystlike lucency of the scaphoid could be a pseudocyst related to remote trauma or could be a small intraosseous ganglion or other bone cyst  2.  Degenerative changes of the first carpal metacarpal joint                 Results for orders placed during the hospital encounter of 08/23/22    DX-LUMBAR SPINE-2 OR 3 VIEWS    Impression  1.  No compression deformity or acute fracture is identified.    2.  Grade 1 anterolisthesis at L3-L4.    3.  Mild degenerative disc disease and facet arthropathy.                  Results for orders placed during the hospital encounter of 12/30/21    DX-WRIST-COMPLETE 3+ LEFT    Impression  1.  Cystlike lucency of the scaphoid could be a pseudocyst related to remote trauma or could be a small intraosseous ganglion or other bone cyst  2.  Degenerative changes of the first carpal metacarpal joint         Diagnosis   Visit Diagnoses     ICD-10-CM   1. Chronic bilateral low back pain with right-sided sciatica  M54.41     G89.29   2. Lumbar radiculopathy  M54.16   3. Spondylolisthesis of lumbar region  M43.16   4. SI (sacroiliac) joint dysfunction  M53.3   5. Ambulates with cane  Z99.89   6. Peripheral polyneuropathy  G62.9   7. Risk for falls  Z91.81           ASSESSMENT AND PLAN:  Jaclyn Peter Guern[place] 58 y.o. female      Jaclyn was seen today for leg pain.    Diagnoses and all orders for this visit:    Chronic bilateral low back pain with right-sided sciatica  -     MR-LUMBAR SPINE-W/O; Future  -     Referral to Physical Therapy    Lumbar radiculopathy  -     MR-LUMBAR SPINE-W/O; Future  -     Referral to Physical Therapy    Spondylolisthesis of lumbar region  -     MR-LUMBAR SPINE-W/O; Future  -     Referral to Physical Therapy    SI (sacroiliac) joint dysfunction  -     MR-LUMBAR SPINE-W/O; Future  -     Referral to Physical Therapy    Ambulates with cane  -     MR-LUMBAR SPINE-W/O; Future  -     Referral to Physical Therapy    Peripheral polyneuropathy  -     MR-LUMBAR SPINE-W/O; Future  -     Referral to Physical Therapy    Risk for falls        I believe the patient's low back pain is secondary to a combination of the above.  She has failed conservative treatments and has chronic pain and functional deficits likely related to this.    Defer fibromyalgia and intermittent chest pain to PCP.    Physical therapy: I ordered physical therapy to focus on strengthening and stretching.     home exercise program: I provided the patient with a strengthening and stretching with a home exercise program     Diagnostic workup: As above    Medications:   Continue gabapentin and baclofen as prescribed by PCP.  PCP to consider Cymbalta which could be helpful for pain and mood.    Interventional program: I would consider the patient for an epidural steroid injection depending on the results of the above       Follow-up: After the above diagnostic studies and physical therapy          Please note that this dictation was created using voice  recognition software. I have made every reasonable attempt to correct obvious errors but there may be errors of grammar and content that I may have overlooked prior to finalization of this note.      Elmo Donaldson MD  Physical Medicine and Rehabilitation  Interventional Spine and Sports Physiatry  [facility]          [place], [email or web address].

## 2023-08-11 DIAGNOSIS — K58.0 IRRITABLE BOWEL SYNDROME WITH DIARRHEA: ICD-10-CM

## 2023-08-11 DIAGNOSIS — K21.9 GASTROESOPHAGEAL REFLUX DISEASE WITHOUT ESOPHAGITIS: ICD-10-CM

## 2023-08-14 RX ORDER — FAMOTIDINE 40 MG/1
TABLET, FILM COATED ORAL
Qty: 30 TABLET | Refills: 0 | Status: SHIPPED | OUTPATIENT
Start: 2023-08-14 | End: 2023-09-18

## 2023-08-14 RX ORDER — DICYCLOMINE HCL 20 MG
20 TABLET ORAL EVERY 6 HOURS PRN
Qty: 120 TABLET | Refills: 0 | Status: SHIPPED | OUTPATIENT
Start: 2023-08-14 | End: 2023-08-29

## 2023-08-22 ENCOUNTER — TELEPHONE (OUTPATIENT)
Dept: CARDIOLOGY | Facility: MEDICAL CENTER | Age: 58
End: 2023-08-22
Payer: MEDICARE

## 2023-08-22 ENCOUNTER — PATIENT OUTREACH (OUTPATIENT)
Dept: HEALTH INFORMATION MANAGEMENT | Facility: OTHER | Age: 58
End: 2023-08-22
Payer: MEDICARE

## 2023-08-22 DIAGNOSIS — J44.89 COPD (CHRONIC OBSTRUCTIVE PULMONARY DISEASE) WITH CHRONIC BRONCHITIS (HCC): ICD-10-CM

## 2023-08-22 DIAGNOSIS — R07.9 CHEST PAIN, UNSPECIFIED TYPE: ICD-10-CM

## 2023-08-22 DIAGNOSIS — N18.32 STAGE 3B CHRONIC KIDNEY DISEASE: ICD-10-CM

## 2023-08-22 DIAGNOSIS — I10 ESSENTIAL HYPERTENSION: ICD-10-CM

## 2023-08-22 NOTE — TELEPHONE ENCOUNTER
Spoke to patient in regards to records for NP appointment with DA.     Patient has seen a cardiologist before?  No   If yes, where?:     Any recent cardiac testing outside of [facility]?  No   What testing:   Where was it completed?:  Had a Barbra Scan in about 2016 at [facility] in [facility]    Were any records requested?  No   Fax:

## 2023-08-22 NOTE — PROGRESS NOTES
Left voicemail message with contact information.  Need to give patient an update about my status & determine if she would like to transition to nurse who is taking over my role.      Patient returned my call, she gave her verbal consent to transition her PCM follow-up to Nurse Eugenia uriarte 8/22/23.

## 2023-08-23 DIAGNOSIS — G43.809 OTHER MIGRAINE WITHOUT STATUS MIGRAINOSUS, NOT INTRACTABLE: ICD-10-CM

## 2023-08-23 DIAGNOSIS — M79.7 FIBROMYALGIA: ICD-10-CM

## 2023-08-24 RX ORDER — TOPIRAMATE 50 MG/1
TABLET, FILM COATED ORAL
Qty: 270 TABLET | Refills: 0 | Status: SHIPPED | OUTPATIENT
Start: 2023-08-24 | End: 2023-11-20

## 2023-08-25 ENCOUNTER — APPOINTMENT (OUTPATIENT)
Dept: MEDICAL GROUP | Facility: PHYSICIAN GROUP | Age: 58
End: 2023-08-25
Payer: MEDICARE

## 2023-08-26 DIAGNOSIS — K58.0 IRRITABLE BOWEL SYNDROME WITH DIARRHEA: ICD-10-CM

## 2023-08-28 NOTE — TELEPHONE ENCOUNTER
Received request via: Pharmacy    Was the patient seen in the last year in this department? Yes    Does the patient have an active prescription (recently filled or refills available) for medication(s) requested? PT IS REQUESTING 90 DAY SUPPLY PLEASE ADVISE.    Does the patient have [facility] and need 100 day supply (blood pressure, diabetes and cholesterol meds only)? Medication is not for cholesterol, blood pressure or diabetes

## 2023-08-29 ENCOUNTER — OFFICE VISIT (OUTPATIENT)
Dept: CARDIOLOGY | Facility: MEDICAL CENTER | Age: 58
End: 2023-08-29
Attending: NURSE PRACTITIONER
Payer: MEDICARE

## 2023-08-29 VITALS
SYSTOLIC BLOOD PRESSURE: 102 MMHG | HEART RATE: 56 BPM | DIASTOLIC BLOOD PRESSURE: 76 MMHG | HEIGHT: 66 IN | WEIGHT: 176 LBS | BODY MASS INDEX: 28.28 KG/M2 | RESPIRATION RATE: 18 BRPM | OXYGEN SATURATION: 97 %

## 2023-08-29 DIAGNOSIS — J43.9 PULMONARY EMPHYSEMA, UNSPECIFIED EMPHYSEMA TYPE (HCC): ICD-10-CM

## 2023-08-29 DIAGNOSIS — F15.21 METHAMPHETAMINE DEPENDENCE IN REMISSION (HCC): ICD-10-CM

## 2023-08-29 DIAGNOSIS — R07.89 OTHER CHEST PAIN: ICD-10-CM

## 2023-08-29 DIAGNOSIS — G47.33 OSA ON CPAP: ICD-10-CM

## 2023-08-29 DIAGNOSIS — F17.200 TOBACCO DEPENDENCE: ICD-10-CM

## 2023-08-29 DIAGNOSIS — R06.02 SHORTNESS OF BREATH: ICD-10-CM

## 2023-08-29 PROCEDURE — 3078F DIAST BP <80 MM HG: CPT | Performed by: INTERNAL MEDICINE

## 2023-08-29 PROCEDURE — 99213 OFFICE O/P EST LOW 20 MIN: CPT | Performed by: INTERNAL MEDICINE

## 2023-08-29 PROCEDURE — 3074F SYST BP LT 130 MM HG: CPT | Performed by: INTERNAL MEDICINE

## 2023-08-29 PROCEDURE — 99204 OFFICE O/P NEW MOD 45 MIN: CPT | Performed by: INTERNAL MEDICINE

## 2023-08-29 RX ORDER — DICYCLOMINE HCL 20 MG
20 TABLET ORAL EVERY 6 HOURS PRN
Qty: 360 TABLET | Refills: 1 | Status: SHIPPED | OUTPATIENT
Start: 2023-08-29

## 2023-08-29 RX ORDER — COLCHICINE 0.6 MG/1
0.6 CAPSULE ORAL DAILY
Qty: 30 CAPSULE | Refills: 0 | Status: SHIPPED | OUTPATIENT
Start: 2023-08-29 | End: 2023-09-25

## 2023-08-29 ASSESSMENT — ENCOUNTER SYMPTOMS
EXCESSIVE DAYTIME SLEEPINESS: 0
DIARRHEA: 1
ORTHOPNEA: 0
PARESTHESIAS: 1
SLEEP DISTURBANCES DUE TO BREATHING: 0
NAUSEA: 1
LIGHT-HEADEDNESS: 0
NECK PAIN: 1
PALPITATIONS: 1
SYNCOPE: 0
VOMITING: 0
SHORTNESS OF BREATH: 0
DECREASED APPETITE: 0
BACK PAIN: 1
NUMBNESS: 0
HEADACHES: 1
SORE THROAT: 0
WHEEZING: 0
MYALGIAS: 1
IRREGULAR HEARTBEAT: 0
INSOMNIA: 1
BLURRED VISION: 0
DYSPNEA ON EXERTION: 0
NERVOUS/ANXIOUS: 1
DIZZINESS: 1
LOSS OF BALANCE: 0
HEARTBURN: 1
CONSTIPATION: 0
PND: 0
FLANK PAIN: 0
TREMORS: 1
COUGH: 0
BLOATING: 0
NEAR-SYNCOPE: 0
WEAKNESS: 1
FEVER: 0
DOUBLE VISION: 0
DIAPHORESIS: 0
FALLS: 0
NIGHT SWEATS: 0

## 2023-08-29 ASSESSMENT — FIBROSIS 4 INDEX: FIB4 SCORE: .7755102040816326531

## 2023-08-29 NOTE — PROGRESS NOTES
Cardiology Initial Consultation Note    Date of note:    8/29/2023    Primary Care Provider: DEONTE Rose.  Referring Provider: Madeline Finnegan*     Patient Name: Jaclyn Mejia   YOB: 1965  MRN:              [id number]    Chief Complaint   Patient presents with    Chest Pain    Bradycardia       History of Present Illness: Ms. Jaclyn Mejia is a 58 y.o. female whose current medical problems include dyslipidemia who is here for cardiac consultation for chest pain and shortness of breath.    Patient states that she has been dealing with ongoing substernal/epigastric chest pain, shortness of breath and dizziness for the past 2 months.  Symptoms have progressed since then.  Symptoms are predominantly present at rest.  Has also noted resting heart rate in the 50s, at times in 40s which concerns her.  Does get dizziness at rest and also with exertion.  Did undergo Epley maneuver which helped a bit.    In terms of physical activity, uses a cane to ambulate.  Has not been able to physically be more active due to ongoing symptoms.    Cardiovascular Risk Factors:  1. Smoking status: Current smoker  2. Type II Diabetes Mellitus: no   Lab Results   Component Value Date/Time    HBA1C 5.5 06/30/2023 02:57 PM    HBA1C 5.1 08/16/2021 08:54 AM     3. Hypertension: no  4. Dyslipidemia: Yes   Cholesterol,Tot   Date Value Ref Range Status   06/30/2023 160 100 - 199 mg/dL Final     LDL   Date Value Ref Range Status   06/30/2023 104 (H) <100 mg/dL Final     HDL   Date Value Ref Range Status   06/30/2023 43 >=40 mg/dL Final     Triglycerides   Date Value Ref Range Status   06/30/2023 67 0 - 149 mg/dL Final     5. Family history of early Coronary Artery Disease in a first degree relative (Male less than 55 years of age; Female less than 65 years of age): Mother had heart attack in her 70s      Review of Systems   Constitutional: Positive for malaise/fatigue. Negative for decreased  appetite, diaphoresis, fever and night sweats.   HENT:  Positive for tinnitus. Negative for congestion and sore throat.    Eyes:  Negative for blurred vision and double vision.   Cardiovascular:  Positive for chest pain and palpitations. Negative for cyanosis, dyspnea on exertion, irregular heartbeat, leg swelling, near-syncope, orthopnea, paroxysmal nocturnal dyspnea and syncope.   Respiratory:  Negative for cough, shortness of breath, sleep disturbances due to breathing and wheezing.    Endocrine: Negative for cold intolerance and heat intolerance.   Musculoskeletal:  Positive for back pain, joint pain, myalgias and neck pain. Negative for falls.   Gastrointestinal:  Positive for diarrhea, heartburn and nausea. Negative for bloating, constipation and vomiting.   Genitourinary:  Negative for dysuria and flank pain.   Neurological:  Positive for dizziness, headaches, paresthesias, tremors and weakness. Negative for excessive daytime sleepiness, light-headedness, loss of balance and numbness.   Psychiatric/Behavioral:  The patient has insomnia and is nervous/anxious.          Past Medical History:   Diagnosis Date    Abdominal pain     Actinic keratosis 02/06/2018    Alcohol abuse     Allergy     Anxiety     Arthritis     Back pain     Back pain     Bronchitis     Chronic pain syndrome     Constipation     COPD (chronic obstructive pulmonary disease) (HCC)     Cough     Daytime sleepiness     DDD (degenerative disc disease), cervical     DDD (degenerative disc disease), thoracolumbar     Depression     Diarrhea     Diverticulosis     Dizziness     Essential tremor     Fatigue     Fibromyalgia     IBS (irritable bowel syndrome)     Insomnia     Migraine     Nausea     Painful joint     Pulmonary emphysema (HCC)     Restless leg syndrome     Ringing in ears     Snoring     SOB (shortness of breath)     Sore muscles     Sweat, sweating, excessive     Weakness     Wears glasses          Past Surgical History:   Procedure  Laterality Date    ABDOMINAL HYSTERECTOMY TOTAL      CHOLECYSTECTOMY      HYSTERECTOMY LAPAROSCOPY      OTHER ABDOMINAL SURGERY      TONSILLECTOMY           Current Outpatient Medications   Medication Sig Dispense Refill    Colchicine (MITIGARE) 0.6 MG Cap Take 0.6 mg by mouth every day. 30 Capsule 0    topiramate (TOPAMAX) 50 MG tablet TAKE 1 TABLET BY MOUTH THREE TIMES A  Tablet 0    dicyclomine (BENTYL) 20 MG Tab TAKE 1 TABLET BY MOUTH EVERY 6 HOURS AS NEEDED (IBS). 120 Tablet 0    famotidine (PEPCID) 40 MG Tab TAKE 1 TABLET BY MOUTH EVERY DAY 30 Tablet 0    Multiple Vitamin (MULTIVITAMIN ADULT PO)       NON SPECIFIED Take  by mouth. Flax oil gel cap, one capsule daily      ondansetron (ZOFRAN ODT) 4 MG TABLET DISPERSIBLE Take 1 Tablet by mouth every 8 hours as needed for Nausea/Vomiting. 15 Tablet 0    Milnacipran HCl (SAVELLA) 100 MG Tab Take 1 Tablet by mouth 2 times a day. 180 Tablet 2    baclofen (LIORESAL) 10 MG Tab Take 1 Tablet by mouth 3 times a day. 270 Tablet 3    gabapentin (NEURONTIN) 600 MG tablet TAKE 1 TABLET BY MOUTH THREE TIMES A  Tablet 0    diclofenac sodium (VOLTAREN) 1 % Gel Apply 2 g topically 4 times a day as needed (pain). 100 g 0    Omega-3 Fatty Acids (FISH OIL) 1000 MG Cap capsule Take 1,000 mg by mouth 1 time a day as needed.      magnesium oxide (MAG-OX) 400 MG Tab tablet Take 400 mg by mouth every day.      Loratadine (CLARITIN PO) Take 1 tablet by mouth every day.      hydrOXYzine HCl (ATARAX) 25 MG Tab Take 1 Tab by mouth 3 times a day as needed for Anxiety. 90 Tab 0     No current facility-administered medications for this visit.         Allergies   Allergen Reactions    Erythromycin Rash     Chest rash    Tetracycline Rash     Chest rash         Family History   Problem Relation Age of Onset    Lung Disease Mother         copd    Cancer Mother         basal/squamous    Hypertension Mother     Hyperlipidemia Mother     Stroke Mother     Alcohol abuse Mother      Heart Disease Father         HF    Arthritis Father         rheumatoid    Lung Disease Father         emphysema    Alcohol abuse Father     Cancer Sister     Alcohol abuse Sister     Cancer Maternal Grandfather         Bladder or prostate?    Cancer Sister         pre cancerous spots     Alcohol abuse Sister     Kidney Disease Sister     Alcohol abuse Sister     Hypertension Sister     Alcohol abuse Sister          Social History     Socioeconomic History    Marital status: Single     Spouse name: Not on file    Number of children: Not on file    Years of education: Not on file    Highest education level: Not on file   Occupational History    Not on file   Tobacco Use    Smoking status: Every Day     Current packs/day: 0.00     Average packs/day: 1 pack/day for 35.0 years (35.0 ttl pk-yrs)     Types: Cigarettes     Start date: 1987     Last attempt to quit: 2022     Years since quittin.1    Smokeless tobacco: Never   Vaping Use    Vaping Use: Every day    Substances: THC, CBD    Devices: Disposable, Pre-filled or refillable cartridge, Refillable tank, Pre-filled pod   Substance and Sexual Activity    Alcohol use: Yes     Comment: occasionally    Drug use: Yes     Frequency: 7.0 times per week     Types: Marijuana, Inhaled, Oral     Comment: Only canibus medicinal for anti spasm, anxiety, ibs, inflamm    Sexual activity: Not Currently     Partners: Male, Female     Comment: currently with male   Other Topics Concern    Not on file   Social History Narrative    Initial Intake Date:  Last Updated:        Financial situation:    SSD $1570/mo w/ $450 savings. We did discuss care credit in regards to behavioral health treatment interventions. Jaclyn        Food resources & insecurities:    Deferred.         Housing & environmental:    Resides in [place] in [facility] ([address] wheel).         Transportation:    Deferred.        Family dynamics & family/friend social supports:    Pt has local family to include  "86-year-old mother and sister. She reports family are functioning alcoholics but are supportive in her sobriety. They will provide alternative alcohol free drinks at gatherings. Pt has son currently in inpatient treatment for alcohol use.         Pt has significant other who resides in the same [facility]. He is also pursuing sobriety w/ Jaclyn.        ADLs/IADLs & associated diagnoses:    Pt's ADLs/IADL have previously been affected by her alcohol use. They have cause incontinent issues in the past as well as extended periods of vomiting.         Advanced life planning:    Not on file.         Behavioral/Mental:    Associated diagnoses include migraine without status migrainosus, not intractable; insomnia due to medical condition; recurrent major depressive disorder; alcoholism; deliberate self-cutting; and obesity.        Pt has extensive history of substance use and recovery. In her young adult years pt used methamphatmine for aprox 1 year until she sought treatment. At this time she was mother to 2 children. She attended both AA/NA and \"[facility] in the [facility] area. She became sober \"[place]\" and maintained sobriety for 16 years. 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 Pt's daughter then left the home (at age 21) which affected her drinking; at that time she also became disabled. Pt relapsed on methamphetamine with use over 2 years w/ a 9 month period of sobriety. Pt then moved to [place] where her drinking increased (vodka). Pt reports medical symptoms from drinking include every other day vomitting, a period of GI/rectal bleeding and hypertensive events. Per chart review pt drinking 5th vodka daily. Pt's last drink 3/7/2021. She is pursuing sobriety with her significant other, Bradly. Pt is 6 days sober at time of assessment (3/12/2021). She is \"tired of being sick and tired.\"        Pt's reservations " include attending inpatient; she would need to take her pet dog with her as no one else can care for the dog. Dog is  animal. And the allotment of time required for intensive out patient (3 days a week); she is concerned her fibromyalgia will affect her attendance. Thre is a general concern of cost which may direct pt towards low income treatment options.         Assessments completed by :    n/a        Collaterals:     Social Determinants of Health     Financial Resource Strain: High Risk (8/16/2022)    Overall Financial Resource Strain (CARDIA)     Difficulty of Paying Living Expenses: Very hard   Food Insecurity: No Food Insecurity (8/16/2022)    Hunger Vital Sign     Worried About Running Out of Food in the Last Year: Never true     Ran Out of Food in the Last Year: Never true   Transportation Needs: Unmet Transportation Needs (8/16/2022)    PRAPARE - Transportation     Lack of Transportation (Medical): Yes     Lack of Transportation (Non-Medical): No   Physical Activity: Insufficiently Active (8/16/2022)    Exercise Vital Sign     Days of Exercise per Week: 6 days     Minutes of Exercise per Session: 20 min   Stress: Stress Concern Present (8/16/2022)    [place] [employer] - Occupational Stress Questionnaire     Feeling of Stress : Very much   Social Connections: Moderately Isolated (8/16/2022)    Social Connection and Isolation Panel [NHANES]     Frequency of Communication with Friends and Family: More than three times a week     Frequency of Social Gatherings with Friends and Family: More than three times a week     Attends Restorationist Services: More than 4 times per year     Active Member of Clubs or Organizations: No     Attends Club or Organization Meetings: Never     Marital Status: Never    Intimate Partner Violence: Not on file   Housing Stability: Low Risk  (8/16/2022)    Housing Stability Vital Sign     Unable to Pay for Housing in the Last Year: No      "Number of Places Lived in the Last Year: 1     Unstable Housing in the Last Year: No         Physical Exam:  Ambulatory Vitals  /76 (BP Location: Left arm, Patient Position: Sitting, BP Cuff Size: Adult)   Pulse (!) 56   Resp 18   Ht 1.664 m (5' 5.5\")   Wt 79.8 kg (176 lb)   SpO2 97%    Oxygen Therapy:  Pulse Oximetry: 97 %  BP Readings from Last 4 Encounters:   08/29/23 102/76   08/10/23 128/76   07/29/23 130/76   07/25/23 118/70       Weight/BMI: Body mass index is 28.84 kg/m².  Wt Readings from Last 4 Encounters:   08/29/23 79.8 kg (176 lb)   08/10/23 81.2 kg (179 lb 0.2 oz)   07/29/23 83 kg (183 lb)   07/25/23 83.1 kg (183 lb 3.2 oz)       General: Well appearing and in no apparent distress  Eyes: nl conjunctiva, no icteric sclera  ENT:  normal external appearance of ears  Neck: no visible JVP,  no carotid bruits  Lungs: normal respiratory effort, CTAB  Heart: RRR, no murmurs, no rubs or gallops,  no edema bilateral lower extremities. 2+ bilateral radial pulses.    Abdomen: soft, non tender, non distended  Extremities/MSK: no clubbing, no cyanosis  Neurological: No focal sensory deficits  Psychiatric: Appropriate affect, A/O x 3, intact judgement and insight  Skin: Warm extremities      Lab Data Review:  Lab Results   Component Value Date/Time    CHOLSTRLTOT 160 06/30/2023 02:57 PM     (H) 06/30/2023 02:57 PM    HDL 43 06/30/2023 02:57 PM    TRIGLYCERIDE 67 06/30/2023 02:57 PM       Lab Results   Component Value Date/Time    SODIUM 143 07/29/2023 01:22 PM    POTASSIUM 4.4 07/29/2023 01:22 PM    CHLORIDE 109 07/29/2023 01:22 PM    CO2 23 07/29/2023 01:22 PM    GLUCOSE 110 (H) 07/29/2023 01:22 PM    BUN 17 07/29/2023 01:22 PM    CREATININE 1.05 07/29/2023 01:22 PM     Lab Results   Component Value Date/Time    ALKPHOSPHAT 93 07/29/2023 01:22 PM    ASTSGOT 12 07/29/2023 01:22 PM    ALTSGPT 9 07/29/2023 01:22 PM    TBILIRUBIN 0.2 07/29/2023 01:22 PM      Lab Results   Component Value Date/Time    " WBC 7.8 07/29/2023 01:22 PM    HEMOGLOBIN 14.5 07/29/2023 01:22 PM     Lab Results   Component Value Date/Time    HBA1C 5.5 06/30/2023 02:57 PM    HBA1C 5.1 08/16/2021 08:54 AM         Cardiac Imaging and Procedures Review:    EKG dated 7/29/2023: My personal interpretation is sinus arrhythmia    Nuclear Perfusion Imaging (7/28/2023):   Negative for ischemia or infarction  Normal wall motion  Negative stress ECG      Radiology test Review:  CXR: No acute cardiopulmonary abnormality noted        Assessment & Plan     1. Other chest pain  EC-ECHOCARDIOGRAM COMPLETE W/O CONT    Colchicine (MITIGARE) 0.6 MG Cap      2. Shortness of breath  EC-ECHOCARDIOGRAM COMPLETE W/O CONT      3. DORA on CPAP  EC-ECHOCARDIOGRAM COMPLETE W/O CONT      4. Tobacco dependence        5. Pulmonary emphysema, unspecified emphysema type (HCC)  EC-ECHOCARDIOGRAM COMPLETE W/O CONT      6. Methamphetamine dependence in remission (HCC)  EC-ECHOCARDIOGRAM COMPLETE W/O CONT            Shared Medical Decision Making:  Patient with ongoing symptoms of chest pain, shortness of breath, dyspnea on exertion, dizziness.  We discussed nuclear stress test results which shows no obstructive CAD which is reassuring.  EKG showed possible left atrial enlargement which can result in shortness of breath.  Obtain echocardiogram to evaluate underlying cardiac structure and function.  Rule out structural or valvular heart abnormality that could be contributing to her ongoing symptoms.    Some of the symptoms are consistent with musculoskeletal chest pain.  Unable to take NSAIDs with underlying CKD.  Trial of colchicine 0.6 mg daily to see if her chest pain episodes improve.    Was diagnosed with emphysema several years ago in [place].  Was told that she needs to be on inhalers but never followed through.  Continues to smoke.  We discussed follow-up with primary care for repeat pulmonary test and trial of inhalers with ongoing symptoms.    I have independently interpreted  test results and discussed results and management with the patient.    All of patient's excellent questions were answered to the best of my knowledge and to her satisfaction.  It was a pleasure seeing Ms. Jaclyn Mejia in my clinic today. RTC if abnormal test results otherwise as needed. Patient is aware to call the cardiology clinic with any questions or concerns.      Matt Dela MD  [facility], [facility]  [address] [address]  Phone: [phone]  Fax: [phone]    Please note that this dictation was created using voice recognition software. I have made every reasonable attempt to correct obvious errors, but it is possible there are errors of grammar and possibly content that I did not discover before finalizing the note.

## 2023-08-30 ENCOUNTER — ANCILLARY PROCEDURE (OUTPATIENT)
Dept: CARDIOLOGY | Facility: MEDICAL CENTER | Age: 58
End: 2023-08-30
Attending: INTERNAL MEDICINE
Payer: MEDICARE

## 2023-08-30 DIAGNOSIS — R06.02 SHORTNESS OF BREATH: ICD-10-CM

## 2023-08-30 DIAGNOSIS — R07.89 OTHER CHEST PAIN: ICD-10-CM

## 2023-08-30 DIAGNOSIS — J43.9 PULMONARY EMPHYSEMA, UNSPECIFIED EMPHYSEMA TYPE (HCC): ICD-10-CM

## 2023-08-30 DIAGNOSIS — G47.33 OSA ON CPAP: ICD-10-CM

## 2023-08-30 DIAGNOSIS — F15.21 METHAMPHETAMINE DEPENDENCE IN REMISSION (HCC): ICD-10-CM

## 2023-08-30 LAB
LV EJECT FRACT  99904: 55
LV EJECT FRACT MOD 2C 99903: 62.86
LV EJECT FRACT MOD 4C 99902: 46.38
LV EJECT FRACT MOD BP 99901: 54.85

## 2023-08-30 PROCEDURE — 93306 TTE W/DOPPLER COMPLETE: CPT

## 2023-08-30 PROCEDURE — 93306 TTE W/DOPPLER COMPLETE: CPT | Mod: 26 | Performed by: INTERNAL MEDICINE

## 2023-08-31 ENCOUNTER — HOSPITAL ENCOUNTER (OUTPATIENT)
Dept: RADIOLOGY | Facility: MEDICAL CENTER | Age: 58
End: 2023-08-31
Attending: PHYSICAL MEDICINE & REHABILITATION
Payer: MEDICARE

## 2023-08-31 DIAGNOSIS — M54.41 CHRONIC BILATERAL LOW BACK PAIN WITH RIGHT-SIDED SCIATICA: ICD-10-CM

## 2023-08-31 DIAGNOSIS — M43.16 SPONDYLOLISTHESIS OF LUMBAR REGION: ICD-10-CM

## 2023-08-31 DIAGNOSIS — G89.29 CHRONIC BILATERAL LOW BACK PAIN WITH RIGHT-SIDED SCIATICA: ICD-10-CM

## 2023-08-31 DIAGNOSIS — M54.16 LUMBAR RADICULOPATHY: ICD-10-CM

## 2023-08-31 DIAGNOSIS — M53.3 SI (SACROILIAC) JOINT DYSFUNCTION: ICD-10-CM

## 2023-08-31 DIAGNOSIS — G62.9 PERIPHERAL POLYNEUROPATHY: ICD-10-CM

## 2023-08-31 DIAGNOSIS — Z99.89 AMBULATES WITH CANE: ICD-10-CM

## 2023-08-31 PROCEDURE — 72148 MRI LUMBAR SPINE W/O DYE: CPT

## 2023-09-05 ENCOUNTER — OFFICE VISIT (OUTPATIENT)
Dept: DERMATOLOGY | Facility: IMAGING CENTER | Age: 58
End: 2023-09-05
Payer: MEDICARE

## 2023-09-05 DIAGNOSIS — Z12.83 SKIN CANCER SCREENING: ICD-10-CM

## 2023-09-05 DIAGNOSIS — L81.4 LENTIGINES: ICD-10-CM

## 2023-09-05 DIAGNOSIS — D22.9 MULTIPLE NEVI: ICD-10-CM

## 2023-09-05 DIAGNOSIS — D18.01 CHERRY ANGIOMA: ICD-10-CM

## 2023-09-05 DIAGNOSIS — L57.0 ACTINIC KERATOSIS: ICD-10-CM

## 2023-09-05 DIAGNOSIS — L82.1 SK (SEBORRHEIC KERATOSIS): ICD-10-CM

## 2023-09-05 PROCEDURE — 99213 OFFICE O/P EST LOW 20 MIN: CPT | Mod: 25 | Performed by: NURSE PRACTITIONER

## 2023-09-05 PROCEDURE — 17000 DESTRUCT PREMALG LESION: CPT | Performed by: NURSE PRACTITIONER

## 2023-09-05 PROCEDURE — 17003 DESTRUCT PREMALG LES 2-14: CPT | Performed by: NURSE PRACTITIONER

## 2023-09-05 NOTE — PROGRESS NOTES
DERMATOLOGY NOTE  NEW VISIT       Chief complaint: Establish Care (ABEL)         History of skin cancer: No  History of precancers/actinic keratoses: Yes, Details:    History of biopsies:No  History of blistering/severe sunburns:Yes, Details: Child to adult  Family history of skin cancer:Yes, Details: Sisters Unsure type ,  Mother SCC  Family history of atypical moles:Yes, Details: Mother       Allergies   Allergen Reactions    Erythromycin Rash     Chest rash    Tetracycline Rash     Chest rash        MEDICATIONS:  Medications relevant to specialty reviewed.     REVIEW OF SYSTEMS:   Positive for skin (see HPI)  Negative for fevers and chills       EXAM:  LMP 12/01/2000   Constitutional: Well-developed, well-nourished, and in no distress.     A total body skin exam was performed excluding the genitals per patient preference and including the following areas: head (including face), neck, chest, abdomen, groin/buttocks, back, bilateral upper extremities, and bilateral lower extremities with the following pertinent findings listed below and/or in assessment/plan.     -sun exposed skin of trunk and b/l upper, lower extremities and face with scattered clinically benign light brown reticulated macules all of which were morphologically similar and none of which were suspicious for skin cancer today on exam    -Several scattered 1-3mm bright red macules and thin papules on the trunk and extremities    -Multiple tan light brown skin-colored macules papules scattered over the trunk >> extremities-All with benign-appearing pigment network patterns on dermoscopy    -Several medium brown stuck-on waxy papules scattered on the trunk and extremities    -Ill-defined erythematous gritty/scaly papule over the Bilateral infraorbital, over nasal bone      IMPRESSION / PLAN:    1. Actinic keratosis  - N[facility] education/counseling   CRYOTHERAPY:  Risks (including, but not limited to: skin discoloration, redness, blister, blood blister,  recurrence, need for further treatment, infection, scar) and benefits of cryotherapy discussed. Patient verbally agreed to proceed with treatment. 1 cryotherapy freeze thaw cycles of 10 seconds were applied to 5 lesions on face as noted on exam with cryac. Patient tolerated procedure well. Aftercare instructions given--no specific care needed unless irritated during healing process, can apply Vaseline with small band-aid if needed.      2. Lentigines  - Benign-appearing nature of lesions discussed during exam.   - Advised to continue to monitor for any return to clinic for new or concerning changes.      3. Cherry angioma  - Benign-appearing nature of lesions discussed during exam.   - Advised to continue to monitor for any return to clinic for new or concerning changes.      4. Multiple nevi  - Benign-appearing nature of lesions discussed during exam.   - Advised to continue to monitor for any return to clinic for new or concerning changes.sa  - ABCDE's of melanoma discussed/handout given      5. SK (seborrheic keratosis)  - Benign-appearing nature of lesions discussed during exam.   - Advised to continue to monitor for any return to clinic for new or concerning changes.      6. Skin cancer screening  Skin cancer education  discussed importance of sun protective clothing, eyewear in addition to the use of broad spectrum sunscreen with SPF 30 or greater, as well as need for reapplication ~every 2 hours when exposed to UVR/handout given  discussed importance following up for any new or changing lesions as noted in handout given, but every 12 months exams in clinic in the setting of dermatologic history  ABCDE's of melanoma discussed/handout given        Discussed risks associated with LN2, Patient verbalized understanding and agrees with plan regarding the above          Please note that this dictation was created using voice recognition software. I have made every reasonable attempt to correct obvious errors, but I  expect that there are errors of grammar and possibly content that I did not discover before finalizing the note.      Return to clinic in: Return in about 1 year (around 9/5/2024) for ABEL. and as needed for any new or changing skin lesions.

## 2023-09-16 DIAGNOSIS — K21.9 GASTROESOPHAGEAL REFLUX DISEASE WITHOUT ESOPHAGITIS: ICD-10-CM

## 2023-09-18 RX ORDER — FAMOTIDINE 40 MG/1
TABLET, FILM COATED ORAL
Qty: 30 TABLET | Refills: 0 | Status: SHIPPED | OUTPATIENT
Start: 2023-09-18 | End: 2023-10-17

## 2023-09-23 DIAGNOSIS — R07.89 OTHER CHEST PAIN: ICD-10-CM

## 2023-09-25 ENCOUNTER — PATIENT MESSAGE (OUTPATIENT)
Dept: MEDICAL GROUP | Facility: PHYSICIAN GROUP | Age: 58
End: 2023-09-25
Payer: MEDICARE

## 2023-09-25 ENCOUNTER — PATIENT OUTREACH (OUTPATIENT)
Dept: MEDICAL GROUP | Facility: PHYSICIAN GROUP | Age: 58
End: 2023-09-25
Payer: MEDICARE

## 2023-09-25 ENCOUNTER — PHYSICAL THERAPY (OUTPATIENT)
Dept: PHYSICAL THERAPY | Facility: REHABILITATION | Age: 58
End: 2023-09-25
Attending: PHYSICAL MEDICINE & REHABILITATION
Payer: MEDICARE

## 2023-09-25 DIAGNOSIS — N18.32 STAGE 3B CHRONIC KIDNEY DISEASE: ICD-10-CM

## 2023-09-25 DIAGNOSIS — R07.89 CHEST PRESSURE: ICD-10-CM

## 2023-09-25 DIAGNOSIS — G89.29 CHRONIC BILATERAL LOW BACK PAIN WITH RIGHT-SIDED SCIATICA: ICD-10-CM

## 2023-09-25 DIAGNOSIS — G62.9 PERIPHERAL POLYNEUROPATHY: ICD-10-CM

## 2023-09-25 DIAGNOSIS — B96.89 BV (BACTERIAL VAGINOSIS): ICD-10-CM

## 2023-09-25 DIAGNOSIS — R73.09 ELEVATED HEMOGLOBIN A1C: ICD-10-CM

## 2023-09-25 DIAGNOSIS — N76.0 BV (BACTERIAL VAGINOSIS): ICD-10-CM

## 2023-09-25 DIAGNOSIS — M54.41 CHRONIC BILATERAL LOW BACK PAIN WITH RIGHT-SIDED SCIATICA: ICD-10-CM

## 2023-09-25 DIAGNOSIS — M54.16 LUMBAR RADICULOPATHY: ICD-10-CM

## 2023-09-25 DIAGNOSIS — R07.89 OTHER CHEST PAIN: ICD-10-CM

## 2023-09-25 DIAGNOSIS — M43.16 SPONDYLOLISTHESIS OF LUMBAR REGION: ICD-10-CM

## 2023-09-25 DIAGNOSIS — M53.3 SI (SACROILIAC) JOINT DYSFUNCTION: ICD-10-CM

## 2023-09-25 DIAGNOSIS — K58.0 IRRITABLE BOWEL SYNDROME WITH DIARRHEA: ICD-10-CM

## 2023-09-25 DIAGNOSIS — Z99.89 AMBULATES WITH CANE: ICD-10-CM

## 2023-09-25 PROCEDURE — 97110 THERAPEUTIC EXERCISES: CPT

## 2023-09-25 PROCEDURE — 99490 CHRNC CARE MGMT STAFF 1ST 20: CPT | Performed by: NURSE PRACTITIONER

## 2023-09-25 RX ORDER — CLINDAMYCIN PHOSPHATE 20 MG/G
CREAM VAGINAL
Qty: 40 G | Refills: 0 | Status: SHIPPED | OUTPATIENT
Start: 2023-09-25

## 2023-09-25 RX ORDER — COLCHICINE 0.6 MG/1
1 CAPSULE ORAL
Qty: 30 CAPSULE | Refills: 0 | Status: SHIPPED | OUTPATIENT
Start: 2023-09-25 | End: 2023-09-26

## 2023-09-25 SDOH — ECONOMIC STABILITY: GENERAL: QUALITY OF LIFE: GOOD

## 2023-09-25 ASSESSMENT — ENCOUNTER SYMPTOMS
QUALITY: DULL ACHE
ALLEVIATING FACTORS: PAIN MEDICATION
EXACERBATED BY: STANDING
PAIN SCALE: 6
PAIN TIMING: IN THE EVENING
QUALITY: SHOOTING
EXACERBATED BY: LIFTING
QUALITY: RADIATING
QUALITY: ACHING
EXACERBATED BY: BENDING
EXACERBATED BY: WALKING
EXACERBATED BY: PROLONGED STANDING

## 2023-09-25 NOTE — TELEPHONE ENCOUNTER
Is the patient due for a refill? Yes    Was the patient seen the past year? Yes    Date of last office visit: 8/39/2023    Does the patient have an upcoming appointment?  No    Provider to refill:AK (ADD)    Does the patients insurance require a 100 day supply?  Yes

## 2023-09-25 NOTE — PROGRESS NOTES
"Assessment    I spoke with the patient by phone today for their Monthly and Quarterly CCM reviews. The patient is in good spirits. States that they just began physical therapy and is excited to see what improvements therapy makes. The patient states she is having only 1 or 2 drinks socially about once a month but has continued to refrain from drinking otherwise with the support of her family. The patient also just started pain control through [facility] as well. She states that she is feeling good. She did go to her Cardiology appointment and was told that there \"didn't seem to be anything seriously wrong with her heart.\" Medications were reviewed, including new medication prescribed by cardiology for the chest pain the patient has been dealing with. The patient saw their nephrologist in June and reports that when she went to the ER to be evaluated for her chest pain that her GFR was higher than it has been in a very long time. She attributes this to Omega 3 supplements and hydration.This is exciting. Patient denies any issues with obtaining or taking medications.     Education    *Article on hydrating foods, information on [facility] Balance Screenings sent by mail.     Plan of Care and Goals    *Remain free from falls  *Maintain healthy mental and physical habits.     Barriers:    *Busy household and schedule    Progress:    Progressing    Next outreach:  10/25/23                           "

## 2023-09-26 RX ORDER — COLCHICINE 0.6 MG/1
1 CAPSULE ORAL
Qty: 90 CAPSULE | Refills: 3 | Status: SHIPPED | OUTPATIENT
Start: 2023-09-26

## 2023-09-26 RX ORDER — ONDANSETRON 4 MG/1
TABLET, ORALLY DISINTEGRATING ORAL
Qty: 15 TABLET | Refills: 0 | Status: SHIPPED | OUTPATIENT
Start: 2023-09-26 | End: 2023-11-11 | Stop reason: SDUPTHER

## 2023-10-02 ENCOUNTER — PHYSICAL THERAPY (OUTPATIENT)
Dept: PHYSICAL THERAPY | Facility: REHABILITATION | Age: 58
End: 2023-10-02
Attending: PHYSICAL MEDICINE & REHABILITATION
Payer: MEDICARE

## 2023-10-02 DIAGNOSIS — M54.16 LUMBAR RADICULOPATHY: ICD-10-CM

## 2023-10-02 DIAGNOSIS — G89.29 CHRONIC BILATERAL LOW BACK PAIN WITH RIGHT-SIDED SCIATICA: ICD-10-CM

## 2023-10-02 DIAGNOSIS — Z99.89 AMBULATES WITH CANE: ICD-10-CM

## 2023-10-02 DIAGNOSIS — M43.16 SPONDYLOLISTHESIS OF LUMBAR REGION: ICD-10-CM

## 2023-10-02 DIAGNOSIS — M54.41 CHRONIC BILATERAL LOW BACK PAIN WITH RIGHT-SIDED SCIATICA: ICD-10-CM

## 2023-10-02 PROCEDURE — 97112 NEUROMUSCULAR REEDUCATION: CPT

## 2023-10-02 PROCEDURE — 97110 THERAPEUTIC EXERCISES: CPT

## 2023-10-02 PROCEDURE — 97140 MANUAL THERAPY 1/> REGIONS: CPT

## 2023-10-02 NOTE — OP THERAPY DAILY TREATMENT
Outpatient Physical Therapy  DAILY TREATMENT     [facility] Outpatient Physical Therapy [place] [id number] [facility], [address]  [place] [address]  Phone:  [phone]    Date: 10/02/2023    Patient: Jaclyn Mejia  YOB: 1965  MRN: [id number]     Time Calculation    Start time: 0130  Stop time: 0215 Time Calculation (min): 45 minutes         Chief Complaint: Back Problem    Visit #: 3    SUBJECTIVE:  The patient has been active today and she is having a family re-union soon.     OBJECTIVE:  Current objective measures:       Decrease pain to SIJ bilaterally; decrease pain to (R) ITB    Therapeutic Exercises (CPT 71882):     1. posterior pelvic tilts, 1 x10 reps 5 sec hold    2. hamstring stretch, 3 x30 sec    3. bridges    4. squats    5. clamshells    6. piriformis stretch    7. treadmill, next visit    8. Nu step bike, seat 19 at L4 for 9 minutes      Therapeutic Exercise Summary: 6 minute walking test    Therapeutic Treatments and Modalities:     1. Manual Therapy (CPT 53218), Lumbar, CPA's, IASTM (R) hip    Time-based treatments/modalities:    Physical Therapy Timed Treatment Charges  Manual therapy minutes (CPT 33839): 20 minutes  Neuromusc re-ed, balance, coor, post minutes (CPT 45257): 10 minutes  Therapeutic exercise minutes (CPT 51833): 15 minutes  ASSESSMENT:   Response to treatment:   CPA's grade 2-3 at L1-L5 without pain to the (L) side and > to the (R) side at L3-L4 to the (R) .  Patient was instructed on TrA engagement activation; completed with abdominal crunches and reached muscle fatigue response.      PLAN/RECOMMENDATIONS:   Plan for treatment: therapy treatment to continue next visit.  Planned interventions for next visit: continue with current treatment.

## 2023-10-09 ENCOUNTER — APPOINTMENT (OUTPATIENT)
Dept: PHYSICAL THERAPY | Facility: REHABILITATION | Age: 58
End: 2023-10-09
Attending: PHYSICAL MEDICINE & REHABILITATION
Payer: MEDICARE

## 2023-10-09 NOTE — OP THERAPY DAILY TREATMENT
Outpatient Physical Therapy  DAILY TREATMENT     [facility] Outpatient Physical Therapy [place] [address] [facility], [address]  [place] [address]  Phone:  [phone]    Date: 10/09/2023    Patient: Jaclyn Mejia  YOB: 1965  MRN: [id number]     Time Calculation                   Chief Complaint: No chief complaint on file.    Visit #: 4    SUBJECTIVE:  The patient     OBJECTIVE:  Current objective measures:       Decrease pain to lumbar; ITB to (R) side; improve mobility     Therapeutic Exercises (CPT 04376):     1. posterior pelvic tilts, 1 x10 reps 5 sec hold    2. hamstring stretch, 3 x30 sec    3. bridges    4. squats    5. clamshells    6. piriformis stretch    7. treadmill, next visit    8. Nu step bike, seat 19 at L4 for 9 minutes    9. prone hip extension    10. Yunait      Therapeutic Exercise Summary: 6 minute walking test    Therapeutic Treatments and Modalities:     1. Manual Therapy (CPT 36086), Lumbar, CPA's, IASTM (R) hip    Time-based treatments/modalities:       ASSESSMENT:   Response to treatment:           PLAN/RECOMMENDATIONS:   Plan for treatment: therapy treatment to continue next visit.  Planned interventions for next visit: continue with current treatment.

## 2023-10-16 ENCOUNTER — PHYSICAL THERAPY (OUTPATIENT)
Dept: PHYSICAL THERAPY | Facility: REHABILITATION | Age: 58
End: 2023-10-16
Attending: PHYSICAL MEDICINE & REHABILITATION
Payer: MEDICARE

## 2023-10-16 DIAGNOSIS — Z99.89 AMBULATES WITH CANE: ICD-10-CM

## 2023-10-16 DIAGNOSIS — M54.41 CHRONIC BILATERAL LOW BACK PAIN WITH RIGHT-SIDED SCIATICA: ICD-10-CM

## 2023-10-16 DIAGNOSIS — G89.29 CHRONIC BILATERAL LOW BACK PAIN WITH RIGHT-SIDED SCIATICA: ICD-10-CM

## 2023-10-16 DIAGNOSIS — M54.16 LUMBAR RADICULOPATHY: ICD-10-CM

## 2023-10-16 DIAGNOSIS — M43.16 SPONDYLOLISTHESIS OF LUMBAR REGION: ICD-10-CM

## 2023-10-16 PROCEDURE — 97110 THERAPEUTIC EXERCISES: CPT

## 2023-10-16 PROCEDURE — 97140 MANUAL THERAPY 1/> REGIONS: CPT

## 2023-10-16 PROCEDURE — 97112 NEUROMUSCULAR REEDUCATION: CPT

## 2023-10-16 PROCEDURE — 97014 ELECTRIC STIMULATION THERAPY: CPT

## 2023-10-16 NOTE — OP THERAPY DAILY TREATMENT
Outpatient Physical Therapy  DAILY TREATMENT     [facility] Outpatient Physical Therapy [place] [address] [facility], [address]  [place] [address]  Phone:  [phone]    Date: 10/16/2023    Patient: Jaclyn Mejia  YOB: 1965  MRN: [id number]     Time Calculation    Start time: 0130  Stop time: 0215 Time Calculation (min): 45 minutes         Chief Complaint: Back Problem    Visit #: 4    SUBJECTIVE:  The patient reports having allergies affecting her today. The patient had a family reunion last week and had to cancel. She has not been walking as much    OBJECTIVE:  Current objective measures:       Decrease tightness to the lumbar area; improve AROM in trunk flexion and extension    Therapeutic Exercises (CPT 09992):     1. posterior pelvic tilts, 1 x10 reps 5 sec hold    2. hamstring stretch, 3 x30 sec    3. bridges, 1 x10 reps 10 sec hold    4. squats    5. clamshells, 1 x10 reps (blue tb)    6. piriformis stretch    7. treadmill, next visit    8. Nu step bike, seat 19 at L4 for 10 minutes    9. prone hip extension, 1 x10 reps    10. [employer]      Therapeutic Exercise Summary: 6 minute walking test    Therapeutic Treatments and Modalities:     1. Manual Therapy (CPT 37497), Lumbar, CPA's, IASTM (R) hip    Time-based treatments/modalities:  Physical Therapy Timed Treatment Charges  Manual therapy minutes (CPT 47541): 15 minutes  Neuromusc re-ed, balance, coor, post minutes (CPT 91159): 15 minutes  Therapeutic exercise minutes (CPT 79165): 15 minutes    ASSESSMENT:   Response to treatment:   CPA's grade 3 at L1-L5 with tenderness at L4-5 bilaterally to the (L) side; patient notes some increased sensitivity to the (R) side at L3 upon  PA pressure to the (L) facet joint. AROM in trunk flexion and extension increased but pain level following intervention was the same. Recommended pain management with electrical stimulation to lumbar region today and P 15 minutes.      PLAN/RECOMMENDATIONS:   Plan for  treatment: therapy treatment to continue next visit.  Planned interventions for next visit: continue with current treatment.

## 2023-10-17 ENCOUNTER — APPOINTMENT (OUTPATIENT)
Dept: PHYSICAL MEDICINE AND REHAB | Facility: MEDICAL CENTER | Age: 58
End: 2023-10-17
Payer: MEDICARE

## 2023-10-17 DIAGNOSIS — K21.9 GASTROESOPHAGEAL REFLUX DISEASE WITHOUT ESOPHAGITIS: ICD-10-CM

## 2023-10-17 RX ORDER — FAMOTIDINE 40 MG/1
TABLET, FILM COATED ORAL
Qty: 90 TABLET | Refills: 1 | Status: SHIPPED | OUTPATIENT
Start: 2023-10-17 | End: 2024-02-06 | Stop reason: SDUPTHER

## 2023-10-17 NOTE — TELEPHONE ENCOUNTER
Received request via: Pharmacy    Was the patient seen in the last year in this department? Yes    Does the patient have an active prescription (recently filled or refills available) for medication(s) requested?  Pharmacy comment: REQUEST FOR 90 DAYS PRESCRIPTION. DX Code Needed.    Does the patient have group home Plus and need 100 day supply (blood pressure, diabetes and cholesterol meds only)? Medication is not for cholesterol, blood pressure or diabetes

## 2023-10-18 ENCOUNTER — APPOINTMENT (OUTPATIENT)
Dept: PHYSICAL MEDICINE AND REHAB | Facility: MEDICAL CENTER | Age: 58
End: 2023-10-18
Payer: MEDICARE

## 2023-10-18 NOTE — PROGRESS NOTES
Follow up patient note  Interventional spine and Pain  Physiatry (physical medicine and  Rehabilitation)       Chief complaint: No chief complaint on file.   ***      HISTORY    Please see new patient note by Dr Donaldson,  for more details.     HPI  Patient identification: Jaclyn Mejia ,  1965,   With There were no encounter diagnoses.       - ***       ROS Red Flags :   -***  Fever, Chills, Sweats: Denies  Involuntary Weight Loss: Denies  Bowel/Bladder Incontinence: Denies  Saddle Anesthesia: Denies        PMHx:   Past Medical History:   Diagnosis Date    Abdominal pain     Actinic keratosis 2018    Alcohol abuse     Allergy     Anxiety     Arthritis     Back pain     Back pain     Bronchitis     Chronic pain syndrome     Constipation     COPD (chronic obstructive pulmonary disease) (HCC)     Cough     Daytime sleepiness     DDD (degenerative disc disease), cervical     DDD (degenerative disc disease), thoracolumbar     Depression     Diarrhea     Diverticulosis     Dizziness     Essential tremor     Fatigue     Fibromyalgia     IBS (irritable bowel syndrome)     Insomnia     Migraine     Nausea     Painful joint     Pulmonary emphysema (HCC)     Restless leg syndrome     Ringing in ears     Snoring     SOB (shortness of breath)     Sore muscles     Sweat, sweating, excessive     Weakness     Wears glasses        PSHx:   Past Surgical History:   Procedure Laterality Date    ABDOMINAL HYSTERECTOMY TOTAL      CHOLECYSTECTOMY      HYSTERECTOMY LAPAROSCOPY      OTHER ABDOMINAL SURGERY      TONSILLECTOMY         Family history   Family History   Problem Relation Age of Onset    Lung Disease Mother         copd    Cancer Mother         basal/squamous    Hypertension Mother     Hyperlipidemia Mother     Stroke Mother     Alcohol abuse Mother     Heart Disease Father         HF    Arthritis Father         rheumatoid    Lung Disease Father         emphysema    Alcohol abuse Father     Cancer Sister      Alcohol abuse Sister     Cancer Maternal Grandfather         Bladder or prostate?    Cancer Sister         pre cancerous spots     Alcohol abuse Sister     Kidney Disease Sister     Alcohol abuse Sister     Hypertension Sister     Alcohol abuse Sister          Medications:   No outpatient medications have been marked as taking for the 10/18/23 encounter (Appointment) with Elmo Donaldson M.D..        Current Outpatient Medications on File Prior to Visit   Medication Sig Dispense Refill    ondansetron (ZOFRAN ODT) 4 MG TABLET DISPERSIBLE DISSOLVE 1 TABLET BY MOUTH EVERY 8 HOURS AS NEEDED FOR NAUSEA AND VOMITING 15 Tablet 0    famotidine (PEPCID) 40 MG Tab TAKE 1 TABLET BY MOUTH EVERY DAY 90 Tablet 1    MITIGARE 0.6 MG Cap TAKE 1 CAPSULE BY MOUTH EVERY DAY 90 Capsule 3    clindamycin (CLEOCIN) 2 % vaginal cream Use vaginally after sexual activity for irritation and odor related to BV. 40 g 0    dicyclomine (BENTYL) 20 MG Tab TAKE 1 TABLET BY MOUTH EVERY 6 HOURS AS NEEDED (IBS). 360 Tablet 1    topiramate (TOPAMAX) 50 MG tablet TAKE 1 TABLET BY MOUTH THREE TIMES A  Tablet 0    Multiple Vitamin (MULTIVITAMIN ADULT PO)       NON SPECIFIED Take  by mouth. Flax oil gel cap, one capsule daily      Milnacipran HCl (SAVELLA) 100 MG Tab Take 1 Tablet by mouth 2 times a day. 180 Tablet 2    baclofen (LIORESAL) 10 MG Tab Take 1 Tablet by mouth 3 times a day. 270 Tablet 3    gabapentin (NEURONTIN) 600 MG tablet TAKE 1 TABLET BY MOUTH THREE TIMES A  Tablet 0    diclofenac sodium (VOLTAREN) 1 % Gel Apply 2 g topically 4 times a day as needed (pain). 100 g 0    Omega-3 Fatty Acids (FISH OIL) 1000 MG Cap capsule Take 1,000 mg by mouth 1 time a day as needed.      magnesium oxide (MAG-OX) 400 MG Tab tablet Take 400 mg by mouth every day.      Loratadine (CLARITIN PO) Take 1 tablet by mouth every day.      hydrOXYzine HCl (ATARAX) 25 MG Tab Take 1 Tab by mouth 3 times a day as needed for Anxiety. 90 Tab 0     No current  facility-administered medications on file prior to visit.         Allergies:   Allergies   Allergen Reactions    Erythromycin Rash     Chest rash    Tetracycline Rash     Chest rash       Social Hx:   Social History     Socioeconomic History    Marital status: Single     Spouse name: Not on file    Number of children: Not on file    Years of education: Not on file    Highest education level: Associate degree: occupational, technical, or vocational program   Occupational History    Not on file   Tobacco Use    Smoking status: Every Day     Current packs/day: 0.00     Average packs/day: 1 pack/day for 35.0 years (35.0 ttl pk-yrs)     Types: Cigarettes     Start date: 1987     Last attempt to quit: 2022     Years since quittin.2    Smokeless tobacco: Never   Vaping Use    Vaping Use: Every day    Substances: THC, CBD    Devices: Disposable, Pre-filled or refillable cartridge, Refillable tank, Pre-filled pod   Substance and Sexual Activity    Alcohol use: Yes     Comment: occasionally    Drug use: Yes     Frequency: 7.0 times per week     Types: Marijuana, Inhaled, Oral     Comment: Only canibus medicinal for anti spasm, anxiety, ibs, inflamm    Sexual activity: Not Currently     Partners: Male, Female     Comment: currently with male   Other Topics Concern    Not on file   Social History Narrative    Initial Intake Date:  Last Updated:        Financial situation:    SSD $1570/mo w/ $450 savings. We did discuss care credit in regards to behavioral health treatment interventions. Jaclyn        Food resources & insecurities:    Deferred.         Housing & environmental:    Resides in [place] in [facility] ([address]).         Transportation:    Deferred.        Family dynamics & family/friend social supports:    Pt has local family to include 86-year-old mother and sister. She reports family are functioning alcoholics but are supportive in her sobriety. They will provide alternative alcohol free drinks at  "gatherings. Pt has son currently in inpatient treatment for alcohol use.         Pt has significant other who resides in the same [facility]. He is also pursuing sobriety w/ Jaclyn.        ADLs/IADLs & associated diagnoses:    Pt's ADLs/IADL have previously been affected by her alcohol use. They have cause incontinent issues in the past as well as extended periods of vomiting.         Advanced life planning:    Not on file.         Behavioral/Mental:    Associated diagnoses include migraine without status migrainosus, not intractable; insomnia due to medical condition; recurrent major depressive disorder; alcoholism; deliberate self-cutting; and obesity.        Pt has extensive history of substance use and recovery. In her young adult years pt used methamphatmine for aprox 1 year until she sought treatment. At this time she was mother to 2 children. She attended both AA/NA and \"[facility]\" in the [facility] area. She became sober \"[place]\" and maintained sobriety for 16 years. 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 Pt's daughter then left the home (at age 21) which affected her drinking; at that time she also became disabled. Pt relapsed on methamphetamine with use over 2 years w/ a 9 month period of sobriety. Pt then moved to [place] where her drinking increased (vodka). Pt reports medical symptoms from drinking include every other day vomitting, a period of GI/rectal bleeding and hypertensive events. Per chart review pt drinking 5th vodka daily. Pt's last drink 3/7/2021. She is pursuing sobriety with her significant other, Bradly. Pt is 6 days sober at time of assessment (3/12/2021). She is \"tired of being sick and tired.\"        Pt's reservations include attending inpatient; she would need to take her pet dog with her as no one else can care for the dog. Dog is  animal. And the allotment of time " required for intensive out patient (3 days a week); she is concerned her fibromyalgia will affect her attendance. Thre is a general concern of cost which may direct pt towards low income treatment options.         Assessments completed by :    n/a        Collaterals:     Social Determinants of Health     Financial Resource Strain: High Risk (9/8/2023)    Overall Financial Resource Strain (CARDIA)     Difficulty of Paying Living Expenses: Very hard   Food Insecurity: Food Insecurity Present (9/8/2023)    Hunger Vital Sign     Worried About Running Out of Food in the Last Year: Sometimes true     Ran Out of Food in the Last Year: Never true   Transportation Needs: Unmet Transportation Needs (9/8/2023)    PRAPARE - Transportation     Lack of Transportation (Medical): Yes     Lack of Transportation (Non-Medical): Yes   Physical Activity: Inactive (9/8/2023)    Exercise Vital Sign     Days of Exercise per Week: 0 days     Minutes of Exercise per Session: 0 min   Stress: Stress Concern Present (9/8/2023)    [employer] [place] [employer] - Occupational Stress Questionnaire     Feeling of Stress : Rather much   Social Connections: Moderately Integrated (9/8/2023)    Social Connection and Isolation Panel [NHANES]     Frequency of Communication with Friends and Family: More than three times a week     Frequency of Social Gatherings with Friends and Family: Once a week     Attends [employer] Services: More than 4 times per year     Active Member of Clubs or Organizations: Yes     Attends Club or Organization Meetings: More than 4 times per year     Marital Status:    Intimate Partner Violence: Not on file   Housing Stability: Low Risk  (9/8/2023)    Housing Stability Vital Sign     Unable to Pay for Housing in the Last Year: No     Number of Places Lived in the Last Year: 2     Unstable Housing in the Last Year: No         EXAMINATION     Physical Exam:   Vitals: There were no vitals taken for  "this visit.    Constitutional:   Body Habitus: There is no height or weight on file to calculate BMI.  Cooperation: Fully cooperates with exam  Appearance: Well-groomed no disheveled    Respiratory-  breathing comfortable on room air, no audible wheezing  Cardiovascular- capillary refills less than 2 seconds. No lower extremity edema is noted.   Psychiatric- alert and oriented ×3. Normal affect.    MSK and Neuro: -***          MEDICAL DECISION MAKING    DATA    Labs: ***  Lab Results   Component Value Date/Time    SODIUM 143 07/29/2023 01:22 PM    POTASSIUM 4.4 07/29/2023 01:22 PM    CHLORIDE 109 07/29/2023 01:22 PM    CO2 23 07/29/2023 01:22 PM    GLUCOSE 110 (H) 07/29/2023 01:22 PM    BUN 17 07/29/2023 01:22 PM    CREATININE 1.05 07/29/2023 01:22 PM        No results found for: \"PROTHROMBTM\", \"INR\"     Lab Results   Component Value Date/Time    WBC 7.8 07/29/2023 01:22 PM    RBC 4.69 07/29/2023 01:22 PM    HEMOGLOBIN 14.5 07/29/2023 01:22 PM    HEMATOCRIT 43.9 07/29/2023 01:22 PM    MCV 93.6 07/29/2023 01:22 PM    MCH 30.9 07/29/2023 01:22 PM    MCHC 33.0 07/29/2023 01:22 PM    MPV 10.3 07/29/2023 01:22 PM    NEUTSPOLYS 46.90 07/29/2023 01:22 PM    LYMPHOCYTES 45.70 (H) 07/29/2023 01:22 PM    MONOCYTES 5.00 07/29/2023 01:22 PM    EOSINOPHILS 1.40 07/29/2023 01:22 PM    BASOPHILS 0.60 07/29/2023 01:22 PM        Lab Results   Component Value Date/Time    HBA1C 5.5 06/30/2023 02:57 PM          Imaging:   I personally reviewed following images      ***      I reviewed the following radiology reports    ***                     Results for orders placed during the hospital encounter of 08/31/23    MR-LUMBAR SPINE-W/O    Impression  Mild degenerative disease in the lumbar spine as described above.        Results for orders placed during the hospital encounter of 08/31/23    MR-LUMBAR SPINE-W/O    Impression  Mild degenerative disease in the lumbar spine as described above.                                                  "                                           Results for orders placed during the hospital encounter of 21    DX-CHEST-2 VIEWS    Impression  1.  No acute cardiopulmonary disease evident.  2.  No acute findings and no significant change from 2020.       Results for orders placed during the hospital encounter of 21    DX-FINGER(S) 2+ LEFT    Impression  1.  Cystlike lucency of the scaphoid could be a pseudocyst related to remote trauma or could be a small intraosseous ganglion or other bone cyst  2.  Degenerative changes of the first carpal metacarpal joint                 Results for orders placed during the hospital encounter of 22    DX-LUMBAR SPINE-2 OR 3 VIEWS    Impression  1.  No compression deformity or acute fracture is identified.    2.  Grade 1 anterolisthesis at L3-L4.    3.  Mild degenerative disc disease and facet arthropathy.                  Results for orders placed during the hospital encounter of 21    DX-WRIST-COMPLETE 3+ LEFT    Impression  1.  Cystlike lucency of the scaphoid could be a pseudocyst related to remote trauma or could be a small intraosseous ganglion or other bone cyst  2.  Degenerative changes of the first carpal metacarpal joint         DIAGNOSIS   {No diagnosis found. (Refresh or delete this SmartLink)}      ASSESSMENT and PLAN:     Jaclyn Mejia  1965 female  ***    There are no diagnoses linked to this encounter.      -***    Follow up: {time follow up:86332}    Thank you for allowing me to participate in the care of this patient. If you have any questions please not hesitate to contact me.             Please note that this dictation was created using voice recognition software. I have made every reasonable attempt to correct obvious errors but there may be errors of grammar and content that I may have overlooked prior to finalization of this note.      Elmo Donaldson MD  Interventional Spine and Sports Physiatry  Physical Medicine and  [facility]

## 2023-10-23 ENCOUNTER — PHYSICAL THERAPY (OUTPATIENT)
Dept: PHYSICAL THERAPY | Facility: REHABILITATION | Age: 58
End: 2023-10-23
Attending: PHYSICAL MEDICINE & REHABILITATION
Payer: MEDICARE

## 2023-10-23 DIAGNOSIS — M54.16 LUMBAR RADICULOPATHY: ICD-10-CM

## 2023-10-23 DIAGNOSIS — Z99.89 AMBULATES WITH CANE: ICD-10-CM

## 2023-10-23 DIAGNOSIS — M54.41 CHRONIC BILATERAL LOW BACK PAIN WITH RIGHT-SIDED SCIATICA: ICD-10-CM

## 2023-10-23 DIAGNOSIS — M43.16 SPONDYLOLISTHESIS OF LUMBAR REGION: ICD-10-CM

## 2023-10-23 DIAGNOSIS — G89.29 CHRONIC BILATERAL LOW BACK PAIN WITH RIGHT-SIDED SCIATICA: ICD-10-CM

## 2023-10-23 PROCEDURE — 97110 THERAPEUTIC EXERCISES: CPT

## 2023-10-23 PROCEDURE — 97014 ELECTRIC STIMULATION THERAPY: CPT

## 2023-10-23 PROCEDURE — 97140 MANUAL THERAPY 1/> REGIONS: CPT

## 2023-10-23 PROCEDURE — 97112 NEUROMUSCULAR REEDUCATION: CPT

## 2023-10-23 NOTE — OP THERAPY DAILY TREATMENT
Outpatient Physical Therapy  DAILY TREATMENT     [facility] Outpatient Physical Therapy [address] [facility], [address]  [place] [address]  Phone:  [phone]    Date: 10/23/2023    Patient: Jaclyn Mejia  YOB: 1965  MRN: [id number]     Time Calculation    Start time: 0130  Stop time: 0215 Time Calculation (min): 45 minutes         Chief Complaint: Back Problem    Visit #: 5    SUBJECTIVE:  The patient reports having problems with her (R) hip pain, has fibromyalgia and chronic fatigue syndrome which may exacerbate symptoms.    OBJECTIVE:  Current objective measures:       Assess walking distance; decrease low back pain; improve multifidi strength      Therapeutic Exercises (CPT 74676):     1. posterior pelvic tilts, 1 x10 reps 5 sec hold    2. hamstring stretch, 3 x30 sec    3. bridges, 1 x10 reps 10 sec hold    4. squats    5. clamshells, 1 x10 reps (blue tb)    6. piriformis stretch    7. treadmill, next visit    8. Nu step bike, seat 19 at L4 for 10 minutes    9. prone hip extension, 1 x10 reps    10. deadlifts, x    11. quadriped UE/LE lifts, x    12. open book stretch, 3 x15 sec hold      Therapeutic Exercise Summary: 6 minute walking test: 795 feet completing 6 minutes; patient indicated some (R) hip pain; light dizziness.    Therapeutic Treatments and Modalities:     1. Manual Therapy (CPT 37213), Lumbar, CPA's grade 2-3 to L1-L5; T12-T10, IASTM (R) hip    Time-based treatments/modalities:    Physical Therapy Timed Treatment Charges  Manual therapy minutes (CPT 87218): 15 minutes  Neuromusc re-ed, balance, coor, post minutes (CPT 92215): 15 minutes  Therapeutic exercise minutes (CPT 00605): 15 minutes    ASSESSMENT:   Response to treatment:   Assessed 6 minute walking test: patient completed 6 minutes at 795 feet; had some hip pain to the (R) side; increased neuropathy in feet.        PLAN/RECOMMENDATIONS:   Plan for treatment: therapy treatment to continue next visit.  Planned interventions  for next visit: continue with current treatment.

## 2023-10-24 ENCOUNTER — PATIENT OUTREACH (OUTPATIENT)
Dept: HEALTH INFORMATION MANAGEMENT | Facility: OTHER | Age: 58
End: 2023-10-24
Payer: MEDICARE

## 2023-10-24 DIAGNOSIS — I10 ESSENTIAL HYPERTENSION: ICD-10-CM

## 2023-10-24 DIAGNOSIS — J44.89 COPD (CHRONIC OBSTRUCTIVE PULMONARY DISEASE) WITH CHRONIC BRONCHITIS (HCC): ICD-10-CM

## 2023-10-24 DIAGNOSIS — R07.9 CHEST PAIN, UNSPECIFIED TYPE: ICD-10-CM

## 2023-10-24 DIAGNOSIS — N18.32 STAGE 3B CHRONIC KIDNEY DISEASE: ICD-10-CM

## 2023-10-24 PROCEDURE — 99490 CHRNC CARE MGMT STAFF 1ST 20: CPT | Performed by: NURSE PRACTITIONER

## 2023-10-24 NOTE — PROGRESS NOTES
09:35: First attempt made to contact patient for her October CCM follow up call. No answer, message left on mobile number with contact information.     10/26/23 (10:30 am): Second attempt made for October check in. No answer. Message left with contact information. I will send out the educational health article for the month by mail and attempt again in November.     11:00 am:   Assessment  I spoke to the patient over the phone this morning for her monthly [facility] follow up. She voices understanding of all future appointments. Jaclyn states that she has had no recent falls or injuries. She denies any issues with obtaining or taking medications. Jaclyn admits to having depression that has increased in intensity due to having a flare up of her IBS that kept her in bed for two days, a dear friend who is hospitalized, and conflict at home. She states she began speaking to a counselor through an online therapy service last week. She states that the conflict at home has since pretty much resolved. Jaclyn states that family were just in town for a week long family reunion and that she was eating and drinking far more than she has in a long time. She states that she has had to work hard mentally to pull herself back into a healthier mindset. The patient denies any further questions or concerns at this time. She voices understanding of how to contact me should any arise.     Education    *Article on important health screenings to have after 61 sent by mail.     Plan of Care and Goals    *Increased activity  *Healthy nutrition and hydration  *Remain free from injuries and falls.   *Effective coping and use of support system    Barriers:    *History of addiction  *Busy schedule supporting her friend and being responsible for her home while she is hospitalized.     Progress:    Progressing    Next outreach:    11/20/23 by Phone

## 2023-10-30 ENCOUNTER — APPOINTMENT (OUTPATIENT)
Dept: PHYSICAL THERAPY | Facility: REHABILITATION | Age: 58
End: 2023-10-30
Attending: PHYSICAL MEDICINE & REHABILITATION
Payer: MEDICARE

## 2023-11-01 ENCOUNTER — APPOINTMENT (OUTPATIENT)
Dept: RADIOLOGY | Facility: MEDICAL CENTER | Age: 58
End: 2023-11-01
Attending: NURSE PRACTITIONER
Payer: MEDICARE

## 2023-11-06 ENCOUNTER — PHYSICAL THERAPY (OUTPATIENT)
Dept: PHYSICAL THERAPY | Facility: REHABILITATION | Age: 58
End: 2023-11-06
Attending: PHYSICAL MEDICINE & REHABILITATION
Payer: MEDICARE

## 2023-11-06 DIAGNOSIS — M54.16 LUMBAR RADICULOPATHY: ICD-10-CM

## 2023-11-06 DIAGNOSIS — G89.29 CHRONIC BILATERAL LOW BACK PAIN WITH RIGHT-SIDED SCIATICA: ICD-10-CM

## 2023-11-06 DIAGNOSIS — M54.41 CHRONIC BILATERAL LOW BACK PAIN WITH RIGHT-SIDED SCIATICA: ICD-10-CM

## 2023-11-06 PROCEDURE — 97110 THERAPEUTIC EXERCISES: CPT

## 2023-11-06 PROCEDURE — 97140 MANUAL THERAPY 1/> REGIONS: CPT

## 2023-11-06 PROCEDURE — 97112 NEUROMUSCULAR REEDUCATION: CPT

## 2023-11-09 ENCOUNTER — APPOINTMENT (OUTPATIENT)
Dept: RADIOLOGY | Facility: MEDICAL CENTER | Age: 58
End: 2023-11-09
Attending: NURSE PRACTITIONER
Payer: MEDICARE

## 2023-11-11 DIAGNOSIS — K58.0 IRRITABLE BOWEL SYNDROME WITH DIARRHEA: ICD-10-CM

## 2023-11-13 ENCOUNTER — APPOINTMENT (OUTPATIENT)
Dept: PHYSICAL THERAPY | Facility: REHABILITATION | Age: 58
End: 2023-11-13
Attending: PHYSICAL MEDICINE & REHABILITATION
Payer: MEDICARE

## 2023-11-13 RX ORDER — ONDANSETRON 4 MG/1
4 TABLET, ORALLY DISINTEGRATING ORAL EVERY 6 HOURS PRN
Qty: 15 TABLET | Refills: 0 | Status: SHIPPED | OUTPATIENT
Start: 2023-11-13 | End: 2024-02-06 | Stop reason: SDUPTHER

## 2023-11-19 DIAGNOSIS — M79.7 FIBROMYALGIA: ICD-10-CM

## 2023-11-19 DIAGNOSIS — G43.809 OTHER MIGRAINE WITHOUT STATUS MIGRAINOSUS, NOT INTRACTABLE: ICD-10-CM

## 2023-11-20 ENCOUNTER — PATIENT OUTREACH (OUTPATIENT)
Dept: HEALTH INFORMATION MANAGEMENT | Facility: OTHER | Age: 58
End: 2023-11-20
Payer: MEDICARE

## 2023-11-20 ENCOUNTER — APPOINTMENT (OUTPATIENT)
Dept: PHYSICAL THERAPY | Facility: REHABILITATION | Age: 58
End: 2023-11-20
Attending: PHYSICAL MEDICINE & REHABILITATION
Payer: MEDICARE

## 2023-11-20 DIAGNOSIS — N18.32 STAGE 3B CHRONIC KIDNEY DISEASE: ICD-10-CM

## 2023-11-20 DIAGNOSIS — J44.89 COPD (CHRONIC OBSTRUCTIVE PULMONARY DISEASE) WITH CHRONIC BRONCHITIS (HCC): ICD-10-CM

## 2023-11-20 DIAGNOSIS — I10 ESSENTIAL HYPERTENSION: ICD-10-CM

## 2023-11-20 PROCEDURE — 99999 PR NO CHARGE: CPT | Performed by: NURSE PRACTITIONER

## 2023-11-20 RX ORDER — TOPIRAMATE 50 MG/1
TABLET, FILM COATED ORAL
Qty: 270 TABLET | Refills: 0 | Status: SHIPPED | OUTPATIENT
Start: 2023-11-20 | End: 2024-02-06 | Stop reason: SDUPTHER

## 2023-11-20 NOTE — PROGRESS NOTES
2:09 pm: I attempted to call the patient for her monthly CCM follow up. No answer. Message left on patient's mobile phone.     11/28/23  Assessment    I spoke with the patient this afternoon for her CCM monthly follow up over the phone. Future appointments reviewed. Patient is aware and states understanding. Care gaps reviewed. Patient very interested in Hep C and Lung cancer screening. Will route to PCP. The patient states that she has been going to the gym and doing stretching and lumbar exercises that were recommended to her by PT, which she cut short for financial reasons. The patient denies any falls or injuries. No reported issues with nutrition or hydration. Patient is excited to participate in a family reunion in the next couple of weeks.     Education    *Article on environmental safety and organization for seniors sent by mail    Plan of Care and Goals    Remain free from falls  Healthy nutrition, hydration activity    Barriers:    Busy schedule    Progress:    incremental    Next outreach:  12/26/23

## 2023-11-27 ENCOUNTER — TELEPHONE (OUTPATIENT)
Dept: NEPHROLOGY | Facility: MEDICAL CENTER | Age: 58
End: 2023-11-27
Payer: MEDICARE

## 2023-11-27 RX ORDER — MILNACIPRAN HYDROCHLORIDE 100 MG/1
1 TABLET, FILM COATED ORAL 2 TIMES DAILY
Qty: 180 TABLET | Refills: 2 | Status: SHIPPED | OUTPATIENT
Start: 2023-11-27 | End: 2024-02-06 | Stop reason: SDUPTHER

## 2023-11-28 DIAGNOSIS — N18.32 STAGE 3B CHRONIC KIDNEY DISEASE: ICD-10-CM

## 2023-11-29 ENCOUNTER — APPOINTMENT (OUTPATIENT)
Dept: PHYSICAL MEDICINE AND REHAB | Facility: MEDICAL CENTER | Age: 58
End: 2023-11-29
Payer: MEDICARE

## 2023-11-29 ENCOUNTER — APPOINTMENT (OUTPATIENT)
Dept: RADIOLOGY | Facility: MEDICAL CENTER | Age: 58
End: 2023-11-29
Attending: NURSE PRACTITIONER
Payer: MEDICARE

## 2023-11-29 NOTE — TELEPHONE ENCOUNTER
Generally has to be discussed in a visit as I have to have very accurate smoking history and to screen for symptoms.

## 2023-12-04 DIAGNOSIS — M79.7 FIBROMYALGIA: ICD-10-CM

## 2023-12-04 RX ORDER — GABAPENTIN 600 MG/1
600 TABLET ORAL 3 TIMES DAILY
Qty: 270 TABLET | Refills: 0 | Status: SHIPPED | OUTPATIENT
Start: 2023-12-04 | End: 2024-02-06 | Stop reason: SDUPTHER

## 2023-12-14 ENCOUNTER — TELEMEDICINE (OUTPATIENT)
Dept: SLEEP MEDICINE | Facility: MEDICAL CENTER | Age: 58
End: 2023-12-14
Attending: PHYSICIAN ASSISTANT
Payer: MEDICARE

## 2023-12-14 VITALS
WEIGHT: 175 LBS | HEART RATE: 82 BPM | RESPIRATION RATE: 16 BRPM | BODY MASS INDEX: 29.16 KG/M2 | DIASTOLIC BLOOD PRESSURE: 64 MMHG | HEIGHT: 65 IN | SYSTOLIC BLOOD PRESSURE: 115 MMHG

## 2023-12-14 DIAGNOSIS — J44.9 CHRONIC OBSTRUCTIVE PULMONARY DISEASE, UNSPECIFIED COPD TYPE (HCC): ICD-10-CM

## 2023-12-14 DIAGNOSIS — G47.33 OSA (OBSTRUCTIVE SLEEP APNEA): ICD-10-CM

## 2023-12-14 PROCEDURE — 99213 OFFICE O/P EST LOW 20 MIN: CPT | Mod: 95 | Performed by: PHYSICIAN ASSISTANT

## 2023-12-14 ASSESSMENT — ENCOUNTER SYMPTOMS
WHEEZING: 0
SINUS PAIN: 0
HEADACHES: 1
CHILLS: 0
WEIGHT LOSS: 0
INSOMNIA: 0
DIZZINESS: 1
SORE THROAT: 0
FEVER: 0
TREMORS: 1
PALPITATIONS: 0
HEARTBURN: 1
COUGH: 1
ORTHOPNEA: 0
SPUTUM PRODUCTION: 1
SHORTNESS OF BREATH: 1

## 2023-12-14 ASSESSMENT — FIBROSIS 4 INDEX: FIB4 SCORE: .7755102040816326531

## 2023-12-14 NOTE — PROGRESS NOTES
"Virtual Visit: Established Patient   This visit was conducted via Zoom using secure and encrypted videoconferencing technology.   The patient was in their home in the [facility].    The patient's identity was confirmed and verbal consent was obtained for this virtual visit.     Subjective:     Chief Complaint   Patient presents with    Follow-Up    Apnea       HPI:  Jaclyn Mejia is a 58 y.o. year old female here today for follow-up on obstructive sleep apnea. Patient continues to smoke, complete cessation is advised.  Last seen in clinic 6/14/2023 by Cherri Pereira PA-C.  Patient previously evaluated in clinic 6/10/2022 by Dr. Rafy Cavanaugh.    Past Medical History: Migraines, IBS with diarrhea, essential tremor, bipolar affective disorder, generalized anxiety disorder, alcohol dependence in remission, fibromyalgia, chronic fatigue, methamphetamine dependence in remission, emphysema, degenerative disc disease.    Vitals:  /64   Pulse 82   Resp 16   Ht 1.651 m (5' 5\")   Wt 79.4 kg (175 lb)   LMP 12/01/2000   BMI 29.12 kg/m²     Recent Imaging: Echocardiogram obtained 8/30/2023 demonstrating normal left ventricular size, wall thickness, systolic and diastolic function.  LVEF estimated 55%.  Normal right ventricular size and systolic function, trace tricuspid regurgitation estimated RVSP of 20 mmHg.    Currently using  Resmed auto CPAP @ 4-7cm H20 pressure; compliance reviewed for 11/14/2023-12/13/2023, days used 28/30, average daily usage 6 hours 59 minutes, 77% of days greater than or equal to 4 hours, mask leak at 8.1 LPM at 95th percentile, AHI 2.8 per hour.  See media for full report.    Device obtained May 2023  DME provider Preferred  Mask interface fullface mask    Overnight home sleep study obtained 5/18/2022 demonstrated mild obstructive sleep apnea with NING of 14.8/h increasing to 16.6/h when supine.  Low O2 sat of 84% with sats at or below 88% for 13 minutes of flow evaluation " time.  Recommendation for auto CPAP versus titration study.    Sleep schedule goes to bed 10 PM and wakens 5-6 AM   Symptoms denies daytime somnolence, morning headache     [place] Sleepiness Scale reported as 5/24 on 2/24/2022  Stop Bang Score 5 (6/14/2023  2:09 PM)         Review of Systems   Constitutional:  Positive for malaise/fatigue (persistent). Negative for chills, fever and weight loss.   HENT:  Positive for congestion (constant runny, allergies) and tinnitus (intermittent). Negative for hearing loss, nosebleeds, sinus pain and sore throat.    Eyes:         Presc glasses   Respiratory:  Positive for cough, sputum production and shortness of breath. Negative for wheezing.    Cardiovascular:  Positive for chest pain (not cardiac). Negative for palpitations, orthopnea and leg swelling.   Gastrointestinal:  Positive for heartburn (famotidine).        No dentures, missing quite a few/5 teeth, no swallowing issues   Neurological:  Positive for dizziness (BPPV), tremors and headaches.   Psychiatric/Behavioral:  The patient does not have insomnia.        Past Medical History:   Diagnosis Date    Abdominal pain     Actinic keratosis 02/06/2018    Alcohol abuse     Allergy     Anxiety     Arthritis     Back pain     Back pain     Bronchitis     Chronic pain syndrome     Constipation     COPD (chronic obstructive pulmonary disease) (HCC)     Cough     Daytime sleepiness     DDD (degenerative disc disease), cervical     DDD (degenerative disc disease), thoracolumbar     Depression     Diarrhea     Diverticulosis     Dizziness     Essential tremor     Fatigue     Fibromyalgia     IBS (irritable bowel syndrome)     Insomnia     Migraine     Nausea     Painful joint     Pulmonary emphysema (HCC)     Restless leg syndrome     Ringing in ears     Snoring     SOB (shortness of breath)     Sore muscles     Sweat, sweating, excessive     Weakness     Wears glasses        Past Surgical History:   Procedure Laterality Date     ABDOMINAL HYSTERECTOMY TOTAL      CHOLECYSTECTOMY      HYSTERECTOMY LAPAROSCOPY      OTHER ABDOMINAL SURGERY      TONSILLECTOMY         Family History   Problem Relation Age of Onset    Lung Disease Mother         copd    Cancer Mother         basal/squamous    Hypertension Mother     Hyperlipidemia Mother     Stroke Mother     Alcohol abuse Mother     Heart Disease Father         HF    Arthritis Father         rheumatoid    Lung Disease Father         emphysema    Alcohol abuse Father     Cancer Sister     Alcohol abuse Sister     Cancer Maternal Grandfather         Bladder or prostate?    Cancer Sister         pre cancerous spots     Alcohol abuse Sister     Kidney Disease Sister     Alcohol abuse Sister     Hypertension Sister     Alcohol abuse Sister        Social History     Socioeconomic History    Marital status: Single     Spouse name: Not on file    Number of children: Not on file    Years of education: Not on file    Highest education level: Associate degree: occupational, technical, or vocational program   Occupational History    Not on file   Tobacco Use    Smoking status: Every Day     Current packs/day: 0.00     Average packs/day: 1 pack/day for 35.0 years (35.0 ttl pk-yrs)     Types: Cigarettes     Start date: 1987     Last attempt to quit: 2022     Years since quittin.4    Smokeless tobacco: Never   Vaping Use    Vaping Use: Every day    Substances: THC, CBD    Devices: Disposable, Pre-filled or refillable cartridge, Refillable tank, Pre-filled pod   Substance and Sexual Activity    Alcohol use: Yes     Comment: occasionally    Drug use: Yes     Frequency: 7.0 times per week     Types: Marijuana, Inhaled, Oral     Comment: Only canibus medicinal for anti spasm, anxiety, ibs, inflamm    Sexual activity: Not Currently     Partners: Male, Female     Comment: currently with male   Other Topics Concern    Not on file   Social History Narrative    Initial Intake Date:  Last Updated:         "Financial situation:    SSD $1570/mo w/ $450 savings. We did discuss care credit in regards to behavioral health treatment interventions. Jaclyn        Food resources & insecurities:    Deferred.         Housing & environmental:    Resides in [place] in [facility] ([address]).         Transportation:    Deferred.        Family dynamics & family/friend social supports:    Pt has local family to include 86-year-old mother and sister. She reports family are functioning alcoholics but are supportive in her sobriety. They will provide alternative alcohol free drinks at gatherings. Pt has son currently in inpatient treatment for alcohol use.         Pt has significant other who resides in the same [facility]. He is also pursuing sobriety w/ Jaclyn.        ADLs/IADLs & associated diagnoses:    Pt's ADLs/IADL have previously been affected by her alcohol use. They have cause incontinent issues in the past as well as extended periods of vomiting.         Advanced life planning:    Not on file.         Behavioral/Mental:    Associated diagnoses include migraine without status migrainosus, not intractable; insomnia due to medical condition; recurrent major depressive disorder; alcoholism; deliberate self-cutting; and obesity.        Pt has extensive history of substance use and recovery. In her young adult years pt used methamphatmine for aprox 1 year until she sought treatment. At this time she was mother to 2 children. She attended both AA/NA and \"[facility] in the [facility] area. She became sober \"[place]\" and maintained sobriety for 16 years. 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 Pt's daughter then left the home (at age 21) which affected her drinking; at that time she also became disabled. Pt relapsed on methamphetamine with use over 2 years w/ a 9 month period of sobriety. Pt then moved to [place] where her " "drinking increased (vodka). Pt reports medical symptoms from drinking include every other day vomitting, a period of GI/rectal bleeding and hypertensive events. Per chart review pt drinking 5th vodka daily. Pt's last drink 3/7/2021. She is pursuing sobriety with her significant other, Bradly. Pt is 6 days sober at time of assessment (3/12/2021). She is \"tired of being sick and tired.\"        Pt's reservations include attending inpatient; she would need to take her pet dog with her as no one else can care for the dog. Dog is  animal. And the allotment of time required for intensive out patient (3 days a week); she is concerned her fibromyalgia will affect her attendance. Thre is a general concern of cost which may direct pt towards low income treatment options.         Assessments completed by :    n/a        Collaterals:     Social Determinants of Health     Financial Resource Strain: High Risk (9/8/2023)    Overall Financial Resource Strain (CARDIA)     Difficulty of Paying Living Expenses: Very hard   Food Insecurity: Food Insecurity Present (9/8/2023)    Hunger Vital Sign     Worried About Running Out of Food in the Last Year: Sometimes true     Ran Out of Food in the Last Year: Never true   Transportation Needs: Unmet Transportation Needs (9/8/2023)    PRAPARE - Transportation     Lack of Transportation (Medical): Yes     Lack of Transportation (Non-Medical): Yes   Physical Activity: Inactive (9/8/2023)    Exercise Vital Sign     Days of Exercise per Week: 0 days     Minutes of Exercise per Session: 0 min   Stress: Stress Concern Present (9/8/2023)    [employer] [employer] - Occupational Stress Questionnaire     Feeling of Stress : Rather much   Social Connections: Moderately Integrated (9/8/2023)    Social Connection and Isolation Panel [NHANES]     Frequency of Communication with Friends and Family: More than three times a week     Frequency of Social Gatherings with " Friends and Family: Once a week     Attends Taoist Services: More than 4 times per year     Active Member of Clubs or Organizations: Yes     Attends Club or Organization Meetings: More than 4 times per year     Marital Status:    Intimate Partner Violence: Not on file   Housing Stability: Low Risk  (9/8/2023)    Housing Stability Vital Sign     Unable to Pay for Housing in the Last Year: No     Number of Places Lived in the Last Year: 2     Unstable Housing in the Last Year: No       Allergies as of 12/14/2023 - Reviewed 12/14/2023   Allergen Reaction Noted    Erythromycin Rash 09/15/2016    Tetracycline Rash 09/15/2016          Current medications as of today   Current Outpatient Medications   Medication Sig Dispense Refill    gabapentin (NEURONTIN) 600 MG tablet Take 1 Tablet by mouth 3 times a day. 270 Tablet 0    Milnacipran HCl (SAVELLA) 100 MG Tab Take 1 Tablet by mouth 2 times a day. 180 Tablet 2    topiramate (TOPAMAX) 50 MG tablet TAKE 1 TABLET BY MOUTH THREE TIMES A  Tablet 0    ondansetron (ZOFRAN ODT) 4 MG TABLET DISPERSIBLE Take 1 Tablet by mouth every 6 hours as needed for Nausea/Vomiting. 15 Tablet 0    famotidine (PEPCID) 40 MG Tab TAKE 1 TABLET BY MOUTH EVERY DAY 90 Tablet 1    MITIGARE 0.6 MG Cap TAKE 1 CAPSULE BY MOUTH EVERY DAY 90 Capsule 3    clindamycin (CLEOCIN) 2 % vaginal cream Use vaginally after sexual activity for irritation and odor related to BV. 40 g 0    dicyclomine (BENTYL) 20 MG Tab TAKE 1 TABLET BY MOUTH EVERY 6 HOURS AS NEEDED (IBS). 360 Tablet 1    Multiple Vitamin (MULTIVITAMIN ADULT PO)       NON SPECIFIED Take  by mouth. Flax oil gel cap, one capsule daily      baclofen (LIORESAL) 10 MG Tab Take 1 Tablet by mouth 3 times a day. 270 Tablet 3    diclofenac sodium (VOLTAREN) 1 % Gel Apply 2 g topically 4 times a day as needed (pain). 100 g 0    Omega-3 Fatty Acids (FISH OIL) 1000 MG Cap capsule Take 1,000 mg by mouth 1 time a day as needed.      magnesium oxide  (MAG-OX) 400 MG Tab tablet Take 400 mg by mouth every day.      Loratadine (CLARITIN PO) Take 1 tablet by mouth every day.      hydrOXYzine HCl (ATARAX) 25 MG Tab Take 1 Tab by mouth 3 times a day as needed for Anxiety. 90 Tab 0     No current facility-administered medications for this visit.          Objective:   Physical Exam:  Constitutional: Alert, no distress, well-groomed.  Skin: No rashes in visible areas.  Eye: Round. Conjunctiva clear, lids normal. No icterus.   ENMT: Lips pink without lesions, good dentition, moist mucous membranes. Phonation normal.  Neck: No masses, no thyromegaly. Moves freely without pain.  Respiratory: Unlabored respiratory effort, no cough or audible wheeze  Psych: Alert and oriented x3, normal affect and mood.     Assessment and Plan:   The following treatment plan was discussed:     1. Chronic obstructive pulmonary disease, unspecified COPD type (HCC)  - Referral to Pulmonary and Sleep Medicine  - PULMONARY FUNCTION TESTS -Test requested: Complete Pulmonary Function Test; Future    Patient previously referred to pulmonary but missed appointment.  Requested testing and referral placed.  Patient was evaluated by cardiology for some chest discomfort not determined to be cardiac.    2. DORA (obstructive sleep apnea)  - DME Mask and Supplies    Managing well on CPAP at the current time, reviewed compliance meeting therapeutic goals.  Will send updated orders for mask and supplies, patient to follow-up annually.     Follow-up:   Return in about 1 year (around 12/14/2024) for Return with Cherri Pereira PA-C.

## 2023-12-14 NOTE — PATIENT INSTRUCTIONS
1-patient previously referred to pulmonary but missed appointment  2-requested testing and referral placed  3-orders placed  4-evaluated by cardiology some chest discomfort not cardiac  5-managing well on cpap at this time  6-send updated orders for mask and supplies   7-follow up annually

## 2023-12-18 ENCOUNTER — PATIENT OUTREACH (OUTPATIENT)
Dept: HEALTH INFORMATION MANAGEMENT | Facility: OTHER | Age: 58
End: 2023-12-18
Payer: MEDICARE

## 2023-12-18 DIAGNOSIS — I10 ESSENTIAL HYPERTENSION: ICD-10-CM

## 2023-12-18 DIAGNOSIS — N18.32 STAGE 3B CHRONIC KIDNEY DISEASE: ICD-10-CM

## 2023-12-18 DIAGNOSIS — J44.89 COPD (CHRONIC OBSTRUCTIVE PULMONARY DISEASE) WITH CHRONIC BRONCHITIS (HCC): ICD-10-CM

## 2023-12-18 PROCEDURE — 99490 CHRNC CARE MGMT STAFF 1ST 20: CPT | Performed by: NURSE PRACTITIONER

## 2023-12-18 NOTE — PROGRESS NOTES
"I spoke with the patient briefly this afternoon. She is aware of future appointments. Phone call received that she had to take. Patient requests that she call back later.     12/19/23:    Assessment    I spoke with the patient this afternoon for their monthly CCM follow up by phone. The patient reports feeling \"a bit run down\" with a runny nose, headache, cough. Patient states that they are resting and hydrating. They stated they had taken a Covid test a couple of weeks ago but is considering taking another one since her symptoms have expanded and worsened. The patient is aware of her future appointments. She reports taking her medications as indicated in her chart. The patient has been going to the gym with her daughter. She states that she has been focusing on core and kegel exercises to help with \"things trying to fall out down there.\" She also reports using the elliptical machine. She states that she has to proceed carefully with her physical activity as it tends to exacerbate her fibromyalgia symptoms if she overdoes it. She has been incredibly busy with helping to care for her grandson and family events. She is aware of her care gaps and intends to begin working on them when things have calmed down a bit after the holidays. The patient voices understanding of how to contact me if any further concerns or questions arise.     Education    *Article on effective winter germ control sent by mail    Plan of Care and Goals    *Remain free from falls, injuries  *Healthy nutrition, hydration, activity    Barriers:    *Busy schedule taking care of grandson, management of symptoms from multiple chronic health concerns.     Progress:    Stable.     Next outreach:    1/18/24 post PCP                       "

## 2024-01-16 ENCOUNTER — OFFICE VISIT (OUTPATIENT)
Dept: PHYSICAL MEDICINE AND REHAB | Facility: MEDICAL CENTER | Age: 59
End: 2024-01-16
Payer: MEDICARE

## 2024-01-16 ENCOUNTER — HOSPITAL ENCOUNTER (OUTPATIENT)
Dept: RADIOLOGY | Facility: MEDICAL CENTER | Age: 59
End: 2024-01-16
Attending: NURSE PRACTITIONER
Payer: MEDICARE

## 2024-01-16 VITALS
WEIGHT: 172.8 LBS | BODY MASS INDEX: 28.79 KG/M2 | OXYGEN SATURATION: 100 % | HEART RATE: 59 BPM | DIASTOLIC BLOOD PRESSURE: 78 MMHG | SYSTOLIC BLOOD PRESSURE: 132 MMHG | HEIGHT: 65 IN | TEMPERATURE: 97.9 F

## 2024-01-16 DIAGNOSIS — M54.41 CHRONIC BILATERAL LOW BACK PAIN WITH RIGHT-SIDED SCIATICA: ICD-10-CM

## 2024-01-16 DIAGNOSIS — G89.29 CHRONIC BILATERAL LOW BACK PAIN WITH RIGHT-SIDED SCIATICA: ICD-10-CM

## 2024-01-16 DIAGNOSIS — M43.16 SPONDYLOLISTHESIS OF LUMBAR REGION: ICD-10-CM

## 2024-01-16 DIAGNOSIS — M47.816 LUMBAR SPONDYLOSIS: ICD-10-CM

## 2024-01-16 DIAGNOSIS — M51.36 LUMBAR DEGENERATIVE DISC DISEASE: ICD-10-CM

## 2024-01-16 DIAGNOSIS — M53.3 SI (SACROILIAC) JOINT DYSFUNCTION: ICD-10-CM

## 2024-01-16 DIAGNOSIS — Z13.31 POSITIVE DEPRESSION SCREENING: ICD-10-CM

## 2024-01-16 DIAGNOSIS — Z12.31 VISIT FOR SCREENING MAMMOGRAM: ICD-10-CM

## 2024-01-16 PROCEDURE — 99214 OFFICE O/P EST MOD 30 MIN: CPT | Performed by: PHYSICAL MEDICINE & REHABILITATION

## 2024-01-16 PROCEDURE — 3078F DIAST BP <80 MM HG: CPT | Performed by: PHYSICAL MEDICINE & REHABILITATION

## 2024-01-16 PROCEDURE — 1125F AMNT PAIN NOTED PAIN PRSNT: CPT | Performed by: PHYSICAL MEDICINE & REHABILITATION

## 2024-01-16 PROCEDURE — 3075F SYST BP GE 130 - 139MM HG: CPT | Performed by: PHYSICAL MEDICINE & REHABILITATION

## 2024-01-16 PROCEDURE — 77067 SCR MAMMO BI INCL CAD: CPT

## 2024-01-16 ASSESSMENT — PAIN SCALES - GENERAL: PAINLEVEL: 6=MODERATE PAIN

## 2024-01-16 ASSESSMENT — PATIENT HEALTH QUESTIONNAIRE - PHQ9
SUM OF ALL RESPONSES TO PHQ QUESTIONS 1-9: 17
5. POOR APPETITE OR OVEREATING: 2 - MORE THAN HALF THE DAYS
CLINICAL INTERPRETATION OF PHQ2 SCORE: 3

## 2024-01-16 ASSESSMENT — FIBROSIS 4 INDEX: FIB4 SCORE: 0.79

## 2024-01-16 NOTE — PROGRESS NOTES
Follow up patient note  Interventional spine and Pain  Physiatry (physical medicine and  Rehabilitation)       Chief complaint:   Chief Complaint   Patient presents with    Follow-Up    Back Pain     [facility]          HISTORY    Please see new patient note by Dr Donaldson,  for more details.     HPI  Patient identification: Jaclyn Mejia ,  1965,   With Diagnoses of Lumbar spondylosis, Chronic bilateral low back pain with right-sided sciatica, Spondylolisthesis of lumbar region, SI (sacroiliac) joint dysfunction, Lumbar degenerative disc disease, and Positive depression screening were pertinent to this visit.       -Chronic bilateral axial low back pain present for greater than 6 months failed conservative treatments including the past 6 months with comprehensive pain program, physical therapy, home exercise program, medication management.    She also has pain in the right hip but back pain is the worst of these.  This pain is 6 out of 10 intensity constant aching in quality nonradiating.       ROS Red Flags :   Fever, Chills, Sweats: Denies  Involuntary Weight Loss: Denies  Bowel/Bladder Incontinence: Denies  Saddle Anesthesia: Denies        PMHx:   Past Medical History:   Diagnosis Date    Abdominal pain     Actinic keratosis 2018    Alcohol abuse     Allergy     Anxiety     Arthritis     Back pain     Back pain     Bronchitis     Chronic pain syndrome     Constipation     COPD (chronic obstructive pulmonary disease) (HCC)     Cough     Daytime sleepiness     DDD (degenerative disc disease), cervical     DDD (degenerative disc disease), thoracolumbar     Depression     Diarrhea     Diverticulosis     Dizziness     Essential tremor     Fatigue     Fibromyalgia     IBS (irritable bowel syndrome)     Insomnia     Migraine     Nausea     Painful joint     Pulmonary emphysema (HCC)     Restless leg syndrome     Ringing in ears     Snoring     SOB (shortness of breath)     Sore muscles     Sweat,  sweating, excessive     Weakness     Wears glasses        PSHx:   Past Surgical History:   Procedure Laterality Date    ABDOMINAL HYSTERECTOMY TOTAL      CHOLECYSTECTOMY      HYSTERECTOMY LAPAROSCOPY      OTHER ABDOMINAL SURGERY      TONSILLECTOMY         Family history   Family History   Problem Relation Age of Onset    Lung Disease Mother         copd    Cancer Mother         basal/squamous    Hypertension Mother     Hyperlipidemia Mother     Stroke Mother     Alcohol abuse Mother     Heart Disease Father         HF    Arthritis Father         rheumatoid    Lung Disease Father         emphysema    Alcohol abuse Father     Cancer Sister     Alcohol abuse Sister     Cancer Maternal Grandfather         Bladder or prostate?    Cancer Sister         pre cancerous spots     Alcohol abuse Sister     Kidney Disease Sister     Alcohol abuse Sister     Hypertension Sister     Alcohol abuse Sister          Medications:   Outpatient Medications Marked as Taking for the 1/16/24 encounter (Office Visit) with Elmo Donaldson M.D.   Medication Sig Dispense Refill    gabapentin (NEURONTIN) 600 MG tablet Take 1 Tablet by mouth 3 times a day. 270 Tablet 0    Milnacipran HCl (SAVELLA) 100 MG Tab Take 1 Tablet by mouth 2 times a day. 180 Tablet 2    topiramate (TOPAMAX) 50 MG tablet TAKE 1 TABLET BY MOUTH THREE TIMES A  Tablet 0    ondansetron (ZOFRAN ODT) 4 MG TABLET DISPERSIBLE Take 1 Tablet by mouth every 6 hours as needed for Nausea/Vomiting. 15 Tablet 0    famotidine (PEPCID) 40 MG Tab TAKE 1 TABLET BY MOUTH EVERY DAY 90 Tablet 1    MITIGARE 0.6 MG Cap TAKE 1 CAPSULE BY MOUTH EVERY DAY 90 Capsule 3    clindamycin (CLEOCIN) 2 % vaginal cream Use vaginally after sexual activity for irritation and odor related to BV. 40 g 0    dicyclomine (BENTYL) 20 MG Tab TAKE 1 TABLET BY MOUTH EVERY 6 HOURS AS NEEDED (IBS). 360 Tablet 1    Multiple Vitamin (MULTIVITAMIN ADULT PO)       NON SPECIFIED Take  by mouth. Flax oil gel cap, one  capsule daily      baclofen (LIORESAL) 10 MG Tab Take 1 Tablet by mouth 3 times a day. 270 Tablet 3    diclofenac sodium (VOLTAREN) 1 % Gel Apply 2 g topically 4 times a day as needed (pain). 100 g 0    Omega-3 Fatty Acids (FISH OIL) 1000 MG Cap capsule Take 1,000 mg by mouth 1 time a day as needed.      magnesium oxide (MAG-OX) 400 MG Tab tablet Take 400 mg by mouth every day.      Loratadine (CLARITIN PO) Take 1 tablet by mouth every day.      hydrOXYzine HCl (ATARAX) 25 MG Tab Take 1 Tab by mouth 3 times a day as needed for Anxiety. 90 Tab 0        Current Outpatient Medications on File Prior to Visit   Medication Sig Dispense Refill    gabapentin (NEURONTIN) 600 MG tablet Take 1 Tablet by mouth 3 times a day. 270 Tablet 0    Milnacipran HCl (SAVELLA) 100 MG Tab Take 1 Tablet by mouth 2 times a day. 180 Tablet 2    topiramate (TOPAMAX) 50 MG tablet TAKE 1 TABLET BY MOUTH THREE TIMES A  Tablet 0    ondansetron (ZOFRAN ODT) 4 MG TABLET DISPERSIBLE Take 1 Tablet by mouth every 6 hours as needed for Nausea/Vomiting. 15 Tablet 0    famotidine (PEPCID) 40 MG Tab TAKE 1 TABLET BY MOUTH EVERY DAY 90 Tablet 1    MITIGARE 0.6 MG Cap TAKE 1 CAPSULE BY MOUTH EVERY DAY 90 Capsule 3    clindamycin (CLEOCIN) 2 % vaginal cream Use vaginally after sexual activity for irritation and odor related to BV. 40 g 0    dicyclomine (BENTYL) 20 MG Tab TAKE 1 TABLET BY MOUTH EVERY 6 HOURS AS NEEDED (IBS). 360 Tablet 1    Multiple Vitamin (MULTIVITAMIN ADULT PO)       NON SPECIFIED Take  by mouth. Flax oil gel cap, one capsule daily      baclofen (LIORESAL) 10 MG Tab Take 1 Tablet by mouth 3 times a day. 270 Tablet 3    diclofenac sodium (VOLTAREN) 1 % Gel Apply 2 g topically 4 times a day as needed (pain). 100 g 0    Omega-3 Fatty Acids (FISH OIL) 1000 MG Cap capsule Take 1,000 mg by mouth 1 time a day as needed.      magnesium oxide (MAG-OX) 400 MG Tab tablet Take 400 mg by mouth every day.      Loratadine (CLARITIN PO) Take 1  tablet by mouth every day.      hydrOXYzine HCl (ATARAX) 25 MG Tab Take 1 Tab by mouth 3 times a day as needed for Anxiety. 90 Tab 0     No current facility-administered medications on file prior to visit.         Allergies:   Allergies   Allergen Reactions    Erythromycin Rash     Chest rash    Tetracycline Rash     Chest rash       Social Hx:   Social History     Socioeconomic History    Marital status: Single     Spouse name: Not on file    Number of children: Not on file    Years of education: Not on file    Highest education level: Associate degree: occupational, technical, or vocational program   Occupational History    Not on file   Tobacco Use    Smoking status: Every Day     Current packs/day: 0.00     Average packs/day: 1 pack/day for 35.0 years (35.0 ttl pk-yrs)     Types: Cigarettes     Start date: 1987     Last attempt to quit: 2022     Years since quittin.5    Smokeless tobacco: Never   Vaping Use    Vaping Use: Every day    Substances: THC, CBD    Devices: Disposable, Pre-filled or refillable cartridge, Refillable tank, Pre-filled pod   Substance and Sexual Activity    Alcohol use: Yes     Comment: occasionally    Drug use: Yes     Frequency: 7.0 times per week     Types: Marijuana, Inhaled, Oral     Comment: Only canibus medicinal for anti spasm, anxiety, ibs, inflamm    Sexual activity: Not Currently     Partners: Male, Female     Comment: currently with male   Other Topics Concern    Not on file   Social History Narrative    Initial Intake Date:  Last Updated:        Financial situation:    SSD $1570/mo w/ $450 savings. We did discuss care credit in regards to behavioral health treatment interventions. Jaclyn        Food resources & insecurities:    Deferred.         Housing & environmental:    Resides in [place] in [facility] ([address]).         Transportation:    Deferred.        Family dynamics & family/friend social supports:    Pt has local family to include 86-year-old mother  "and sister. She reports family are functioning alcoholics but are supportive in her sobriety. They will provide alternative alcohol free drinks at gatherings. Pt has son currently in inpatient treatment for alcohol use.         Pt has significant other who resides in the same [facility]. He is also pursuing sobriety w/ Jaclyn.        ADLs/IADLs & associated diagnoses:    Pt's ADLs/IADL have previously been affected by her alcohol use. They have cause incontinent issues in the past as well as extended periods of vomiting.         Advanced life planning:    Not on file.         Behavioral/Mental:    Associated diagnoses include migraine without status migrainosus, not intractable; insomnia due to medical condition; recurrent major depressive disorder; alcoholism; deliberate self-cutting; and obesity.        Pt has extensive history of substance use and recovery. In her young adult years pt used methamphatmine for aprox 1 year until she sought treatment. At this time she was mother to 2 children. She attended both AA/NA and \"[facility]\" in the [facility] area. She became sober \"[place]\" and maintained sobriety for 16 years. 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 Pt's daughter then left the home (at age 21) which affected her drinking; at that time she also became disabled. Pt relapsed on methamphetamine with use over 2 years w/ a 9 month period of sobriety. Pt then moved to [place] where her drinking increased (vodka). Pt reports medical symptoms from drinking include every other day vomitting, a period of GI/rectal bleeding and hypertensive events. Per chart review pt drinking 5th vodka daily. Pt's last drink 3/7/2021. She is pursuing sobriety with her significant other, Bradly. Pt is 6 days sober at time of assessment (3/12/2021). She is \"tired of being sick and tired.\"        Pt's reservations include attending " inpatient; she would need to take her pet dog with her as no one else can care for the dog. Dog is  animal. And the allotment of time required for intensive out patient (3 days a week); she is concerned her fibromyalgia will affect her attendance. Thre is a general concern of cost which may direct pt towards low income treatment options.         Assessments completed by :    n/a        Collaterals:     Social Determinants of Health     Financial Resource Strain: High Risk (9/8/2023)    Overall Financial Resource Strain (CARDIA)     Difficulty of Paying Living Expenses: Very hard   Food Insecurity: Food Insecurity Present (9/8/2023)    Hunger Vital Sign     Worried About Running Out of Food in the Last Year: Sometimes true     Ran Out of Food in the Last Year: Never true   Transportation Needs: Unmet Transportation Needs (9/8/2023)    PRAPARE - Transportation     Lack of Transportation (Medical): Yes     Lack of Transportation (Non-Medical): Yes   Physical Activity: Inactive (9/8/2023)    Exercise Vital Sign     Days of Exercise per Week: 0 days     Minutes of Exercise per Session: 0 min   Stress: Stress Concern Present (9/8/2023)    [employer] - Occupational Stress Questionnaire     Feeling of Stress : Rather much   Social Connections: Moderately Integrated (9/8/2023)    Social Connection and Isolation Panel [NHANES]     Frequency of Communication with Friends and Family: More than three times a week     Frequency of Social Gatherings with Friends and Family: Once a week     Attends Scientologist Services: More than 4 times per year     Active Member of Clubs or Organizations: Yes     Attends Club or Organization Meetings: More than 4 times per year     Marital Status:    Intimate Partner Violence: Not on file   Housing Stability: Low Risk  (9/8/2023)    Housing Stability Vital Sign     Unable to Pay for Housing in the Last Year: No     Number of Places Lived  "in the Last Year: 2     Unstable Housing in the Last Year: No         EXAMINATION     Physical Exam:   Vitals: /78 (BP Location: Right arm, Patient Position: Sitting, BP Cuff Size: Adult)   Pulse (!) 59   Temp 36.6 °C (97.9 °F) (Temporal)   Ht 1.651 m (5' 5\")   Wt 78.4 kg (172 lb 12.8 oz)   SpO2 100%     Constitutional:   Body Habitus: Body mass index is 28.76 kg/m².  Cooperation: Fully cooperates with exam  Appearance: Well-groomed no disheveled    Respiratory-  breathing comfortable on room air, no audible wheezing  Cardiovascular- capillary refills less than 2 seconds. No lower extremity edema is noted.   Psychiatric- alert and oriented ×3. Normal affect.    MSK and Neuro: -    Thoracic/Lumbar Spine/Sacral Spine/Hips   There are no signs of infection around the injection sites.   decreased active range of motion with flexion, lateral flexion, and rotation bilaterally.   There is decreased active range of motion with lumbar extension.    There is  pain with lumbar extension.   There is pain with facet loading maneuver (extension rotation) with axial low back pain on the BILATERAL side(s) L3-4, L4-5, and L5-S1      Palpation:   No tenderness to palpation in midline at T1-T12 levels. No tenderness to palpation in the left and right of the midline T1-L5, NEGATIVE for tenderness to palpation to the para-midline region in the lower lumbar levels.  palpation over SI joint: negative bilaterally    palpation in hip or over the gluteus medius tendon insertion: negative bilaterally      Lumbar spine Special tests  Neuro tension  Straight leg test negative bilaterally    Slump test negative bilaterally      Key points for the international standards for neurological classification of spinal cord injury (ISNCSCI) to light touch.     Dermatome R L                                      L2 2 2   L3 2 2   L4 2 2   L5 2 2   S1 2 2   S2 2 2         Motor Exam Lower Extremities    ? Myotome R L   Hip flexion L2 5 5   Knee " "extension L3 5 5   Ankle dorsiflexion L4 5 5   Toe extension L5 5 5   Ankle plantarflexion S1 5 5           MEDICAL DECISION MAKING    DATA    Labs:   Lab Results   Component Value Date/Time    SODIUM 143 07/29/2023 01:22 PM    POTASSIUM 4.4 07/29/2023 01:22 PM    CHLORIDE 109 07/29/2023 01:22 PM    CO2 23 07/29/2023 01:22 PM    GLUCOSE 110 (H) 07/29/2023 01:22 PM    BUN 17 07/29/2023 01:22 PM    CREATININE 1.05 07/29/2023 01:22 PM        No results found for: \"PROTHROMBTM\", \"INR\"     Lab Results   Component Value Date/Time    WBC 7.8 07/29/2023 01:22 PM    RBC 4.69 07/29/2023 01:22 PM    HEMOGLOBIN 14.5 07/29/2023 01:22 PM    HEMATOCRIT 43.9 07/29/2023 01:22 PM    MCV 93.6 07/29/2023 01:22 PM    MCH 30.9 07/29/2023 01:22 PM    MCHC 33.0 07/29/2023 01:22 PM    MPV 10.3 07/29/2023 01:22 PM    NEUTSPOLYS 46.90 07/29/2023 01:22 PM    LYMPHOCYTES 45.70 (H) 07/29/2023 01:22 PM    MONOCYTES 5.00 07/29/2023 01:22 PM    EOSINOPHILS 1.40 07/29/2023 01:22 PM    BASOPHILS 0.60 07/29/2023 01:22 PM        Lab Results   Component Value Date/Time    HBA1C 5.5 06/30/2023 02:57 PM          Imaging:   I personally reviewed following images      MRI lumbar spine 8/31/2023  There are no areas of high-grade central canal stenosis.  No areas of high-grade foraminal stenosis.  There is degenerative changes and degenerative disc disease at L3-4, L4-5, L5-S1.  There is facet arthropathy bilaterally at L3-4, L4-5, L5-S1.      I reviewed the following radiology reports                         Results for orders placed during the hospital encounter of 08/31/23    MR-LUMBAR SPINE-W/O    Impression  Mild degenerative disease in the lumbar spine as described above.        Results for orders placed during the hospital encounter of 08/31/23    MR-LUMBAR SPINE-W/O    Impression  Mild degenerative disease in the lumbar spine as described above.                                                                                            Results for " orders placed during the hospital encounter of 21    DX-CHEST-2 VIEWS    Impression  1.  No acute cardiopulmonary disease evident.  2.  No acute findings and no significant change from 2020.       Results for orders placed during the hospital encounter of 21    DX-FINGER(S) 2+ LEFT    Impression  1.  Cystlike lucency of the scaphoid could be a pseudocyst related to remote trauma or could be a small intraosseous ganglion or other bone cyst  2.  Degenerative changes of the first carpal metacarpal joint                 Results for orders placed during the hospital encounter of 22    DX-LUMBAR SPINE-2 OR 3 VIEWS    Impression  1.  No compression deformity or acute fracture is identified.    2.  Grade 1 anterolisthesis at L3-L4.    3.  Mild degenerative disc disease and facet arthropathy.                  Results for orders placed during the hospital encounter of 21    DX-WRIST-COMPLETE 3+ LEFT    Impression  1.  Cystlike lucency of the scaphoid could be a pseudocyst related to remote trauma or could be a small intraosseous ganglion or other bone cyst  2.  Degenerative changes of the first carpal metacarpal joint         DIAGNOSIS   Visit Diagnoses     ICD-10-CM   1. Lumbar spondylosis  M47.816   2. Chronic bilateral low back pain with right-sided sciatica  M54.41    G89.29   3. Spondylolisthesis of lumbar region  M43.16   4. SI (sacroiliac) joint dysfunction  M53.3   5. Lumbar degenerative disc disease  M51.36   6. Positive depression screening  Z13.31         ASSESSMENT and PLAN:     Jaclyn Peter Roberto  1965 female      Jaclyn was seen today for follow-up and back pain.    Diagnoses and all orders for this visit:    Lumbar spondylosis  -     Referral to Physical Medicine Rehab  -     Cancel: Referral to Physical Medicine Rehab  -     Referral to Physical Medicine Rehab    Chronic bilateral low back pain with right-sided sciatica    Spondylolisthesis of lumbar region    SI  (sacroiliac) joint dysfunction    Lumbar degenerative disc disease    Positive depression screening  -     Patient has been identified as having a positive depression screening. Appropriate orders and counseling have been given.           The patient is failed conservative treatments with medication management, home exercise program from the direction of physical therapy/physician including the past 6 months .  I have ordered a BILATERAL diagnostic medial branch block targeting the L3-4, L4-5, and L5-S1 facet joints #1 and #2.  Procedure #2 is only to be done if #1 is a positive block.    The risks benefits and alternatives to this procedure were discussed and the patient wishes to proceed with the procedure. Risks include but are not limited to damage to surrounding structures, infection, bleeding, worsening of pain which can be permanent, weakness which can be permanent. Benefits include pain relief, improved function. Alternatives includes not doing the procedure.   If there are 2 positive blocks I would consider the patient for radiofrequency neurotomy of the previously blocked nerves.    Medications: Continue gabapentin    Follow up: After the above diagnostic procedures    Thank you for allowing me to participate in the care of this patient. If you have any questions please not hesitate to contact me.             Please note that this dictation was created using voice recognition software. I have made every reasonable attempt to correct obvious errors but there may be errors of grammar and content that I may have overlooked prior to finalization of this note.      Elmo Donaldson MD  Interventional Spine and Sports Physiatry  Physical Medicine and Rehabilitation  [facility]

## 2024-01-16 NOTE — H&P (VIEW-ONLY)
Follow up patient note  Interventional spine and Pain  Physiatry (physical medicine and  Rehabilitation)       Chief complaint:   Chief Complaint   Patient presents with    Follow-Up    Back Pain     [facility]          HISTORY    Please see new patient note by Dr Donaldson,  for more details.     HPI  Patient identification: Jaclyn Mejia ,  1965,   With Diagnoses of Lumbar spondylosis, Chronic bilateral low back pain with right-sided sciatica, Spondylolisthesis of lumbar region, SI (sacroiliac) joint dysfunction, Lumbar degenerative disc disease, and Positive depression screening were pertinent to this visit.       -Chronic bilateral axial low back pain present for greater than 6 months failed conservative treatments including the past 6 months with comprehensive pain program, physical therapy, home exercise program, medication management.    She also has pain in the right hip but back pain is the worst of these.  This pain is 6 out of 10 intensity constant aching in quality nonradiating.       ROS Red Flags :   Fever, Chills, Sweats: Denies  Involuntary Weight Loss: Denies  Bowel/Bladder Incontinence: Denies  Saddle Anesthesia: Denies        PMHx:   Past Medical History:   Diagnosis Date    Abdominal pain     Actinic keratosis 2018    Alcohol abuse     Allergy     Anxiety     Arthritis     Back pain     Back pain     Bronchitis     Chronic pain syndrome     Constipation     COPD (chronic obstructive pulmonary disease) (HCC)     Cough     Daytime sleepiness     DDD (degenerative disc disease), cervical     DDD (degenerative disc disease), thoracolumbar     Depression     Diarrhea     Diverticulosis     Dizziness     Essential tremor     Fatigue     Fibromyalgia     IBS (irritable bowel syndrome)     Insomnia     Migraine     Nausea     Painful joint     Pulmonary emphysema (HCC)     Restless leg syndrome     Ringing in ears     Snoring     SOB (shortness of breath)     Sore muscles     Sweat,  sweating, excessive     Weakness     Wears glasses        PSHx:   Past Surgical History:   Procedure Laterality Date    ABDOMINAL HYSTERECTOMY TOTAL      CHOLECYSTECTOMY      HYSTERECTOMY LAPAROSCOPY      OTHER ABDOMINAL SURGERY      TONSILLECTOMY         Family history   Family History   Problem Relation Age of Onset    Lung Disease Mother         copd    Cancer Mother         basal/squamous    Hypertension Mother     Hyperlipidemia Mother     Stroke Mother     Alcohol abuse Mother     Heart Disease Father         HF    Arthritis Father         rheumatoid    Lung Disease Father         emphysema    Alcohol abuse Father     Cancer Sister     Alcohol abuse Sister     Cancer Maternal Grandfather         Bladder or prostate?    Cancer Sister         pre cancerous spots     Alcohol abuse Sister     Kidney Disease Sister     Alcohol abuse Sister     Hypertension Sister     Alcohol abuse Sister          Medications:   Outpatient Medications Marked as Taking for the 1/16/24 encounter (Office Visit) with Elmo Donaldson M.D.   Medication Sig Dispense Refill    gabapentin (NEURONTIN) 600 MG tablet Take 1 Tablet by mouth 3 times a day. 270 Tablet 0    Milnacipran HCl (SAVELLA) 100 MG Tab Take 1 Tablet by mouth 2 times a day. 180 Tablet 2    topiramate (TOPAMAX) 50 MG tablet TAKE 1 TABLET BY MOUTH THREE TIMES A  Tablet 0    ondansetron (ZOFRAN ODT) 4 MG TABLET DISPERSIBLE Take 1 Tablet by mouth every 6 hours as needed for Nausea/Vomiting. 15 Tablet 0    famotidine (PEPCID) 40 MG Tab TAKE 1 TABLET BY MOUTH EVERY DAY 90 Tablet 1    MITIGARE 0.6 MG Cap TAKE 1 CAPSULE BY MOUTH EVERY DAY 90 Capsule 3    clindamycin (CLEOCIN) 2 % vaginal cream Use vaginally after sexual activity for irritation and odor related to BV. 40 g 0    dicyclomine (BENTYL) 20 MG Tab TAKE 1 TABLET BY MOUTH EVERY 6 HOURS AS NEEDED (IBS). 360 Tablet 1    Multiple Vitamin (MULTIVITAMIN ADULT PO)       NON SPECIFIED Take  by mouth. Flax oil gel cap, one  capsule daily      baclofen (LIORESAL) 10 MG Tab Take 1 Tablet by mouth 3 times a day. 270 Tablet 3    diclofenac sodium (VOLTAREN) 1 % Gel Apply 2 g topically 4 times a day as needed (pain). 100 g 0    Omega-3 Fatty Acids (FISH OIL) 1000 MG Cap capsule Take 1,000 mg by mouth 1 time a day as needed.      magnesium oxide (MAG-OX) 400 MG Tab tablet Take 400 mg by mouth every day.      Loratadine (CLARITIN PO) Take 1 tablet by mouth every day.      hydrOXYzine HCl (ATARAX) 25 MG Tab Take 1 Tab by mouth 3 times a day as needed for Anxiety. 90 Tab 0        Current Outpatient Medications on File Prior to Visit   Medication Sig Dispense Refill    gabapentin (NEURONTIN) 600 MG tablet Take 1 Tablet by mouth 3 times a day. 270 Tablet 0    Milnacipran HCl (SAVELLA) 100 MG Tab Take 1 Tablet by mouth 2 times a day. 180 Tablet 2    topiramate (TOPAMAX) 50 MG tablet TAKE 1 TABLET BY MOUTH THREE TIMES A  Tablet 0    ondansetron (ZOFRAN ODT) 4 MG TABLET DISPERSIBLE Take 1 Tablet by mouth every 6 hours as needed for Nausea/Vomiting. 15 Tablet 0    famotidine (PEPCID) 40 MG Tab TAKE 1 TABLET BY MOUTH EVERY DAY 90 Tablet 1    MITIGARE 0.6 MG Cap TAKE 1 CAPSULE BY MOUTH EVERY DAY 90 Capsule 3    clindamycin (CLEOCIN) 2 % vaginal cream Use vaginally after sexual activity for irritation and odor related to BV. 40 g 0    dicyclomine (BENTYL) 20 MG Tab TAKE 1 TABLET BY MOUTH EVERY 6 HOURS AS NEEDED (IBS). 360 Tablet 1    Multiple Vitamin (MULTIVITAMIN ADULT PO)       NON SPECIFIED Take  by mouth. Flax oil gel cap, one capsule daily      baclofen (LIORESAL) 10 MG Tab Take 1 Tablet by mouth 3 times a day. 270 Tablet 3    diclofenac sodium (VOLTAREN) 1 % Gel Apply 2 g topically 4 times a day as needed (pain). 100 g 0    Omega-3 Fatty Acids (FISH OIL) 1000 MG Cap capsule Take 1,000 mg by mouth 1 time a day as needed.      magnesium oxide (MAG-OX) 400 MG Tab tablet Take 400 mg by mouth every day.      Loratadine (CLARITIN PO) Take 1  tablet by mouth every day.      hydrOXYzine HCl (ATARAX) 25 MG Tab Take 1 Tab by mouth 3 times a day as needed for Anxiety. 90 Tab 0     No current facility-administered medications on file prior to visit.         Allergies:   Allergies   Allergen Reactions    Erythromycin Rash     Chest rash    Tetracycline Rash     Chest rash       Social Hx:   Social History     Socioeconomic History    Marital status: Single     Spouse name: Not on file    Number of children: Not on file    Years of education: Not on file    Highest education level: Associate degree: occupational, technical, or vocational program   Occupational History    Not on file   Tobacco Use    Smoking status: Every Day     Current packs/day: 0.00     Average packs/day: 1 pack/day for 35.0 years (35.0 ttl pk-yrs)     Types: Cigarettes     Start date: 1987     Last attempt to quit: 2022     Years since quittin.5    Smokeless tobacco: Never   Vaping Use    Vaping Use: Every day    Substances: THC, CBD    Devices: Disposable, Pre-filled or refillable cartridge, Refillable tank, Pre-filled pod   Substance and Sexual Activity    Alcohol use: Yes     Comment: occasionally    Drug use: Yes     Frequency: 7.0 times per week     Types: Marijuana, Inhaled, Oral     Comment: Only canibus medicinal for anti spasm, anxiety, ibs, inflamm    Sexual activity: Not Currently     Partners: Male, Female     Comment: currently with male   Other Topics Concern    Not on file   Social History Narrative    Initial Intake Date:  Last Updated:        Financial situation:    SSD $1570/mo w/ $450 savings. We did discuss care credit in regards to behavioral health treatment interventions. Jaclyn        Food resources & insecurities:    Deferred.         Housing & environmental:    Resides in [place] in [facility] ([address]).         Transportation:    Deferred.        Family dynamics & family/friend social supports:    Pt has local family to include 86-year-old mother  "and sister. She reports family are functioning alcoholics but are supportive in her sobriety. They will provide alternative alcohol free drinks at gatherings. Pt has son currently in inpatient treatment for alcohol use.         Pt has significant other who resides in the same [facility]. He is also pursuing sobriety w/ Jaclyn.        ADLs/IADLs & associated diagnoses:    Pt's ADLs/IADL have previously been affected by her alcohol use. They have cause incontinent issues in the past as well as extended periods of vomiting.         Advanced life planning:    Not on file.         Behavioral/Mental:    Associated diagnoses include migraine without status migrainosus, not intractable; insomnia due to medical condition; recurrent major depressive disorder; alcoholism; deliberate self-cutting; and obesity.        Pt has extensive history of substance use and recovery. In her young adult years pt used methamphatmine for aprox 1 year until she sought treatment. At this time she was mother to 2 children. She attended both AA/NA and \"[facility]\" in the [facility] area. She became sober \"[place]\" and maintained sobriety for 16 years. 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 Pt's daughter then left the home (at age 21) which affected her drinking; at that time she also became disabled. Pt relapsed on methamphetamine with use over 2 years w/ a 9 month period of sobriety. Pt then moved to [place] where her drinking increased (vodka). Pt reports medical symptoms from drinking include every other day vomitting, a period of GI/rectal bleeding and hypertensive events. Per chart review pt drinking 5th vodka daily. Pt's last drink 3/7/2021. She is pursuing sobriety with her significant other, Bradly. Pt is 6 days sober at time of assessment (3/12/2021). She is \"tired of being sick and tired.\"        Pt's reservations include attending " inpatient; she would need to take her pet dog with her as no one else can care for the dog. Dog is  animal. And the allotment of time required for intensive out patient (3 days a week); she is concerned her fibromyalgia will affect her attendance. Thre is a general concern of cost which may direct pt towards low income treatment options.         Assessments completed by :    n/a        Collaterals:     Social Determinants of Health     Financial Resource Strain: High Risk (9/8/2023)    Overall Financial Resource Strain (CARDIA)     Difficulty of Paying Living Expenses: Very hard   Food Insecurity: Food Insecurity Present (9/8/2023)    Hunger Vital Sign     Worried About Running Out of Food in the Last Year: Sometimes true     Ran Out of Food in the Last Year: Never true   Transportation Needs: Unmet Transportation Needs (9/8/2023)    PRAPARE - Transportation     Lack of Transportation (Medical): Yes     Lack of Transportation (Non-Medical): Yes   Physical Activity: Inactive (9/8/2023)    Exercise Vital Sign     Days of Exercise per Week: 0 days     Minutes of Exercise per Session: 0 min   Stress: Stress Concern Present (9/8/2023)    [employer] - Occupational Stress Questionnaire     Feeling of Stress : Rather much   Social Connections: Moderately Integrated (9/8/2023)    Social Connection and Isolation Panel [NHANES]     Frequency of Communication with Friends and Family: More than three times a week     Frequency of Social Gatherings with Friends and Family: Once a week     Attends Buddhism Services: More than 4 times per year     Active Member of Clubs or Organizations: Yes     Attends Club or Organization Meetings: More than 4 times per year     Marital Status:    Intimate Partner Violence: Not on file   Housing Stability: Low Risk  (9/8/2023)    Housing Stability Vital Sign     Unable to Pay for Housing in the Last Year: No     Number of Places Lived  "in the Last Year: 2     Unstable Housing in the Last Year: No         EXAMINATION     Physical Exam:   Vitals: /78 (BP Location: Right arm, Patient Position: Sitting, BP Cuff Size: Adult)   Pulse (!) 59   Temp 36.6 °C (97.9 °F) (Temporal)   Ht 1.651 m (5' 5\")   Wt 78.4 kg (172 lb 12.8 oz)   SpO2 100%     Constitutional:   Body Habitus: Body mass index is 28.76 kg/m².  Cooperation: Fully cooperates with exam  Appearance: Well-groomed no disheveled    Respiratory-  breathing comfortable on room air, no audible wheezing  Cardiovascular- capillary refills less than 2 seconds. No lower extremity edema is noted.   Psychiatric- alert and oriented ×3. Normal affect.    MSK and Neuro: -    Thoracic/Lumbar Spine/Sacral Spine/Hips   There are no signs of infection around the injection sites.   decreased active range of motion with flexion, lateral flexion, and rotation bilaterally.   There is decreased active range of motion with lumbar extension.    There is  pain with lumbar extension.   There is pain with facet loading maneuver (extension rotation) with axial low back pain on the BILATERAL side(s) L3-4, L4-5, and L5-S1      Palpation:   No tenderness to palpation in midline at T1-T12 levels. No tenderness to palpation in the left and right of the midline T1-L5, NEGATIVE for tenderness to palpation to the para-midline region in the lower lumbar levels.  palpation over SI joint: negative bilaterally    palpation in hip or over the gluteus medius tendon insertion: negative bilaterally      Lumbar spine Special tests  Neuro tension  Straight leg test negative bilaterally    Slump test negative bilaterally      Key points for the international standards for neurological classification of spinal cord injury (ISNCSCI) to light touch.     Dermatome R L                                      L2 2 2   L3 2 2   L4 2 2   L5 2 2   S1 2 2   S2 2 2         Motor Exam Lower Extremities    ? Myotome R L   Hip flexion L2 5 5   Knee " "extension L3 5 5   Ankle dorsiflexion L4 5 5   Toe extension L5 5 5   Ankle plantarflexion S1 5 5           MEDICAL DECISION MAKING    DATA    Labs:   Lab Results   Component Value Date/Time    SODIUM 143 07/29/2023 01:22 PM    POTASSIUM 4.4 07/29/2023 01:22 PM    CHLORIDE 109 07/29/2023 01:22 PM    CO2 23 07/29/2023 01:22 PM    GLUCOSE 110 (H) 07/29/2023 01:22 PM    BUN 17 07/29/2023 01:22 PM    CREATININE 1.05 07/29/2023 01:22 PM        No results found for: \"PROTHROMBTM\", \"INR\"     Lab Results   Component Value Date/Time    WBC 7.8 07/29/2023 01:22 PM    RBC 4.69 07/29/2023 01:22 PM    HEMOGLOBIN 14.5 07/29/2023 01:22 PM    HEMATOCRIT 43.9 07/29/2023 01:22 PM    MCV 93.6 07/29/2023 01:22 PM    MCH 30.9 07/29/2023 01:22 PM    MCHC 33.0 07/29/2023 01:22 PM    MPV 10.3 07/29/2023 01:22 PM    NEUTSPOLYS 46.90 07/29/2023 01:22 PM    LYMPHOCYTES 45.70 (H) 07/29/2023 01:22 PM    MONOCYTES 5.00 07/29/2023 01:22 PM    EOSINOPHILS 1.40 07/29/2023 01:22 PM    BASOPHILS 0.60 07/29/2023 01:22 PM        Lab Results   Component Value Date/Time    HBA1C 5.5 06/30/2023 02:57 PM          Imaging:   I personally reviewed following images      MRI lumbar spine 8/31/2023  There are no areas of high-grade central canal stenosis.  No areas of high-grade foraminal stenosis.  There is degenerative changes and degenerative disc disease at L3-4, L4-5, L5-S1.  There is facet arthropathy bilaterally at L3-4, L4-5, L5-S1.      I reviewed the following radiology reports                         Results for orders placed during the hospital encounter of 08/31/23    MR-LUMBAR SPINE-W/O    Impression  Mild degenerative disease in the lumbar spine as described above.        Results for orders placed during the hospital encounter of 08/31/23    MR-LUMBAR SPINE-W/O    Impression  Mild degenerative disease in the lumbar spine as described above.                                                                                            Results for " orders placed during the hospital encounter of 21    DX-CHEST-2 VIEWS    Impression  1.  No acute cardiopulmonary disease evident.  2.  No acute findings and no significant change from 2020.       Results for orders placed during the hospital encounter of 21    DX-FINGER(S) 2+ LEFT    Impression  1.  Cystlike lucency of the scaphoid could be a pseudocyst related to remote trauma or could be a small intraosseous ganglion or other bone cyst  2.  Degenerative changes of the first carpal metacarpal joint                 Results for orders placed during the hospital encounter of 22    DX-LUMBAR SPINE-2 OR 3 VIEWS    Impression  1.  No compression deformity or acute fracture is identified.    2.  Grade 1 anterolisthesis at L3-L4.    3.  Mild degenerative disc disease and facet arthropathy.                  Results for orders placed during the hospital encounter of 21    DX-WRIST-COMPLETE 3+ LEFT    Impression  1.  Cystlike lucency of the scaphoid could be a pseudocyst related to remote trauma or could be a small intraosseous ganglion or other bone cyst  2.  Degenerative changes of the first carpal metacarpal joint         DIAGNOSIS   Visit Diagnoses     ICD-10-CM   1. Lumbar spondylosis  M47.816   2. Chronic bilateral low back pain with right-sided sciatica  M54.41    G89.29   3. Spondylolisthesis of lumbar region  M43.16   4. SI (sacroiliac) joint dysfunction  M53.3   5. Lumbar degenerative disc disease  M51.36   6. Positive depression screening  Z13.31         ASSESSMENT and PLAN:     Jaclyn Peter Roberto  1965 female      Jaclyn was seen today for follow-up and back pain.    Diagnoses and all orders for this visit:    Lumbar spondylosis  -     Referral to Physical Medicine Rehab  -     Cancel: Referral to Physical Medicine Rehab  -     Referral to Physical Medicine Rehab    Chronic bilateral low back pain with right-sided sciatica    Spondylolisthesis of lumbar region    SI  (sacroiliac) joint dysfunction    Lumbar degenerative disc disease    Positive depression screening  -     Patient has been identified as having a positive depression screening. Appropriate orders and counseling have been given.           The patient is failed conservative treatments with medication management, home exercise program from the direction of physical therapy/physician including the past 6 months .  I have ordered a BILATERAL diagnostic medial branch block targeting the L3-4, L4-5, and L5-S1 facet joints #1 and #2.  Procedure #2 is only to be done if #1 is a positive block.    The risks benefits and alternatives to this procedure were discussed and the patient wishes to proceed with the procedure. Risks include but are not limited to damage to surrounding structures, infection, bleeding, worsening of pain which can be permanent, weakness which can be permanent. Benefits include pain relief, improved function. Alternatives includes not doing the procedure.   If there are 2 positive blocks I would consider the patient for radiofrequency neurotomy of the previously blocked nerves.    Medications: Continue gabapentin    Follow up: After the above diagnostic procedures    Thank you for allowing me to participate in the care of this patient. If you have any questions please not hesitate to contact me.             Please note that this dictation was created using voice recognition software. I have made every reasonable attempt to correct obvious errors but there may be errors of grammar and content that I may have overlooked prior to finalization of this note.      Elmo Donaldson MD  Interventional Spine and Sports Physiatry  Physical Medicine and Rehabilitation  [facility]

## 2024-01-16 NOTE — H&P (VIEW-ONLY)
Follow up patient note  Interventional spine and Pain  Physiatry (physical medicine and  Rehabilitation)       Chief complaint:   Chief Complaint   Patient presents with    Follow-Up    Back Pain     [facility]          HISTORY    Please see new patient note by Dr Donaldson,  for more details.     HPI  Patient identification: Jaclyn Mejia ,  1965,   With Diagnoses of Lumbar spondylosis, Chronic bilateral low back pain with right-sided sciatica, Spondylolisthesis of lumbar region, SI (sacroiliac) joint dysfunction, Lumbar degenerative disc disease, and Positive depression screening were pertinent to this visit.       -Chronic bilateral axial low back pain present for greater than 6 months failed conservative treatments including the past 6 months with comprehensive pain program, physical therapy, home exercise program, medication management.    She also has pain in the right hip but back pain is the worst of these.  This pain is 6 out of 10 intensity constant aching in quality nonradiating.       ROS Red Flags :   Fever, Chills, Sweats: Denies  Involuntary Weight Loss: Denies  Bowel/Bladder Incontinence: Denies  Saddle Anesthesia: Denies        PMHx:   Past Medical History:   Diagnosis Date    Abdominal pain     Actinic keratosis 2018    Alcohol abuse     Allergy     Anxiety     Arthritis     Back pain     Back pain     Bronchitis     Chronic pain syndrome     Constipation     COPD (chronic obstructive pulmonary disease) (HCC)     Cough     Daytime sleepiness     DDD (degenerative disc disease), cervical     DDD (degenerative disc disease), thoracolumbar     Depression     Diarrhea     Diverticulosis     Dizziness     Essential tremor     Fatigue     Fibromyalgia     IBS (irritable bowel syndrome)     Insomnia     Migraine     Nausea     Painful joint     Pulmonary emphysema (HCC)     Restless leg syndrome     Ringing in ears     Snoring     SOB (shortness of breath)     Sore muscles     Sweat,  sweating, excessive     Weakness     Wears glasses        PSHx:   Past Surgical History:   Procedure Laterality Date    ABDOMINAL HYSTERECTOMY TOTAL      CHOLECYSTECTOMY      HYSTERECTOMY LAPAROSCOPY      OTHER ABDOMINAL SURGERY      TONSILLECTOMY         Family history   Family History   Problem Relation Age of Onset    Lung Disease Mother         copd    Cancer Mother         basal/squamous    Hypertension Mother     Hyperlipidemia Mother     Stroke Mother     Alcohol abuse Mother     Heart Disease Father         HF    Arthritis Father         rheumatoid    Lung Disease Father         emphysema    Alcohol abuse Father     Cancer Sister     Alcohol abuse Sister     Cancer Maternal Grandfather         Bladder or prostate?    Cancer Sister         pre cancerous spots     Alcohol abuse Sister     Kidney Disease Sister     Alcohol abuse Sister     Hypertension Sister     Alcohol abuse Sister          Medications:   Outpatient Medications Marked as Taking for the 1/16/24 encounter (Office Visit) with Elmo Donaldson M.D.   Medication Sig Dispense Refill    gabapentin (NEURONTIN) 600 MG tablet Take 1 Tablet by mouth 3 times a day. 270 Tablet 0    Milnacipran HCl (SAVELLA) 100 MG Tab Take 1 Tablet by mouth 2 times a day. 180 Tablet 2    topiramate (TOPAMAX) 50 MG tablet TAKE 1 TABLET BY MOUTH THREE TIMES A  Tablet 0    ondansetron (ZOFRAN ODT) 4 MG TABLET DISPERSIBLE Take 1 Tablet by mouth every 6 hours as needed for Nausea/Vomiting. 15 Tablet 0    famotidine (PEPCID) 40 MG Tab TAKE 1 TABLET BY MOUTH EVERY DAY 90 Tablet 1    MITIGARE 0.6 MG Cap TAKE 1 CAPSULE BY MOUTH EVERY DAY 90 Capsule 3    clindamycin (CLEOCIN) 2 % vaginal cream Use vaginally after sexual activity for irritation and odor related to BV. 40 g 0    dicyclomine (BENTYL) 20 MG Tab TAKE 1 TABLET BY MOUTH EVERY 6 HOURS AS NEEDED (IBS). 360 Tablet 1    Multiple Vitamin (MULTIVITAMIN ADULT PO)       NON SPECIFIED Take  by mouth. Flax oil gel cap, one  capsule daily      baclofen (LIORESAL) 10 MG Tab Take 1 Tablet by mouth 3 times a day. 270 Tablet 3    diclofenac sodium (VOLTAREN) 1 % Gel Apply 2 g topically 4 times a day as needed (pain). 100 g 0    Omega-3 Fatty Acids (FISH OIL) 1000 MG Cap capsule Take 1,000 mg by mouth 1 time a day as needed.      magnesium oxide (MAG-OX) 400 MG Tab tablet Take 400 mg by mouth every day.      Loratadine (CLARITIN PO) Take 1 tablet by mouth every day.      hydrOXYzine HCl (ATARAX) 25 MG Tab Take 1 Tab by mouth 3 times a day as needed for Anxiety. 90 Tab 0        Current Outpatient Medications on File Prior to Visit   Medication Sig Dispense Refill    gabapentin (NEURONTIN) 600 MG tablet Take 1 Tablet by mouth 3 times a day. 270 Tablet 0    Milnacipran HCl (SAVELLA) 100 MG Tab Take 1 Tablet by mouth 2 times a day. 180 Tablet 2    topiramate (TOPAMAX) 50 MG tablet TAKE 1 TABLET BY MOUTH THREE TIMES A  Tablet 0    ondansetron (ZOFRAN ODT) 4 MG TABLET DISPERSIBLE Take 1 Tablet by mouth every 6 hours as needed for Nausea/Vomiting. 15 Tablet 0    famotidine (PEPCID) 40 MG Tab TAKE 1 TABLET BY MOUTH EVERY DAY 90 Tablet 1    MITIGARE 0.6 MG Cap TAKE 1 CAPSULE BY MOUTH EVERY DAY 90 Capsule 3    clindamycin (CLEOCIN) 2 % vaginal cream Use vaginally after sexual activity for irritation and odor related to BV. 40 g 0    dicyclomine (BENTYL) 20 MG Tab TAKE 1 TABLET BY MOUTH EVERY 6 HOURS AS NEEDED (IBS). 360 Tablet 1    Multiple Vitamin (MULTIVITAMIN ADULT PO)       NON SPECIFIED Take  by mouth. Flax oil gel cap, one capsule daily      baclofen (LIORESAL) 10 MG Tab Take 1 Tablet by mouth 3 times a day. 270 Tablet 3    diclofenac sodium (VOLTAREN) 1 % Gel Apply 2 g topically 4 times a day as needed (pain). 100 g 0    Omega-3 Fatty Acids (FISH OIL) 1000 MG Cap capsule Take 1,000 mg by mouth 1 time a day as needed.      magnesium oxide (MAG-OX) 400 MG Tab tablet Take 400 mg by mouth every day.      Loratadine (CLARITIN PO) Take 1  tablet by mouth every day.      hydrOXYzine HCl (ATARAX) 25 MG Tab Take 1 Tab by mouth 3 times a day as needed for Anxiety. 90 Tab 0     No current facility-administered medications on file prior to visit.         Allergies:   Allergies   Allergen Reactions    Erythromycin Rash     Chest rash    Tetracycline Rash     Chest rash       Social Hx:   Social History     Socioeconomic History    Marital status: Single     Spouse name: Not on file    Number of children: Not on file    Years of education: Not on file    Highest education level: Associate degree: occupational, technical, or vocational program   Occupational History    Not on file   Tobacco Use    Smoking status: Every Day     Current packs/day: 0.00     Average packs/day: 1 pack/day for 35.0 years (35.0 ttl pk-yrs)     Types: Cigarettes     Start date: 1987     Last attempt to quit: 2022     Years since quittin.5    Smokeless tobacco: Never   Vaping Use    Vaping Use: Every day    Substances: THC, CBD    Devices: Disposable, Pre-filled or refillable cartridge, Refillable tank, Pre-filled pod   Substance and Sexual Activity    Alcohol use: Yes     Comment: occasionally    Drug use: Yes     Frequency: 7.0 times per week     Types: Marijuana, Inhaled, Oral     Comment: Only canibus medicinal for anti spasm, anxiety, ibs, inflamm    Sexual activity: Not Currently     Partners: Male, Female     Comment: currently with male   Other Topics Concern    Not on file   Social History Narrative    Initial Intake Date:  Last Updated:        Financial situation:    SSD $1570/mo w/ $450 savings. We did discuss care credit in regards to behavioral health treatment interventions. Jaclyn        Food resources & insecurities:    Deferred.         Housing & environmental:    Resides in [place] in [facility] ([address]).         Transportation:    Deferred.        Family dynamics & family/friend social supports:    Pt has local family to include 86-year-old mother  "and sister. She reports family are functioning alcoholics but are supportive in her sobriety. They will provide alternative alcohol free drinks at gatherings. Pt has son currently in inpatient treatment for alcohol use.         Pt has significant other who resides in the same [facility]. He is also pursuing sobriety w/ Jaclyn.        ADLs/IADLs & associated diagnoses:    Pt's ADLs/IADL have previously been affected by her alcohol use. They have cause incontinent issues in the past as well as extended periods of vomiting.         Advanced life planning:    Not on file.         Behavioral/Mental:    Associated diagnoses include migraine without status migrainosus, not intractable; insomnia due to medical condition; recurrent major depressive disorder; alcoholism; deliberate self-cutting; and obesity.        Pt has extensive history of substance use and recovery. In her young adult years pt used methamphatmine for aprox 1 year until she sought treatment. At this time she was mother to 2 children. She attended both AA/NA and \"[facility]\" in the [facility] area. She became sober \"[place]\" and maintained sobriety for 16 years. 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 Pt's daughter then left the home (at age 21) which affected her drinking; at that time she also became disabled. Pt relapsed on methamphetamine with use over 2 years w/ a 9 month period of sobriety. Pt then moved to [place] where her drinking increased (vodka). Pt reports medical symptoms from drinking include every other day vomitting, a period of GI/rectal bleeding and hypertensive events. Per chart review pt drinking 5th vodka daily. Pt's last drink 3/7/2021. She is pursuing sobriety with her significant other, Bradyl. Pt is 6 days sober at time of assessment (3/12/2021). She is \"tired of being sick and tired.\"        Pt's reservations include attending " inpatient; she would need to take her pet dog with her as no one else can care for the dog. Dog is  animal. And the allotment of time required for intensive out patient (3 days a week); she is concerned her fibromyalgia will affect her attendance. Thre is a general concern of cost which may direct pt towards low income treatment options.         Assessments completed by :    n/a        Collaterals:     Social Determinants of Health     Financial Resource Strain: High Risk (9/8/2023)    Overall Financial Resource Strain (CARDIA)     Difficulty of Paying Living Expenses: Very hard   Food Insecurity: Food Insecurity Present (9/8/2023)    Hunger Vital Sign     Worried About Running Out of Food in the Last Year: Sometimes true     Ran Out of Food in the Last Year: Never true   Transportation Needs: Unmet Transportation Needs (9/8/2023)    PRAPARE - Transportation     Lack of Transportation (Medical): Yes     Lack of Transportation (Non-Medical): Yes   Physical Activity: Inactive (9/8/2023)    Exercise Vital Sign     Days of Exercise per Week: 0 days     Minutes of Exercise per Session: 0 min   Stress: Stress Concern Present (9/8/2023)    [employer] - Occupational Stress Questionnaire     Feeling of Stress : Rather much   Social Connections: Moderately Integrated (9/8/2023)    Social Connection and Isolation Panel [NHANES]     Frequency of Communication with Friends and Family: More than three times a week     Frequency of Social Gatherings with Friends and Family: Once a week     Attends [employer] Services: More than 4 times per year     Active Member of Clubs or Organizations: Yes     Attends Club or Organization Meetings: More than 4 times per year     Marital Status:    Intimate Partner Violence: Not on file   Housing Stability: Low Risk  (9/8/2023)    Housing Stability Vital Sign     Unable to Pay for Housing in the Last Year: No     Number of Places Lived  "in the Last Year: 2     Unstable Housing in the Last Year: No         EXAMINATION     Physical Exam:   Vitals: /78 (BP Location: Right arm, Patient Position: Sitting, BP Cuff Size: Adult)   Pulse (!) 59   Temp 36.6 °C (97.9 °F) (Temporal)   Ht 1.651 m (5' 5\")   Wt 78.4 kg (172 lb 12.8 oz)   SpO2 100%     Constitutional:   Body Habitus: Body mass index is 28.76 kg/m².  Cooperation: Fully cooperates with exam  Appearance: Well-groomed no disheveled    Respiratory-  breathing comfortable on room air, no audible wheezing  Cardiovascular- capillary refills less than 2 seconds. No lower extremity edema is noted.   Psychiatric- alert and oriented ×3. Normal affect.    MSK and Neuro: -    Thoracic/Lumbar Spine/Sacral Spine/Hips   There are no signs of infection around the injection sites.   decreased active range of motion with flexion, lateral flexion, and rotation bilaterally.   There is decreased active range of motion with lumbar extension.    There is  pain with lumbar extension.   There is pain with facet loading maneuver (extension rotation) with axial low back pain on the BILATERAL side(s) L3-4, L4-5, and L5-S1      Palpation:   No tenderness to palpation in midline at T1-T12 levels. No tenderness to palpation in the left and right of the midline T1-L5, NEGATIVE for tenderness to palpation to the para-midline region in the lower lumbar levels.  palpation over SI joint: negative bilaterally    palpation in hip or over the gluteus medius tendon insertion: negative bilaterally      Lumbar spine Special tests  Neuro tension  Straight leg test negative bilaterally    Slump test negative bilaterally      Key points for the international standards for neurological classification of spinal cord injury (ISNCSCI) to light touch.     Dermatome R L                                      L2 2 2   L3 2 2   L4 2 2   L5 2 2   S1 2 2   S2 2 2         Motor Exam Lower Extremities    ? Myotome R L   Hip flexion L2 5 5   Knee " "extension L3 5 5   Ankle dorsiflexion L4 5 5   Toe extension L5 5 5   Ankle plantarflexion S1 5 5           MEDICAL DECISION MAKING    DATA    Labs:   Lab Results   Component Value Date/Time    SODIUM 143 07/29/2023 01:22 PM    POTASSIUM 4.4 07/29/2023 01:22 PM    CHLORIDE 109 07/29/2023 01:22 PM    CO2 23 07/29/2023 01:22 PM    GLUCOSE 110 (H) 07/29/2023 01:22 PM    BUN 17 07/29/2023 01:22 PM    CREATININE 1.05 07/29/2023 01:22 PM        No results found for: \"PROTHROMBTM\", \"INR\"     Lab Results   Component Value Date/Time    WBC 7.8 07/29/2023 01:22 PM    RBC 4.69 07/29/2023 01:22 PM    HEMOGLOBIN 14.5 07/29/2023 01:22 PM    HEMATOCRIT 43.9 07/29/2023 01:22 PM    MCV 93.6 07/29/2023 01:22 PM    MCH 30.9 07/29/2023 01:22 PM    MCHC 33.0 07/29/2023 01:22 PM    MPV 10.3 07/29/2023 01:22 PM    NEUTSPOLYS 46.90 07/29/2023 01:22 PM    LYMPHOCYTES 45.70 (H) 07/29/2023 01:22 PM    MONOCYTES 5.00 07/29/2023 01:22 PM    EOSINOPHILS 1.40 07/29/2023 01:22 PM    BASOPHILS 0.60 07/29/2023 01:22 PM        Lab Results   Component Value Date/Time    HBA1C 5.5 06/30/2023 02:57 PM          Imaging:   I personally reviewed following images      MRI lumbar spine 8/31/2023  There are no areas of high-grade central canal stenosis.  No areas of high-grade foraminal stenosis.  There is degenerative changes and degenerative disc disease at L3-4, L4-5, L5-S1.  There is facet arthropathy bilaterally at L3-4, L4-5, L5-S1.      I reviewed the following radiology reports                         Results for orders placed during the hospital encounter of 08/31/23    MR-LUMBAR SPINE-W/O    Impression  Mild degenerative disease in the lumbar spine as described above.        Results for orders placed during the hospital encounter of 08/31/23    MR-LUMBAR SPINE-W/O    Impression  Mild degenerative disease in the lumbar spine as described above.                                                                                            Results for " orders placed during the hospital encounter of 21    DX-CHEST-2 VIEWS    Impression  1.  No acute cardiopulmonary disease evident.  2.  No acute findings and no significant change from 2020.       Results for orders placed during the hospital encounter of 21    DX-FINGER(S) 2+ LEFT    Impression  1.  Cystlike lucency of the scaphoid could be a pseudocyst related to remote trauma or could be a small intraosseous ganglion or other bone cyst  2.  Degenerative changes of the first carpal metacarpal joint                 Results for orders placed during the hospital encounter of 22    DX-LUMBAR SPINE-2 OR 3 VIEWS    Impression  1.  No compression deformity or acute fracture is identified.    2.  Grade 1 anterolisthesis at L3-L4.    3.  Mild degenerative disc disease and facet arthropathy.                  Results for orders placed during the hospital encounter of 21    DX-WRIST-COMPLETE 3+ LEFT    Impression  1.  Cystlike lucency of the scaphoid could be a pseudocyst related to remote trauma or could be a small intraosseous ganglion or other bone cyst  2.  Degenerative changes of the first carpal metacarpal joint         DIAGNOSIS   Visit Diagnoses     ICD-10-CM   1. Lumbar spondylosis  M47.816   2. Chronic bilateral low back pain with right-sided sciatica  M54.41    G89.29   3. Spondylolisthesis of lumbar region  M43.16   4. SI (sacroiliac) joint dysfunction  M53.3   5. Lumbar degenerative disc disease  M51.36   6. Positive depression screening  Z13.31         ASSESSMENT and PLAN:     Jaclyn Peter Roberto  1965 female      Jaclyn was seen today for follow-up and back pain.    Diagnoses and all orders for this visit:    Lumbar spondylosis  -     Referral to Physical Medicine Rehab  -     Cancel: Referral to Physical Medicine Rehab  -     Referral to Physical Medicine Rehab    Chronic bilateral low back pain with right-sided sciatica    Spondylolisthesis of lumbar region    SI  (sacroiliac) joint dysfunction    Lumbar degenerative disc disease    Positive depression screening  -     Patient has been identified as having a positive depression screening. Appropriate orders and counseling have been given.           The patient is failed conservative treatments with medication management, home exercise program from the direction of physical therapy/physician including the past 6 months .  I have ordered a BILATERAL diagnostic medial branch block targeting the L3-4, L4-5, and L5-S1 facet joints #1 and #2.  Procedure #2 is only to be done if #1 is a positive block.    The risks benefits and alternatives to this procedure were discussed and the patient wishes to proceed with the procedure. Risks include but are not limited to damage to surrounding structures, infection, bleeding, worsening of pain which can be permanent, weakness which can be permanent. Benefits include pain relief, improved function. Alternatives includes not doing the procedure.   If there are 2 positive blocks I would consider the patient for radiofrequency neurotomy of the previously blocked nerves.    Medications: Continue gabapentin    Follow up: After the above diagnostic procedures    Thank you for allowing me to participate in the care of this patient. If you have any questions please not hesitate to contact me.             Please note that this dictation was created using voice recognition software. I have made every reasonable attempt to correct obvious errors but there may be errors of grammar and content that I may have overlooked prior to finalization of this note.      Elmo Donaldson MD  Interventional Spine and Sports Physiatry  Physical Medicine and Rehabilitation  [facility]

## 2024-01-17 DIAGNOSIS — M79.7 FIBROMYALGIA: ICD-10-CM

## 2024-01-18 ENCOUNTER — OFFICE VISIT (OUTPATIENT)
Dept: MEDICAL GROUP | Facility: PHYSICIAN GROUP | Age: 59
End: 2024-01-18
Payer: MEDICARE

## 2024-01-18 ENCOUNTER — PATIENT OUTREACH (OUTPATIENT)
Dept: HEALTH INFORMATION MANAGEMENT | Facility: OTHER | Age: 59
End: 2024-01-18

## 2024-01-18 ENCOUNTER — PATIENT MESSAGE (OUTPATIENT)
Dept: HEALTH INFORMATION MANAGEMENT | Facility: OTHER | Age: 59
End: 2024-01-18

## 2024-01-18 VITALS
HEART RATE: 73 BPM | WEIGHT: 172.8 LBS | DIASTOLIC BLOOD PRESSURE: 70 MMHG | TEMPERATURE: 97.9 F | SYSTOLIC BLOOD PRESSURE: 110 MMHG | OXYGEN SATURATION: 97 % | BODY MASS INDEX: 28.79 KG/M2 | HEIGHT: 65 IN

## 2024-01-18 DIAGNOSIS — R53.82 CHRONIC FATIGUE: ICD-10-CM

## 2024-01-18 DIAGNOSIS — R00.1 BRADYCARDIA: ICD-10-CM

## 2024-01-18 DIAGNOSIS — R59.0 LYMPHADENOPATHY, CERVICAL: ICD-10-CM

## 2024-01-18 DIAGNOSIS — N18.32 STAGE 3B CHRONIC KIDNEY DISEASE: ICD-10-CM

## 2024-01-18 DIAGNOSIS — E78.00 ELEVATED LDL CHOLESTEROL LEVEL: ICD-10-CM

## 2024-01-18 DIAGNOSIS — J43.9 PULMONARY EMPHYSEMA, UNSPECIFIED EMPHYSEMA TYPE (HCC): ICD-10-CM

## 2024-01-18 DIAGNOSIS — R42 DIZZINESS: ICD-10-CM

## 2024-01-18 DIAGNOSIS — I10 ESSENTIAL HYPERTENSION: ICD-10-CM

## 2024-01-18 DIAGNOSIS — Z13.220 ENCOUNTER FOR SCREENING FOR LIPID DISORDER: ICD-10-CM

## 2024-01-18 DIAGNOSIS — F31.31 BIPOLAR AFFECTIVE DISORDER, CURRENTLY DEPRESSED, MILD (HCC): ICD-10-CM

## 2024-01-18 DIAGNOSIS — F17.210 TOBACCO SMOKER, 20 CIGARETTES OR FEWER PER DAY: ICD-10-CM

## 2024-01-18 DIAGNOSIS — Z11.59 NEED FOR HEPATITIS C SCREENING TEST: ICD-10-CM

## 2024-01-18 DIAGNOSIS — J44.89 COPD (CHRONIC OBSTRUCTIVE PULMONARY DISEASE) WITH CHRONIC BRONCHITIS (HCC): ICD-10-CM

## 2024-01-18 DIAGNOSIS — G63 POLYNEUROPATHY IN OTHER DISEASES CLASSIFIED ELSEWHERE (HCC): ICD-10-CM

## 2024-01-18 DIAGNOSIS — F10.21 ALCOHOL DEPENDENCE, IN REMISSION (HCC): ICD-10-CM

## 2024-01-18 DIAGNOSIS — Z23 NEED FOR VACCINATION: ICD-10-CM

## 2024-01-18 DIAGNOSIS — M79.7 FIBROMYALGIA: ICD-10-CM

## 2024-01-18 DIAGNOSIS — F10.21 ALCOHOLISM IN RECOVERY (HCC): ICD-10-CM

## 2024-01-18 DIAGNOSIS — F15.21 METHAMPHETAMINE DEPENDENCE IN REMISSION (HCC): ICD-10-CM

## 2024-01-18 PROCEDURE — 90471 IMMUNIZATION ADMIN: CPT | Performed by: NURSE PRACTITIONER

## 2024-01-18 PROCEDURE — 90472 IMMUNIZATION ADMIN EACH ADD: CPT | Performed by: NURSE PRACTITIONER

## 2024-01-18 PROCEDURE — 90632 HEPA VACCINE ADULT IM: CPT | Performed by: NURSE PRACTITIONER

## 2024-01-18 PROCEDURE — 3078F DIAST BP <80 MM HG: CPT | Performed by: NURSE PRACTITIONER

## 2024-01-18 PROCEDURE — 3074F SYST BP LT 130 MM HG: CPT | Performed by: NURSE PRACTITIONER

## 2024-01-18 PROCEDURE — 99214 OFFICE O/P EST MOD 30 MIN: CPT | Mod: 25 | Performed by: NURSE PRACTITIONER

## 2024-01-18 PROCEDURE — 90715 TDAP VACCINE 7 YRS/> IM: CPT | Performed by: NURSE PRACTITIONER

## 2024-01-18 PROCEDURE — 99999 PR NO CHARGE: CPT | Performed by: NURSE PRACTITIONER

## 2024-01-18 RX ORDER — BACLOFEN 10 MG/1
10 TABLET ORAL 3 TIMES DAILY
Qty: 270 TABLET | Refills: 3 | Status: SHIPPED | OUTPATIENT
Start: 2024-01-18 | End: 2024-02-06 | Stop reason: SDUPTHER

## 2024-01-18 ASSESSMENT — ENCOUNTER SYMPTOMS
MYALGIAS: 0
HEADACHES: 0
CHILLS: 0
SHORTNESS OF BREATH: 0
WHEEZING: 0
COUGH: 0
DIZZINESS: 0
FEVER: 0
DEPRESSION: 0
ABDOMINAL PAIN: 0
PALPITATIONS: 0

## 2024-01-18 ASSESSMENT — FIBROSIS 4 INDEX: FIB4 SCORE: 0.79

## 2024-01-18 NOTE — PROGRESS NOTES
Assessment    I spoke with the patient this morning for their CCM follow up when they were in office for an appointment with their PCP. Patient's future appointments reviewed. She is aware and voices understanding. She is working hard to close her care gaps and is scheduled to go in for procedures over the next couple of weeks designed to block nerves in her spine to pinpoint which ones are causing her back pain, which so far has been resistant to treatment. She also reported continuing to refrain from alcohol per her PCP. She states that she is exhausted from so many appointments and that she is having to work hard to keep her fibromyalgia under control. The patient doesn't report any issues with appetite or hydration. She reports taking her medications as indicated in her chart. No resources requested at this time. I will continue to check in and remind her of next steps and goals.   Education    *Article on winter skin care sent by mail    Plan of Care and Goals    *Remain fall, injury free  *Healthy nutrition, hydration, and activity  *Follow up with PCP, specialists, as indicated.     Barriers:    Management of symptoms r/t chronic medical concerns.     Progress:    Progressing    Next outreach:  2/16/24 by phone

## 2024-01-18 NOTE — PROGRESS NOTES
Subjective:     Chief Complaint   Patient presents with    Follow-Up    Pharyngitis     Keeps getting sore throat swollen glands    Medication Refill     All medication needs to be sent to [facility]        HPI:   Jaclyn presents today with     Problem   Lymphadenopathy, Cervical    This is a new issue. States lymph node improved. States has had some episodes of sore throat.      Bradycardia    Recently patient has had several episodes where her heart rate was in the 40s to 50s at specialist appointments, patient states that she has had more dizziness during these times, has not noted any syncope or near syncope but is concerned about lower heart rate.  States that she is still having the same chest pain, did see cardiology and was cleared with regard to cardiac ischemia.     Dizziness    Chronic in nature. States no severe episodes. States that she has noticed some increased dizziness with lower heart rate.       [facility] Form    Diagnosis to address: J43.9 - Pulmonary emphysema, unspecified emphysema type (HCC)  Assessment and plan: Chronic, stable. Continue with current defined treatment plan: monitoring, 97% o2 saturation, no current inhalers. Follow-up at least annually.  Diagnosis: N18.32 - Stage 3b chronic kidney disease (HCC)  Assessment and plan: Chronic, stable, as based on today's assessment and impact on other conditions evaluated today. Continue with current treatment plan: follows with Dr. Najjar, ordered for update. Follow-up with specialist as directed, but at least annually.  Diagnosis: F15.21 - Methamphetamine dependence in remission ([facility])  Assessment and plan: Chronic, stable. Continue with current defined treatment plan: no current meth use. Follow-up at least annually.  Diagnosis: F10.21 - Alcohol dependence, in remission ([facility])  Assessment and plan: Chronic, stable. Continue with current defined treatment plan: continues cessation. Follow-up at least annually.  Diagnosis: F31.31 - Bipolar  affective disorder, currently depressed, mild ([facility])  Assessment and plan: Chronic, stable. Continue with current defined treatment plan: continues follow-up with psychiatry. States doing well. Follow-up at least annually.  Diagnosis: G63 - Polyneuropathy in other diseases classified elsewhere (HCC)  Assessment and plan: Chronic, stable. Continue with current defined treatment plan: States some worse symptoms recently. States a lot of fatigue and increased fibro pain. Follow-up at least annually.  Last edited 01/18/24 12:35 PST by Magdiel Colorado, A.P.RMOHAN.           Current Outpatient Medications Ordered in Epic   Medication Sig Dispense Refill    gabapentin (NEURONTIN) 600 MG tablet Take 1 Tablet by mouth 3 times a day. 270 Tablet 0    Milnacipran HCl (SAVELLA) 100 MG Tab Take 1 Tablet by mouth 2 times a day. 180 Tablet 2    topiramate (TOPAMAX) 50 MG tablet TAKE 1 TABLET BY MOUTH THREE TIMES A  Tablet 0    ondansetron (ZOFRAN ODT) 4 MG TABLET DISPERSIBLE Take 1 Tablet by mouth every 6 hours as needed for Nausea/Vomiting. 15 Tablet 0    famotidine (PEPCID) 40 MG Tab TAKE 1 TABLET BY MOUTH EVERY DAY 90 Tablet 1    MITIGARE 0.6 MG Cap TAKE 1 CAPSULE BY MOUTH EVERY DAY 90 Capsule 3    clindamycin (CLEOCIN) 2 % vaginal cream Use vaginally after sexual activity for irritation and odor related to BV. 40 g 0    dicyclomine (BENTYL) 20 MG Tab TAKE 1 TABLET BY MOUTH EVERY 6 HOURS AS NEEDED (IBS). 360 Tablet 1    Multiple Vitamin (MULTIVITAMIN ADULT PO)       NON SPECIFIED Take  by mouth. Flax oil gel cap, one capsule daily      baclofen (LIORESAL) 10 MG Tab Take 1 Tablet by mouth 3 times a day. 270 Tablet 3    diclofenac sodium (VOLTAREN) 1 % Gel Apply 2 g topically 4 times a day as needed (pain). 100 g 0    Omega-3 Fatty Acids (FISH OIL) 1000 MG Cap capsule Take 1,000 mg by mouth 1 time a day as needed.      magnesium oxide (MAG-OX) 400 MG Tab tablet Take 400 mg by mouth every day.      Loratadine (CLARITIN  "PO) Take 1 tablet by mouth every day.      hydrOXYzine HCl (ATARAX) 25 MG Tab Take 1 Tab by mouth 3 times a day as needed for Anxiety. 90 Tab 0     No current Epic-ordered facility-administered medications on file.       Health Maintenance: Completed    Review of Systems   Constitutional:  Negative for chills, fever and malaise/fatigue.   Respiratory:  Negative for cough, shortness of breath and wheezing.    Cardiovascular:  Negative for chest pain, palpitations and leg swelling.   Gastrointestinal:  Negative for abdominal pain.   Genitourinary:  Negative for dysuria.   Musculoskeletal:  Negative for myalgias.   Neurological:  Negative for dizziness and headaches.   Psychiatric/Behavioral:  Negative for depression and suicidal ideas.          Objective:     Exam:  /70 (BP Location: Left arm, Patient Position: Sitting, BP Cuff Size: Adult)   Pulse 73   Temp 36.6 °C (97.9 °F) (Temporal)   Ht 1.651 m (5' 5\")   Wt 78.4 kg (172 lb 12.8 oz)   LMP 12/01/2000   SpO2 97%   BMI 28.76 kg/m²  Body mass index is 28.76 kg/m².    Physical Exam  Constitutional:       Appearance: Normal appearance.   HENT:      Head: Normocephalic and atraumatic.   Eyes:      Pupils: Pupils are equal, round, and reactive to light.   Cardiovascular:      Rate and Rhythm: Regular rhythm. Bradycardia present.      Pulses: Normal pulses.      Heart sounds: Normal heart sounds.   Pulmonary:      Effort: Pulmonary effort is normal.      Breath sounds: Normal breath sounds.   Abdominal:      General: Abdomen is flat.   Skin:     General: Skin is warm and dry.      Capillary Refill: Capillary refill takes less than 2 seconds.   Neurological:      General: No focal deficit present.      Mental Status: She is alert and oriented to person, place, and time.       Assessment & Plan:     59 y.o. female with the following -     Problem List Items Addressed This Visit       Stage 3b chronic kidney disease (HCC)    Pulmonary emphysema (HCC)    " 2 weeks Polyneuropathy in other diseases classified elsewhere (HCC)    Methamphetamine dependence in remission (HCC)    Lymphadenopathy, cervical    Dizziness     -recommend ziopatch and follow-up with cardiology.         Relevant Orders    Lipid Profile    RIH ZIO PATCH MONITOR    Bradycardia     -order ziopatch 14 days  -follow-up with cardiology         Relevant Orders    Lipid Profile    RIH ZIO PATCH MONITOR    Bipolar affective disorder, current episode depressed (HCC)    Alcohol dependence, in remission (HCC)     Other Visit Diagnoses       Need for vaccination        Relevant Orders    Hepatitis A Vaccine Adult 19+ (Completed)    Tdap Vaccine =>8YO IM (Completed)    Need for hepatitis C screening test        Relevant Orders    HEP C VIRUS ANTIBODY    Tobacco smoker, 20 cigarettes or fewer per day        Relevant Orders    REFERRAL TO LUNG CANCER SCREENING PROGRAM    Encounter for screening for lipid disorder        Relevant Orders    Lipid Profile    Elevated LDL cholesterol level        Relevant Orders    Lipid Profile            Return in about 3 months (around 4/18/2024), or if symptoms worsen or fail to improve.    I have placed the below orders and discussed them with an approved delegating provider. The MA is performing the below orders under the direction of Dr. Duke.     Please note that this dictation was created using voice recognition software. I have made every reasonable attempt to correct obvious errors, but I expect that there are errors of grammar and possibly content that I did not discover before finalizing the note.

## 2024-01-22 ENCOUNTER — TELEPHONE (OUTPATIENT)
Dept: HEALTH INFORMATION MANAGEMENT | Facility: OTHER | Age: 59
End: 2024-01-22
Payer: MEDICARE

## 2024-01-22 NOTE — TELEPHONE ENCOUNTER
Outcome: Left Message    Please transfer to Patient Outreach Team at [phone] when patient returns call.      Attempt # 1

## 2024-01-23 ENCOUNTER — HOSPITAL ENCOUNTER (OUTPATIENT)
Facility: REHABILITATION | Age: 59
End: 2024-01-23
Attending: PHYSICAL MEDICINE & REHABILITATION | Admitting: PHYSICAL MEDICINE & REHABILITATION
Payer: MEDICARE

## 2024-01-23 ENCOUNTER — APPOINTMENT (OUTPATIENT)
Dept: RADIOLOGY | Facility: REHABILITATION | Age: 59
End: 2024-01-23
Attending: PHYSICAL MEDICINE & REHABILITATION
Payer: MEDICARE

## 2024-01-23 VITALS
HEIGHT: 65 IN | TEMPERATURE: 98 F | HEART RATE: 67 BPM | BODY MASS INDEX: 28.54 KG/M2 | SYSTOLIC BLOOD PRESSURE: 104 MMHG | DIASTOLIC BLOOD PRESSURE: 81 MMHG | OXYGEN SATURATION: 97 % | RESPIRATION RATE: 16 BRPM | WEIGHT: 171.3 LBS

## 2024-01-23 PROCEDURE — 64493 INJ PARAVERT F JNT L/S 1 LEV: CPT

## 2024-01-23 PROCEDURE — 64495 INJ PARAVERT F JNT L/S 3 LEV: CPT

## 2024-01-23 PROCEDURE — 700117 HCHG RX CONTRAST REV CODE 255

## 2024-01-23 PROCEDURE — 99152 MOD SED SAME PHYS/QHP 5/>YRS: CPT

## 2024-01-23 PROCEDURE — 700101 HCHG RX REV CODE 250

## 2024-01-23 PROCEDURE — 700111 HCHG RX REV CODE 636 W/ 250 OVERRIDE (IP)

## 2024-01-23 PROCEDURE — 64494 INJ PARAVERT F JNT L/S 2 LEV: CPT

## 2024-01-23 RX ORDER — LIDOCAINE HYDROCHLORIDE 20 MG/ML
INJECTION, SOLUTION EPIDURAL; INFILTRATION; INTRACAUDAL; PERINEURAL
Status: COMPLETED
Start: 2024-01-23 | End: 2024-01-23

## 2024-01-23 RX ORDER — MIDAZOLAM HYDROCHLORIDE 1 MG/ML
INJECTION INTRAMUSCULAR; INTRAVENOUS
Status: COMPLETED
Start: 2024-01-23 | End: 2024-01-23

## 2024-01-23 RX ORDER — LIDOCAINE HYDROCHLORIDE 10 MG/ML
INJECTION, SOLUTION EPIDURAL; INFILTRATION; INTRACAUDAL; PERINEURAL
Status: COMPLETED
Start: 2024-01-23 | End: 2024-01-23

## 2024-01-23 RX ADMIN — IOHEXOL 5 ML: 240 INJECTION, SOLUTION INTRATHECAL; INTRAVASCULAR; INTRAVENOUS; ORAL at 09:18

## 2024-01-23 RX ADMIN — MIDAZOLAM HYDROCHLORIDE 1 MG: 1 INJECTION, SOLUTION INTRAMUSCULAR; INTRAVENOUS at 09:05

## 2024-01-23 RX ADMIN — LIDOCAINE HYDROCHLORIDE 10 ML: 10 INJECTION, SOLUTION EPIDURAL; INFILTRATION; INTRACAUDAL; PERINEURAL at 09:10

## 2024-01-23 RX ADMIN — LIDOCAINE HYDROCHLORIDE 10 ML: 20 INJECTION, SOLUTION EPIDURAL; INFILTRATION; INTRACAUDAL at 09:21

## 2024-01-23 ASSESSMENT — PAIN DESCRIPTION - PAIN TYPE
TYPE: CHRONIC PAIN

## 2024-01-23 ASSESSMENT — FIBROSIS 4 INDEX: FIB4 SCORE: 0.79

## 2024-01-23 NOTE — OR SURGEON
Immediate Post OP Note    Pre-Op Diagnosis Codes:     * Lumbar spondylosis [M47.816]      Post-Op Diagnosis Codes:     * Lumbar spondylosis [M47.816]      Procedure(s):  Diagnostic medial branch blocks targeting the BILATERAL L3-4, L4-5 and L5-S1 facet joints with fluoroscopic guidance #1 with sedation.     sedation  versed 1mg. - Wound Class: Clean    Surgeon(s):  Elmo Donaldson M.D.    Anesthesiologist/Type of Anesthesia:  No anesthesia staff entered./Sedation    Surgical Staff:  Circulator: Isa Ellington R.N.  Scrub Person: Sandra Kaiser R.N.  Radiology Technologist: Ochoa Julian    Specimens removed if any:  * No specimens in log *    Estimated Blood Loss: None    Findings: None    Complications: None        1/23/2024 9:24 AM Elmo Donaldson M.D.

## 2024-01-23 NOTE — INTERVAL H&P NOTE
H&P reviewed. The patient was examined and there are no changes to the H&P     Lungs clear to auscultation bilaterally.  No abdominal tenderness.  Heart regular rate and rhythm.  Vitals reviewed.    Proceed with the originally scheduled procedure.  The risks, benefits and alternatives were discussed.  The patient understands these.    Pre-Op Diagnosis Codes:     * Lumbar spondylosis [M47.816]  Procedure(s):  Diagnostic medial branch blocks targeting the BILATERAL L3-4, L4-5 and L5-S1 facet joints with fluoroscopic guidance #1 with sedation.    Note: plan on versed 1mg.    Elmo Donaldson MD  Physical Medicine and Rehabilitation  Interventional Spine and Sports Physiatry  [facility]

## 2024-01-23 NOTE — OP REPORT
Date of Service: 1/23/2024     Patient: Jaclyn Mejia 59 y.o. female     MRN: [id number]     Physician/s: Elmo Donaldson MD    Pre-operative Diagnosis: Lumbosacral spondylosis, facet arthropathy. The patient was NOT seen for lumbar radiculopathy today.     Post-operative Diagnosis: Lumbosacral spondylosis, facet arthropathy. The patient was NOT seen for lumbar radiculopathy today.     Procedure: Medial Branch Blocks targeting the bilateral  L3-4, L4-5, and L5-S1  facet joints with sedation     Description of procedure:    The risks, benefits, and alternatives of the procedure were reviewed and discussed with the patient.  Written informed consent was freely obtained. A pre-procedural time-out was conducted by the physician verifying patient’s identity, procedure to be performed, procedure site and side, and allergy verification. Appropriate equipment was determined to be in place for the procedure.     Moderation sedation was achieved with Versed (1mg) . Monitoring of the patients vital signs and respiratory status was provided by trained independent registered nurse during the entire course of the procedures and under my supervision and recoded in the patient’s medical record. The duration of sedation was over 10 minutes.     In the fluoroscopy suite the patient was placed in a prone position and the skin was prepped and draped in the usual sterile fashion. The fluoroscope was placed over the lower back at the appropriate angles, and the targets for injection were marked. A 27g needle was placed into each of the markings at the levels below, and approx 1mL of 1% Lidocaine was injected subcutaneously into the epidermal and dermal layers. The needle was removed intact.     Using an oblique view, A 25g 3.5 inch needle was then placed at the intersection of the transverse process and superior articular process at the S1 facet where the L5 dorsal ramus runs on the left side. The needle tips were then verified by  AP, oblique, and lateral views.     Using an oblique view, A 25g 3.5 inch needle was then placed at the intersection of the transverse process and superior articular process at the L5-S1 facet joint where the L4 medial branch runs  on the left side. The needle tips were then verified by AP, oblique, and lateral views.     Using an oblique view, A 25g 3.5 inch needle was then placed at the intersection of the transverse process and superior articular process at the L4-5 facet joint where the L3 medial branch runs  on the left side. The needle tips were then verified by AP, oblique, and lateral views.     Using an oblique view, A 25g 3.5 inch needle was then placed at the intersection of the transverse process and superior articular process at the L3-4 facet joint where the L2 medial branch runs  on the left side. The needle tips were then verified by AP, oblique, and lateral views.       Using an oblique view, A 25g 3.5 inch needle was then placed at the intersection of the transverse process and superior articular process at the S1 facet where the L5 dorsal ramus runs  on the right side. The needle tips were then verified by AP, oblique, and lateral views.     Using an oblique view, A 25g 3.5 inch needle was then placed at the intersection of the transverse process and superior articular process at the L5-S1 facet joint where the L4 medial branch runs  on the right side. The needle tips were then verified by AP, oblique, and lateral views.     Using an oblique view, A 25g 3.5 inch needle was then placed at the intersection of the transverse process and superior articular process at the L4-5 facet joint where the L3 medial branch runs  on the right side. The needle tips were then verified by AP, oblique, and lateral views.     Using an oblique view, A 25g 3.5 inch needle was then placed at the intersection of the transverse process and superior articular process at the L3-4 facet joint where the L2 medial branch runs   on the right side. The needle tips were then verified by AP, oblique, and lateral views.      In the AP view, less than 1mL of contrast dye was used to highlight the medial branch while the fluoroscope was running live at the levels above. Following negative aspiration, approximately 1mL of 2% lidocaine  was then injected at the above levels, and the needles were removed intact after restyleted. The patient's back was covered with a 4x4 gauze, the area was cleansed with sterile normal saline, and a dressing was applied.     There were no complications noted, the patient was hemodynamically stable, and tolerated the procedure well.     The patient had greater than 80% pain relief of the index pain minutes post procedure.      Preprocedure pain 6/10 NRS  Postprocedure pain 1 /10 NRS      Follow-up as scheduled      Elmo Donaldson MD  Physical Medicine and Rehabilitation  Interventional Spine and Sports Physiatry  [facility]            CPT  Intraarticular joint or medial branch block (MBB) - lumbar or sacral (1st level):  46891-54-mn  Intraarticular joint or medial branch block (MBB) - lumbar or sacral (2nd level):  62004-58-au  Intraarticular joint or medial branch block (MBB) - lumbar or sacral (3rd level):  98884-32-[id number]  moderate procedural sedation first 15 minutes: [id number]

## 2024-01-23 NOTE — PROGRESS NOTES
0801 Pt admitted to Pre Procedure area.  Consent signed and post op instructions reviewed with pt, all questions answered.   Dr. Donaldson in to see pt.    0926 Pt received to Post Procedure area with updates from procedure RN.  Snack and drink tolerated without C/O N/V.  Dressing clear, dry, no swelling noted.   Pt seen by Dr. Donaldson for post procedure evaluation.     0955 Pt ambulated without difficulty & meets criteria for DC, accompanied to car and placed in passenger seat.  Discharged to designated .

## 2024-01-24 ENCOUNTER — TELEPHONE (OUTPATIENT)
Dept: PHYSICAL MEDICINE AND REHAB | Facility: MEDICAL CENTER | Age: 59
End: 2024-01-24
Payer: MEDICARE

## 2024-01-24 PROBLEM — E66.9 OBESITY (BMI 30-39.9): Status: RESOLVED | Noted: 2023-07-25 | Resolved: 2024-01-24

## 2024-01-24 NOTE — TELEPHONE ENCOUNTER
Called for Post -SP check up: Diagnostic medial branch blocks targeting the BILATERAL L3-4, L4-5 and L5-S1 facet joints with fluoroscopic guidance #1 with sedation     Change in pain:    Concerns?    Confirmed [facility] appt?

## 2024-01-29 ENCOUNTER — OFFICE VISIT (OUTPATIENT)
Dept: FAMILY PLANNING/WOMEN'S HEALTH CLINIC | Facility: PHYSICIAN GROUP | Age: 59
End: 2024-01-29
Attending: FAMILY MEDICINE
Payer: MEDICARE

## 2024-01-29 VITALS
SYSTOLIC BLOOD PRESSURE: 102 MMHG | WEIGHT: 168 LBS | DIASTOLIC BLOOD PRESSURE: 78 MMHG | HEIGHT: 65 IN | BODY MASS INDEX: 27.99 KG/M2

## 2024-01-29 DIAGNOSIS — N18.32 STAGE 3B CHRONIC KIDNEY DISEASE: ICD-10-CM

## 2024-01-29 DIAGNOSIS — J43.9 PULMONARY EMPHYSEMA, UNSPECIFIED EMPHYSEMA TYPE (HCC): ICD-10-CM

## 2024-01-29 DIAGNOSIS — G63 POLYNEUROPATHY IN OTHER DISEASES CLASSIFIED ELSEWHERE (HCC): ICD-10-CM

## 2024-01-29 DIAGNOSIS — F15.21 METHAMPHETAMINE DEPENDENCE IN REMISSION (HCC): ICD-10-CM

## 2024-01-29 DIAGNOSIS — G47.33 OBSTRUCTIVE SLEEP APNEA SYNDROME: ICD-10-CM

## 2024-01-29 DIAGNOSIS — F10.21 ALCOHOL DEPENDENCE, IN REMISSION (HCC): ICD-10-CM

## 2024-01-29 DIAGNOSIS — F31.31 BIPOLAR AFFECTIVE DISORDER, CURRENTLY DEPRESSED, MILD (HCC): ICD-10-CM

## 2024-01-29 DIAGNOSIS — M79.7 FIBROMYALGIA: ICD-10-CM

## 2024-01-29 PROCEDURE — 3074F SYST BP LT 130 MM HG: CPT

## 2024-01-29 PROCEDURE — 3078F DIAST BP <80 MM HG: CPT

## 2024-01-29 PROCEDURE — 1125F AMNT PAIN NOTED PAIN PRSNT: CPT

## 2024-01-29 PROCEDURE — G0439 PPPS, SUBSEQ VISIT: HCPCS

## 2024-01-29 SDOH — ECONOMIC STABILITY: INCOME INSECURITY: IN THE LAST 12 MONTHS, WAS THERE A TIME WHEN YOU WERE NOT ABLE TO PAY THE MORTGAGE OR RENT ON TIME?: NO

## 2024-01-29 SDOH — ECONOMIC STABILITY: TRANSPORTATION INSECURITY
IN THE PAST 12 MONTHS, HAS THE LACK OF TRANSPORTATION KEPT YOU FROM MEDICAL APPOINTMENTS OR FROM GETTING MEDICATIONS?: NO

## 2024-01-29 SDOH — ECONOMIC STABILITY: FOOD INSECURITY: WITHIN THE PAST 12 MONTHS, YOU WORRIED THAT YOUR FOOD WOULD RUN OUT BEFORE YOU GOT MONEY TO BUY MORE.: NEVER TRUE

## 2024-01-29 SDOH — ECONOMIC STABILITY: INCOME INSECURITY: HOW HARD IS IT FOR YOU TO PAY FOR THE VERY BASICS LIKE FOOD, HOUSING, MEDICAL CARE, AND HEATING?: SOMEWHAT HARD

## 2024-01-29 SDOH — ECONOMIC STABILITY: INCOME INSECURITY: IN THE PAST 12 MONTHS, HAS THE ELECTRIC, GAS, OIL, OR WATER COMPANY THREATENED TO SHUT OFF SERVICE IN YOUR HOME?: NO

## 2024-01-29 SDOH — ECONOMIC STABILITY: FOOD INSECURITY: WITHIN THE PAST 12 MONTHS, THE FOOD YOU BOUGHT JUST DIDN'T LAST AND YOU DIDN'T HAVE MONEY TO GET MORE.: NEVER TRUE

## 2024-01-29 SDOH — ECONOMIC STABILITY: HOUSING INSECURITY: IN THE LAST 12 MONTHS, HOW MANY PLACES HAVE YOU LIVED?: 1

## 2024-01-29 ASSESSMENT — PATIENT HEALTH QUESTIONNAIRE - PHQ9
SUM OF ALL RESPONSES TO PHQ QUESTIONS 1-9: 13
CLINICAL INTERPRETATION OF PHQ2 SCORE: 4
5. POOR APPETITE OR OVEREATING: 0 - NOT AT ALL

## 2024-01-29 ASSESSMENT — ENCOUNTER SYMPTOMS: GENERAL WELL-BEING: FAIR

## 2024-01-29 ASSESSMENT — FIBROSIS 4 INDEX: FIB4 SCORE: 0.79

## 2024-01-29 ASSESSMENT — ACTIVITIES OF DAILY LIVING (ADL): BATHING_REQUIRES_ASSISTANCE: 1

## 2024-01-29 ASSESSMENT — PAIN SCALES - GENERAL: PAINLEVEL: 6=MODERATE PAIN

## 2024-01-29 NOTE — ASSESSMENT & PLAN NOTE
Chronic, stable. PHQ-9 score today is 13. The patient denies SI/HI at this time.  The patient is planning on starting therapy.  Continue with current defined treatment plan: Milnacipran HCl (SAVELLA) 100 MG Tab  . Follow-up at least annually.

## 2024-01-29 NOTE — PROGRESS NOTES
Comprehensive Health Assessment Program     Jaclyn Mejia is a 59 y.o. here for her comprehensive health assessment.    Patient Active Problem List    Diagnosis Date Noted    Lymphadenopathy, cervical 01/18/2024    Bradycardia 01/18/2024    Pulmonary emphysema ([facility]) 08/29/2023    BMI 29.0-29.9,adult 05/25/2023    Polyneuropathy in other diseases classified elsewhere ([facility]) 05/25/2023    Methamphetamine dependence in remission ([facility]) 05/25/2023    Osteopenia of lumbar spine 05/25/2023    Dizziness 04/11/2023    Chest pain 04/11/2023    Obstructive sleep apnea syndrome 04/11/2023    Tobacco dependence 09/22/2022    Currently attempting to quit smoking 07/08/2022    DDD (degenerative disc disease), lumbar 06/01/2022    Chronic fatigue 08/24/2021    Pelvic floor dysfunction 08/24/2021    Stage 3b chronic kidney disease ([facility]) 08/24/2021    Elevated hemoglobin A1c 05/20/2021    Risk for falls 03/18/2021    Dupuytren's contracture of right hand 08/29/2019    Alcohol dependence, in remission ([facility]) 08/14/2019    Deliberate self-cutting 08/14/2019    Actinic keratosis 02/06/2018    H/O hysterectomy with oophorectomy 02/09/2017    Essential tremor 02/09/2017    Irritable bowel syndrome with diarrhea 02/09/2017    Bipolar affective disorder, current episode depressed ([facility]) 02/09/2017    Migraine without status migrainosus, not intractable 09/15/2016    Facet arthropathy, cervical 04/08/2016    Facet arthropathy, lumbar 04/08/2016    KVNG (generalized anxiety disorder) 09/23/2014    Seborrheic keratosis 09/25/2013    Fibromyalgia 01/01/2002       Current Outpatient Medications   Medication Sig Dispense Refill    baclofen (LIORESAL) 10 MG Tab Take 1 Tablet by mouth 3 times a day. 270 Tablet 0    famotidine (PEPCID) 40 MG Tab Take 1 Tablet by mouth every day. 90 Tablet 0    Milnacipran HCl (SAVELLA) 100 MG Tab Take 1 Tablet by mouth 2 times a day. 180 Tablet 0    ondansetron (ZOFRAN ODT) 4 MG TABLET DISPERSIBLE Take 1  Tablet by mouth every 6 hours as needed for Nausea/Vomiting. 15 Tablet 0    topiramate (TOPAMAX) 50 MG tablet Take 1 Tablet by mouth 3 times a day. 270 Tablet 0    gabapentin (NEURONTIN) 600 MG tablet Take 1 Tablet by mouth 3 times a day. 270 Tablet 0    MITIGARE 0.6 MG Cap TAKE 1 CAPSULE BY MOUTH EVERY DAY 90 Capsule 3    clindamycin (CLEOCIN) 2 % vaginal cream Use vaginally after sexual activity for irritation and odor related to BV. 40 g 0    dicyclomine (BENTYL) 20 MG Tab TAKE 1 TABLET BY MOUTH EVERY 6 HOURS AS NEEDED (IBS). 360 Tablet 1    Multiple Vitamin (MULTIVITAMIN ADULT PO)       NON SPECIFIED Take  by mouth. Flax oil gel cap, one capsule daily      diclofenac sodium (VOLTAREN) 1 % Gel Apply 2 g topically 4 times a day as needed (pain). 100 g 0    Omega-3 Fatty Acids (FISH OIL) 1000 MG Cap capsule Take 1,000 mg by mouth 1 time a day as needed.      magnesium oxide (MAG-OX) 400 MG Tab tablet Take 400 mg by mouth every day.      Loratadine (CLARITIN PO) Take 1 tablet by mouth every day.      hydrOXYzine HCl (ATARAX) 25 MG Tab Take 1 Tab by mouth 3 times a day as needed for Anxiety. 90 Tab 0     No current facility-administered medications for this visit.          Current supplements as per medication list.     Allergies:   Erythromycin and Tetracycline  Social History     Tobacco Use    Smoking status: Every Day     Current packs/day: 1.00     Average packs/day: 1 pack/day for 44.7 years (44.7 ttl pk-yrs)     Types: Cigarettes     Start date: 7/1/1979    Smokeless tobacco: Never   Vaping Use    Vaping Use: Every day    Substances: THC, CBD    Devices: Disposable, Pre-filled or refillable cartridge, Refillable tank, Pre-filled pod   Substance Use Topics    Alcohol use: Yes     Comment: occasionally    Drug use: Yes     Frequency: 7.0 times per week     Types: Marijuana, Inhaled, Oral     Comment: Only canibus medicinal for anti spasm, anxiety, ibs, inflamm     Family History   Problem Relation Age of Onset     Lung Disease Mother         copd    Cancer Mother         basal/squamous    Hypertension Mother     Hyperlipidemia Mother     Stroke Mother     Alcohol abuse Mother     Heart Disease Father         HF    Arthritis Father         rheumatoid    Lung Disease Father         emphysema    Alcohol abuse Father     Cancer Sister     Alcohol abuse Sister     Cancer Maternal Grandfather         Bladder or prostate?    Cancer Sister         pre cancerous spots     Alcohol abuse Sister     Kidney Disease Sister     Alcohol abuse Sister     Hypertension Sister     Alcohol abuse Sister      Jaclyn  has a past medical history of Abdominal pain, Actinic keratosis (02/06/2018), Alcohol abuse, Allergy, Anxiety, Arthritis, Back pain, Back pain, Bronchitis, Chronic pain syndrome, Constipation, COPD (chronic obstructive pulmonary disease) (HCC), Cough, Daytime sleepiness, DDD (degenerative disc disease), cervical, DDD (degenerative disc disease), thoracolumbar, Depression, Diarrhea, Diverticulosis, Dizziness, Essential tremor, Fatigue, Fibromyalgia, IBS (irritable bowel syndrome), Insomnia, Migraine, Nausea, Painful joint, Pulmonary emphysema (HCC), Restless leg syndrome, Ringing in ears, Snoring, SOB (shortness of breath), Sore muscles, Sweat, sweating, excessive, Weakness, and Wears glasses.    She has no past medical history of GERD (gastroesophageal reflux disease) or Hyperlipidemia.   Past Surgical History:   Procedure Laterality Date    LUMBAR MEDIAL BRANCH BLOCKS Bilateral 1/30/2024    Procedure: Diagnostic medial branch blocks targeting the BILATERAL L3-4, L4-5 and L5-S1 facet joints with fluoroscopic guidance #2 with sedation.   Note: plan on versed 1mg.  Diagnostic medial branch block #2 is only be done if procedure #1 is a positive block.  This will be on a separate date from procedure #1.;  Surgeon: Elmo Donaldson M.D.;  [facility];  Service: P    LUMBAR MEDIAL BRANCH BLOCKS  1/23/2024     Procedure: Diagnostic medial branch blocks targeting the BILATERAL L3-4, L4-5 and L5-S1 facet joints with fluoroscopic guidance #1 with sedation.    Note: plan on versed 1mg.;  Surgeon: Elmo Donaldson M.D.;  Location[facility];  Service: Pain Management    ABDOMINAL HYSTERECTOMY TOTAL      CHOLECYSTECTOMY      HYSTERECTOMY LAPAROSCOPY      OTHER ABDOMINAL SURGERY      TONSILLECTOMY         Screening:  In the last six months have you experienced any leakage of urine? No    Depression Screening  Little interest or pleasure in doing things?  2 - more than half the days  Feeling down, depressed , or hopeless? 2 - more than half the days  Trouble falling or staying asleep, or sleeping too much?  2 - more than half the days  Feeling tired or having little energy?  2 - more than half the days  Poor appetite or overeating?  0 - not at all  Feeling bad about yourself - or that you are a failure or have let yourself or your family down? 2 - more than half the days  Trouble concentrating on things, such as reading the newspaper or watching television? 2 - more than half the days  Moving or speaking so slowly that other people could have noticed.  Or the opposite - being so fidgety or restless that you have been moving around a lot more than usual?  0 - not at all  Thoughts that you would be better off dead, or of hurting yourself?  1 - several days  Patient Health Questionnaire Score: 13    If depressive symptoms identified deferred to follow up visit unless specifically addressed in assessment and plan.    Interpretation of PHQ-9 Total Score   Score Severity   1-4 No Depression   5-9 Mild Depression   10-14 Moderate Depression   15-19 Moderately Severe Depression   20-27 Severe Depression    Screening for Cognitive Impairment  Do you or any of your friends or family members have any concern about your memory? Yes  Three Minute Recall (Banana, Sunrise, Chair) 2/3    Doroteo clock face with all 12 numbers  and set the hands to show 20 past 8.  Yes 5/5  Cognitive concerns identified deferred for follow up unless specifically addressed in assessment and plan.    Fall Risk Assessment  Has the patient had two or more falls in the last year or any fall with injury in the last year?  No    Safety Assessment  Do you always wear your seatbelt?  Yes  Any changes to home needed to function safely? No  Difficulty hearing.  No  Patient counseled about all safety risks that were identified.    Functional Assessment ADLs  Are there any barriers preventing you from cooking for yourself or meeting nutritional needs?  No.    Are there any barriers preventing you from driving safely or obtaining transportation?  No.    Are there any barriers preventing you from using a telephone or calling for help?  No    Are there any barriers preventing you from shopping?  No.    Are there any barriers preventing you from taking care of your own finances?  No    Are there any barriers preventing you from managing your medications?  No    Are there any barriers preventing you from showering, bathing or dressing yourself? Yes Sometimes depending how she feels, puts it off until feels better  Are there any barriers preventing you from doing housework or laundry? Yes Waits until she feels better to do it  Are there any barriers preventing you from using the toilet?No    Are you currently engaging in any exercise or physical activity?  Yes. walks    Self-Assessment of Health  What is your perception of your health? Fair    Do you sleep more than six hours a night? Yes    In the past 7 days, how much did pain keep you from doing your normal work? Some    Do you spend quality time with family or friends (virtually or in person)? Yes    Do you usually eat a heart healthy diet that constists of a variety of fruits, vegetables, whole grains and fiber? Yes    Do you eat foods high in fat and/or Fast Food more than three times per week? No    How concerned are  you that your medical conditions are not being well managed? Not at all    Are you worried that in the next 2 months, you may not have stable housing that you own, rent, or stay in as part of a household? No        Advance Care Planning  Do you have an Advance Directive, Living Will, Durable Power of , or POLST? No                 Health Maintenance Summary            Overdue - Zoster (Shingles) Vaccines (1 of 2) Never done      No completion history exists for this topic.              Postponed - COVID-19 Vaccine (4 - 2023-24 season) Postponed until 10/14/2024      12/07/2021  Imm Admin: PFIZER PURPLE CAP SARS-COV-2 VACCINATION (12+)    04/15/2021  Imm Admin: PFIZER PURPLE CAP SARS-COV-2 VACCINATION (12+)    03/23/2021  Imm Admin: PFIZER PURPLE CAP SARS-COV-2 VACCINATION (12+)              Hepatitis A Vaccine (Hep A) (2 of 2 - Risk 2-dose series) Next due on 7/18/2024 01/18/2024  Imm Admin: Hepatitis A Vaccine, Adult              Annual Wellness Visit (Yearly) Next due on 1/29/2025 01/29/2024  Level of Service: VA ANNUAL WELLNESS VISIT-INCLUDES PPPS SUBSEQUE*    06/20/2023  Level of Service: VA ANNUAL WELLNESS VISIT-INCLUDES PPPS SUBSEQUE*    06/20/2023  Visit Dx: Medicare annual wellness visit, subsequent    05/25/2023  Level of Service: VA ANNUAL WELLNESS VISIT-INCLUDES PPPS SUBSEQUE*    06/01/2022  Level of Service: VA ANNUAL WELLNESS VISIT-INCLUDES PPPS SUBSEQUE*    Only the first 5 history entries have been loaded, but more history exists.              Annual Pulmonary Function Test / Spirometry (Yearly) Next due on 2/16/2025 02/16/2024  PULMONARY FUNCTION TESTS -Test requested: Complete Pulmonary Function Test    09/28/2021  PULMONARY FUNCTION TESTS -Test requested: Complete Pulmonary Function Test              Mammogram (Every 2 Years) Next due on 1/16/2026 01/16/2024  MA-SCREENING MAMMO BILAT W/TOMOSYNTHESIS W/CAD    08/30/2022  MA-SCREENING MAMMO BILAT W/TOMOSYNTHESIS W/CAD     04/16/2021  MA-SCREENING MAMMO BILAT W/TOMOSYNTHESIS W/CAD    09/13/2019  MA-SCREENING MAMMO BILAT W/TOMOSYNTHESIS W/CAD              Colorectal Cancer Screening (Colonoscopy - Every 10 Years) Tentatively due on 9/18/2029 09/18/2019  REFERRAL TO GI FOR COLONOSCOPY    01/15/2019  OCCULT BLOOD FECES IMMUNOASSAY              Pneumococcal Vaccine: 0-64 Years (3 of 3 - PPSV23 or PCV20) Next due on 1/3/2031      01/22/2018  Imm Admin: Pneumococcal polysaccharide vaccine (PPSV-23)    10/24/2014  Imm Admin: Pneumococcal polysaccharide vaccine (PPSV-23)    10/24/2014  Imm Admin: Pneumococcal Conjugate Vaccine (Prevnar/PCV-13)              IMM DTaP/Tdap/Td Vaccine (3 - Td or Tdap) Next due on 1/18/2034 01/18/2024  Imm Admin: Tdap Vaccine    01/14/2014  Imm Admin: Tdap Vaccine              Influenza Vaccine (Series Information) Completed      09/05/2023  Imm Admin: Influenza Vaccine Quad Inj (Pf)    12/07/2021  Imm Admin: Influenza, Unspecified - HISTORICAL DATA    10/02/2019  Imm Admin: Influenza Vaccine Quad Inj (Pf)    12/01/2017  Imm Admin: Influenza Vaccine Quad Inj (Pf)    10/14/2015  Imm Admin: Influenza Seasonal Injectable - Historical Data    Only the first 5 history entries have been loaded, but more history exists.              Hepatitis C Screening  Completed      01/31/2024  Hepatitis C Antibody component of HEP C VIRUS ANTIBODY              Lung Cancer Screening Shared Decision Making  Completed      02/27/2024  Registry Metric: Lung Cancer Shared Decision Making Conversation Date              HPV Vaccines (Series Information) Aged Out      No completion history exists for this topic.              Polio Vaccine (Inactivated Polio) (Series Information) Aged Out      No completion history exists for this topic.              Meningococcal Immunization (Series Information) Aged Out      No completion history exists for this topic.              Discontinued - Hepatitis B Vaccine (Hep B)  Discontinued      No  "completion history exists for this topic.                    Patient Care Team:  JAN Rose as PCP - General (Family Medicine)  JAN Rose as PCP - [facility]  [facility] as [facility]   [facility] (DME Supplier)  Guera Louise R.N. as Chronic Care Manager (Registered Nurse)  Yuniel Ortiz, PT as Physical Therapist (Physical Therapy)    Financial Resource Strain: Medium Risk (1/29/2024)    Overall Financial Resource Strain (CARDIA)     Difficulty of Paying Living Expenses: Somewhat hard      Transportation Needs: No Transportation Needs (1/29/2024)    PRAPARE - Transportation     Lack of Transportation (Medical): No     Lack of Transportation (Non-Medical): No      Food Insecurity: No Food Insecurity (1/29/2024)    Hunger Vital Sign     Worried About Running Out of Food in the Last Year: Never true     Ran Out of Food in the Last Year: Never true        Encounter Vitals  Blood Pressure: 102/78  O2 Delivery Device: CPAP  Weight: 76.2 kg (168 lb)  Height: 165.1 cm (5' 5\")  BMI (Calculated): 27.96  Pain Score: 6=Moderate Pain  DME  O2 Delivery Device: CPAP     Alert, oriented in no acute distress.  Eye contact is good, speech goal directed, affect calm.    Assessment and Plan. The following treatment and monitoring plan is recommended:  Bipolar affective disorder, current episode depressed (HCC)  Chronic, stable. PHQ-9 score today is 13. The patient denies SI/HI at this time.  The patient is planning on starting therapy.  Continue with current defined treatment plan: Milnacipran HCl (SAVELLA) 100 MG Tab  . Follow-up at least annually.      Alcohol dependence, in remission (HCC)  Chronic, stable. The patient reports that she drinks occasionally.  She had 1 drink a few weeks ago. No alcohol recently.  Follow-up at least annually.      Stage 3b chronic kidney disease (HCC)  Chronic, stable. Discussed drinking plenty of " "fluids and avoiding nephrotoxins such as OTC NSAIDs.  Follow-up at least annually.      Polyneuropathy in other diseases classified elsewhere (HCC)  Chronic, stable. The patient reports that she has sharp pain all over. Her pain is well managed with current medication.  Her symptoms are likely due to fibromyalgia.  Continue with current defined treatment plan: gabapentin (NEURONTIN) 600 MG tablet and Milnacipran HCl (SAVELLA) 100 MG Tab . Follow-up at least annually.      Pulmonary emphysema ([facility])  Chronic, stable. The patient denies shortness of breath at this time. No current treatment.  Follow-up at least annually.      Methamphetamine dependence in remission (HCC)  Chronic, stable. The patient continues to be in remission.  Follow-up at least annually.      Fibromyalgia  Chronic, stable. The patient reports pain \"all over\".  Her pain is well managed with current treatment. Continue with current defined treatment plan: gabapentin (NEURONTIN) 600 MG tablet, topiramate (TOPAMAX) 50 MG tablet  and Milnacipran HCl (SAVELLA) 100 MG Tab . Follow-up at least annually.      Obstructive sleep apnea syndrome  Chronic, stable.  The patient reports that she is sleeping better with CPAP.  Continue with current defined treatment plan: CPAP. Follow-up at least annually.      Services suggested: No services needed at this time  Health Care Screening: Age-appropriate preventive services recommended by USPTF and ACIP covered by Medicare were discussed today. Services ordered if indicated and agreed upon by the patient.  Referrals offered: Community-based lifestyle interventions to reduce health risks and promote self-management and wellness, fall prevention, nutrition, physical activity, tobacco-use cessation, weight loss, and mental health services as per orders if indicated.    Discussion today about general wellness and lifestyle habits:    Prevent falls and reduce trip hazards; Cautioned about securing or removing rugs.  Have " a working fire alarm and carbon monoxide detector.  Engage in regular physical activity and social activities.    Follow-up: Return for appointment with Primary Care Provider as needed.

## 2024-01-29 NOTE — TELEPHONE ENCOUNTER
I tried calling patient but she did not answer.  Message sent to Johan and patient's PCP, Magdiel GAFFNEY, recommending holding scheduling for SDM since patient would not qualify for screening at this time and would instead warrant diagnostic work-up for abnormal weight loss.   -Dr. Ladonna Ocampo

## 2024-01-29 NOTE — TELEPHONE ENCOUNTER
Age 50-77 yrs of age?   59 years    Total Years Smoking cigarettes?   45 total years    20 pack year hx of smoking, or greater (average packs per day)?    45 years @ 1ppd = 45 total pack years    Current smoker or if quit, has pt quit within last 15 yrs?   Current Smoker    Completed Lung Cancer screen CT with [facility] previously?   NONE    Anything noted on previous CT involving lungs? ([place], [place], Etc.)   NONE    Any signs or symptoms of lung cancer? (New / change to Cough, Wheezing, S.O.B., coughing up blood, unexplained weight loss within last year)?    Pt states weight loss, but it is purposeful not unexplained    Previous history of lung cancer?  NONE

## 2024-01-29 NOTE — ASSESSMENT & PLAN NOTE
Chronic, stable. The patient reports that she drinks occasionally.  She had 1 drink a few weeks ago. No alcohol recently.  Follow-up at least annually.

## 2024-01-29 NOTE — ASSESSMENT & PLAN NOTE
Chronic, stable. Discussed drinking plenty of fluids and avoiding nephrotoxins such as OTC NSAIDs.  Follow-up at least annually.

## 2024-01-29 NOTE — ASSESSMENT & PLAN NOTE
"Chronic, stable. The patient reports pain \"all over\".  Her pain is well managed with current treatment. Continue with current defined treatment plan: gabapentin (NEURONTIN) 600 MG tablet, topiramate (TOPAMAX) 50 MG tablet  and Milnacipran HCl (SAVELLA) 100 MG Tab . Follow-up at least annually.    "

## 2024-01-29 NOTE — ASSESSMENT & PLAN NOTE
Chronic, stable. The patient denies shortness of breath at this time. No current treatment.  Follow-up at least annually.

## 2024-01-29 NOTE — ASSESSMENT & PLAN NOTE
Chronic, stable.  The patient reports that she is sleeping better with CPAP.  Continue with current defined treatment plan: CPAP. Follow-up at least annually.

## 2024-01-29 NOTE — ASSESSMENT & PLAN NOTE
Chronic, stable. The patient reports that she has sharp pain all over. Her pain is well managed with current medication.  Her symptoms are likely due to fibromyalgia.  Continue with current defined treatment plan: gabapentin (NEURONTIN) 600 MG tablet and Milnacipran HCl (SAVELLA) 100 MG Tab . Follow-up at least annually.

## 2024-01-30 ENCOUNTER — NON-PROVIDER VISIT (OUTPATIENT)
Dept: CARDIOLOGY | Facility: MEDICAL CENTER | Age: 59
End: 2024-01-30
Attending: NURSE PRACTITIONER
Payer: MEDICARE

## 2024-01-30 ENCOUNTER — APPOINTMENT (OUTPATIENT)
Dept: RADIOLOGY | Facility: REHABILITATION | Age: 59
End: 2024-01-30
Attending: PHYSICAL MEDICINE & REHABILITATION
Payer: MEDICARE

## 2024-01-30 ENCOUNTER — HOSPITAL ENCOUNTER (OUTPATIENT)
Facility: REHABILITATION | Age: 59
End: 2024-01-30
Attending: PHYSICAL MEDICINE & REHABILITATION | Admitting: PHYSICAL MEDICINE & REHABILITATION
Payer: MEDICARE

## 2024-01-30 VITALS
TEMPERATURE: 97.5 F | WEIGHT: 170.86 LBS | HEIGHT: 65 IN | OXYGEN SATURATION: 99 % | SYSTOLIC BLOOD PRESSURE: 108 MMHG | BODY MASS INDEX: 28.47 KG/M2 | DIASTOLIC BLOOD PRESSURE: 72 MMHG | HEART RATE: 62 BPM | RESPIRATION RATE: 16 BRPM

## 2024-01-30 DIAGNOSIS — I49.3 PVC'S (PREMATURE VENTRICULAR CONTRACTIONS): ICD-10-CM

## 2024-01-30 DIAGNOSIS — I47.10 SVT (SUPRAVENTRICULAR TACHYCARDIA) (HCC): ICD-10-CM

## 2024-01-30 DIAGNOSIS — I49.8 VENTRICULAR TRIGEMINY: ICD-10-CM

## 2024-01-30 DIAGNOSIS — R00.1 BRADYCARDIA: ICD-10-CM

## 2024-01-30 DIAGNOSIS — I49.1 APC (ATRIAL PREMATURE CONTRACTIONS): ICD-10-CM

## 2024-01-30 DIAGNOSIS — R42 DIZZINESS: ICD-10-CM

## 2024-01-30 DIAGNOSIS — I49.8 VENTRICULAR BIGEMINY: ICD-10-CM

## 2024-01-30 PROCEDURE — 700111 HCHG RX REV CODE 636 W/ 250 OVERRIDE (IP): Mod: JZ

## 2024-01-30 PROCEDURE — 64493 INJ PARAVERT F JNT L/S 1 LEV: CPT

## 2024-01-30 PROCEDURE — 99152 MOD SED SAME PHYS/QHP 5/>YRS: CPT

## 2024-01-30 PROCEDURE — 64495 INJ PARAVERT F JNT L/S 3 LEV: CPT

## 2024-01-30 PROCEDURE — 700101 HCHG RX REV CODE 250

## 2024-01-30 PROCEDURE — 700117 HCHG RX CONTRAST REV CODE 255

## 2024-01-30 PROCEDURE — 64494 INJ PARAVERT F JNT L/S 2 LEV: CPT

## 2024-01-30 RX ORDER — LIDOCAINE HYDROCHLORIDE 20 MG/ML
INJECTION, SOLUTION EPIDURAL; INFILTRATION; INTRACAUDAL; PERINEURAL
Status: COMPLETED
Start: 2024-01-30 | End: 2024-01-30

## 2024-01-30 RX ORDER — LIDOCAINE HYDROCHLORIDE 10 MG/ML
INJECTION, SOLUTION EPIDURAL; INFILTRATION; INTRACAUDAL; PERINEURAL
Status: COMPLETED
Start: 2024-01-30 | End: 2024-01-30

## 2024-01-30 RX ORDER — MIDAZOLAM HYDROCHLORIDE 1 MG/ML
INJECTION INTRAMUSCULAR; INTRAVENOUS
Status: COMPLETED
Start: 2024-01-30 | End: 2024-01-30

## 2024-01-30 RX ADMIN — LIDOCAINE HYDROCHLORIDE 10 ML: 10 INJECTION, SOLUTION EPIDURAL; INFILTRATION; INTRACAUDAL; PERINEURAL at 08:37

## 2024-01-30 RX ADMIN — LIDOCAINE HYDROCHLORIDE 10 ML: 20 INJECTION, SOLUTION EPIDURAL; INFILTRATION; INTRACAUDAL at 08:37

## 2024-01-30 RX ADMIN — MIDAZOLAM HYDROCHLORIDE 1 MG: 1 INJECTION, SOLUTION INTRAMUSCULAR; INTRAVENOUS at 08:32

## 2024-01-30 RX ADMIN — IOHEXOL 5 ML: 240 INJECTION, SOLUTION INTRATHECAL; INTRAVASCULAR; INTRAVENOUS; ORAL at 08:37

## 2024-01-30 ASSESSMENT — FIBROSIS 4 INDEX: FIB4 SCORE: 0.79

## 2024-01-30 ASSESSMENT — PAIN DESCRIPTION - PAIN TYPE
TYPE: CHRONIC PAIN

## 2024-01-30 NOTE — OR SURGEON
Immediate Post OP Note    Pre-Op Diagnosis Codes:     * Lumbar spondylosis [M47.816]      Post-Op Diagnosis Codes:     * Lumbar spondylosis [M47.816]      Procedure(s):  Diagnostic medial branch blocks targeting the BILATERAL L3-4, L4-5 and L5-S1 facet joints with fluoroscopic guidance #2 with sedation.  - Wound Class: Clean    Surgeon(s):  Elmo Donaldson M.D.    Anesthesiologist/Type of Anesthesia:  No anesthesia staff entered./Local    Surgical Staff:  Circulator: Isa Ellington R.N.  Scrub Person: Alyssa Hall  Radiology Technologist: Susan Parish    Specimens removed if any:  * No specimens in log *    Estimated Blood Loss: None    Findings: None    Complications: None        1/30/2024 9:10 AM Elmo Donaldson M.D.

## 2024-01-30 NOTE — OP REPORT
Date of Service: 1/30/2024      Patient: Jaclyn Mejia 59 y.o. female     MRN: [id number]      Physician/s: Elmo Donaldson MD     Pre-operative Diagnosis: Lumbosacral spondylosis, facet arthropathy. The patient was NOT seen for lumbar radiculopathy today.      Post-operative Diagnosis: Lumbosacral spondylosis, facet arthropathy. The patient was NOT seen for lumbar radiculopathy today.      Procedure: Medial Branch Blocks targeting the bilateral  L3-4, L4-5, and L5-S1  facet joints with sedation      Description of procedure:     The patient is a chronic low back pain for greater than 6 months and failed conservative treatments greater than 6 months.  I reviewed the patient's pain prior to the procedure.  During the diagnostic phase after medial branch block #1 the patient had 80% pain relief preprocedure pain 6/ 10 postprocedure pain 1/10.  She was able to get in and out of the car without assistance during the diagnostic phase which she was not able to do prior to the medial branch blocks.  After the diagnostic phase pain returned back to baseline as expected.  The patient is here for medial branch block 2.    The risks, benefits, and alternatives of the procedure were reviewed and discussed with the patient.  Written informed consent was freely obtained. A pre-procedural time-out was conducted by the physician verifying patient’s identity, procedure to be performed, procedure site and side, and allergy verification. Appropriate equipment was determined to be in place for the procedure.      Moderation sedation was achieved with Versed (1mg) . Monitoring of the patients vital signs and respiratory status was provided by trained independent registered nurse during the entire course of the procedures and under my supervision and recoded in the patient’s medical record. The duration of sedation was over 10 minutes.      In the fluoroscopy suite the patient was placed in a prone position and the skin was prepped  and draped in the usual sterile fashion. The fluoroscope was placed over the lower back at the appropriate angles, and the targets for injection were marked. A 27g needle was placed into each of the markings at the levels below, and approx 1mL of 1% Lidocaine was injected subcutaneously into the epidermal and dermal layers. The needle was removed intact.     Using an oblique view, A 25g 3.5 inch needle was then placed at the intersection of the transverse process and superior articular process at the S1 facet where the L5 dorsal ramus runs on the left side. The needle tips were then verified by AP, oblique, and lateral views.      Using an oblique view, A 25g 3.5 inch needle was then placed at the intersection of the transverse process and superior articular process at the L5-S1 facet joint where the L4 medial branch runs  on the left side. The needle tips were then verified by AP, oblique, and lateral views.      Using an oblique view, A 25g 3.5 inch needle was then placed at the intersection of the transverse process and superior articular process at the L4-5 facet joint where the L3 medial branch runs  on the left side. The needle tips were then verified by AP, oblique, and lateral views.      Using an oblique view, A 25g 3.5 inch needle was then placed at the intersection of the transverse process and superior articular process at the L3-4 facet joint where the L2 medial branch runs  on the left side. The needle tips were then verified by AP, oblique, and lateral views.         Using an oblique view, A 25g 3.5 inch needle was then placed at the intersection of the transverse process and superior articular process at the S1 facet where the L5 dorsal ramus runs  on the right side. The needle tips were then verified by AP, oblique, and lateral views.      Using an oblique view, A 25g 3.5 inch needle was then placed at the intersection of the transverse process and superior articular process at the L5-S1 facet joint  where the L4 medial branch runs  on the right side. The needle tips were then verified by AP, oblique, and lateral views.      Using an oblique view, A 25g 3.5 inch needle was then placed at the intersection of the transverse process and superior articular process at the L4-5 facet joint where the L3 medial branch runs  on the right side. The needle tips were then verified by AP, oblique, and lateral views.      Using an oblique view, A 25g 3.5 inch needle was then placed at the intersection of the transverse process and superior articular process at the L3-4 facet joint where the L2 medial branch runs  on the right side. The needle tips were then verified by AP, oblique, and lateral views.      In the AP view, less than 1mL of contrast dye was used to highlight the medial branch while the fluoroscope was running live at the levels above. Following negative aspiration, approximately 1mL of 2% lidocaine  was then injected at the above levels, and the needles were removed intact after restyleted. The patient's back was covered with a 4x4 gauze, the area was cleansed with sterile normal saline, and a dressing was applied.      There were no complications noted, the patient was hemodynamically stable, and tolerated the procedure well.      The patient had greater than 80% pain relief of the index pain minutes post procedure.        Preprocedure pain 6/10 NRS  Postprocedure pain 1 /10 NRS        Follow-up as scheduled        Elmo Donaldson MD  Physical Medicine and Rehabilitation  Interventional Spine and Sports Physiatry  [facility]              CPT  Intraarticular joint or medial branch block (MBB) - lumbar or sacral (1st level):  28524-10-yq  Intraarticular joint or medial branch block (MBB) - lumbar or sacral (2nd level):  76783-44-cq  Intraarticular joint or medial branch block (MBB) - lumbar or sacral (3rd level):  [id number]  moderate procedural sedation first 15 minutes: [id number]

## 2024-01-30 NOTE — PROGRESS NOTES
Home enrollment completed in the 14 day Zio XT Holter monitor per Magdiel Colorado MD.  Monitor will be shipped to patient via [facility]. Pending EOS.

## 2024-01-30 NOTE — INTERVAL H&P NOTE
H&P reviewed. The patient was examined and there are no changes to the H&P     Lungs clear to auscultation bilaterally.  No abdominal tenderness.  Heart regular rate and rhythm.  Vitals reviewed.    Proceed with the originally scheduled procedure.  The risks, benefits and alternatives were discussed.  The patient understands these.    Pre-Op Diagnosis Codes:     * Lumbar spondylosis [M47.816]  Procedure(s):  Diagnostic medial branch blocks targeting the BILATERAL L3-4, L4-5 and L5-S1 facet joints with fluoroscopic guidance #2 with sedation.         Elmo Donaldson MD  Physical Medicine and Rehabilitation  Interventional Spine and Sports Physiatry  [facility]

## 2024-01-30 NOTE — PROGRESS NOTES
0810 Pt admitted to Pre Procedure area.  Consent signed and post op instructions reviewed with pt, all questions answered.   0814 Dr. Donaldson in to see pt.    0849 Pt received to Post Procedure area with updates from procedure RN.  Snack and drink tolerated without C/O N/V.  Dressing clear, dry, no swelling noted.    0901 Pt seen by Dr. Donaldson for post procedure evaluation.     0910 Pt cleared for d/c.  Waiting for ride.   0925 Pt ambulated without difficulty & meets criteria for DC, accompanied to car and placed in passenger seat.  Discharged to designated .

## 2024-01-31 ENCOUNTER — HOSPITAL ENCOUNTER (OUTPATIENT)
Dept: LAB | Facility: MEDICAL CENTER | Age: 59
End: 2024-01-31
Attending: NURSE PRACTITIONER
Payer: MEDICARE

## 2024-01-31 ENCOUNTER — HOSPITAL ENCOUNTER (OUTPATIENT)
Dept: LAB | Facility: MEDICAL CENTER | Age: 59
End: 2024-01-31
Attending: INTERNAL MEDICINE
Payer: MEDICARE

## 2024-01-31 ENCOUNTER — TELEPHONE (OUTPATIENT)
Dept: PHYSICAL MEDICINE AND REHAB | Facility: MEDICAL CENTER | Age: 59
End: 2024-01-31
Payer: MEDICARE

## 2024-01-31 DIAGNOSIS — Z11.59 NEED FOR HEPATITIS C SCREENING TEST: ICD-10-CM

## 2024-01-31 DIAGNOSIS — R42 DIZZINESS: ICD-10-CM

## 2024-01-31 DIAGNOSIS — Z13.220 ENCOUNTER FOR SCREENING FOR LIPID DISORDER: ICD-10-CM

## 2024-01-31 DIAGNOSIS — N18.32 STAGE 3B CHRONIC KIDNEY DISEASE: ICD-10-CM

## 2024-01-31 DIAGNOSIS — E78.00 ELEVATED LDL CHOLESTEROL LEVEL: ICD-10-CM

## 2024-01-31 DIAGNOSIS — R00.1 BRADYCARDIA: ICD-10-CM

## 2024-01-31 LAB
ANION GAP SERPL CALC-SCNC: 13 MMOL/L (ref 7–16)
BUN SERPL-MCNC: 15 MG/DL (ref 8–22)
CALCIUM SERPL-MCNC: 9.5 MG/DL (ref 8.5–10.5)
CHLORIDE SERPL-SCNC: 108 MMOL/L (ref 96–112)
CHOLEST SERPL-MCNC: 158 MG/DL (ref 100–199)
CO2 SERPL-SCNC: 22 MMOL/L (ref 20–33)
CREAT SERPL-MCNC: 1.06 MG/DL (ref 0.5–1.4)
CREAT UR-MCNC: 59.86 MG/DL
ERYTHROCYTE [DISTWIDTH] IN BLOOD BY AUTOMATED COUNT: 44.3 FL (ref 35.9–50)
FASTING STATUS PATIENT QL REPORTED: NORMAL
GFR SERPLBLD CREATININE-BSD FMLA CKD-EPI: 61 ML/MIN/1.73 M 2
GLUCOSE SERPL-MCNC: 81 MG/DL (ref 65–99)
HCT VFR BLD AUTO: 45.4 % (ref 37–47)
HCV AB SER QL: NORMAL
HDLC SERPL-MCNC: 45 MG/DL
HGB BLD-MCNC: 15.1 G/DL (ref 12–16)
LDLC SERPL CALC-MCNC: 95 MG/DL
MCH RBC QN AUTO: 31.7 PG (ref 27–33)
MCHC RBC AUTO-ENTMCNC: 33.3 G/DL (ref 32.2–35.5)
MCV RBC AUTO: 95.4 FL (ref 81.4–97.8)
MICROALBUMIN UR-MCNC: <1.2 MG/DL
MICROALBUMIN/CREAT UR: NORMAL MG/G (ref 0–30)
PLATELET # BLD AUTO: 318 K/UL (ref 164–446)
PMV BLD AUTO: 10.9 FL (ref 9–12.9)
POTASSIUM SERPL-SCNC: 4.3 MMOL/L (ref 3.6–5.5)
RBC # BLD AUTO: 4.76 M/UL (ref 4.2–5.4)
SODIUM SERPL-SCNC: 143 MMOL/L (ref 135–145)
TRIGL SERPL-MCNC: 88 MG/DL (ref 0–149)
WBC # BLD AUTO: 8 K/UL (ref 4.8–10.8)

## 2024-01-31 PROCEDURE — 82043 UR ALBUMIN QUANTITATIVE: CPT

## 2024-01-31 PROCEDURE — 36415 COLL VENOUS BLD VENIPUNCTURE: CPT

## 2024-01-31 PROCEDURE — 86803 HEPATITIS C AB TEST: CPT

## 2024-01-31 PROCEDURE — 80048 BASIC METABOLIC PNL TOTAL CA: CPT

## 2024-01-31 PROCEDURE — 85027 COMPLETE CBC AUTOMATED: CPT

## 2024-01-31 PROCEDURE — 82570 ASSAY OF URINE CREATININE: CPT

## 2024-01-31 PROCEDURE — 80061 LIPID PANEL: CPT

## 2024-01-31 NOTE — TELEPHONE ENCOUNTER
Called for Post -SP check up: Diagnostic medial branch blocks targeting the BILATERAL L3-4, L4-5 and L5-S1 facet joints with fluoroscopic guidance #2 with sedation     Change in pain: No    Concerns? No    Confirmed [facility] appt? Yes

## 2024-02-06 ENCOUNTER — OFFICE VISIT (OUTPATIENT)
Dept: PHYSICAL MEDICINE AND REHAB | Facility: MEDICAL CENTER | Age: 59
End: 2024-02-06
Payer: MEDICARE

## 2024-02-06 VITALS
TEMPERATURE: 96.8 F | BODY MASS INDEX: 28.42 KG/M2 | OXYGEN SATURATION: 96 % | WEIGHT: 170.6 LBS | DIASTOLIC BLOOD PRESSURE: 72 MMHG | SYSTOLIC BLOOD PRESSURE: 110 MMHG | HEIGHT: 65 IN | HEART RATE: 60 BPM

## 2024-02-06 DIAGNOSIS — M54.41 CHRONIC BILATERAL LOW BACK PAIN WITH RIGHT-SIDED SCIATICA: ICD-10-CM

## 2024-02-06 DIAGNOSIS — M54.16 LUMBAR RADICULOPATHY: ICD-10-CM

## 2024-02-06 DIAGNOSIS — M51.36 LUMBAR DEGENERATIVE DISC DISEASE: ICD-10-CM

## 2024-02-06 DIAGNOSIS — M79.7 FIBROMYALGIA: ICD-10-CM

## 2024-02-06 DIAGNOSIS — Z13.31 POSITIVE DEPRESSION SCREENING: ICD-10-CM

## 2024-02-06 DIAGNOSIS — G89.29 CHRONIC BILATERAL LOW BACK PAIN WITH RIGHT-SIDED SCIATICA: ICD-10-CM

## 2024-02-06 DIAGNOSIS — Z99.89 AMBULATES WITH CANE: ICD-10-CM

## 2024-02-06 DIAGNOSIS — G62.9 PERIPHERAL POLYNEUROPATHY: ICD-10-CM

## 2024-02-06 DIAGNOSIS — M43.16 SPONDYLOLISTHESIS OF LUMBAR REGION: ICD-10-CM

## 2024-02-06 DIAGNOSIS — M47.816 LUMBAR SPONDYLOSIS: ICD-10-CM

## 2024-02-06 DIAGNOSIS — M53.3 SI (SACROILIAC) JOINT DYSFUNCTION: ICD-10-CM

## 2024-02-06 DIAGNOSIS — Z91.81 RISK FOR FALLS: ICD-10-CM

## 2024-02-06 DIAGNOSIS — K21.9 GASTROESOPHAGEAL REFLUX DISEASE WITHOUT ESOPHAGITIS: ICD-10-CM

## 2024-02-06 DIAGNOSIS — K58.0 IRRITABLE BOWEL SYNDROME WITH DIARRHEA: ICD-10-CM

## 2024-02-06 DIAGNOSIS — G43.809 OTHER MIGRAINE WITHOUT STATUS MIGRAINOSUS, NOT INTRACTABLE: ICD-10-CM

## 2024-02-06 PROCEDURE — 1125F AMNT PAIN NOTED PAIN PRSNT: CPT | Performed by: PHYSICAL MEDICINE & REHABILITATION

## 2024-02-06 PROCEDURE — 99214 OFFICE O/P EST MOD 30 MIN: CPT | Performed by: PHYSICAL MEDICINE & REHABILITATION

## 2024-02-06 PROCEDURE — 3078F DIAST BP <80 MM HG: CPT | Performed by: PHYSICAL MEDICINE & REHABILITATION

## 2024-02-06 PROCEDURE — 3074F SYST BP LT 130 MM HG: CPT | Performed by: PHYSICAL MEDICINE & REHABILITATION

## 2024-02-06 RX ORDER — BACLOFEN 10 MG/1
10 TABLET ORAL 3 TIMES DAILY
Qty: 270 TABLET | Refills: 0 | Status: SHIPPED | OUTPATIENT
Start: 2024-02-06 | End: 2024-03-07 | Stop reason: SDUPTHER

## 2024-02-06 RX ORDER — MILNACIPRAN HYDROCHLORIDE 100 MG/1
1 TABLET, FILM COATED ORAL 2 TIMES DAILY
Qty: 180 TABLET | Refills: 0 | Status: SHIPPED | OUTPATIENT
Start: 2024-02-06 | End: 2024-03-07 | Stop reason: SDUPTHER

## 2024-02-06 RX ORDER — ONDANSETRON 4 MG/1
4 TABLET, ORALLY DISINTEGRATING ORAL EVERY 6 HOURS PRN
Qty: 15 TABLET | Refills: 0 | Status: SHIPPED | OUTPATIENT
Start: 2024-02-06 | End: 2024-03-07 | Stop reason: SDUPTHER

## 2024-02-06 RX ORDER — FAMOTIDINE 40 MG/1
40 TABLET, FILM COATED ORAL
Qty: 90 TABLET | Refills: 0 | Status: SHIPPED | OUTPATIENT
Start: 2024-02-06 | End: 2024-03-07 | Stop reason: SDUPTHER

## 2024-02-06 RX ORDER — TOPIRAMATE 50 MG/1
50 TABLET, FILM COATED ORAL 3 TIMES DAILY
Qty: 270 TABLET | Refills: 0 | Status: SHIPPED | OUTPATIENT
Start: 2024-02-06 | End: 2024-03-07 | Stop reason: SDUPTHER

## 2024-02-06 RX ORDER — GABAPENTIN 600 MG/1
600 TABLET ORAL 3 TIMES DAILY
Qty: 270 TABLET | Refills: 0 | Status: SHIPPED | OUTPATIENT
Start: 2024-02-06 | End: 2024-03-07 | Stop reason: SDUPTHER

## 2024-02-06 ASSESSMENT — PATIENT HEALTH QUESTIONNAIRE - PHQ9
5. POOR APPETITE OR OVEREATING: 0 - NOT AT ALL
CLINICAL INTERPRETATION OF PHQ2 SCORE: 2
SUM OF ALL RESPONSES TO PHQ QUESTIONS 1-9: 10

## 2024-02-06 ASSESSMENT — FIBROSIS 4 INDEX: FIB4 SCORE: 0.73

## 2024-02-06 ASSESSMENT — PAIN SCALES - GENERAL: PAINLEVEL: 8=MODERATE-SEVERE PAIN

## 2024-02-06 NOTE — TELEPHONE ENCOUNTER
Received request via: Pharmacy    Was the patient seen in the last year in this department? Yes    Does the patient have an active prescription (recently filled or refills available) for medication(s) requested? No    Pharmacy Name: [facility]    Does the patient have [facility] Plus and need 100 day supply (blood pressure, diabetes and cholesterol meds only)? Medication is not for cholesterol, blood pressure or diabetes

## 2024-02-06 NOTE — PROGRESS NOTES
"Follow up patient note  Interventional spine and Pain  Physiatry (physical medicine and  Rehabilitation)       Chief complaint:   Chief Complaint   Patient presents with    Back Pain     Post SP MBB 1 7 2 Casey L3-4 and L4-5          HISTORY    Please see new patient note by Dr Donaldson,  for more details.     HPI  Patient identification: Jaclyn Mejia ,  1965,   With Diagnoses of Lumbar spondylosis, Chronic bilateral low back pain with right-sided sciatica, Spondylolisthesis of lumbar region, SI (sacroiliac) joint dysfunction, Lumbar degenerative disc disease, Lumbar radiculopathy, Ambulates with cane, Peripheral polyneuropathy, Risk for falls, and Positive depression screening were pertinent to this visit.     Procedures  2024 Medial Branch Blocks targeting the bilateral  L3-4, L4-5, and L5-S1  facet joints with sedation  80% pain relief  2024 Medial Branch Blocks targeting the bilateral  L3-4, L4-5, and L5-S1  facet joints with sedation  no improvement     The patient noted that she had a difficult time relaxing her muscles even with sedation during the procedure.  She continues to have mostly bilateral axial low back pain.  She also has intermittent left leg and left hip and knee pain.  These pains are very and she notes that they are worse when her \"fibromyalgia is acting up.\"  Pain ranges from 5-8 /10 in intensity.  She states she is not interested in any future injections or surgeries or procedures.    past 6 months with comprehensive pain program, physical therapy, home exercise program, medication management.             ROS Red Flags :   Fever, Chills, Sweats: Denies  Involuntary Weight Loss: Denies  Bowel/Bladder Incontinence: Denies  Saddle Anesthesia: Denies        PMHx:   Past Medical History:   Diagnosis Date    Abdominal pain     Actinic keratosis 2018    Alcohol abuse     Allergy     Anxiety     Arthritis     Back pain     Back pain     Bronchitis     Chronic pain syndrome "     Constipation     COPD (chronic obstructive pulmonary disease) (HCC)     Cough     Daytime sleepiness     DDD (degenerative disc disease), cervical     DDD (degenerative disc disease), thoracolumbar     Depression     Diarrhea     Diverticulosis     Dizziness     Essential tremor     Fatigue     Fibromyalgia     IBS (irritable bowel syndrome)     Insomnia     Migraine     Nausea     Painful joint     Pulmonary emphysema (HCC)     Restless leg syndrome     Ringing in ears     Snoring     SOB (shortness of breath)     Sore muscles     Sweat, sweating, excessive     Weakness     Wears glasses        PSHx:   Past Surgical History:   Procedure Laterality Date    LUMBAR MEDIAL BRANCH BLOCKS Bilateral 1/30/2024    Procedure: Diagnostic medial branch blocks targeting the BILATERAL L3-4, L4-5 and L5-S1 facet joints with fluoroscopic guidance #2 with sedation.   Note: plan on versed 1mg.  Diagnostic medial branch block #2 is only be done if procedure #1 is a positive block.  This will be on a separate date from procedure #1.;  Surgeon: Elmo Donaldson M.D.;  [facility];  Service: P    LUMBAR MEDIAL BRANCH BLOCKS  1/23/2024    Procedure: Diagnostic medial branch blocks targeting the BILATERAL L3-4, L4-5 and L5-S1 facet joints with fluoroscopic guidance #1 with sedation.    Note: plan on versed 1mg.;  Surgeon: Elmo Donaldson M.D.;  [facility];  Service: Pain Management    ABDOMINAL HYSTERECTOMY TOTAL      CHOLECYSTECTOMY      HYSTERECTOMY LAPAROSCOPY      OTHER ABDOMINAL SURGERY      TONSILLECTOMY         Family history   Family History   Problem Relation Age of Onset    Lung Disease Mother         copd    Cancer Mother         basal/squamous    Hypertension Mother     Hyperlipidemia Mother     Stroke Mother     Alcohol abuse Mother     Heart Disease Father         HF    Arthritis Father         rheumatoid    Lung Disease Father         emphysema    Alcohol abuse  Father     Cancer Sister     Alcohol abuse Sister     Cancer Maternal Grandfather         Bladder or prostate?    Cancer Sister         pre cancerous spots     Alcohol abuse Sister     Kidney Disease Sister     Alcohol abuse Sister     Hypertension Sister     Alcohol abuse Sister          Medications:   Outpatient Medications Marked as Taking for the 2/6/24 encounter (Office Visit) with Elmo Donaldson M.D.   Medication Sig Dispense Refill    baclofen (LIORESAL) 10 MG Tab Take 1 Tablet by mouth 3 times a day. 270 Tablet 0    famotidine (PEPCID) 40 MG Tab Take 1 Tablet by mouth every day. 90 Tablet 0    Milnacipran HCl (SAVELLA) 100 MG Tab Take 1 Tablet by mouth 2 times a day. 180 Tablet 0    ondansetron (ZOFRAN ODT) 4 MG TABLET DISPERSIBLE Take 1 Tablet by mouth every 6 hours as needed for Nausea/Vomiting. 15 Tablet 0    topiramate (TOPAMAX) 50 MG tablet Take 1 Tablet by mouth 3 times a day. 270 Tablet 0    gabapentin (NEURONTIN) 600 MG tablet Take 1 Tablet by mouth 3 times a day. 270 Tablet 0    MITIGARE 0.6 MG Cap TAKE 1 CAPSULE BY MOUTH EVERY DAY 90 Capsule 3    clindamycin (CLEOCIN) 2 % vaginal cream Use vaginally after sexual activity for irritation and odor related to BV. 40 g 0    dicyclomine (BENTYL) 20 MG Tab TAKE 1 TABLET BY MOUTH EVERY 6 HOURS AS NEEDED (IBS). 360 Tablet 1    Multiple Vitamin (MULTIVITAMIN ADULT PO)       NON SPECIFIED Take  by mouth. Flax oil gel cap, one capsule daily      diclofenac sodium (VOLTAREN) 1 % Gel Apply 2 g topically 4 times a day as needed (pain). 100 g 0    Omega-3 Fatty Acids (FISH OIL) 1000 MG Cap capsule Take 1,000 mg by mouth 1 time a day as needed.      magnesium oxide (MAG-OX) 400 MG Tab tablet Take 400 mg by mouth every day.      Loratadine (CLARITIN PO) Take 1 tablet by mouth every day.      hydrOXYzine HCl (ATARAX) 25 MG Tab Take 1 Tab by mouth 3 times a day as needed for Anxiety. 90 Tab 0        Current Outpatient Medications on File Prior to Visit   Medication  Sig Dispense Refill    MITIGARE 0.6 MG Cap TAKE 1 CAPSULE BY MOUTH EVERY DAY 90 Capsule 3    clindamycin (CLEOCIN) 2 % vaginal cream Use vaginally after sexual activity for irritation and odor related to BV. 40 g 0    dicyclomine (BENTYL) 20 MG Tab TAKE 1 TABLET BY MOUTH EVERY 6 HOURS AS NEEDED (IBS). 360 Tablet 1    Multiple Vitamin (MULTIVITAMIN ADULT PO)       NON SPECIFIED Take  by mouth. Flax oil gel cap, one capsule daily      diclofenac sodium (VOLTAREN) 1 % Gel Apply 2 g topically 4 times a day as needed (pain). 100 g 0    Omega-3 Fatty Acids (FISH OIL) 1000 MG Cap capsule Take 1,000 mg by mouth 1 time a day as needed.      magnesium oxide (MAG-OX) 400 MG Tab tablet Take 400 mg by mouth every day.      Loratadine (CLARITIN PO) Take 1 tablet by mouth every day.      hydrOXYzine HCl (ATARAX) 25 MG Tab Take 1 Tab by mouth 3 times a day as needed for Anxiety. 90 Tab 0     No current facility-administered medications on file prior to visit.         Allergies:   Allergies   Allergen Reactions    Erythromycin Rash     Chest rash    Tetracycline Rash     Chest rash       Social Hx:   Social History     Socioeconomic History    Marital status: Single     Spouse name: Not on file    Number of children: Not on file    Years of education: Not on file    Highest education level: Associate degree: occupational, technical, or vocational program   Occupational History    Not on file   Tobacco Use    Smoking status: Every Day     Current packs/day: 0.00     Average packs/day: 1 pack/day for 35.0 years (35.0 ttl pk-yrs)     Types: Cigarettes     Start date: 1987     Last attempt to quit: 2022     Years since quittin.6    Smokeless tobacco: Never   Vaping Use    Vaping Use: Every day    Substances: THC, CBD    Devices: Disposable, Pre-filled or refillable cartridge, Refillable tank, Pre-filled pod   Substance and Sexual Activity    Alcohol use: Yes     Comment: occasionally    Drug use: Yes     Frequency: 7.0 times  "per week     Types: Marijuana, Inhaled, Oral     Comment: Only canibus medicinal for anti spasm, anxiety, ibs, inflamm    Sexual activity: Not Currently     Partners: Male, Female     Comment: currently with male   Other Topics Concern    Not on file   Social History Narrative    Initial Intake Date:  Last Updated:        Financial situation:    SSD $1570/mo w/ $450 savings. We did discuss care credit in regards to behavioral health treatment interventions. Jaclyn        Food resources & insecurities:    Deferred.         Housing & environmental:    Resides in [place] in [facility] ([address]).         Transportation:    Deferred.        Family dynamics & family/friend social supports:    Pt has local family to include 86-year-old mother and sister. She reports family are functioning alcoholics but are supportive in her sobriety. They will provide alternative alcohol free drinks at gatherings. Pt has son currently in inpatient treatment for alcohol use.         Pt has significant other who resides in the same [facility]. He is also pursuing sobriety w/ Jaclyn.        ADLs/IADLs & associated diagnoses:    Pt's ADLs/IADL have previously been affected by her alcohol use. They have cause incontinent issues in the past as well as extended periods of vomiting.         Advanced life planning:    Not on file.         Behavioral/Mental:    Associated diagnoses include migraine without status migrainosus, not intractable; insomnia due to medical condition; recurrent major depressive disorder; alcoholism; deliberate self-cutting; and obesity.        Pt has extensive history of substance use and recovery. In her young adult years pt used methamphatmine for aprox 1 year until she sought treatment. At this time she was mother to 2 children. She attended both AA/NA and \"[facility] in the [facility] area. She became sober \"[place]\" and maintained sobriety for 16 years. Pt then returned to alcohol use " "via social drinking; she maintained a job at this time and managed 1 glass wine at events. Pt's daughter then left the home (at age 21) which affected her drinking; at that time she also became disabled. Pt relapsed on methamphetamine with use over 2 years w/ a 9 month period of sobriety. Pt then moved to [place] where her drinking increased (vodka). Pt reports medical symptoms from drinking include every other day vomitting, a period of GI/rectal bleeding and hypertensive events. Per chart review pt drinking 5th vodka daily. Pt's last drink 3/7/2021. She is pursuing sobriety with her significant other, Bradly. Pt is 6 days sober at time of assessment (3/12/2021). She is \"tired of being sick and tired.\"        Pt's reservations include attending inpatient; she would need to take her pet dog with her as no one else can care for the dog. Dog is  animal. And the allotment of time required for intensive out patient (3 days a week); she is concerned her fibromyalgia will affect her attendance. Thre is a general concern of cost which may direct pt towards low income treatment options.         Assessments completed by :    n/a        Collaterals:     Social Determinants of Health     Financial Resource Strain: Medium Risk (1/29/2024)    Overall Financial Resource Strain (CARDIA)     Difficulty of Paying Living Expenses: Somewhat hard   Food Insecurity: No Food Insecurity (1/29/2024)    Hunger Vital Sign     Worried About Running Out of Food in the Last Year: Never true     Ran Out of Food in the Last Year: Never true   Transportation Needs: No Transportation Needs (1/29/2024)    PRAPARE - Transportation     Lack of Transportation (Medical): No     Lack of Transportation (Non-Medical): No   Physical Activity: Inactive (9/8/2023)    Exercise Vital Sign     Days of Exercise per Week: 0 days     Minutes of Exercise per Session: 0 min   Stress: Stress Concern Present (9/8/2023)    [facility] of " "Occupational Health - Occupational Stress Questionnaire     Feeling of Stress : Rather much   Social Connections: Moderately Integrated (9/8/2023)    Social Connection and Isolation Panel [NHANES]     Frequency of Communication with Friends and Family: More than three times a week     Frequency of Social Gatherings with Friends and Family: Once a week     Attends Latter-day Services: More than 4 times per year     Active Member of Clubs or Organizations: Yes     Attends Club or Organization Meetings: More than 4 times per year     Marital Status:    Intimate Partner Violence: Not on file   Housing Stability: Low Risk  (1/29/2024)    Housing Stability Vital Sign     Unable to Pay for Housing in the Last Year: No     Number of Places Lived in the Last Year: 1     Unstable Housing in the Last Year: No         EXAMINATION     Physical Exam:   Vitals: /72 (BP Location: Right arm, Patient Position: Sitting, BP Cuff Size: Adult)   Pulse 60   Temp 36 °C (96.8 °F) (Temporal)   Ht 1.651 m (5' 5\")   Wt 77.4 kg (170 lb 9.6 oz)   SpO2 96%     Constitutional:   Body Habitus: Body mass index is 28.39 kg/m².  Cooperation: Fully cooperates with exam  Appearance: Well-groomed no disheveled    Respiratory-  breathing comfortable on room air, no audible wheezing  Cardiovascular- capillary refills less than 2 seconds. No lower extremity edema is noted.   Psychiatric- alert and oriented ×3. Normal affect.    MSK and Neuro: -    Thoracic/Lumbar Spine/Sacral Spine/Hips   There are no signs of infection around the injection sites.   decreased active range of motion with flexion, lateral flexion, and rotation bilaterally.   There is decreased active range of motion with lumbar extension.    There is  pain with lumbar extension.   There is pain with facet loading maneuver (extension rotation) with axial low back pain on the BILATERAL side(s) L3-4, L4-5, and L5-S1      Palpation:   No tenderness to palpation in midline at " "T1-T12 levels. No tenderness to palpation in the left and right of the midline T1-L5, NEGATIVE for tenderness to palpation to the para-midline region in the lower lumbar levels.  palpation over SI joint: negative bilaterally    palpation in hip or over the gluteus medius tendon insertion: negative bilaterally      Lumbar spine Special tests  Neuro tension  Straight leg test negative bilaterally    Slump test negative bilaterally      Key points for the international standards for neurological classification of spinal cord injury (ISNCSCI) to light touch.     Dermatome R L                                      L2 2 2   L3 2 2   L4 2 2   L5 2 2   S1 2 2   S2 2 2         Motor Exam Lower Extremities    ? Myotome R L   Hip flexion L2 5 5   Knee extension L3 5 5   Ankle dorsiflexion L4 5 5   Toe extension L5 5 5   Ankle plantarflexion S1 5 5           MEDICAL DECISION MAKING    DATA    Labs:   Lab Results   Component Value Date/Time    SODIUM 143 01/31/2024 10:20 AM    POTASSIUM 4.3 01/31/2024 10:20 AM    CHLORIDE 108 01/31/2024 10:20 AM    CO2 22 01/31/2024 10:20 AM    GLUCOSE 81 01/31/2024 10:20 AM    BUN 15 01/31/2024 10:20 AM    CREATININE 1.06 01/31/2024 10:20 AM        No results found for: \"PROTHROMBTM\", \"INR\"     Lab Results   Component Value Date/Time    WBC 8.0 01/31/2024 10:20 AM    RBC 4.76 01/31/2024 10:20 AM    HEMOGLOBIN 15.1 01/31/2024 10:20 AM    HEMATOCRIT 45.4 01/31/2024 10:20 AM    MCV 95.4 01/31/2024 10:20 AM    MCH 31.7 01/31/2024 10:20 AM    MCHC 33.3 01/31/2024 10:20 AM    MPV 10.9 01/31/2024 10:20 AM    NEUTSPOLYS 46.90 07/29/2023 01:22 PM    LYMPHOCYTES 45.70 (H) 07/29/2023 01:22 PM    MONOCYTES 5.00 07/29/2023 01:22 PM    EOSINOPHILS 1.40 07/29/2023 01:22 PM    BASOPHILS 0.60 07/29/2023 01:22 PM        Lab Results   Component Value Date/Time    HBA1C 5.5 06/30/2023 02:57 PM          Imaging:   I personally reviewed following images      MRI lumbar spine 8/31/2023  There are no areas of " high-grade central canal stenosis.  No areas of high-grade foraminal stenosis.  There is degenerative changes and degenerative disc disease at L3-4, L4-5, L5-S1.  There is facet arthropathy bilaterally at L3-4, L4-5, L5-S1.      I reviewed the following radiology reports                         Results for orders placed during the hospital encounter of 08/31/23    MR-LUMBAR SPINE-W/O    Impression  Mild degenerative disease in the lumbar spine as described above.        Results for orders placed during the hospital encounter of 08/31/23    MR-LUMBAR SPINE-W/O    Impression  Mild degenerative disease in the lumbar spine as described above.                                                                                            Results for orders placed during the hospital encounter of 08/25/21    DX-CHEST-2 VIEWS    Impression  1.  No acute cardiopulmonary disease evident.  2.  No acute findings and no significant change from 8/31/2020.       Results for orders placed during the hospital encounter of 12/30/21    DX-FINGER(S) 2+ LEFT    Impression  1.  Cystlike lucency of the scaphoid could be a pseudocyst related to remote trauma or could be a small intraosseous ganglion or other bone cyst  2.  Degenerative changes of the first carpal metacarpal joint                 Results for orders placed during the hospital encounter of 08/23/22    DX-LUMBAR SPINE-2 OR 3 VIEWS    Impression  1.  No compression deformity or acute fracture is identified.    2.  Grade 1 anterolisthesis at L3-L4.    3.  Mild degenerative disc disease and facet arthropathy.                  Results for orders placed during the hospital encounter of 12/30/21    DX-WRIST-COMPLETE 3+ LEFT    Impression  1.  Cystlike lucency of the scaphoid could be a pseudocyst related to remote trauma or could be a small intraosseous ganglion or other bone cyst  2.  Degenerative changes of the first carpal metacarpal joint         DIAGNOSIS   Visit Diagnoses      ICD-10-CM   1. Lumbar spondylosis  M47.816   2. Chronic bilateral low back pain with right-sided sciatica  M54.41    G89.29   3. Spondylolisthesis of lumbar region  M43.16   4. SI (sacroiliac) joint dysfunction  M53.3   5. Lumbar degenerative disc disease  M51.36   6. Lumbar radiculopathy  M54.16   7. Ambulates with cane  Z99.89   8. Peripheral polyneuropathy  G62.9   9. Risk for falls  Z91.81   10. Positive depression screening  Z13.31           ASSESSMENT and PLAN:     Jaclyn Mejia  1965 female      Jaclyn was seen today for back pain.    Diagnoses and all orders for this visit:    Lumbar spondylosis  -     Referral back to PCP    Chronic bilateral low back pain with right-sided sciatica  -     Referral back to PCP    Spondylolisthesis of lumbar region  -     Referral back to PCP    SI (sacroiliac) joint dysfunction  -     Referral back to PCP    Lumbar degenerative disc disease  -     Referral back to PCP    Lumbar radiculopathy  -     Referral back to PCP    Ambulates with cane  -     Referral back to PCP    Peripheral polyneuropathy  -     Referral back to PCP    Risk for falls  -     Referral back to PCP    Positive depression screening  -     Patient has been identified as having a positive depression screening. Appropriate orders and counseling have been given.  -     Referral back to PCP      No improvement with medial branch block #2.  This was a negative block.  Because of this patient is not a candidate for radiofrequency neurotomy.  She also wants to avoid all future injections and procedures.    The patient is optimized from an interventional pain standpoint.    Medications: Continue gabapentin.  I also recommend turmeric with black pepper.  PCP to consider Cymbalta which could be helpful for pain and mood.  However given the patient's complicated psychiatric history this would likely be best to come from psychiatry.         Follow up: I am referring the patient back to her PCP.       Thank you for allowing me to participate in the care of this patient. If you have any questions please not hesitate to contact me.             Please note that this dictation was created using voice recognition software. I have made every reasonable attempt to correct obvious errors but there may be errors of grammar and content that I may have overlooked prior to finalization of this note.      Elmo Donaldson MD  Interventional Spine and Sports Physiatry  Physical Medicine and Rehabilitation  [facility]

## 2024-02-12 NOTE — PROGRESS NOTES
Subjective     Jaclyn Mejia is a 59 y.o. female who presents with No chief complaint on file.            HPI  Patient seen today for initial lung cancer screening visit. Patient referred by her PCP, Magdiel GAFFNEY.     The patient meets eligibility criteria including age, smoking history (20+ pack years), if former smoker, quit in the last 15 years, and absence of signs or symptoms of lung cancer.    - Age - 59  - Smoking history - Patient has smoked for *** years at an average of *** ppd = *** pack year smoking history.  - Current smoking status - ***  - No symptoms of lung cancer and no previous history of lung cancer     Allergies   Allergen Reactions    Erythromycin Rash     Chest rash    Tetracycline Rash     Chest rash     Current Outpatient Medications on File Prior to Visit   Medication Sig Dispense Refill    baclofen (LIORESAL) 10 MG Tab Take 1 Tablet by mouth 3 times a day. 270 Tablet 0    famotidine (PEPCID) 40 MG Tab Take 1 Tablet by mouth every day. 90 Tablet 0    Milnacipran HCl (SAVELLA) 100 MG Tab Take 1 Tablet by mouth 2 times a day. 180 Tablet 0    ondansetron (ZOFRAN ODT) 4 MG TABLET DISPERSIBLE Take 1 Tablet by mouth every 6 hours as needed for Nausea/Vomiting. 15 Tablet 0    topiramate (TOPAMAX) 50 MG tablet Take 1 Tablet by mouth 3 times a day. 270 Tablet 0    gabapentin (NEURONTIN) 600 MG tablet Take 1 Tablet by mouth 3 times a day. 270 Tablet 0    MITIGARE 0.6 MG Cap TAKE 1 CAPSULE BY MOUTH EVERY DAY 90 Capsule 3    clindamycin (CLEOCIN) 2 % vaginal cream Use vaginally after sexual activity for irritation and odor related to BV. 40 g 0    dicyclomine (BENTYL) 20 MG Tab TAKE 1 TABLET BY MOUTH EVERY 6 HOURS AS NEEDED (IBS). 360 Tablet 1    Multiple Vitamin (MULTIVITAMIN ADULT PO)       NON SPECIFIED Take  by mouth. Flax oil gel cap, one capsule daily      diclofenac sodium (VOLTAREN) 1 % Gel Apply 2 g topically 4 times a day as needed (pain). 100 g 0    Omega-3 Fatty Acids  (FISH OIL) 1000 MG Cap capsule Take 1,000 mg by mouth 1 time a day as needed.      magnesium oxide (MAG-OX) 400 MG Tab tablet Take 400 mg by mouth every day.      Loratadine (CLARITIN PO) Take 1 tablet by mouth every day.      hydrOXYzine HCl (ATARAX) 25 MG Tab Take 1 Tab by mouth 3 times a day as needed for Anxiety. 90 Tab 0     No current facility-administered medications on file prior to visit.       ROS           Objective     LMP 12/01/2000      Physical Exam                        Assessment & Plan        There are no diagnoses linked to this encounter.     We conducted a shared decision-making process using a decision aid. We reviewed benefits and harms of screening, including false positives and potential need for additional diagnostic testing, the possibility of over diagnosis, and total radiation exposure.    We discussed the importance of adhering to annual LDCT screening. We also discussed the impact of comorbities on the patient's the ability or willingness to undergo diagnostic procedure(s) and treatment.    {AMB SHARED DECISION LUNG CANCER SCREENING SMOKING ABSTINENCE:[id number]}    Based on our discussion, we have decided {AMB SHARED DECISION LUNG CANCER SCREENING OUTCOMES:[id number]}    No follow-up needed unless imaging is abnormal

## 2024-02-13 ENCOUNTER — APPOINTMENT (OUTPATIENT)
Dept: SLEEP MEDICINE | Facility: MEDICAL CENTER | Age: 59
End: 2024-02-13
Attending: FAMILY MEDICINE
Payer: MEDICARE

## 2024-02-16 ENCOUNTER — PATIENT MESSAGE (OUTPATIENT)
Dept: HEALTH INFORMATION MANAGEMENT | Facility: OTHER | Age: 59
End: 2024-02-16

## 2024-02-16 ENCOUNTER — NON-PROVIDER VISIT (OUTPATIENT)
Dept: SLEEP MEDICINE | Facility: MEDICAL CENTER | Age: 59
End: 2024-02-16
Attending: PHYSICIAN ASSISTANT
Payer: MEDICARE

## 2024-02-16 ENCOUNTER — PATIENT OUTREACH (OUTPATIENT)
Dept: HEALTH INFORMATION MANAGEMENT | Facility: OTHER | Age: 59
End: 2024-02-16

## 2024-02-16 VITALS — HEIGHT: 66 IN | WEIGHT: 168 LBS | BODY MASS INDEX: 27 KG/M2

## 2024-02-16 DIAGNOSIS — I10 ESSENTIAL HYPERTENSION: ICD-10-CM

## 2024-02-16 DIAGNOSIS — J44.9 CHRONIC OBSTRUCTIVE PULMONARY DISEASE, UNSPECIFIED COPD TYPE (HCC): ICD-10-CM

## 2024-02-16 DIAGNOSIS — N18.32 STAGE 3B CHRONIC KIDNEY DISEASE: ICD-10-CM

## 2024-02-16 DIAGNOSIS — Z91.81 RISK FOR FALLS: ICD-10-CM

## 2024-02-16 DIAGNOSIS — J44.89 COPD (CHRONIC OBSTRUCTIVE PULMONARY DISEASE) WITH CHRONIC BRONCHITIS (HCC): ICD-10-CM

## 2024-02-16 DIAGNOSIS — R00.1 BRADYCARDIA: ICD-10-CM

## 2024-02-16 PROCEDURE — 94060 EVALUATION OF WHEEZING: CPT | Mod: 26 | Performed by: STUDENT IN AN ORGANIZED HEALTH CARE EDUCATION/TRAINING PROGRAM

## 2024-02-16 PROCEDURE — 94729 DIFFUSING CAPACITY: CPT | Performed by: PHYSICIAN ASSISTANT

## 2024-02-16 PROCEDURE — 94726 PLETHYSMOGRAPHY LUNG VOLUMES: CPT | Mod: 26 | Performed by: STUDENT IN AN ORGANIZED HEALTH CARE EDUCATION/TRAINING PROGRAM

## 2024-02-16 PROCEDURE — 94060 EVALUATION OF WHEEZING: CPT | Performed by: PHYSICIAN ASSISTANT

## 2024-02-16 PROCEDURE — 94729 DIFFUSING CAPACITY: CPT | Mod: 26 | Performed by: STUDENT IN AN ORGANIZED HEALTH CARE EDUCATION/TRAINING PROGRAM

## 2024-02-16 PROCEDURE — 94726 PLETHYSMOGRAPHY LUNG VOLUMES: CPT | Performed by: PHYSICIAN ASSISTANT

## 2024-02-16 ASSESSMENT — PULMONARY FUNCTION TESTS
FEV1: 2.95
FEV1/FVC_PREDICTED: 79
FVC_PREDICTED: 3.46
FEV1: 3
FEV1/FVC_PERCENT_PREDICTED: 79
FEV1_PERCENT_CHANGE: -1
FEV1/FVC_PERCENT_PREDICTED: 115
FVC: 3.34
FEV1/FVC: 89.94
FEV1_PREDICTED: 2.72
FEV1/FVC_PERCENT_PREDICTED: 113
FEV1/FVC: 90
FEV1/FVC_PERCENT_PREDICTED: 113
FEV1_PERCENT_CHANGE: -1
FEV1/FVC: 90
FEV1/FVC_PERCENT_PREDICTED: 115
FVC: 3.28
FVC_LLN: 2.89
FEV1/FVC_PERCENT_LLN: 66
FVC_PERCENT_PREDICTED: 94
FEV1/FVC_PERCENT_CHANGE: 0
FVC_PERCENT_PREDICTED: 96
FEV1_PERCENT_PREDICTED: 108
FEV1_LLN: 2.27
FEV1_PERCENT_PREDICTED: 110
FEV1/FVC: 90
FEV1/FVC_PERCENT_CHANGE: 100

## 2024-02-16 ASSESSMENT — FIBROSIS 4 INDEX: FIB4 SCORE: 0.73

## 2024-02-16 NOTE — PROCEDURES
Tech: Mikki Holguin, RT  Good patient effort & cooperation.  Test was performed on the [employer] Body Plethysmograph- Elite DX system.  The predicted sets used for Spirometry are GLI-2012, for Lung Volumes are ITS, and for DLCO is GLI 2017.  The results of this test meet the ATS standards for acceptability and repeatability.  The DLCO was uncorrected for Hb.  A bronchodilator of Ventolin HFA 2 puffs via spacer was administered.  DLCO was performed during dilation period.  IV less than 90%.    Interpretation:    There is no significant obstruction or restriction.  No significant bronchodilator response.  Normal diffusion capacity.  The MVV is reduced disproportionately to the degree of reduction in FEV1.  The reduced MVV may reflect optimal effort, neuromuscular disease or upper airway obstruction.  Recommend clinical correlation and can get maximal inspiratory pressures and maximal expiratory pressures for further workup if indicated.

## 2024-02-16 NOTE — PROGRESS NOTES
"First attempt made for February [facility] follow up. She is having lunch with her mother and will call when she is available.     2/26/24:    Second attempt made to contact patient for February [facility] follow up. No answer. Message left with contact information. I will send her informational article by mail and will addend this note if she returns my call in the next 3 days. I will attempt another contact in March    4:40 pm:   Assessment    I spoke to the patient this afternoon for her monthly [facility] follow up. The patient is aware of future appointment dates and times. The patient is aware of care gaps. The patient denies any recent falls or injuries. The patient states that she is mostly in a \"waiting game,\" right now. She states that she has done the portable cardiac monitor and is waiting for the provider to contact her about the results. She is also waiting to hear back from pulmonology regarding if she needs to be seen sooner based on her PFT results. The patient denies any acute medical concerns at this time. She has her lung cancer screening tomorrow.     Education    *Article on kidney friendly foods sent by mail    Plan of Care and Goals    *Remain free from falls, injuries  *Healthy nutrition, hydration, and activity  *Follow up with PCP, specialists as indicated    Barriers:    *Management of acute and chronic medical concerns.     Progress:    Progressing    Next outreach:  One Month.                            "

## 2024-02-20 NOTE — PROGRESS NOTES
Subjective     Jaclyn Mejia is a 59 y.o. female who presents with Lung Cancer Screening Program Prescreen and Nicotine Dependence          HPI  Patient seen today for initial lung cancer screening visit. Patient referred by her PCP, Magdiel GAFFNEY.     The patient meets eligibility criteria including age, smoking history (20+ pack years), if former smoker, quit in the last 15 years, and absence of signs or symptoms of lung cancer.    - Age - 59  - Smoking history - Patient has smoked for 44 years at an average of 1 ppd = 44 pack year smoking history.  - Current smoking status - current smoker  - No symptoms of lung cancer and no previous history of lung cancer     Allergies   Allergen Reactions    Erythromycin Rash     Chest rash    Tetracycline Rash     Chest rash     Current Outpatient Medications on File Prior to Visit   Medication Sig Dispense Refill    baclofen (LIORESAL) 10 MG Tab Take 1 Tablet by mouth 3 times a day. 270 Tablet 0    famotidine (PEPCID) 40 MG Tab Take 1 Tablet by mouth every day. 90 Tablet 0    Milnacipran HCl (SAVELLA) 100 MG Tab Take 1 Tablet by mouth 2 times a day. 180 Tablet 0    ondansetron (ZOFRAN ODT) 4 MG TABLET DISPERSIBLE Take 1 Tablet by mouth every 6 hours as needed for Nausea/Vomiting. 15 Tablet 0    topiramate (TOPAMAX) 50 MG tablet Take 1 Tablet by mouth 3 times a day. 270 Tablet 0    gabapentin (NEURONTIN) 600 MG tablet Take 1 Tablet by mouth 3 times a day. 270 Tablet 0    MITIGARE 0.6 MG Cap TAKE 1 CAPSULE BY MOUTH EVERY DAY 90 Capsule 3    clindamycin (CLEOCIN) 2 % vaginal cream Use vaginally after sexual activity for irritation and odor related to BV. 40 g 0    dicyclomine (BENTYL) 20 MG Tab TAKE 1 TABLET BY MOUTH EVERY 6 HOURS AS NEEDED (IBS). 360 Tablet 1    Multiple Vitamin (MULTIVITAMIN ADULT PO)       NON SPECIFIED Take  by mouth. Flax oil gel cap, one capsule daily      diclofenac sodium (VOLTAREN) 1 % Gel Apply 2 g topically 4 times a day as  "needed (pain). 100 g 0    Omega-3 Fatty Acids (FISH OIL) 1000 MG Cap capsule Take 1,000 mg by mouth 1 time a day as needed.      magnesium oxide (MAG-OX) 400 MG Tab tablet Take 400 mg by mouth every day.      Loratadine (CLARITIN PO) Take 1 tablet by mouth every day.      hydrOXYzine HCl (ATARAX) 25 MG Tab Take 1 Tab by mouth 3 times a day as needed for Anxiety. 90 Tab 0     No current facility-administered medications on file prior to visit.       Review of Systems   Constitutional:  Positive for weight loss. Negative for chills and fever.        She is purposefully working to lose weight with diet and exercise   Respiratory:  Positive for cough and sputum production. Negative for hemoptysis, shortness of breath and wheezing.         Chronic, cough with with phlegm for years without recent worsening   Cardiovascular:  Positive for palpitations. Negative for chest pain.        Chronic, intermittent palpitation for about 1-2 years for which she is seeing cardiology.  Symptoms are not worsening            Objective     /72 (BP Location: Right arm, Patient Position: Sitting, BP Cuff Size: Adult)   Pulse 94   Resp 15   Ht 1.651 m (5' 5\")   Wt 75.8 kg (167 lb)   LMP 12/01/2000   SpO2 97%   BMI 27.79 kg/m²      Physical Exam  Constitutional:       Appearance: Normal appearance.   Cardiovascular:      Rate and Rhythm: Normal rate and regular rhythm.   Pulmonary:      Effort: Pulmonary effort is normal.      Breath sounds: Normal breath sounds.   Musculoskeletal:         General: No swelling.   Neurological:      Mental Status: She is alert.                 Assessment & Plan        1. Cigarette smoker  CT-LUNG CANCER-SCREENING             We conducted a shared decision-making process using a decision aid. We reviewed benefits and harms of screening, including false positives and potential need for additional diagnostic testing, the possibility of over diagnosis, and total radiation exposure.    We discussed the " importance of adhering to annual LDCT screening. We also discussed the impact of comorbities on the patient's the ability or willingness to undergo diagnostic procedure(s) and treatment.    Counseling on the importance of maintaining cigarette smoking abstinence if former smoker; or the importance of smoking cessation if current smoker and, if appropriate, furnishing of information about tobacco cessation interventions.    Based on our discussion, we have decided to begin annual lung cancer screening starting now.    No follow-up needed unless imaging is abnormal

## 2024-02-26 ENCOUNTER — TELEPHONE (OUTPATIENT)
Dept: CARDIOLOGY | Facility: MEDICAL CENTER | Age: 59
End: 2024-02-26
Payer: MEDICARE

## 2024-02-27 ENCOUNTER — OFFICE VISIT (OUTPATIENT)
Dept: SLEEP MEDICINE | Facility: MEDICAL CENTER | Age: 59
End: 2024-02-27
Attending: FAMILY MEDICINE
Payer: MEDICARE

## 2024-02-27 VITALS
SYSTOLIC BLOOD PRESSURE: 102 MMHG | WEIGHT: 167 LBS | HEART RATE: 94 BPM | BODY MASS INDEX: 27.82 KG/M2 | DIASTOLIC BLOOD PRESSURE: 72 MMHG | RESPIRATION RATE: 15 BRPM | OXYGEN SATURATION: 97 % | HEIGHT: 65 IN

## 2024-02-27 DIAGNOSIS — F17.210 CIGARETTE SMOKER: ICD-10-CM

## 2024-02-27 PROCEDURE — G0296 VISIT TO DETERM LDCT ELIG: HCPCS | Performed by: FAMILY MEDICINE

## 2024-02-27 PROCEDURE — 93248 EXT ECG>7D<15D REV&INTERPJ: CPT | Performed by: INTERNAL MEDICINE

## 2024-02-27 PROCEDURE — 3078F DIAST BP <80 MM HG: CPT | Performed by: FAMILY MEDICINE

## 2024-02-27 PROCEDURE — 3074F SYST BP LT 130 MM HG: CPT | Performed by: FAMILY MEDICINE

## 2024-02-27 ASSESSMENT — FIBROSIS 4 INDEX: FIB4 SCORE: 0.73

## 2024-02-27 ASSESSMENT — ENCOUNTER SYMPTOMS
PALPITATIONS: 1
CHILLS: 0
WHEEZING: 0
SHORTNESS OF BREATH: 0
SPUTUM PRODUCTION: 1
WEIGHT LOSS: 1
COUGH: 1
FEVER: 0
HEMOPTYSIS: 0

## 2024-02-27 NOTE — Clinical Note
Thank you for referring Jaclyn to the Lung Cancer Screening program.  I enrolled her today. I will update you re: abnormal findings. -Dr. Ladonna Ocampo

## 2024-02-29 ENCOUNTER — OFFICE VISIT (OUTPATIENT)
Dept: SLEEP MEDICINE | Facility: MEDICAL CENTER | Age: 59
End: 2024-02-29
Attending: PHYSICIAN ASSISTANT
Payer: MEDICARE

## 2024-02-29 ENCOUNTER — HOSPITAL ENCOUNTER (OUTPATIENT)
Dept: RADIOLOGY | Facility: MEDICAL CENTER | Age: 59
End: 2024-02-29
Attending: FAMILY MEDICINE
Payer: MEDICARE

## 2024-02-29 VITALS
WEIGHT: 167 LBS | BODY MASS INDEX: 27.82 KG/M2 | HEART RATE: 71 BPM | SYSTOLIC BLOOD PRESSURE: 102 MMHG | OXYGEN SATURATION: 96 % | DIASTOLIC BLOOD PRESSURE: 60 MMHG | HEIGHT: 65 IN

## 2024-02-29 DIAGNOSIS — F17.210 CIGARETTE SMOKER: ICD-10-CM

## 2024-02-29 DIAGNOSIS — J43.9 PULMONARY EMPHYSEMA, UNSPECIFIED EMPHYSEMA TYPE (HCC): ICD-10-CM

## 2024-02-29 DIAGNOSIS — R06.09 DYSPNEA ON EXERTION: ICD-10-CM

## 2024-02-29 DIAGNOSIS — F17.200 TOBACCO DEPENDENCE: ICD-10-CM

## 2024-02-29 PROCEDURE — 3074F SYST BP LT 130 MM HG: CPT | Performed by: INTERNAL MEDICINE

## 2024-02-29 PROCEDURE — 71271 CT THORAX LUNG CANCER SCR C-: CPT

## 2024-02-29 PROCEDURE — 99213 OFFICE O/P EST LOW 20 MIN: CPT | Performed by: INTERNAL MEDICINE

## 2024-02-29 PROCEDURE — 99205 OFFICE O/P NEW HI 60 MIN: CPT | Performed by: INTERNAL MEDICINE

## 2024-02-29 PROCEDURE — 3078F DIAST BP <80 MM HG: CPT | Performed by: INTERNAL MEDICINE

## 2024-02-29 ASSESSMENT — FIBROSIS 4 INDEX: FIB4 SCORE: 0.73

## 2024-02-29 ASSESSMENT — ENCOUNTER SYMPTOMS
HEADACHES: 1
SHORTNESS OF BREATH: 1
TREMORS: 1

## 2024-02-29 NOTE — PROGRESS NOTES
Pulmonary Clinic- Initial Consult    Date of Service: 2/29/2024    Referring Physician: Cherri Pandya P.A.-C.    Reason for Consult: COPD    Chief Complaint:   Chief Complaint   Patient presents with    Establish Care     NP / REF BY :CHERRI PANDYA / DX: Chronic obstructive pulmonary disease, unspecified COPD type     Results     PFT 2/16/24     HPI:   Jaclyn Mejia is a very pleasant 59 y.o. female current smoker, 44 pack years, 0.5 pack/day, recently established in the lung cancer screening program, referred to the pulmonary clinic as well for concern for COPD.    PFT 2/2024 shows preserved ratio, no obstruction, no bronchodilator response, preserved lung volumes and diffusion capacity.  Slightly reduced MVV likely effort dependent.    Last chest imaging CXR 7/2023 was reassuring.  Has an upcoming LDCT scheduled for later today.    No inhaler therapy is currently.    Functionally, Jaclyn reports dyspnea on exertion when doing aerobic exercise.  She works out at the gym with her daughter.    Jaclyn has a history of DORA on CPAP, migraines, IBS, bipolar affective disorder, fibromyalgia, chronic fatigue, former methamphetamine dependence and alcohol dependence both in remission.    MMRC Grade: 1: Short of breath when hurrying or going up a small hill    Past Medical History:   Diagnosis Date    Abdominal pain     Actinic keratosis 02/06/2018    Alcohol abuse     Allergy     Anxiety     Arthritis     Back pain     Back pain     Bronchitis     Chronic pain syndrome     Constipation     COPD (chronic obstructive pulmonary disease) (HCC)     Cough     Daytime sleepiness     DDD (degenerative disc disease), cervical     DDD (degenerative disc disease), thoracolumbar     Depression     Diarrhea     Diverticulosis     Dizziness     Essential tremor     Fatigue     Fibromyalgia     IBS (irritable bowel syndrome)     Insomnia     Migraine     Nausea     Painful joint     Pulmonary emphysema (HCC)     Restless leg  syndrome     Ringing in ears     Snoring     SOB (shortness of breath)     Sore muscles     Sweat, sweating, excessive     Weakness     Wears glasses        Past Surgical History:   Procedure Laterality Date    LUMBAR MEDIAL BRANCH BLOCKS Bilateral 1/30/2024    Procedure: Diagnostic medial branch blocks targeting the BILATERAL L3-4, L4-5 and L5-S1 facet joints with fluoroscopic guidance #2 with sedation.   Note: plan on versed 1mg.  Diagnostic medial branch block #2 is only be done if procedure #1 is a positive block.  This will be on a separate date from procedure #1.;  Surgeon: [place] Donaldson M.D.;  [facility];  Service: P    LUMBAR MEDIAL BRANCH BLOCKS  1/23/2024    Procedure: Diagnostic medial branch blocks targeting the BILATERAL L3-4, L4-5 and L5-S1 facet joints with fluoroscopic guidance #1 with sedation.    Note: plan on versed 1mg.;  Surgeon: [place] Donaldson M.D.;  Location: [facility];  Service: Pain Management    ABDOMINAL HYSTERECTOMY TOTAL      CHOLECYSTECTOMY      HYSTERECTOMY LAPAROSCOPY      OTHER ABDOMINAL SURGERY      TONSILLECTOMY         Social History     Socioeconomic History    Marital status: Single     Spouse name: Not on file    Number of children: Not on file    Years of education: Not on file    Highest education level: Associate degree: occupational, technical, or vocational program   Occupational History    Not on file   Tobacco Use    Smoking status: Every Day     Current packs/day: 1.00     Average packs/day: 1 pack/day for 44.7 years (44.7 ttl pk-yrs)     Types: Cigarettes     Start date: 7/1/1979    Smokeless tobacco: Never   Vaping Use    Vaping Use: Every day    Substances: THC, CBD    Devices: Disposable, Pre-filled or refillable cartridge, Refillable tank, Pre-filled pod   Substance and Sexual Activity    Alcohol use: Yes     Comment: occasionally    Drug use: Yes     Frequency: 7.0 times per week     Types: Marijuana, Inhaled,  "Oral     Comment: Only canibus medicinal for anti spasm, anxiety, ibs, inflamm    Sexual activity: Not Currently     Partners: Male, Female     Comment: currently with male   Other Topics Concern    Not on file   Social History Narrative    Initial Intake Date:  Last Updated:        Financial situation:    SSD $1570/mo w/ $450 savings. We did discuss care credit in regards to behavioral health treatment interventions. Jaclyn        Food resources & insecurities:    Deferred.         Housing & environmental:    Resides in [place] in [facility] ([address]).         Transportation:    Deferred.        Family dynamics & family/friend social supports:    Pt has local family to include 86-year-old mother and sister. She reports family are functioning alcoholics but are supportive in her sobriety. They will provide alternative alcohol free drinks at gatherings. Pt has son currently in inpatient treatment for alcohol use.         Pt has significant other who resides in the same [facility]. He is also pursuing sobriety w/ Jaclyn.        ADLs/IADLs & associated diagnoses:    Pt's ADLs/IADL have previously been affected by her alcohol use. They have cause incontinent issues in the past as well as extended periods of vomiting.         Advanced life planning:    Not on file.         Behavioral/Mental:    Associated diagnoses include migraine without status migrainosus, not intractable; insomnia due to medical condition; recurrent major depressive disorder; alcoholism; deliberate self-cutting; and obesity.        Pt has extensive history of substance use and recovery. In her young adult years pt used methamphatmine for aprox 1 year until she sought treatment. At this time she was mother to 2 children. She attended both AA/NA and [facility] in the [facility] area. She became sober \"[place]\" and maintained sobriety for 16 years. Pt then returned to alcohol use via social drinking; she maintained a job " "at this time and managed 1 glass wine at events. Pt's daughter then left the home (at age 21) which affected her drinking; at that time she also became disabled. Pt relapsed on methamphetamine with use over 2 years w/ a 9 month period of sobriety. Pt then moved to [place] where her drinking increased (vodka). Pt reports medical symptoms from drinking include every other day vomitting, a period of GI/rectal bleeding and hypertensive events. Per chart review pt drinking 5th vodka daily. Pt's last drink 3/7/2021. She is pursuing sobriety with her significant other, Bradly. Pt is 6 days sober at time of assessment (3/12/2021). She is \"tired of being sick and tired.\"        Pt's reservations include attending inpatient; she would need to take her pet dog with her as no one else can care for the dog. Dog is  animal. And the allotment of time required for intensive out patient (3 days a week); she is concerned her fibromyalgia will affect her attendance. Thre is a general concern of cost which may direct pt towards low income treatment options.         Assessments completed by :    n/a        Collaterals:     Social Determinants of Health     Financial Resource Strain: Medium Risk (1/29/2024)    Overall Financial Resource Strain (CARDIA)     Difficulty of Paying Living Expenses: Somewhat hard   Food Insecurity: No Food Insecurity (1/29/2024)    Hunger Vital Sign     Worried About Running Out of Food in the Last Year: Never true     Ran Out of Food in the Last Year: Never true   Transportation Needs: No Transportation Needs (1/29/2024)    PRAPARE - Transportation     Lack of Transportation (Medical): No     Lack of Transportation (Non-Medical): No   Physical Activity: Inactive (9/8/2023)    Exercise Vital Sign     Days of Exercise per Week: 0 days     Minutes of Exercise per Session: 0 min   Stress: Stress Concern Present (9/8/2023)    Citizen of [place] [employer] - Occupational Stress " Questionnaire     Feeling of Stress : Rather much   Social Connections: Moderately Integrated (9/8/2023)    Social Connection and Isolation Panel [NHANES]     Frequency of Communication with Friends and Family: More than three times a week     Frequency of Social Gatherings with Friends and Family: Once a week     Attends Anabaptism Services: More than 4 times per year     Active Member of Clubs or Organizations: Yes     Attends Club or Organization Meetings: More than 4 times per year     Marital Status:    Intimate Partner Violence: Not on file   Housing Stability: Low Risk  (1/29/2024)    Housing Stability Vital Sign     Unable to Pay for Housing in the Last Year: No     Number of Places Lived in the Last Year: 1     Unstable Housing in the Last Year: No          Family History   Problem Relation Age of Onset    Lung Disease Mother         copd    Cancer Mother         basal/squamous    Hypertension Mother     Hyperlipidemia Mother     Stroke Mother     Alcohol abuse Mother     Heart Disease Father         HF    Arthritis Father         rheumatoid    Lung Disease Father         emphysema    Alcohol abuse Father     Cancer Sister     Alcohol abuse Sister     Cancer Maternal Grandfather         Bladder or prostate?    Cancer Sister         pre cancerous spots     Alcohol abuse Sister     Kidney Disease Sister     Alcohol abuse Sister     Hypertension Sister     Alcohol abuse Sister        Current Outpatient Medications on File Prior to Visit   Medication Sig Dispense Refill    baclofen (LIORESAL) 10 MG Tab Take 1 Tablet by mouth 3 times a day. 270 Tablet 0    famotidine (PEPCID) 40 MG Tab Take 1 Tablet by mouth every day. 90 Tablet 0    Milnacipran HCl (SAVELLA) 100 MG Tab Take 1 Tablet by mouth 2 times a day. 180 Tablet 0    ondansetron (ZOFRAN ODT) 4 MG TABLET DISPERSIBLE Take 1 Tablet by mouth every 6 hours as needed for Nausea/Vomiting. 15 Tablet 0    topiramate (TOPAMAX) 50 MG tablet Take 1 Tablet by  "mouth 3 times a day. 270 Tablet 0    gabapentin (NEURONTIN) 600 MG tablet Take 1 Tablet by mouth 3 times a day. 270 Tablet 0    MITIGARE 0.6 MG Cap TAKE 1 CAPSULE BY MOUTH EVERY DAY 90 Capsule 3    clindamycin (CLEOCIN) 2 % vaginal cream Use vaginally after sexual activity for irritation and odor related to BV. 40 g 0    dicyclomine (BENTYL) 20 MG Tab TAKE 1 TABLET BY MOUTH EVERY 6 HOURS AS NEEDED (IBS). 360 Tablet 1    Multiple Vitamin (MULTIVITAMIN ADULT PO)       NON SPECIFIED Take  by mouth. Flax oil gel cap, one capsule daily      diclofenac sodium (VOLTAREN) 1 % Gel Apply 2 g topically 4 times a day as needed (pain). 100 g 0    Omega-3 Fatty Acids (FISH OIL) 1000 MG Cap capsule Take 1,000 mg by mouth 1 time a day as needed.      magnesium oxide (MAG-OX) 400 MG Tab tablet Take 400 mg by mouth every day.      Loratadine (CLARITIN PO) Take 1 tablet by mouth every day.      hydrOXYzine HCl (ATARAX) 25 MG Tab Take 1 Tab by mouth 3 times a day as needed for Anxiety. 90 Tab 0     No current facility-administered medications on file prior to visit.     Allergies: Erythromycin and Tetracycline    ROS:   Review of Systems   Constitutional:  Positive for malaise/fatigue.   Respiratory:  Positive for shortness of breath (with exertion).    Neurological:  Positive for tremors and headaches.   All other systems reviewed and are negative.    Vitals:  /60 (BP Location: Left arm, Patient Position: Sitting, BP Cuff Size: Adult)   Pulse 71   Ht 1.651 m (5' 5\")   Wt 75.8 kg (167 lb)   SpO2 96%     Physical Exam:  Physical Exam  Vitals and nursing note reviewed.   Constitutional:       General: She is not in acute distress.     Appearance: Normal appearance. She is well-developed. She is not diaphoretic.      Comments: Very pleasant   Eyes:      General: No scleral icterus.        Right eye: No discharge.         Left eye: No discharge.      Conjunctiva/sclera: Conjunctivae normal.      Pupils: Pupils are equal, round, " and reactive to light.   Neck:      Thyroid: No thyromegaly.      Vascular: No JVD.   Cardiovascular:      Rate and Rhythm: Normal rate and regular rhythm.      Heart sounds: Normal heart sounds. No murmur heard.     No gallop.   Pulmonary:      Effort: Pulmonary effort is normal. No respiratory distress.      Breath sounds: Normal breath sounds. No wheezing or rales.   Abdominal:      General: There is no distension.      Palpations: Abdomen is soft.      Tenderness: There is no abdominal tenderness. There is no guarding.   Musculoskeletal:         General: No tenderness.   Lymphadenopathy:      Cervical: No cervical adenopathy.   Skin:     General: Skin is warm.      Capillary Refill: Capillary refill takes less than 2 seconds.      Findings: No erythema or rash.   Neurological:      Mental Status: She is alert and oriented to person, place, and time.      Cranial Nerves: No cranial nerve deficit.      Sensory: No sensory deficit.      Motor: No abnormal muscle tone.   Psychiatric:         Behavior: Behavior normal.       Laboratory Data:    PFTs as reviewed by me personally show:  See HPI    Imaging as reviewed by me personally show:    See HPI    Assessment/Plan:    1. Pulmonary emphysema, unspecified emphysema type (HCC)        2. Dyspnea on exertion        3. Tobacco dependence          Emphysema, but without formal obstruction on PFTs.  In fact, her spirometry looks supranormal.  She does have dyspnea on exertion which may be dynamic hyperinflation.  She reports no desaturations.  She is encouraged to continue gentle aerobic exercise regimen and monitor SpO2 to maintain saturations greater than 88%.  Reemphasized importance of smoking cessation.  She is down to 1/2 pack/day.  Follow-up LDCT, scheduled for later today.  Return if symptoms worsen or fail to improve.     This note was generated using voice recognition software which has a chance of producing errors of grammar and possibly content.  I have made  every reasonable attempt to find and correct any obvious errors, but it should be expected that some may not be found prior to finalization of this note.    Time spent in record review prior to patient arrival, reviewing results, and in face-to-face encounter totaled 61 min, excluding any procedures if performed.  __________  Jayy Ocampo MD  Pulmonary and Critical Care Medicine  [facility] [place]

## 2024-03-04 ENCOUNTER — TELEPHONE (OUTPATIENT)
Dept: SLEEP MEDICINE | Facility: MEDICAL CENTER | Age: 59
End: 2024-03-04
Payer: MEDICARE

## 2024-03-04 DIAGNOSIS — I70.0 ATHEROSCLEROSIS OF AORTA (HCC): ICD-10-CM

## 2024-03-04 NOTE — TELEPHONE ENCOUNTER
Phoned patient with results of LDCT exam performed 2/29/24.    Notified her that the results showed no concerning lung nodules  Recommend follow up CT in 12 months.    Informed patient of incidental findings of fat-containing right sided diaphragmatic hernia, coronary artery calcification and trace atherosclerosis of the aorta with recommendation to follow up with PCP.  Of note, she reports no RUQ pain.    Patient agrees to all recommendations.    Her PCP, YEIMY Castorena, notified of results and incidental findings via this communication.    Health maintenance updated and patient sent lung cancer screening result letter.        CT-LUNG CANCER-SCREENING    Result Date: 2/29/2024 2/29/2024 4:06 PM HISTORY/REASON FOR EXAM:  44.7 pack-year smoker. Lung cancer screening TECHNIQUE/EXAM DESCRIPTION AND NUMBER OF VIEWS: Lung cancer screening without contrast. Low dose noncontrast helical images were obtained of the chest from the lung apices through the costophrenic sulci utilizing thin collimation and intervals with reconstructed images sent to PACS in axial, coronal and sagittal planes. Low dose optimization technique was utilized for this CT exam including automated exposure control and adjustment of the mA and/or kV according to patient size. COMPARISON: None. FINDINGS: Lungs: No nodules or airspace process. There is a fat-containing right-sided diaphragmatic hernia Mediastinum/Cherrie: No significant adenopathy. Pleura: No pleural effusion. Cardiac: Heart normal in size without pericardial effusion. There is coronary artery calcification. Vascular: Trace atherosclerosis of the aorta. Soft tissues: Unremarkable. Bones: No acute or destructive process.     1.  No suspicious pulmonary nodule or mass. 2.  Atherosclerosis with coronary artery disease. Lung RADS: 1 - Negative: No nodules and definitely benign nodules Findings: no lung nodules nodule(s) with specific calcifications: complete; central; popcorn;  concentric rings and fat containing nodules Management: Continue annual screening with LDCT in 12 months

## 2024-03-07 ENCOUNTER — PATIENT MESSAGE (OUTPATIENT)
Dept: SLEEP MEDICINE | Facility: MEDICAL CENTER | Age: 59
End: 2024-03-07
Payer: MEDICARE

## 2024-03-07 DIAGNOSIS — G43.809 OTHER MIGRAINE WITHOUT STATUS MIGRAINOSUS, NOT INTRACTABLE: ICD-10-CM

## 2024-03-07 DIAGNOSIS — M79.7 FIBROMYALGIA: ICD-10-CM

## 2024-03-07 DIAGNOSIS — K58.0 IRRITABLE BOWEL SYNDROME WITH DIARRHEA: ICD-10-CM

## 2024-03-07 DIAGNOSIS — K21.9 GASTROESOPHAGEAL REFLUX DISEASE WITHOUT ESOPHAGITIS: ICD-10-CM

## 2024-03-07 RX ORDER — TOPIRAMATE 50 MG/1
50 TABLET, FILM COATED ORAL 3 TIMES DAILY
Qty: 270 TABLET | Refills: 0 | Status: SHIPPED | OUTPATIENT
Start: 2024-03-07

## 2024-03-07 RX ORDER — BACLOFEN 10 MG/1
10 TABLET ORAL 3 TIMES DAILY
Qty: 270 TABLET | Refills: 0 | Status: SHIPPED | OUTPATIENT
Start: 2024-03-07

## 2024-03-07 RX ORDER — ONDANSETRON 4 MG/1
4 TABLET, ORALLY DISINTEGRATING ORAL EVERY 6 HOURS PRN
Qty: 15 TABLET | Refills: 0 | Status: SHIPPED | OUTPATIENT
Start: 2024-03-07 | End: 2024-03-25

## 2024-03-07 RX ORDER — FAMOTIDINE 40 MG/1
40 TABLET, FILM COATED ORAL
Qty: 90 TABLET | Refills: 0 | Status: SHIPPED | OUTPATIENT
Start: 2024-03-07

## 2024-03-07 RX ORDER — MILNACIPRAN HYDROCHLORIDE 100 MG/1
1 TABLET, FILM COATED ORAL 2 TIMES DAILY
Qty: 180 TABLET | Refills: 0 | Status: SHIPPED | OUTPATIENT
Start: 2024-03-07

## 2024-03-07 RX ORDER — GABAPENTIN 600 MG/1
600 TABLET ORAL 3 TIMES DAILY
Qty: 270 TABLET | Refills: 0 | Status: SHIPPED | OUTPATIENT
Start: 2024-03-07 | End: 2024-03-25

## 2024-03-08 NOTE — TELEPHONE ENCOUNTER
Received request via: Pharmacy    Was the patient seen in the last year in this department? Yes    Does the patient have an active prescription (recently filled or refills available) for medication(s) requested?  Yes but is [facility]     [facility]     Does the patient have residential Plus and need 100 day supply (blood pressure, diabetes and cholesterol meds only)? Medication is not for cholesterol, blood pressure or diabetes

## 2024-03-11 ENCOUNTER — OFFICE VISIT (OUTPATIENT)
Dept: MEDICAL GROUP | Facility: PHYSICIAN GROUP | Age: 59
End: 2024-03-11
Payer: MEDICARE

## 2024-03-11 ENCOUNTER — HOSPITAL ENCOUNTER (OUTPATIENT)
Dept: LAB | Facility: MEDICAL CENTER | Age: 59
End: 2024-03-11
Attending: NURSE PRACTITIONER
Payer: MEDICARE

## 2024-03-11 ENCOUNTER — PATIENT OUTREACH (OUTPATIENT)
Dept: HEALTH INFORMATION MANAGEMENT | Facility: OTHER | Age: 59
End: 2024-03-11

## 2024-03-11 VITALS
SYSTOLIC BLOOD PRESSURE: 110 MMHG | OXYGEN SATURATION: 96 % | TEMPERATURE: 98.6 F | HEART RATE: 68 BPM | BODY MASS INDEX: 28.26 KG/M2 | DIASTOLIC BLOOD PRESSURE: 70 MMHG | HEIGHT: 65 IN | WEIGHT: 169.6 LBS

## 2024-03-11 DIAGNOSIS — R42 VERTIGO: ICD-10-CM

## 2024-03-11 DIAGNOSIS — J43.9 PULMONARY EMPHYSEMA, UNSPECIFIED EMPHYSEMA TYPE (HCC): ICD-10-CM

## 2024-03-11 DIAGNOSIS — I10 ESSENTIAL HYPERTENSION: ICD-10-CM

## 2024-03-11 DIAGNOSIS — N18.32 STAGE 3B CHRONIC KIDNEY DISEASE: ICD-10-CM

## 2024-03-11 DIAGNOSIS — I25.10 ASCVD (ARTERIOSCLEROTIC CARDIOVASCULAR DISEASE): ICD-10-CM

## 2024-03-11 DIAGNOSIS — R73.09 ELEVATED HEMOGLOBIN A1C: ICD-10-CM

## 2024-03-11 DIAGNOSIS — K58.0 IRRITABLE BOWEL SYNDROME WITH DIARRHEA: ICD-10-CM

## 2024-03-11 DIAGNOSIS — G47.09 OTHER INSOMNIA: ICD-10-CM

## 2024-03-11 DIAGNOSIS — J44.89 COPD (CHRONIC OBSTRUCTIVE PULMONARY DISEASE) WITH CHRONIC BRONCHITIS (HCC): ICD-10-CM

## 2024-03-11 DIAGNOSIS — R00.1 BRADYCARDIA: ICD-10-CM

## 2024-03-11 DIAGNOSIS — G47.33 OBSTRUCTIVE SLEEP APNEA SYNDROME: ICD-10-CM

## 2024-03-11 DIAGNOSIS — R06.09 DYSPNEA ON EXERTION: ICD-10-CM

## 2024-03-11 LAB
FOLATE SERPL-MCNC: 7.9 NG/ML
VIT B12 SERPL-MCNC: 340 PG/ML (ref 211–911)

## 2024-03-11 PROCEDURE — 82607 VITAMIN B-12: CPT

## 2024-03-11 PROCEDURE — 99215 OFFICE O/P EST HI 40 MIN: CPT | Performed by: NURSE PRACTITIONER

## 2024-03-11 PROCEDURE — 36415 COLL VENOUS BLD VENIPUNCTURE: CPT

## 2024-03-11 PROCEDURE — 3074F SYST BP LT 130 MM HG: CPT | Performed by: NURSE PRACTITIONER

## 2024-03-11 PROCEDURE — 82746 ASSAY OF FOLIC ACID SERUM: CPT

## 2024-03-11 PROCEDURE — 84207 ASSAY OF VITAMIN B-6: CPT

## 2024-03-11 PROCEDURE — 3078F DIAST BP <80 MM HG: CPT | Performed by: NURSE PRACTITIONER

## 2024-03-11 RX ORDER — ROSUVASTATIN CALCIUM 5 MG/1
5 TABLET, COATED ORAL EVERY EVENING
Qty: 100 TABLET | Refills: 3 | Status: SHIPPED | OUTPATIENT
Start: 2024-03-11

## 2024-03-11 ASSESSMENT — FIBROSIS 4 INDEX: FIB4 SCORE: 0.73

## 2024-03-11 NOTE — PROGRESS NOTES
Assessment    I spoke to the patient this afternoon for her monthly and quarterly CCM reviews. Future appointments reviewed. Patient is aware and voices understanding. Patient medications reviewed by Colten Carlin MA, and confirmed by me to be accurate in the patient's chart. The patient is aware of her care gaps and that the shingles vaccine can be obtained through an outside pharmacy. The patient's blood pressures remain well controlled on chart review. The patient reports eating a lot of chicken and drinking a lot of water. The patient's primary concerns at this time are an exacerbation of her IBS, and the imaging from her lung cancer screening which have revealed a hernia along with, thankfully, no worsening lung issues. The patient has been referred to GI for further evaluation. The patient also mentions a referral to ENT as she has gotten some serious dizziness over the past couple of days. She states that this has happened before and that she performed the Epley maneuver. Chart routed to PCP to confirm.     Education    *Article on eating, diet, and nutrition for irritable bowel syndrome sent by mail by patient's request    Plan of Care and Goals    *Remain free from falls, injuries  *Healthy nutrition, hydration, and activity    Barriers:    *Management of acute and chronic medical concerns.     Progress:    Stable    Next outreach:  One Month

## 2024-03-12 NOTE — TELEPHONE ENCOUNTER
Yes, I did send her a referral to ENT, I just had not signed it/the note yet.  I did talk to Dr. Ocampo in pulmonology who stated that since she is not having severe symptoms she would not need surgery related to the small diaphragmatic hernia.

## 2024-03-12 NOTE — PROGRESS NOTES
Verbal consent was acquired by the patient to use [employer] ambient listening note generation during this visit yes    Subjective:     HPI:   Jaclyn is a 59 y.o.female established patient who presents today for:    History of Present Illness  The patient presents for evaluation of multiple medical concerns.    She had a lung CT done for lung cancer screening, which revealed no cancer or pulmonary nodules. She has stopped vaping. She is going to switch to edibles. She is still trying to quit smoking. She has been working on smoking cessation for 1 year now.    She has atherosclerosis. Cardiology did not diagnose her previously with ischemia.  Extensive discussion regarding coronary artery disease versus cardiac ischemia,, what plaque is. she remembers her cardiologist telling her that she was fine and gave her some anti-inflammatories. She has not taken the anti-inflammatories that she was given. Provided medication was mitigare.  She is inquiring about the coronary calcium score, but states she is unwilling to pay for the test.  Stress test was negative.    She is still dealing with dizziness. She is not sure if the dizziness is related to that or if it is still floaty things inside her ears. She is using a cane. She is walking into walls when she gets up. She feels like she is drunk. Her dizziness has been this bad for 1.5 years. It is not constant. It has been really bad in the last 2 weeks. She has a lot of spinning feeling. She has not tried Epley maneuver. She denies feeling like she is going to pass out. Movement in her peripheral vision makes it worse. Turning her head quickly makes it worse.  Denies any headache, blurry vision, facial weakness or other concerning symptoms. She has a harder time this time of year with fibromyalgia.  She saw neurology in [place] when she first started having dizziness. She was put on B1, B12, and B9. She stopped checking them.    She denies nausea or vomiting. She has  not had a big appetite. She has been losing weight purposefully. She used to eat a lot of food, but she does not eat much anymore. Her weight is stable.     She is having hard time falling asleep. When she does fall asleep, she sleeps 15 hours. Last night, she slept 4 hours. She does not feel rested. She is tired all the time. She is still using her CPAP. She falls asleep before she gets that thing stuck on her face. She switched to just the nose one, which seems to help. She used to take mirtazapine. She snores.    She is worried about her diaphragmatic hernia.  She does not have severe acid reflux or shortness of breath. Discussion regarding recommendations for this issue.     She has pain in the left side of her chest that she has been dealing with for the last couple of years. She had a colonoscopy. She is not worried about her appetite. She eats 1 good meal a day. She had a colonoscopy in [place] and was told that she had diver pockets.    She was diagnosed with COPD and emphysema. She has bronchitis. She has coughing fits sometimes, especially with COPD and emphysema.     has a past medical history of Abdominal pain, Actinic keratosis (02/06/2018), Alcohol abuse, Allergy, Anxiety, Arthritis, Back pain, Back pain, Bronchitis, Chronic pain syndrome, Constipation, COPD (chronic obstructive pulmonary disease) (HCC), Cough, Daytime sleepiness, DDD (degenerative disc disease), cervical, DDD (degenerative disc disease), thoracolumbar, Depression, Diarrhea, Diverticulosis, Dizziness, Essential tremor, Fatigue, Fibromyalgia, IBS (irritable bowel syndrome), Insomnia, Migraine, Nausea, Painful joint, Pulmonary emphysema (HCC), Restless leg syndrome, Ringing in ears, Snoring, SOB (shortness of breath), Sore muscles, Sweat, sweating, excessive, Weakness, and Wears glasses.    She has no past medical history of GERD (gastroesophageal reflux disease) or Hyperlipidemia.   has a past surgical history that includes  cholecystectomy; abdominal hysterectomy total; tonsillectomy; hysterectomy laparoscopy; other abdominal surgery; lumbar medial branch blocks (1/23/2024); and lumbar medial branch blocks (Bilateral, 1/30/2024).    Family History   Problem Relation Age of Onset    Lung Disease Mother         copd    Cancer Mother         basal/squamous    Hypertension Mother     Hyperlipidemia Mother     Stroke Mother     Alcohol abuse Mother     Heart Disease Father         HF    Arthritis Father         rheumatoid    Lung Disease Father         emphysema    Alcohol abuse Father     Cancer Sister     Alcohol abuse Sister     Cancer Maternal Grandfather         Bladder or prostate?    Cancer Sister         pre cancerous spots     Alcohol abuse Sister     Kidney Disease Sister     Alcohol abuse Sister     Hypertension Sister     Alcohol abuse Sister        Social History     Socioeconomic History    Marital status: Single     Spouse name: Not on file    Number of children: Not on file    Years of education: Not on file    Highest education level: Associate degree: occupational, technical, or vocational program   Occupational History    Not on file   Tobacco Use    Smoking status: Every Day     Current packs/day: 1.00     Average packs/day: 1 pack/day for 44.7 years (44.7 ttl pk-yrs)     Types: Cigarettes     Start date: 7/1/1979    Smokeless tobacco: Never   Vaping Use    Vaping Use: Every day    Substances: THC, CBD    Devices: Disposable, Pre-filled or refillable cartridge, Refillable tank, Pre-filled pod   Substance and Sexual Activity    Alcohol use: Yes     Comment: occasionally    Drug use: Yes     Frequency: 7.0 times per week     Types: Marijuana, Inhaled, Oral     Comment: Only canibus medicinal for anti spasm, anxiety, ibs, inflamm    Sexual activity: Not Currently     Partners: Male, Female     Comment: currently with male   Other Topics Concern    Not on file   Social History Narrative    Initial Intake Date:  Last Updated:  "       Financial situation:    SSD $1570/mo w/ $450 savings. We did discuss care credit in regards to behavioral health treatment interventions. Jaclyn        Food resources & insecurities:    Deferred.         Housing & environmental:    Resides in [place] in [facility] ([address]).         Transportation:    Deferred.        Family dynamics & family/friend social supports:    Pt has local family to include 86-year-old mother and sister. She reports family are functioning alcoholics but are supportive in her sobriety. They will provide alternative alcohol free drinks at gatherings. Pt has son currently in inpatient treatment for alcohol use.         Pt has significant other who resides in the same [facility]. He is also pursuing sobriety w/ Jaclyn.        ADLs/IADLs & associated diagnoses:    Pt's ADLs/IADL have previously been affected by her alcohol use. They have cause incontinent issues in the past as well as extended periods of vomiting.         Advanced life planning:    Not on file.         Behavioral/Mental:    Associated diagnoses include migraine without status migrainosus, not intractable; insomnia due to medical condition; recurrent major depressive disorder; alcoholism; deliberate self-cutting; and obesity.        Pt has extensive history of substance use and recovery. In her young adult years pt used methamphatmine for aprox 1 year until she sought treatment. At this time she was mother to 2 children. She attended both AA/NA and \"[facility]\" in the [facility] area. She became sober \"[place]\" and maintained sobriety for 16 years. 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 Pt's daughter then left the home (at age 21) which affected her drinking; at that time she also became disabled. Pt relapsed on methamphetamine with use over 2 years w/ a 9 month period of sobriety. Pt then moved to [place] where " "her drinking increased (vodka). Pt reports medical symptoms from drinking include every other day vomitting, a period of GI/rectal bleeding and hypertensive events. Per chart review pt drinking 5th vodka daily. Pt's last drink 3/7/2021. She is pursuing sobriety with her significant other, Bradly. Pt is 6 days sober at time of assessment (3/12/2021). She is \"tired of being sick and tired.\"        Pt's reservations include attending inpatient; she would need to take her pet dog with her as no one else can care for the dog. Dog is  animal. And the allotment of time required for intensive out patient (3 days a week); she is concerned her fibromyalgia will affect her attendance. Thre is a general concern of cost which may direct pt towards low income treatment options.         Assessments completed by :    n/a        Collaterals:     Social Determinants of Health     Financial Resource Strain: Medium Risk (1/29/2024)    Overall Financial Resource Strain (CARDIA)     Difficulty of Paying Living Expenses: Somewhat hard   Food Insecurity: No Food Insecurity (1/29/2024)    Hunger Vital Sign     Worried About Running Out of Food in the Last Year: Never true     Ran Out of Food in the Last Year: Never true   Transportation Needs: No Transportation Needs (1/29/2024)    PRAPARE - Transportation     Lack of Transportation (Medical): No     Lack of Transportation (Non-Medical): No   Physical Activity: Inactive (9/8/2023)    Exercise Vital Sign     Days of Exercise per Week: 0 days     Minutes of Exercise per Session: 0 min   Stress: Stress Concern Present (9/8/2023)    [employer] [place] [employer] - Occupational Stress Questionnaire     Feeling of Stress : Rather much   Social Connections: Moderately Integrated (9/8/2023)    Social Connection and Isolation Panel [NHANES]     Frequency of Communication with Friends and Family: More than three times a week     Frequency of Social Gatherings with " Friends and Family: Once a week     Attends Religion Services: More than 4 times per year     Active Member of Clubs or Organizations: Yes     Attends Club or Organization Meetings: More than 4 times per year     Marital Status:    Intimate Partner Violence: Not on file   Housing Stability: Low Risk  (1/29/2024)    Housing Stability Vital Sign     Unable to Pay for Housing in the Last Year: No     Number of Places Lived in the Last Year: 1     Unstable Housing in the Last Year: No       Patient Active Problem List    Diagnosis Date Noted    Atherosclerosis of aorta ([facility]) 03/04/2024    Dyspnea on exertion 02/29/2024    Lymphadenopathy, cervical 01/18/2024    Bradycardia 01/18/2024    Pulmonary emphysema ([facility]) 08/29/2023    BMI 29.0-29.9,adult 05/25/2023    Polyneuropathy in other diseases classified elsewhere ([facility]) 05/25/2023    Methamphetamine dependence in remission ([facility]) 05/25/2023    Osteopenia of lumbar spine 05/25/2023    Vertigo 04/11/2023    Chest pain 04/11/2023    Obstructive sleep apnea syndrome 04/11/2023    Tobacco dependence 09/22/2022    Currently attempting to quit smoking 07/08/2022    DDD (degenerative disc disease), lumbar 06/01/2022    Chronic fatigue 08/24/2021    Pelvic floor dysfunction 08/24/2021    Stage 3b chronic kidney disease ([facility]) 08/24/2021    Elevated hemoglobin A1c 05/20/2021    Risk for falls 03/18/2021    Dupuytren's contracture of right hand 08/29/2019    Alcohol dependence, in remission ([facility]) 08/14/2019    Deliberate self-cutting 08/14/2019    Actinic keratosis 02/06/2018    H/O hysterectomy with oophorectomy 02/09/2017    Essential tremor 02/09/2017    Irritable bowel syndrome with diarrhea 02/09/2017    Bipolar affective disorder, current episode depressed (HCC) 02/09/2017    Migraine without status migrainosus, not intractable 09/15/2016    Facet arthropathy, cervical 04/08/2016    Facet arthropathy, lumbar 04/08/2016    KVNG (generalized anxiety disorder) 09/23/2014     Seborrheic keratosis 09/25/2013    Fibromyalgia 01/01/2002         Current Outpatient Medications Ordered in Epic   Medication Sig Dispense Refill    rosuvastatin (CRESTOR) 5 MG Tab Take 1 Tablet by mouth every evening. 100 Tablet 3    gabapentin (NEURONTIN) 600 MG tablet Take 1 Tablet by mouth 3 times a day. 270 Tablet 0    topiramate (TOPAMAX) 50 MG tablet Take 1 Tablet by mouth 3 times a day. 270 Tablet 0    famotidine (PEPCID) 40 MG Tab Take 1 Tablet by mouth every day. 90 Tablet 0    Milnacipran HCl (SAVELLA) 100 MG Tab Take 1 Tablet by mouth 2 times a day. 180 Tablet 0    ondansetron (ZOFRAN ODT) 4 MG TABLET DISPERSIBLE Take 1 Tablet by mouth every 6 hours as needed for Nausea/Vomiting. 15 Tablet 0    baclofen (LIORESAL) 10 MG Tab Take 1 Tablet by mouth 3 times a day. 270 Tablet 0    MITIGARE 0.6 MG Cap TAKE 1 CAPSULE BY MOUTH EVERY DAY 90 Capsule 3    clindamycin (CLEOCIN) 2 % vaginal cream Use vaginally after sexual activity for irritation and odor related to BV. 40 g 0    dicyclomine (BENTYL) 20 MG Tab TAKE 1 TABLET BY MOUTH EVERY 6 HOURS AS NEEDED (IBS). 360 Tablet 1    Multiple Vitamin (MULTIVITAMIN ADULT PO)       NON SPECIFIED Take  by mouth. Flax oil gel cap, one capsule daily      diclofenac sodium (VOLTAREN) 1 % Gel Apply 2 g topically 4 times a day as needed (pain). 100 g 0    Omega-3 Fatty Acids (FISH OIL) 1000 MG Cap capsule Take 1,000 mg by mouth 1 time a day as needed.      magnesium oxide (MAG-OX) 400 MG Tab tablet Take 400 mg by mouth every day.      Loratadine (CLARITIN PO) Take 1 tablet by mouth every day.      hydrOXYzine HCl (ATARAX) 25 MG Tab Take 1 Tab by mouth 3 times a day as needed for Anxiety. 90 Tab 0     No current Epic-ordered facility-administered medications on file.     Allergies   Allergen Reactions    Erythromycin Rash     Chest rash    Tetracycline Rash     Chest rash       Health Maintenance: Completed    Objective:     Exam:  /70 (BP Location: Left arm, Patient  "Position: Sitting, BP Cuff Size: Adult)   Pulse 68   Temp 37 °C (98.6 °F) (Temporal)   Ht 1.651 m (5' 5\")   Wt 76.9 kg (169 lb 9.6 oz)   LMP 12/01/2000   SpO2 96%   BMI 28.22 kg/m²  Body mass index is 28.22 kg/m².    Physical Exam  Constitutional:       Appearance: Normal appearance.   HENT:      Head: Normocephalic.   Cardiovascular:      Rate and Rhythm: Normal rate and regular rhythm.      Pulses: Normal pulses.      Heart sounds: Normal heart sounds.   Pulmonary:      Effort: Pulmonary effort is normal.      Breath sounds: Normal breath sounds.   Skin:     General: Skin is warm and dry.      Capillary Refill: Capillary refill takes less than 2 seconds.   Neurological:      General: No focal deficit present.      Mental Status: She is alert and oriented to person, place, and time.             Results  Imaging  Lung CT was reviewed with the patient.    Assessment & Plan:     1. ASCVD (arteriosclerotic cardiovascular disease)  rosuvastatin (CRESTOR) 5 MG Tab      2. Vertigo  VIT B12,  FOLIC ACID    VITAMIN B6    Referral to ENT      3. Other insomnia        4. Obstructive sleep apnea syndrome        5. Irritable bowel syndrome with diarrhea  Referral to Gastroenterology          Assessment & Plan  1. Smoking cessation.  She has been working on smoking cessation for 1 year.    2. Atherosclerosis.  I will start her on rosuvastatin 5 mg p.o. daily. We discussed the side effects.  Patient declines coronary calcium scoring    3. Vertigo.  Her dizziness is significantly worse than what she was describing in 01/2024.  Symptoms are most consistent with vertigo.  Patient has positive nystagmus to the right on exam today, neuroassessment is otherwise within normal limits I will refer her to ENT.    4. Irritable bowel syndrome.  She is vitamin deficient again with her IBS being flared up. She is not having a lot of acid reflux. Her appetite loss could potentially be from her IBS being flared up. I will order vitamin B " vitamin labs.    5. Sleep apnea.  Patient will follow-up with sleep medicine regarding difficulty with sleep and sleeping 15 hours.    6. Diaphragmatic hernia.  She has a fat-containing right-sided diaphragmatic hernia. Based on discussion with pulmonology no surgery consultation is recommended.    Follow-up  The patient will follow up in 1 month.    HCC Gap Form    Diagnosis to address: I70.0 - Atherosclerosis of aorta (HCC)  Assessment and plan: Chronic, stable, as based on today's assessment and impact on other conditions evaluated today. Continue with current treatment plan: continue follow-up with cardiology.  Follow-up with specialist as directed, but at least annually.  Last edited 03/11/24 17:22 PDT by Magdiel Colordao, A.P.R.N.       I spent a total of 42 minutes with record review, exam, communication with the patient, communication with other providers, and documentation of this encounter.    I have placed the below orders and discussed them with an approved delegating provider. The MA is performing the below orders under the direction of Dr. Duke.      Return in about 1 month (around 4/11/2024), or if symptoms worsen or fail to improve.    Please note that this dictation was created using voice recognition software. I have made every reasonable attempt to correct obvious errors, but I expect that there are errors of grammar and possibly content that I did not discover before finalizing the note.

## 2024-03-13 ENCOUNTER — OFFICE VISIT (OUTPATIENT)
Dept: NEPHROLOGY | Facility: MEDICAL CENTER | Age: 59
End: 2024-03-13
Payer: MEDICARE

## 2024-03-13 VITALS
TEMPERATURE: 97.4 F | BODY MASS INDEX: 28.32 KG/M2 | WEIGHT: 170 LBS | OXYGEN SATURATION: 95 % | HEART RATE: 77 BPM | RESPIRATION RATE: 18 BRPM | SYSTOLIC BLOOD PRESSURE: 132 MMHG | HEIGHT: 65 IN | DIASTOLIC BLOOD PRESSURE: 78 MMHG

## 2024-03-13 DIAGNOSIS — M79.7 FIBROMYALGIA: ICD-10-CM

## 2024-03-13 DIAGNOSIS — N18.9 CHRONIC KIDNEY DISEASE, UNSPECIFIED CKD STAGE: ICD-10-CM

## 2024-03-13 PROCEDURE — 99214 OFFICE O/P EST MOD 30 MIN: CPT | Performed by: INTERNAL MEDICINE

## 2024-03-13 PROCEDURE — 3075F SYST BP GE 130 - 139MM HG: CPT | Performed by: INTERNAL MEDICINE

## 2024-03-13 PROCEDURE — 3078F DIAST BP <80 MM HG: CPT | Performed by: INTERNAL MEDICINE

## 2024-03-13 ASSESSMENT — ENCOUNTER SYMPTOMS
VOMITING: 0
SHORTNESS OF BREATH: 0
CHILLS: 0
COUGH: 0
NAUSEA: 0
MYALGIAS: 1
FEVER: 0

## 2024-03-13 ASSESSMENT — FIBROSIS 4 INDEX: FIB4 SCORE: 0.73

## 2024-03-13 NOTE — PROGRESS NOTES
"Subjective     Jaclyn Mejia is a 59 y.o. female who presents with Chronic Kidney Disease            Patient has a history of chronic pain, fibromyalgia, she developed acute kidney injury secondary to hemodynamic effect of NSAIDs.  Kidney function has improved after discontinuing NSAIDs    Chronic Kidney Disease  This is a chronic problem. The current episode started more than 1 year ago. The problem occurs intermittently. The problem has been resolved. Associated symptoms include myalgias. Pertinent negatives include no chest pain, chills, coughing, fever, nausea, urinary symptoms or vomiting.       Review of Systems   Constitutional:  Negative for chills, fever and malaise/fatigue.   Respiratory:  Negative for cough and shortness of breath.    Cardiovascular:  Negative for chest pain and leg swelling.   Gastrointestinal:  Negative for nausea and vomiting.   Genitourinary:  Negative for dysuria, frequency and urgency.   Musculoskeletal:  Positive for myalgias.              Objective     /78 (BP Location: Right arm, Patient Position: Sitting, BP Cuff Size: Adult)   Pulse 77   Temp 36.3 °C (97.4 °F) (Temporal)   Resp 18   Ht 1.651 m (5' 5\")   Wt 77.1 kg (170 lb)   LMP 12/01/2000   SpO2 95%   BMI 28.29 kg/m²      Physical Exam  Vitals and nursing note reviewed.   Constitutional:       General: She is not in acute distress.     Appearance: Normal appearance. She is well-developed. She is not diaphoretic.   HENT:      Head: Normocephalic and atraumatic.      Right Ear: External ear normal.      Left Ear: External ear normal.      Nose: Nose normal.   Eyes:      General: No scleral icterus.        Right eye: No discharge.         Left eye: No discharge.      Conjunctiva/sclera: Conjunctivae normal.   Cardiovascular:      Rate and Rhythm: Normal rate and regular rhythm.      Heart sounds: No murmur heard.  Pulmonary:      Effort: Pulmonary effort is normal. No respiratory distress.      Breath sounds: " Normal breath sounds.   Musculoskeletal:         General: No tenderness.      Right lower leg: No edema.      Left lower leg: No edema.   Skin:     General: Skin is warm and dry.      Findings: No erythema.   Neurological:      General: No focal deficit present.      Mental Status: She is alert and oriented to person, place, and time.      Cranial Nerves: No cranial nerve deficit.   Psychiatric:         Mood and Affect: Mood normal.         Behavior: Behavior normal.       Past Medical History:   Diagnosis Date    Abdominal pain     Actinic keratosis 02/06/2018    Alcohol abuse     Allergy     Anxiety     Arthritis     Back pain     Back pain     Bronchitis     Chronic pain syndrome     Constipation     COPD (chronic obstructive pulmonary disease) ([facility])     Cough     Daytime sleepiness     DDD (degenerative disc disease), cervical     DDD (degenerative disc disease), thoracolumbar     Depression     Diarrhea     Diverticulosis     Dizziness     Essential tremor     Fatigue     Fibromyalgia     IBS (irritable bowel syndrome)     Insomnia     Migraine     Nausea     Painful joint     Pulmonary emphysema ([facility])     Restless leg syndrome     Ringing in ears     Snoring     SOB (shortness of breath)     Sore muscles     Sweat, sweating, excessive     Weakness     Wears glasses      Social History     Socioeconomic History    Marital status: Single     Spouse name: Not on file    Number of children: Not on file    Years of education: Not on file    Highest education level: Associate degree: occupational, technical, or vocational program   Occupational History    Not on file   Tobacco Use    Smoking status: Every Day     Current packs/day: 1.00     Average packs/day: 1 pack/day for 44.7 years (44.7 ttl pk-yrs)     Types: Cigarettes     Start date: 7/1/1979    Smokeless tobacco: Never   Vaping Use    Vaping Use: Every day    Substances: THC, CBD    Devices: Disposable, Pre-filled or refillable cartridge, Refillable tank,  "Pre-filled pod   Substance and Sexual Activity    Alcohol use: Yes     Comment: occasionally    Drug use: Yes     Frequency: 7.0 times per week     Types: Marijuana, Inhaled, Oral     Comment: Only canibus medicinal for anti spasm, anxiety, ibs, inflamm    Sexual activity: Not Currently     Partners: Male, Female     Comment: currently with male   Other Topics Concern    Not on file   Social History Narrative    Initial Intake Date:  Last Updated:        Financial situation:    SSD $1570/mo w/ $450 savings. We did discuss care credit in regards to behavioral health treatment interventions. Jaclyn        Food resources & insecurities:    Deferred.         Housing & environmental:    Resides in [place] in [facility] ([address]).         Transportation:    Deferred.        Family dynamics & family/friend social supports:    Pt has local family to include 86-year-old mother and sister. She reports family are functioning alcoholics but are supportive in her sobriety. They will provide alternative alcohol free drinks at gatherings. Pt has son currently in inpatient treatment for alcohol use.         Pt has significant other who resides in the same [facility]. He is also pursuing sobriety w/ Jaclyn.        ADLs/IADLs & associated diagnoses:    Pt's ADLs/IADL have previously been affected by her alcohol use. They have cause incontinent issues in the past as well as extended periods of vomiting.         Advanced life planning:    Not on file.         Behavioral/Mental:    Associated diagnoses include migraine without status migrainosus, not intractable; insomnia due to medical condition; recurrent major depressive disorder; alcoholism; deliberate self-cutting; and obesity.        Pt has extensive history of substance use and recovery. In her young adult years pt used methamphatmine for aprox 1 year until she sought treatment. At this time she was mother to 2 children. She attended both AA/NA and \"[facility] " "[facility]\" in the [place], [place] area. She became sober \"[place]\" and maintained sobriety for 16 years. 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 Pt's daughter then left the home (at age 21) which affected her drinking; at that time she also became disabled. Pt relapsed on methamphetamine with use over 2 years w/ a 9 month period of sobriety. Pt then moved to [place] where her drinking increased (vodka). Pt reports medical symptoms from drinking include every other day vomitting, a period of GI/rectal bleeding and hypertensive events. Per chart review pt drinking 5th vodka daily. Pt's last drink 3/7/2021. She is pursuing sobriety with her significant other, Bradly. Pt is 6 days sober at time of assessment (3/12/2021). She is \"tired of being sick and tired.\"        Pt's reservations include attending inpatient; she would need to take her pet dog with her as no one else can care for the dog. Dog is  animal. And the allotment of time required for intensive out patient (3 days a week); she is concerned her fibromyalgia will affect her attendance. Thre is a general concern of cost which may direct pt towards low income treatment options.         Assessments completed by :    n/a        Collaterals:     Social Determinants of Health     Financial Resource Strain: Medium Risk (1/29/2024)    Overall Financial Resource Strain (CARDIA)     Difficulty of Paying Living Expenses: Somewhat hard   Food Insecurity: No Food Insecurity (1/29/2024)    Hunger Vital Sign     Worried About Running Out of Food in the Last Year: Never true     Ran Out of Food in the Last Year: Never true   Transportation Needs: No Transportation Needs (1/29/2024)    PRAPARE - Transportation     Lack of Transportation (Medical): No     Lack of Transportation (Non-Medical): No   Physical Activity: Inactive (9/8/2023)    Exercise Vital Sign     Days of " Exercise per Week: 0 days     Minutes of Exercise per Session: 0 min   Stress: Stress Concern Present (9/8/2023)    [employer] [place] [employer] - Occupational Stress Questionnaire     Feeling of Stress : Rather much   Social Connections: Moderately Integrated (9/8/2023)    Social Connection and Isolation Panel [NHANES]     Frequency of Communication with Friends and Family: More than three times a week     Frequency of Social Gatherings with Friends and Family: Once a week     Attends Temple Services: More than 4 times per year     Active Member of Clubs or Organizations: Yes     Attends Club or Organization Meetings: More than 4 times per year     Marital Status:    Intimate Partner Violence: Not on file   Housing Stability: Low Risk  (1/29/2024)    Housing Stability Vital Sign     Unable to Pay for Housing in the Last Year: No     Number of Places Lived in the Last Year: 1     Unstable Housing in the Last Year: No     Family History   Problem Relation Age of Onset    Lung Disease Mother         copd    Cancer Mother         basal/squamous    Hypertension Mother     Hyperlipidemia Mother     Stroke Mother     Alcohol abuse Mother     Heart Disease Father         HF    Arthritis Father         rheumatoid    Lung Disease Father         emphysema    Alcohol abuse Father     Cancer Sister     Alcohol abuse Sister     Cancer Maternal Grandfather         Bladder or prostate?    Cancer Sister         pre cancerous spots     Alcohol abuse Sister     Kidney Disease Sister     Alcohol abuse Sister     Hypertension Sister     Alcohol abuse Sister      Recent Labs     04/19/23  1146 06/30/23  1457 07/29/23  1322 01/31/24  1020 01/31/24  1021   ALBUMIN 4.6  --  4.5  --   --    HDL  --  43  --   --  45   TRIGLYCERIDE  --  67  --   --  88   SODIUM 139  --  143 143  --    POTASSIUM 4.6  --  4.4 4.3  --    CHLORIDE 106  --  109 108  --    CO2 19*  --  23 22  --    BUN 25*  --  17 15  --    CREATININE 1.24   --  1.05 1.06  --                            Assessment & Plan        1. Chronic kidney disease, unspecified CKD stage  Kidney function improved  No uremic symptoms  Renal dose of medication  Avoid nephrotoxins  Continue same medication regimen  Patient was advised to call us if symptoms worsen  Check labs annually  Follow-up in the clinic on as-needed basis    2. Fibromyalgia  Avoid nephrotoxins like NSAIDs

## 2024-03-14 LAB — VIT B6 SERPL-MCNC: 51.5 NMOL/L (ref 20–125)

## 2024-03-24 DIAGNOSIS — M79.7 FIBROMYALGIA: ICD-10-CM

## 2024-03-24 DIAGNOSIS — K58.0 IRRITABLE BOWEL SYNDROME WITH DIARRHEA: ICD-10-CM

## 2024-03-25 RX ORDER — GABAPENTIN 600 MG/1
600 TABLET ORAL 3 TIMES DAILY
Qty: 270 TABLET | Refills: 0 | Status: SHIPPED | OUTPATIENT
Start: 2024-03-25

## 2024-03-25 RX ORDER — ONDANSETRON 4 MG/1
TABLET, ORALLY DISINTEGRATING ORAL
Qty: 15 TABLET | Refills: 0 | Status: SHIPPED | OUTPATIENT
Start: 2024-03-25

## 2024-04-16 ENCOUNTER — PATIENT OUTREACH (OUTPATIENT)
Dept: HEALTH INFORMATION MANAGEMENT | Facility: OTHER | Age: 59
End: 2024-04-16
Payer: MEDICARE

## 2024-04-16 DIAGNOSIS — R00.1 BRADYCARDIA: ICD-10-CM

## 2024-04-16 DIAGNOSIS — J43.9 PULMONARY EMPHYSEMA, UNSPECIFIED EMPHYSEMA TYPE (HCC): ICD-10-CM

## 2024-04-16 DIAGNOSIS — I10 ESSENTIAL HYPERTENSION: ICD-10-CM

## 2024-04-16 DIAGNOSIS — N18.32 STAGE 3B CHRONIC KIDNEY DISEASE: ICD-10-CM

## 2024-04-16 DIAGNOSIS — J44.89 COPD (CHRONIC OBSTRUCTIVE PULMONARY DISEASE) WITH CHRONIC BRONCHITIS (HCC): ICD-10-CM

## 2024-04-16 NOTE — PROGRESS NOTES
First attempt made for April CCM monthly follow up. No answer. Message left with contact information.

## 2024-04-16 NOTE — TELEPHONE ENCOUNTER
Received request via: Pharmacy    Was the patient seen in the last year in this department? Yes    Does the patient have an active prescription (recently filled or refills available) for medication(s) requested? No   oral

## 2024-04-16 NOTE — PROGRESS NOTES
"Patient Returned Call:   Assessment    I spoke to the patient this afternoon for her monthly CCM follow up. The patient states that she has had a major depressive episode but denies any suicidal ideation. She states that she has been incredibly careful to continue taking walks, eating \"quality food\" when able, and has been utilizing the strategies she knows work for her. She denies any cardiac symptoms or increased work of breathing. She does state that she had a round of congestion and runny nose that kept her from wearing her CPAP machine for a few days which has not enabled her to sleep very well. She states that she has been pushing water and has thoroughly cleaned her equipment. Patient states that her back and ribs are hurting, but nothing beyond what she has experienced in the past. Patient advised of indications for emergency care. Patient denies the need to come in and be seen at this time, she does know how to reach me if needed.     Education    Article on [facility] Allergy Management sent by mail.     Plan of Care and Goals    *Patient will remain free from falls/injuries  *Healthy nutrition, hydration, and activity  *Follow up with PCP, specialists as indicated    Barriers:    Management of chronic and acute medical concerns.     Progress:    Stable    Next outreach:  One Month                           " Localized Dermabrasion Text: The patient was draped in routine manner.  Localized dermabrasion using 3 x 17 mm wire brush was performed in routine manner to papillary dermis. This spot dermabrasion is being performed to complete skin cancer reconstruction. It also will eliminate the other sun damaged precancerous cells that are known to be part of the regional effect of a lifetime's worth of sun exposure. This localized dermabrasion is therapeutic and should not be considered cosmetic in any regard. Localized Dermabrasion With Wire Brush Text: The patient was draped in routine manner.  Localized dermabrasion using 3 x 17 mm wire brush was performed in routine manner to papillary dermis. This spot dermabrasion is being performed to complete skin cancer reconstruction. It also will eliminate the other sun damaged precancerous cells that are known to be part of the regional effect of a lifetime's worth of sun exposure. This localized dermabrasion is therapeutic and should not be considered cosmetic in any regard.

## 2024-05-02 ENCOUNTER — TELEPHONE (OUTPATIENT)
Dept: CARDIOLOGY | Facility: MEDICAL CENTER | Age: 59
End: 2024-05-02
Payer: MEDICARE

## 2024-05-02 DIAGNOSIS — R07.89 OTHER CHEST PAIN: ICD-10-CM

## 2024-05-02 RX ORDER — COLCHICINE 0.6 MG/1
1 CAPSULE ORAL
Qty: 90 CAPSULE | Refills: 1 | Status: SHIPPED | OUTPATIENT
Start: 2024-05-02 | End: 2024-05-03

## 2024-05-02 NOTE — TELEPHONE ENCOUNTER
ADD    Caller: Jaclyn Mejia     Topic/issue: Patient has been without her medication for a couple weeks, MITIGARE 0.6 MG Cap, there has been a refill placed but it is for the capsules and her insurance will only cover the tablets, please send a new script to [facility] on [address] for the Tables vs the Capsules otherwise patient is being asked to pay $200     Callback Number: [phone]    Thank You     Jo SEE

## 2024-05-03 RX ORDER — COLCHICINE 0.6 MG/1
0.6 TABLET ORAL DAILY
Qty: 100 TABLET | Refills: 0 | Status: SHIPPED | OUTPATIENT
Start: 2024-05-03

## 2024-05-03 RX ORDER — COLCHICINE 0.6 MG/1
1 CAPSULE ORAL
Qty: 100 CAPSULE | Refills: 1 | Status: SHIPPED | OUTPATIENT
Start: 2024-05-03 | End: 2024-05-03 | Stop reason: SDUPTHER

## 2024-05-03 NOTE — TELEPHONE ENCOUNTER
ADD    Caller: Jaclyn Mejia    Topic/issue: Patient called back regarding this encounter and the medication Colchicine 0.6 MG Cap that was sent incorrectly. She said that the medication needs to be sent as Mitigare tablets not capsules. Her insurance will not cover the capsules. She has been without the medication for over a week now. She is asking for the medication to be resent as tablets. Patient would like a callback when this is completed.     Please advise.     Callback Number: [phone]    Thank you,     Zunilda TAI

## 2024-05-03 NOTE — TELEPHONE ENCOUNTER
Called [facility] at [phone] and verified TABLET prescription has been received and not capsule.

## 2024-05-20 ENCOUNTER — PATIENT OUTREACH (OUTPATIENT)
Dept: HEALTH INFORMATION MANAGEMENT | Facility: OTHER | Age: 59
End: 2024-05-20
Payer: MEDICARE

## 2024-05-20 DIAGNOSIS — R00.1 BRADYCARDIA: ICD-10-CM

## 2024-05-20 DIAGNOSIS — N18.32 STAGE 3B CHRONIC KIDNEY DISEASE: ICD-10-CM

## 2024-05-20 DIAGNOSIS — J43.9 PULMONARY EMPHYSEMA, UNSPECIFIED EMPHYSEMA TYPE (HCC): ICD-10-CM

## 2024-05-20 DIAGNOSIS — R06.09 DYSPNEA ON EXERTION: ICD-10-CM

## 2024-05-20 DIAGNOSIS — I10 ESSENTIAL HYPERTENSION: ICD-10-CM

## 2024-05-20 DIAGNOSIS — Z91.81 RISK FOR FALLS: ICD-10-CM

## 2024-05-20 DIAGNOSIS — J44.89 COPD (CHRONIC OBSTRUCTIVE PULMONARY DISEASE) WITH CHRONIC BRONCHITIS (HCC): ICD-10-CM

## 2024-05-20 NOTE — PROGRESS NOTES
First attempt made for May PCM follow up. No answer. Message left with contact information at number indicated for preferred contact.

## 2024-05-22 DIAGNOSIS — I25.10 ASCVD (ARTERIOSCLEROTIC CARDIOVASCULAR DISEASE): ICD-10-CM

## 2024-05-22 DIAGNOSIS — R07.89 OTHER CHEST PAIN: ICD-10-CM

## 2024-05-22 DIAGNOSIS — M79.7 FIBROMYALGIA: ICD-10-CM

## 2024-05-22 RX ORDER — COLCHICINE 0.6 MG/1
0.6 TABLET ORAL DAILY
Qty: 100 TABLET | Refills: 0 | OUTPATIENT
Start: 2024-05-22

## 2024-05-22 NOTE — TELEPHONE ENCOUNTER
Received request via: Pharmacy    Was the patient seen in the last year in this department? Yes    Does the patient have an active prescription (recently filled or refills available) for medication(s) requested? No    Pharmacy Name: [facility]    Does the patient have group home Plus and need 100 day supply (blood pressure, diabetes and cholesterol meds only)? Medication is not for cholesterol, blood pressure or diabetes

## 2024-05-22 NOTE — TELEPHONE ENCOUNTER
Is the patient due for a refill? No pharmacy change    Was the patient seen the past year? Yes    Date of last office visit: 8/29/23    Does the patient have an upcoming appointment?  Yes   If yes, When? 8/27/24    Provider to refill:ADARSH SHUKLA    Does the patients insurance require a 100 day supply?  Yes

## 2024-05-23 RX ORDER — ROSUVASTATIN CALCIUM 5 MG/1
5 TABLET, COATED ORAL EVERY EVENING
Qty: 100 TABLET | Refills: 0 | Status: SHIPPED | OUTPATIENT
Start: 2024-05-23

## 2024-05-23 RX ORDER — GABAPENTIN 600 MG/1
600 TABLET ORAL 3 TIMES DAILY
Qty: 270 TABLET | Refills: 0 | Status: SHIPPED | OUTPATIENT
Start: 2024-05-23

## 2024-05-28 NOTE — PROGRESS NOTES
Second attempt made for May PCM follow up. No answer. I will mail the monthly educational article and added this encounter if the patient returns my call.

## 2024-06-03 DIAGNOSIS — I25.10 ASCVD (ARTERIOSCLEROTIC CARDIOVASCULAR DISEASE): ICD-10-CM

## 2024-06-04 RX ORDER — ROSUVASTATIN CALCIUM 5 MG/1
5 TABLET, COATED ORAL EVERY EVENING
Qty: 100 TABLET | Refills: 0 | Status: SHIPPED | OUTPATIENT
Start: 2024-06-04

## 2024-06-04 NOTE — TELEPHONE ENCOUNTER
Received request via: Pharmacy    Was the patient seen in the last year in this department? Yes    Does the patient have an active prescription (recently filled or refills available) for medication(s) requested? No    Pharmacy Name: [facility] order     Does the patient have [facility] Plus and need 100 day supply (blood pressure, diabetes and cholesterol meds only)? Yes, quantity updated to 100 days

## 2024-06-20 DIAGNOSIS — K21.9 GASTROESOPHAGEAL REFLUX DISEASE WITHOUT ESOPHAGITIS: ICD-10-CM

## 2024-06-20 RX ORDER — FAMOTIDINE 40 MG/1
40 TABLET, FILM COATED ORAL DAILY
Qty: 90 TABLET | Refills: 0 | Status: SHIPPED | OUTPATIENT
Start: 2024-06-20

## 2024-06-20 NOTE — TELEPHONE ENCOUNTER
Received request via: Pharmacy    Was the patient seen in the last year in this department? Yes    Does the patient have an active prescription (recently filled or refills available) for medication(s) requested? No    [facility]    Does the patient have longterm Plus and need 100 day supply (blood pressure, diabetes and cholesterol meds only)? Medication is not for cholesterol, blood pressure or diabetes

## 2024-06-25 ENCOUNTER — PATIENT OUTREACH (OUTPATIENT)
Dept: HEALTH INFORMATION MANAGEMENT | Facility: OTHER | Age: 59
End: 2024-06-25
Payer: MEDICARE

## 2024-06-25 ENCOUNTER — DOCUMENTATION (OUTPATIENT)
Dept: HEALTH INFORMATION MANAGEMENT | Facility: OTHER | Age: 59
End: 2024-06-25
Payer: MEDICARE

## 2024-06-25 DIAGNOSIS — I10 ESSENTIAL HYPERTENSION: ICD-10-CM

## 2024-06-25 DIAGNOSIS — Z91.81 RISK FOR FALLS: ICD-10-CM

## 2024-06-25 DIAGNOSIS — N18.32 STAGE 3B CHRONIC KIDNEY DISEASE: ICD-10-CM

## 2024-06-25 DIAGNOSIS — R06.09 DYSPNEA ON EXERTION: ICD-10-CM

## 2024-06-25 DIAGNOSIS — J43.9 PULMONARY EMPHYSEMA, UNSPECIFIED EMPHYSEMA TYPE (HCC): ICD-10-CM

## 2024-06-25 DIAGNOSIS — J44.89 COPD (CHRONIC OBSTRUCTIVE PULMONARY DISEASE) WITH CHRONIC BRONCHITIS (HCC): ICD-10-CM

## 2024-06-25 DIAGNOSIS — R00.1 BRADYCARDIA: ICD-10-CM

## 2024-06-25 NOTE — PROGRESS NOTES
"Assessment    I spoke to the patient this afternoon by phone for her monthly and quarterly PCM follow ups. The patient is aware of future appointment dates and times. The patient is aware of her care gaps. The patient's medications were reviewed and the patient reports that medications are being taken as prescribed. 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 She states that this has been a good and fulfilling thing. She states that this has also caused an improvement in her activity level.The patient denies any recent falls or injuries. The patient denies any exacerbated symptoms related to her chronic conditions at this time. The patient is enjoying the variety of fruits and veggies available right now. The patient voices understanding of how to contact me if needed.     Education    \"Neighborhood\" nutrition discussed    Plan of Care and Goals    Patient will remain free from falls/injuries  Healthy nutrition, hydration, and activity    Barriers:    Management of acute and chronic life stressors and medical concerns.     Progress:    Progressing    Next outreach:  One Month                         "

## 2024-06-25 NOTE — PROGRESS NOTES
First attempt made for PCM monthly and quarterly follow ups. No answer. Message left with contact information and request for follow up.

## 2024-07-09 DIAGNOSIS — M79.7 FIBROMYALGIA: ICD-10-CM

## 2024-07-09 DIAGNOSIS — K58.0 IRRITABLE BOWEL SYNDROME WITH DIARRHEA: ICD-10-CM

## 2024-07-09 RX ORDER — ONDANSETRON 4 MG/1
TABLET, ORALLY DISINTEGRATING ORAL
Qty: 15 TABLET | Refills: 0 | Status: SHIPPED | OUTPATIENT
Start: 2024-07-09

## 2024-07-09 RX ORDER — BACLOFEN 10 MG/1
10 TABLET ORAL 3 TIMES DAILY
Qty: 270 TABLET | Refills: 0 | Status: SHIPPED | OUTPATIENT
Start: 2024-07-09

## 2024-07-10 DIAGNOSIS — M79.7 FIBROMYALGIA: ICD-10-CM

## 2024-07-11 DIAGNOSIS — R07.89 OTHER CHEST PAIN: ICD-10-CM

## 2024-07-11 RX ORDER — COLCHICINE 0.6 MG/1
0.6 TABLET ORAL DAILY
Qty: 100 TABLET | Refills: 0 | Status: SHIPPED | OUTPATIENT
Start: 2024-07-11

## 2024-07-11 RX ORDER — GABAPENTIN 600 MG/1
600 TABLET ORAL 3 TIMES DAILY
Qty: 270 TABLET | Refills: 0 | Status: SHIPPED | OUTPATIENT
Start: 2024-07-11

## 2024-07-22 RX ORDER — MILNACIPRAN HYDROCHLORIDE 100 MG/1
1 TABLET, FILM COATED ORAL 2 TIMES DAILY
Qty: 180 TABLET | Refills: 0 | Status: SHIPPED | OUTPATIENT
Start: 2024-07-22

## 2024-07-23 ENCOUNTER — PATIENT OUTREACH (OUTPATIENT)
Dept: HEALTH INFORMATION MANAGEMENT | Facility: OTHER | Age: 59
End: 2024-07-23
Payer: MEDICARE

## 2024-07-23 DIAGNOSIS — M79.7 FIBROMYALGIA: ICD-10-CM

## 2024-07-23 DIAGNOSIS — J44.89 COPD (CHRONIC OBSTRUCTIVE PULMONARY DISEASE) WITH CHRONIC BRONCHITIS (HCC): ICD-10-CM

## 2024-07-23 DIAGNOSIS — R00.1 BRADYCARDIA: ICD-10-CM

## 2024-07-23 DIAGNOSIS — G43.809 OTHER MIGRAINE WITHOUT STATUS MIGRAINOSUS, NOT INTRACTABLE: ICD-10-CM

## 2024-07-23 DIAGNOSIS — N18.32 STAGE 3B CHRONIC KIDNEY DISEASE: ICD-10-CM

## 2024-07-23 DIAGNOSIS — I10 ESSENTIAL HYPERTENSION: ICD-10-CM

## 2024-07-23 DIAGNOSIS — J43.9 PULMONARY EMPHYSEMA, UNSPECIFIED EMPHYSEMA TYPE (HCC): ICD-10-CM

## 2024-07-23 DIAGNOSIS — R06.09 DYSPNEA ON EXERTION: ICD-10-CM

## 2024-07-23 DIAGNOSIS — G47.33 OBSTRUCTIVE SLEEP APNEA SYNDROME: ICD-10-CM

## 2024-07-23 RX ORDER — TOPIRAMATE 50 MG/1
50 TABLET, FILM COATED ORAL 3 TIMES DAILY
Qty: 270 TABLET | Refills: 0 | Status: SHIPPED | OUTPATIENT
Start: 2024-07-23

## 2024-08-17 DIAGNOSIS — I25.10 ASCVD (ARTERIOSCLEROTIC CARDIOVASCULAR DISEASE): ICD-10-CM

## 2024-08-19 RX ORDER — ROSUVASTATIN CALCIUM 5 MG/1
5 TABLET, COATED ORAL EVERY EVENING
Qty: 100 TABLET | Refills: 2 | Status: SHIPPED | OUTPATIENT
Start: 2024-08-19

## 2024-08-19 NOTE — TELEPHONE ENCOUNTER
Received request via: Pharmacy    Was the patient seen in the last year in this department? Yes    Does the patient have an active prescription (recently filled or refills available) for medication(s) requested? No    Pharmacy Name: [facility]    Does the patient have senior living Plus and need 100-day supply? (This applies to ALL medications) Yes, quantity updated to 100 days

## 2024-08-22 ENCOUNTER — PATIENT OUTREACH (OUTPATIENT)
Dept: HEALTH INFORMATION MANAGEMENT | Facility: OTHER | Age: 59
End: 2024-08-22
Payer: MEDICARE

## 2024-08-22 DIAGNOSIS — J43.9 PULMONARY EMPHYSEMA, UNSPECIFIED EMPHYSEMA TYPE (HCC): ICD-10-CM

## 2024-08-22 DIAGNOSIS — R06.09 DYSPNEA ON EXERTION: ICD-10-CM

## 2024-08-22 DIAGNOSIS — N18.32 STAGE 3B CHRONIC KIDNEY DISEASE: ICD-10-CM

## 2024-08-22 DIAGNOSIS — G47.33 OBSTRUCTIVE SLEEP APNEA SYNDROME: ICD-10-CM

## 2024-08-22 DIAGNOSIS — R00.1 BRADYCARDIA: ICD-10-CM

## 2024-08-22 DIAGNOSIS — J44.89 COPD (CHRONIC OBSTRUCTIVE PULMONARY DISEASE) WITH CHRONIC BRONCHITIS (HCC): ICD-10-CM

## 2024-08-22 DIAGNOSIS — I10 ESSENTIAL HYPERTENSION: ICD-10-CM

## 2024-08-22 DIAGNOSIS — Z91.81 RISK FOR FALLS: ICD-10-CM

## 2024-08-22 NOTE — PROGRESS NOTES
First attempt made for PCM monthly follow up. No answer. Message left with contact information and request for call back.

## 2024-08-30 NOTE — PROGRESS NOTES
Assessment    I spoke to the patient today for their Monthly PCM review. Patient's future appointments reviewed. Patient reports not taking her blood pressure regularly. She states she has been having some intermittent chest discomfort and headaches. Patient is on their way to to visit with family in [place] for a baby shower. The patient voices understanding of urgent and emergency care. The patient is going to give me a call when they return.     Education and Self Management of Care    Article on chest pain sent by mail  Patient will continue following up with PCP/Specialists as indicated.     Plan of Care and Goals    Patient will remain free from falls/injuries  Healthy nutrition, hydration, and activity    Barriers:    Management of chronic medical concerns    Progress:    Stable    Next outreach:    One Month

## 2024-09-19 ENCOUNTER — PATIENT OUTREACH (OUTPATIENT)
Dept: HEALTH INFORMATION MANAGEMENT | Facility: OTHER | Age: 59
End: 2024-09-19
Payer: MEDICARE

## 2024-09-19 DIAGNOSIS — F10.21 ALCOHOLISM IN RECOVERY (HCC): ICD-10-CM

## 2024-09-19 DIAGNOSIS — R06.09 DYSPNEA ON EXERTION: ICD-10-CM

## 2024-09-19 DIAGNOSIS — K58.0 IRRITABLE BOWEL SYNDROME WITH DIARRHEA: ICD-10-CM

## 2024-09-19 DIAGNOSIS — R00.1 BRADYCARDIA: ICD-10-CM

## 2024-09-19 DIAGNOSIS — I10 ESSENTIAL HYPERTENSION: ICD-10-CM

## 2024-09-19 DIAGNOSIS — R07.89 OTHER CHEST PAIN: ICD-10-CM

## 2024-09-19 DIAGNOSIS — G47.33 OBSTRUCTIVE SLEEP APNEA SYNDROME: ICD-10-CM

## 2024-09-19 DIAGNOSIS — Z91.81 RISK FOR FALLS: ICD-10-CM

## 2024-09-19 DIAGNOSIS — J43.9 PULMONARY EMPHYSEMA, UNSPECIFIED EMPHYSEMA TYPE (HCC): ICD-10-CM

## 2024-09-19 DIAGNOSIS — J44.89 COPD (CHRONIC OBSTRUCTIVE PULMONARY DISEASE) WITH CHRONIC BRONCHITIS (HCC): ICD-10-CM

## 2024-09-19 DIAGNOSIS — N18.32 STAGE 3B CHRONIC KIDNEY DISEASE: ICD-10-CM

## 2024-09-19 DIAGNOSIS — R73.09 ELEVATED HEMOGLOBIN A1C: ICD-10-CM

## 2024-09-19 ASSESSMENT — ENCOUNTER SYMPTOMS
FOCAL WEAKNESS: 0
COUGH: 0
PALPITATIONS: 0
SHORTNESS OF BREATH: 0
PND: 0
FEVER: 0
DIZZINESS: 0
NIGHT SWEATS: 0
NAUSEA: 0
DIARRHEA: 0
SYNCOPE: 0
VOMITING: 0
WHEEZING: 0
IRREGULAR HEARTBEAT: 0
ORTHOPNEA: 0
WEAKNESS: 0
NEAR-SYNCOPE: 0
ABDOMINAL PAIN: 0
DYSPNEA ON EXERTION: 0

## 2024-09-19 NOTE — PROGRESS NOTES
First attempt for September [facility] follow up. No answer. Message left with contact information and request for call back.

## 2024-09-19 NOTE — PROGRESS NOTES
Cardiology Follow-up Consultation Note    Date of note:    9/20/2024    Primary Care Provider: JAN Rose     Patient Name: Jaclyn Mejia   YOB: 1965  MRN:              [id number]    Chief Complaint: Follow-up    History of Present Illness: Ms. Jaclyn Mejia is a 59 y.o. female whose current medical problems include dyslipidemia, coronary artery calcification, and tobacco abuse who is here for follow-up chest pain.    The patient was previously a patient of Dr. Deal, last seen 8/29/2023.  The patient previously had a nuclear stress test 7/2023, which showed normal perfusion.  Echocardiogram was obtained 8/2023, which was normal.    The patient presents today for follow-up.  She does report chronic chest pain as well as shortness of breath that is unchanged since her last visit.  She continues to smoke cigarettes, and is working on quitting.  She denies any orthopnea, PND, or leg swelling. No palpitations. No syncope or presyncopal episodes.     Cardiovascular Risk Factors:  1. Smoking status: Current everyday smoker  2. Type II Diabetes Mellitus: None  Lab Results   Component Value Date/Time    HBA1C 5.5 06/30/2023 02:57 PM    HBA1C 5.1 08/16/2021 08:54 AM     3. Hypertension: None  4. Dyslipidemia: On statin  Cholesterol,Tot   Date Value Ref Range Status   01/31/2024 158 100 - 199 mg/dL Final     LDL   Date Value Ref Range Status   01/31/2024 95 <100 mg/dL Final     HDL   Date Value Ref Range Status   01/31/2024 45 >=40 mg/dL Final     Triglycerides   Date Value Ref Range Status   01/31/2024 88 0 - 149 mg/dL Final     5. Family history of early Coronary Artery Disease in a first degree relative (Male less than 55 years of age; Female less than 65 years of age): Mother and father with heart disease  6.  Obesity and/or Metabolic Syndrome: Body mass index is 27.56 kg/m².  7. Sedentary lifestyle: Not active    Review of Systems   Constitutional: Negative for fever,  malaise/fatigue and night sweats.   Cardiovascular:  Negative for chest pain, dyspnea on exertion, irregular heartbeat, leg swelling, near-syncope, orthopnea, palpitations, paroxysmal nocturnal dyspnea and syncope.   Respiratory:  Negative for cough, shortness of breath and wheezing.    Gastrointestinal:  Negative for abdominal pain, diarrhea, nausea and vomiting.   Neurological:  Negative for dizziness, focal weakness and weakness.       All other systems reviewed and are negative.       Current Outpatient Medications   Medication Sig Dispense Refill    aspirin 81 MG EC tablet Take 81 mg by mouth every day.      rosuvastatin (CRESTOR) 5 MG Tab TAKE 1 TABLET BY MOUTH IN THE  EVENING 100 Tablet 2    topiramate (TOPAMAX) 50 MG tablet TAKE 1 TABLET BY MOUTH 3 TIMES  DAILY 270 Tablet 0    SAVELLA 100 MG Tab TAKE 1 TABLET BY MOUTH TWICE  DAILY 180 Tablet 0    gabapentin (NEURONTIN) 600 MG tablet Take 1 Tablet by mouth 3 times a day. 270 Tablet 0    colchicine (COLCRYS) 0.6 MG Tab Take 1 Tablet by mouth every day. 100 Tablet 0    ondansetron (ZOFRAN ODT) 4 MG TABLET DISPERSIBLE DISSOLVE 1 TABLET ON THE TONGUE  EVERY 6 HOURS AS NEEDED FOR  NAUSEA/VOMITING 15 Tablet 0    baclofen (LIORESAL) 10 MG Tab TAKE 1 TABLET BY MOUTH 3 TIMES  DAILY 270 Tablet 0    famotidine (PEPCID) 40 MG Tab TAKE 1 TABLET BY MOUTH DAILY 90 Tablet 0    clindamycin (CLEOCIN) 2 % vaginal cream Use vaginally after sexual activity for irritation and odor related to BV. 40 g 0    dicyclomine (BENTYL) 20 MG Tab TAKE 1 TABLET BY MOUTH EVERY 6 HOURS AS NEEDED (IBS). 360 Tablet 1    Multiple Vitamin (MULTIVITAMIN ADULT PO)       NON SPECIFIED Take  by mouth. Flax oil gel cap, one capsule daily      diclofenac sodium (VOLTAREN) 1 % Gel Apply 2 g topically 4 times a day as needed (pain). 100 g 0    Omega-3 Fatty Acids (FISH OIL) 1000 MG Cap capsule Take 1,000 mg by mouth 1 time a day as needed.      magnesium oxide (MAG-OX) 400 MG Tab tablet Take 400 mg by  "mouth every day.      Loratadine (CLARITIN PO) Take 1 tablet by mouth every day.      hydrOXYzine HCl (ATARAX) 25 MG Tab Take 1 Tab by mouth 3 times a day as needed for Anxiety. 90 Tab 0     No current facility-administered medications for this visit.         Allergies   Allergen Reactions    Erythromycin Rash     Chest rash    Tetracycline Rash     Chest rash       Physical Exam:  Ambulatory Vitals  /78 (BP Location: Left arm, Patient Position: Sitting, BP Cuff Size: Adult)   Pulse 71   Resp 16   Ht 1.651 m (5' 5\")   Wt 75.1 kg (165 lb 9.6 oz)   SpO2 96%    Oxygen Therapy:  Pulse Oximetry: 96 %  BP Readings from Last 4 Encounters:   09/20/24 118/78   03/13/24 132/78   03/11/24 110/70   02/29/24 102/60       Weight/BMI: Body mass index is 27.56 kg/m².  Wt Readings from Last 4 Encounters:   09/20/24 75.1 kg (165 lb 9.6 oz)   03/13/24 77.1 kg (170 lb)   03/11/24 76.9 kg (169 lb 9.6 oz)   02/29/24 75.8 kg (167 lb)       General: Well appearing and in no apparent distress  Eyes: nl conjunctiva, no icteric sclera  ENT: normal external appearance of ears, nose, and throat  Neck: no visible JVP,  no carotid bruits  Lungs: normal respiratory effort, CTAB  Heart: RRR, no murmurs, no rubs or gallops,  no edema bilateral lower extremities. No LV/RV heave on cardiac palpatation. + bilateral radial pulses.  + bilateral dp pulses.   Abdomen: soft, non tender, non distended, no masses, normal bowel sounds.  No HSM.  Extremities/MSK: no clubbing, no cyanosis  Neurological: No focal sensory deficits  Psychiatric: Appropriate affect, A/O x 3, intact judgement and insight  Skin: Warm extremities      Lab Data Review:  Lab Results   Component Value Date/Time    CHOLSTRLTOT 158 01/31/2024 10:21 AM    LDL 95 01/31/2024 10:21 AM    HDL 45 01/31/2024 10:21 AM    TRIGLYCERIDE 88 01/31/2024 10:21 AM       Lab Results   Component Value Date/Time    SODIUM 143 01/31/2024 10:20 AM    POTASSIUM 4.3 01/31/2024 10:20 AM    CHLORIDE 108 " 01/31/2024 10:20 AM    CO2 22 01/31/2024 10:20 AM    GLUCOSE 81 01/31/2024 10:20 AM    BUN 15 01/31/2024 10:20 AM    CREATININE 1.06 01/31/2024 10:20 AM     Lab Results   Component Value Date/Time    ALKPHOSPHAT 93 07/29/2023 01:22 PM    ASTSGOT 12 07/29/2023 01:22 PM    ALTSGPT 9 07/29/2023 01:22 PM    TBILIRUBIN 0.2 07/29/2023 01:22 PM      Lab Results   Component Value Date/Time    WBC 8.0 01/31/2024 10:20 AM    HEMOGLOBIN 15.1 01/31/2024 10:20 AM     Lab Results   Component Value Date/Time    HBA1C 5.5 06/30/2023 02:57 PM    HBA1C 5.1 08/16/2021 08:54 AM         Cardiac Imaging and Procedures Review:    EKG dated 7/29/2023: My personal interpretation is Sinus arrhythmia, Probable left atrial enlargement    Echo dated 8/30/2023:   CONCLUSIONS  No prior study is available for comparison.   The left ventricle is normal in size and thickness.  Normal right ventricular systolic function.   No significant valvular abnormalities.      Nuclear Perfusion Imaging (7/28/2023):   NUCLEAR IMAGING INTERPRETATION   No evidence of significant jeopardized viable myocardium or prior myocardial    infarction.   Normal left ventricular size, ejection fraction, and wall motion.   ECG INTERPRETATION   Negative stress ECG for ischemia.      Assessment & Plan     1. Other chest pain  CRP HIGH SENSITIVE (CARDIAC)      2. Dyslipidemia  aspirin 81 MG EC tablet      3. Cigarette smoker        4. Coronary artery calcification  aspirin 81 MG EC tablet            Shared Medical Decision Making:    Chest pain  The patient previously underwent a nuclear stress test, which showed normal perfusion in July 2023. Echocardiogram with normal LVEF and normal pericardium.   -Manage cardiovascular risk factors as below  -Patient was prescribed colchicine for unclear reasons - will plan to obain hs-CRP to assess for pericarditis.  If hs-CRP elevated, reasonable to prescribe colchicine.    Dyslipidemia  Coronary artery calcification   CT lung cancer  screening 2/29/2024 shows coronary artery calcification  -Continue rosuvastatin 5 mg daily  -Start aspirin 81 mg daily    Cigarette smoker  Tobacco cessation counseling and education provided for 4 minutes. Nicotine replacement options provided including patch, and further medical treatments including Wellbutrin and Chantix. As well as over the counter options of lozenges and gum.   -Patient is currently smoking almost a pack a day.  Continue nicotine patch.    All of the patient's excellent questions were answered to the best of my knowledge and to her satisfaction.  It was a pleasure seeing Ms. Jaclyn Mejia in my clinic today. Return in about 3 months (around 12/20/2024). Patient is aware to call the cardiology clinic with any questions or concerns.      Fabrice Marr MD  [facility]  [place] [facility]  [address] [address]  Phone: [phone]  Fax: [phone]

## 2024-09-19 NOTE — PROGRESS NOTES
Assessment    I spoke to the patient this afternoon for her PCM follow up. The patient called  back in response to my earlier call today. The patient reports medications being taken as indicated in chart.  The patient states that she is seeing cardiology tomorrow regarding some intermittent chest pains and shortness of breath. She reports that she is recovering from an IBS flare up that had her in bed for almost a week. She reports keeping on top of nutrition and hydration. She voices understanding of considerations for upcoming cold/flu/covid season and is interested in getting her Influenza shot along with her HEP A vaccine at her next visit. Tang Miguel, Pharm D contacted to confirm that this can be ok.     Education and Self Management of Care    Article on Bradycardia sent by mail  Patient will follow up with PCP, specialists as indicated.     Plan of Care and Goals    Patient will remain free from falls/injuries  Healthy nutrition, hydration, and activity    Barriers:    Management of acute and chronic medical concerns    Progress:    In Process    Next outreach:  One Month

## 2024-09-20 ENCOUNTER — TELEPHONE (OUTPATIENT)
Dept: MEDICAL GROUP | Facility: PHYSICIAN GROUP | Age: 59
End: 2024-09-20
Payer: MEDICARE

## 2024-09-20 ENCOUNTER — OFFICE VISIT (OUTPATIENT)
Dept: CARDIOLOGY | Facility: MEDICAL CENTER | Age: 59
End: 2024-09-20
Attending: STUDENT IN AN ORGANIZED HEALTH CARE EDUCATION/TRAINING PROGRAM
Payer: MEDICARE

## 2024-09-20 VITALS
RESPIRATION RATE: 16 BRPM | OXYGEN SATURATION: 96 % | SYSTOLIC BLOOD PRESSURE: 118 MMHG | HEIGHT: 65 IN | DIASTOLIC BLOOD PRESSURE: 78 MMHG | WEIGHT: 165.6 LBS | HEART RATE: 71 BPM | BODY MASS INDEX: 27.59 KG/M2

## 2024-09-20 DIAGNOSIS — F17.210 CIGARETTE SMOKER: ICD-10-CM

## 2024-09-20 DIAGNOSIS — I25.10 CORONARY ARTERY CALCIFICATION: ICD-10-CM

## 2024-09-20 DIAGNOSIS — I25.84 CORONARY ARTERY CALCIFICATION: ICD-10-CM

## 2024-09-20 DIAGNOSIS — E78.5 DYSLIPIDEMIA: ICD-10-CM

## 2024-09-20 DIAGNOSIS — R07.89 OTHER CHEST PAIN: ICD-10-CM

## 2024-09-20 PROCEDURE — 3078F DIAST BP <80 MM HG: CPT | Performed by: STUDENT IN AN ORGANIZED HEALTH CARE EDUCATION/TRAINING PROGRAM

## 2024-09-20 PROCEDURE — 99214 OFFICE O/P EST MOD 30 MIN: CPT | Mod: 25 | Performed by: STUDENT IN AN ORGANIZED HEALTH CARE EDUCATION/TRAINING PROGRAM

## 2024-09-20 PROCEDURE — 3074F SYST BP LT 130 MM HG: CPT | Performed by: STUDENT IN AN ORGANIZED HEALTH CARE EDUCATION/TRAINING PROGRAM

## 2024-09-20 PROCEDURE — 99406 BEHAV CHNG SMOKING 3-10 MIN: CPT | Performed by: STUDENT IN AN ORGANIZED HEALTH CARE EDUCATION/TRAINING PROGRAM

## 2024-09-20 PROCEDURE — 99213 OFFICE O/P EST LOW 20 MIN: CPT | Performed by: STUDENT IN AN ORGANIZED HEALTH CARE EDUCATION/TRAINING PROGRAM

## 2024-09-20 RX ORDER — COLCHICINE 0.6 MG/1
0.6 TABLET ORAL DAILY
Qty: 100 TABLET | Refills: 2 | OUTPATIENT
Start: 2024-09-20

## 2024-09-20 RX ORDER — ASPIRIN 81 MG/1
81 TABLET ORAL DAILY
COMMUNITY

## 2024-09-20 ASSESSMENT — FIBROSIS 4 INDEX: FIB4 SCORE: 0.73

## 2024-09-23 DIAGNOSIS — M79.7 FIBROMYALGIA: ICD-10-CM

## 2024-09-24 RX ORDER — GABAPENTIN 600 MG/1
600 TABLET ORAL 3 TIMES DAILY
Qty: 270 TABLET | Refills: 0 | Status: SHIPPED | OUTPATIENT
Start: 2024-09-24

## 2024-09-24 NOTE — TELEPHONE ENCOUNTER
Received request via: Pharmacy    Was the patient seen in the last year in this department? Yes    Does the patient have an active prescription (recently filled or refills available) for medication(s) requested? No    Pharmacy Name: Pending sale to [facility] - [address]     Does the patient have [facility] Plus and need 100-day supply? (This applies to ALL medications) Yes, quantity updated to 100 days

## 2024-10-03 ENCOUNTER — PATIENT OUTREACH (OUTPATIENT)
Dept: HEALTH INFORMATION MANAGEMENT | Facility: OTHER | Age: 59
End: 2024-10-03

## 2024-10-03 ENCOUNTER — OFFICE VISIT (OUTPATIENT)
Dept: MEDICAL GROUP | Facility: PHYSICIAN GROUP | Age: 59
End: 2024-10-03
Payer: MEDICARE

## 2024-10-03 VITALS
OXYGEN SATURATION: 98 % | TEMPERATURE: 97.1 F | DIASTOLIC BLOOD PRESSURE: 76 MMHG | WEIGHT: 161.8 LBS | BODY MASS INDEX: 26.96 KG/M2 | HEART RATE: 80 BPM | HEIGHT: 65 IN | SYSTOLIC BLOOD PRESSURE: 116 MMHG

## 2024-10-03 DIAGNOSIS — G47.33 OBSTRUCTIVE SLEEP APNEA SYNDROME: ICD-10-CM

## 2024-10-03 DIAGNOSIS — J43.9 PULMONARY EMPHYSEMA, UNSPECIFIED EMPHYSEMA TYPE (HCC): ICD-10-CM

## 2024-10-03 DIAGNOSIS — I10 ESSENTIAL HYPERTENSION: ICD-10-CM

## 2024-10-03 DIAGNOSIS — M79.7 FIBROMYALGIA: ICD-10-CM

## 2024-10-03 DIAGNOSIS — K58.0 IRRITABLE BOWEL SYNDROME WITH DIARRHEA: ICD-10-CM

## 2024-10-03 DIAGNOSIS — J44.1 COPD EXACERBATION (HCC): ICD-10-CM

## 2024-10-03 DIAGNOSIS — G25.0 ESSENTIAL TREMOR: ICD-10-CM

## 2024-10-03 DIAGNOSIS — F41.1 GENERALIZED ANXIETY DISORDER: ICD-10-CM

## 2024-10-03 DIAGNOSIS — R00.1 BRADYCARDIA: ICD-10-CM

## 2024-10-03 DIAGNOSIS — N18.32 STAGE 3B CHRONIC KIDNEY DISEASE: ICD-10-CM

## 2024-10-03 DIAGNOSIS — J44.89 COPD (CHRONIC OBSTRUCTIVE PULMONARY DISEASE) WITH CHRONIC BRONCHITIS (HCC): ICD-10-CM

## 2024-10-03 DIAGNOSIS — Z23 NEED FOR VACCINATION: ICD-10-CM

## 2024-10-03 DIAGNOSIS — K21.9 GASTROESOPHAGEAL REFLUX DISEASE WITHOUT ESOPHAGITIS: ICD-10-CM

## 2024-10-03 DIAGNOSIS — Z91.81 RISK FOR FALLS: ICD-10-CM

## 2024-10-03 DIAGNOSIS — R06.09 DYSPNEA ON EXERTION: ICD-10-CM

## 2024-10-03 PROCEDURE — 3074F SYST BP LT 130 MM HG: CPT | Performed by: NURSE PRACTITIONER

## 2024-10-03 PROCEDURE — G0008 ADMIN INFLUENZA VIRUS VAC: HCPCS

## 2024-10-03 PROCEDURE — 99214 OFFICE O/P EST MOD 30 MIN: CPT | Mod: 25 | Performed by: NURSE PRACTITIONER

## 2024-10-03 PROCEDURE — 90656 IIV3 VACC NO PRSV 0.5 ML IM: CPT

## 2024-10-03 PROCEDURE — 3078F DIAST BP <80 MM HG: CPT | Performed by: NURSE PRACTITIONER

## 2024-10-03 RX ORDER — AZITHROMYCIN 250 MG/1
TABLET, FILM COATED ORAL
Qty: 6 TABLET | Refills: 0 | Status: SHIPPED | OUTPATIENT
Start: 2024-10-03

## 2024-10-03 RX ORDER — ONDANSETRON 4 MG/1
4 TABLET, ORALLY DISINTEGRATING ORAL EVERY 6 HOURS PRN
Qty: 90 TABLET | Refills: 3 | Status: SHIPPED | OUTPATIENT
Start: 2024-10-03

## 2024-10-03 RX ORDER — TIOTROPIUM BROMIDE 18 UG/1
18 CAPSULE ORAL; RESPIRATORY (INHALATION) DAILY
Qty: 30 CAPSULE | Refills: 11 | Status: SHIPPED | OUTPATIENT
Start: 2024-10-03

## 2024-10-03 RX ORDER — ALBUTEROL SULFATE 90 UG/1
2 INHALANT RESPIRATORY (INHALATION) EVERY 4 HOURS PRN
Qty: 1 EACH | Refills: 0 | Status: SHIPPED | OUTPATIENT
Start: 2024-10-03

## 2024-10-03 ASSESSMENT — FIBROSIS 4 INDEX: FIB4 SCORE: 0.73

## 2024-10-07 NOTE — PROGRESS NOTES
Annual Health Assessment Questions:    1.  Are you currently engaging in any exercise or physical activity? Yes    2.  How would you describe your mood or emotional well-being today? fair    3.  Have you had any falls in the last year? Yes    4.  Have you noticed any problems with your balance or had difficulty walking? Yes    5.  In the last six months have you experienced any leakage of urine? No    6. DPA/Advanced Directive: Patient does not have an Advanced Directive.  A packet and workshop information was given on Advanced Directives.     Subjective:     Chief Complaint   Patient presents with   • Lab Results       HPI:   Jaclyn presents today with     Problem   Currently Attempting to Quit Smoking    She quit smoking July 1st. She is overall doing well with nicotine patches and states that since that time she has bought only 2 packs of cigarettes, previously she was a 1 ppd smoker.      Stage 3b Chronic Kidney Disease (Hcc)    Chronic in nature. Recent GFR is lower at 45. We discussed hydration, which she has increased. BP is currently well-controlled since patient is no longer drinking. We discussed exercise, healthy food, and discussed meloxicam for pain.      Bmi 30.0-30.9,Adult    Chronic in nature. Stable. She has been working on healthy diet and exercise.      Alcoholism in Recovery (Hcc)    Chronic in nature. Stable.  She has been sober. States that she has had 1 drink out to breakfast with a friend since then.  She states that she still lives near her ex-partner, but states she is doing an excellent job setting boundaries.  She does keep an alcohol free residence.  She continues to do well with sobriety.     Class 1 Obesity With Serious Comorbidity and Body Mass Index (Bmi) of 31.0 to 31.9 in Adult (Resolved)   Encounter for Tobacco Use Cessation Counseling (Resolved)    States that today is her quit date. She has 1 left. States she is ordering nicotine patches is using 1(800) QUITNOW.     Physical Therapy    Physical Therapy Treatment    Patient Name: Funmilayo Montenegro  MRN: [id number]  Department: [facility]  Room: [address] [place] [address]  Today's Date: 10/7/2024  Time Calculation  Start Time: 0952  Stop Time: 1021  Time Calculation (min): 29 min         Assessment/Plan   PT Assessment  PT Assessment Results: Decreased strength, Decreased endurance, Impaired balance  Rehab Prognosis: Good  End of Session Communication: Bedside nurse, PCT/NA/CTA  Assessment Comment: pt sat EOB 13 minutes min-GA. cues to lean forward as pt would have a retro lean at times. max cues on TTWB on LLE with proper foot and FWW sequencing. pt unsteady with NBOS. cues on safety and hand placement with transfers.  End of Session Patient Position: Alarm on, Up in chair     PT Plan  Treatment/Interventions: Bed mobility, Transfer training, Gait training, Balance training, Neuromuscular re-education, Endurance training, Strengthening, Range of motion, Therapeutic exercise, Therapeutic activity  PT Plan: Ongoing PT  PT Frequency: 4 times per week  PT Discharge Recommendations: Moderate intensity level of continued care      General Visit Information:   PT  Visit  PT Received On: 10/07/24       Subjective   Precautions:       Vital Signs (Past 2hrs)                 Objective   Pain:  Pain Assessment  0-10 (Numeric) Pain Score: 0 - No pain  Cognition:  Cognition  Orientation Level: Oriented X4  Coordination:       Treatments:       Bed Mobility 1  Bed Mobility 1: Supine to sitting, Sitting to supine  Level of Assistance 1: Minimum assistance (x2)  Bed Mobility Comments 1: HOB raised  Bed Mobility 2  Level of Assistance 2:  (x2)    Ambulation/Gait Training 1  Device 1: Rolling walker  Assistance 1: Moderate assistance (x2)  Quality of Gait 1: Shuffling gait, Narrow base of support  Comments/Distance (ft) 1: 2  Transfer 1  Technique 1: Sit to stand, Stand to sit  Transfer Device 1: Walker  Transfer Level of Assistance 1: Moderate assistance        Current Outpatient Medications Ordered in Epic   Medication Sig Dispense Refill   • Omega-3 Fatty Acids (FISH OIL) 1000 MG Cap capsule Take 1,000 mg by mouth 1 time a day as needed.     • topiramate (TOPAMAX) 50 MG tablet TAKE 1 TABLET BY MOUTH THREE TIMES A  Tablet 0   • gabapentin (NEURONTIN) 600 MG tablet TAKE 1 TABLET BY MOUTH THREE TIMES A  Tablet 0   • ondansetron (ZOFRAN ODT) 4 MG TABLET DISPERSIBLE TAKE 1 TABLET BY MOUTH EVERY 8 HOURS AS NEEDED FOR NAUSEA 15 Tablet 0   • Vitamin D, Cholecalciferol, 50 MCG (2000 UT) Cap Take 1 Capsule by mouth every day.     • magnesium oxide (MAG-OX) 400 MG Tab tablet Take 400 mg by mouth every day.     • diclofenac sodium (VOLTAREN) 1 % Gel APPLY 2 G TOPICALLY 4 TIMES A DAY AS NEEDED. 100 g 0   • Milnacipran HCl (SAVELLA) 100 MG Tab Take 1 Tablet by mouth 2 times a day. 180 Tablet 0   • clindamycin (CLEOCIN) 2 % vaginal cream Use vaginally after sexual activity for irritation and odor related to BV. 40 g 0   • lidocaine (LIDODERM) 5 % Patch Place 1 Patch on the skin every 24 hours. 10 Patch 0   • baclofen (LIORESAL) 10 MG Tab Take 1 Tablet by mouth 3 times a day. 270 Tablet 0   • Loratadine (CLARITIN PO) Take 1 tablet by mouth every day.     • PROAIR  (90 Base) MCG/ACT Aero Soln inhalation aerosol INHALE 2 PUFFS BY MOUTH EVERY 6 HOURS AS NEEDED FOR SHORTNESS OF BREATH. 8.5 Inhaler 3   • hydrOXYzine HCl (ATARAX) 25 MG Tab Take 1 Tab by mouth 3 times a day as needed for Anxiety. 90 Tab 0   • dicyclomine (BENTYL) 20 MG Tab Take 20 mg by mouth every 6 hours as needed.     • mirtazapine (REMERON) 30 MG Tab tablet TAKE 1 TABLET BY MOUTH EVERYDAY AT BEDTIME 90 Tablet 0     No current Epic-ordered facility-administered medications on file.       Health Maintenance: Completed    Review of Systems   Constitutional: Negative for chills, fever and malaise/fatigue.   Respiratory: Negative for cough and shortness of breath.    Cardiovascular: Negative for chest  (x2)  Trials/Comments 1: 1x EOB, 1x chair    Outcome Measures:  [facility] Basic Mobility  Turning from your back to your side while in a flat bed without using bedrails: A lot  Moving from lying on your back to sitting on the side of a flat bed without using bedrails: A lot  Moving to and from bed to chair (including a wheelchair): A lot  Standing up from a chair using your arms (e.g. wheelchair or bedside chair): A lot  To walk in hospital room: A lot  Climbing 3-5 steps with railing: Total  Basic Mobility - Total Score: 11    Education Documentation  Mobility Training, taught by Kely Maldonado PTA at 10/7/2024 10:34 AM.  Learner: Patient  Readiness: Acceptance  Method: Explanation  Response: Verbalizes Understanding    Education Comments  No comments found.        OP EDUCATION:       Encounter Problems       Encounter Problems (Active)       Mobility       STG - Patient will ambulate 15ft with FWW and Min A (Progressing)       Start:  10/05/24    Expected End:  10/19/24               PT Transfers       STG - Patient will perform bed mobility Mod A x1 (Progressing)       Start:  10/05/24    Expected End:  10/19/24            STG - Patient will transfer sit to and from stand using FWW and Min A x1 (Progressing)       Start:  10/05/24    Expected End:  10/19/24            STG - Patient maintains weight bearing status during transfers (including STS, gait and bed/chair) (Progressing)       Start:  10/05/24    Expected End:  10/19/24               Pain - Adult               "pain and palpitations.   Gastrointestinal: Negative for heartburn and nausea.   Genitourinary: Negative for dysuria.   Neurological: Negative for dizziness and headaches.   Psychiatric/Behavioral: Negative for depression and suicidal ideas.         Objective:     Exam:  /70 (BP Location: Left arm, Patient Position: Sitting, BP Cuff Size: Adult)   Pulse 73   Temp 36.1 °C (97 °F) (Temporal)   Ht 1.664 m (5' 5.5\")   Wt 84.4 kg (186 lb)   SpO2 94%   BMI 30.48 kg/m²  Body mass index is 30.48 kg/m².    Constitutional: Alert, no distress, well-groomed.  Skin: Warm, dry, good turgor, no rashes in visible areas.  Eye: Equal, round and reactive, conjunctiva clear, lids normal.  ENMT: Lips without lesions, good dentition, moist mucous membranes.  Neck: Trachea midline, no masses, no thyromegaly.  Respiratory: Unlabored respiratory effort, no cough.  MSK: Normal gait, moves all extremities.  Neuro: Grossly non-focal.   Psych: Alert and oriented x3, normal affect and mood.      Assessment & Plan:     57 y.o. female with the following -     Problem List Items Addressed This Visit     Alcoholism in recovery (HCC)     -continue alcohol cessation.           BMI 30.0-30.9,adult     -She is doing excellent work on healthy diet and exercise.             Currently attempting to quit smoking     -recommend that she continue smoking cessation.  -congratulated her on excellent progress :)           Stage 3b chronic kidney disease (HCC)     -Discontinue meloxicam  -Plan to monitor kidney function closely           Relevant Orders    Basic Metabolic Panel        My total time spent caring for the patient on the day of the encounter was 32 minutes.   This does not include time spent on separately billable procedures/tests.    Return in about 1 month (around 8/7/2022), or if symptoms worsen or fail to improve, for kidney recheck.    I have placed the below orders and discussed them with an approved delegating provider. The MA is " performing the below orders under the direction of Dr. Hernandez.     Please note that this dictation was created using voice recognition software. I have made every reasonable attempt to correct obvious errors, but I expect that there are errors of grammar and possibly content that I did not discover before finalizing the note.

## 2024-10-18 ENCOUNTER — APPOINTMENT (OUTPATIENT)
Dept: MEDICAL GROUP | Facility: PHYSICIAN GROUP | Age: 59
End: 2024-10-18
Payer: MEDICARE

## 2024-10-24 NOTE — PROGRESS NOTES
1. HealthConnect Verified: yes    2. Verify PCP: yes    3. Review and add  to Care Team: yes      4. Reviewed/Updated the following with patient:       •   Communication Preference Obtained? YES  • MyChart Activation: already active       •   E-Mail Address Obtained? YES       •   Appointment Day and Time Preferences? YES       •   Preferred Pharmacy? YES       •   Preferred Lab? YES    5. Care Gap Scheduling (Attempt to Schedule EACH Overdue Care Gap!)    Patient has completed care gaps              Printed to be faxed   at rolling walker ; fair - w/o device/fair plus

## 2024-10-30 DIAGNOSIS — K21.9 GASTROESOPHAGEAL REFLUX DISEASE WITHOUT ESOPHAGITIS: ICD-10-CM

## 2024-10-31 RX ORDER — FAMOTIDINE 40 MG/1
40 TABLET, FILM COATED ORAL DAILY
Qty: 90 TABLET | Refills: 3 | Status: SHIPPED | OUTPATIENT
Start: 2024-10-31

## 2024-11-06 ENCOUNTER — PATIENT OUTREACH (OUTPATIENT)
Dept: HEALTH INFORMATION MANAGEMENT | Facility: OTHER | Age: 59
End: 2024-11-06
Payer: MEDICARE

## 2024-11-06 DIAGNOSIS — J44.89 COPD (CHRONIC OBSTRUCTIVE PULMONARY DISEASE) WITH CHRONIC BRONCHITIS (HCC): ICD-10-CM

## 2024-11-06 DIAGNOSIS — N18.32 STAGE 3B CHRONIC KIDNEY DISEASE: ICD-10-CM

## 2024-11-06 DIAGNOSIS — I10 ESSENTIAL HYPERTENSION: ICD-10-CM

## 2024-11-11 DIAGNOSIS — B96.89 BV (BACTERIAL VAGINOSIS): ICD-10-CM

## 2024-11-11 DIAGNOSIS — N76.0 BV (BACTERIAL VAGINOSIS): ICD-10-CM

## 2024-11-11 RX ORDER — CLINDAMYCIN PHOSPHATE 20 MG/G
CREAM VAGINAL
Qty: 40 G | Refills: 0 | Status: SHIPPED | OUTPATIENT
Start: 2024-11-11 | End: 2024-11-20 | Stop reason: SDUPTHER

## 2024-11-11 NOTE — TELEPHONE ENCOUNTER
Received request via: Pharmacy    Was the patient seen in the last year in this department? Yes    Does the patient have an active prescription (recently filled or refills available) for medication(s) requested? No    Pharmacy Name: [facility] - [address]     Does the patient have senior living Plus and need 100-day supply? (This applies to ALL medications) Yes, quantity updated to 100 days

## 2024-11-20 DIAGNOSIS — B96.89 BV (BACTERIAL VAGINOSIS): ICD-10-CM

## 2024-11-20 DIAGNOSIS — N76.0 BV (BACTERIAL VAGINOSIS): ICD-10-CM

## 2024-11-20 NOTE — PROGRESS NOTES
Second attempt made for PCM monthly phone call. No answer. Message left with contact information and request for call back. Message sent by [employer]. First attempt recorded as 11/6/24 at 2:54 pm.

## 2024-11-21 RX ORDER — CLINDAMYCIN PHOSPHATE 20 MG/G
CREAM VAGINAL
Qty: 40 G | Refills: 0 | Status: SHIPPED | OUTPATIENT
Start: 2024-11-21

## 2024-11-21 NOTE — TELEPHONE ENCOUNTER
Received request via: Pharmacy    Was the patient seen in the last year in this department? Yes    Does the patient have an active prescription (recently filled or refills available) for medication(s) requested? No    [facility]    Does the patient have care home Plus and need 100-day supply? (This applies to ALL medications) Yes, quantity updated to 100 days

## 2024-11-30 ENCOUNTER — HOSPITAL ENCOUNTER (OUTPATIENT)
Dept: RADIOLOGY | Facility: MEDICAL CENTER | Age: 59
End: 2024-11-30
Attending: OTOLARYNGOLOGY
Payer: MEDICARE

## 2024-11-30 DIAGNOSIS — M79.7 FIBROMYALGIA: ICD-10-CM

## 2024-11-30 DIAGNOSIS — R42 DIZZINESS AND GIDDINESS: ICD-10-CM

## 2024-11-30 PROCEDURE — A9579 GAD-BASE MR CONTRAST NOS,1ML: HCPCS | Mod: JZ | Performed by: OTOLARYNGOLOGY

## 2024-11-30 PROCEDURE — 700117 HCHG RX CONTRAST REV CODE 255: Mod: JZ | Performed by: OTOLARYNGOLOGY

## 2024-11-30 PROCEDURE — 70553 MRI BRAIN STEM W/O & W/DYE: CPT

## 2024-11-30 RX ADMIN — GADOTERIDOL 15 ML: 279.3 INJECTION, SOLUTION INTRAVENOUS at 15:20

## 2024-12-02 ENCOUNTER — PATIENT MESSAGE (OUTPATIENT)
Dept: HEALTH INFORMATION MANAGEMENT | Facility: OTHER | Age: 59
End: 2024-12-02

## 2024-12-02 ENCOUNTER — HOSPITAL ENCOUNTER (OUTPATIENT)
Dept: LAB | Facility: MEDICAL CENTER | Age: 59
End: 2024-12-02
Attending: STUDENT IN AN ORGANIZED HEALTH CARE EDUCATION/TRAINING PROGRAM
Payer: MEDICARE

## 2024-12-02 ENCOUNTER — PATIENT OUTREACH (OUTPATIENT)
Dept: HEALTH INFORMATION MANAGEMENT | Facility: OTHER | Age: 59
End: 2024-12-02

## 2024-12-02 ENCOUNTER — OFFICE VISIT (OUTPATIENT)
Dept: MEDICAL GROUP | Facility: PHYSICIAN GROUP | Age: 59
End: 2024-12-02
Payer: MEDICARE

## 2024-12-02 VITALS
SYSTOLIC BLOOD PRESSURE: 112 MMHG | WEIGHT: 159.6 LBS | BODY MASS INDEX: 26.59 KG/M2 | HEART RATE: 75 BPM | TEMPERATURE: 97.3 F | DIASTOLIC BLOOD PRESSURE: 68 MMHG | HEIGHT: 65 IN | OXYGEN SATURATION: 99 %

## 2024-12-02 DIAGNOSIS — J44.89 COPD (CHRONIC OBSTRUCTIVE PULMONARY DISEASE) WITH CHRONIC BRONCHITIS (HCC): ICD-10-CM

## 2024-12-02 DIAGNOSIS — N18.32 STAGE 3B CHRONIC KIDNEY DISEASE: ICD-10-CM

## 2024-12-02 DIAGNOSIS — R07.89 OTHER CHEST PAIN: ICD-10-CM

## 2024-12-02 DIAGNOSIS — M79.7 FIBROMYALGIA: ICD-10-CM

## 2024-12-02 DIAGNOSIS — R90.82 WHITE MATTER DISEASE OF BRAIN DUE TO ISCHEMIA: ICD-10-CM

## 2024-12-02 DIAGNOSIS — I10 ESSENTIAL HYPERTENSION: ICD-10-CM

## 2024-12-02 DIAGNOSIS — N18.9 CHRONIC KIDNEY DISEASE, UNSPECIFIED CKD STAGE: ICD-10-CM

## 2024-12-02 DIAGNOSIS — F10.21 ALCOHOL DEPENDENCE, IN REMISSION (HCC): ICD-10-CM

## 2024-12-02 DIAGNOSIS — I99.8 WHITE MATTER DISEASE OF BRAIN DUE TO ISCHEMIA: ICD-10-CM

## 2024-12-02 DIAGNOSIS — I70.0 ATHEROSCLEROSIS OF AORTA (HCC): ICD-10-CM

## 2024-12-02 DIAGNOSIS — Z71.6 ENCOUNTER FOR TOBACCO USE CESSATION COUNSELING: ICD-10-CM

## 2024-12-02 DIAGNOSIS — F41.1 GAD (GENERALIZED ANXIETY DISORDER): ICD-10-CM

## 2024-12-02 DIAGNOSIS — F17.200 TOBACCO DEPENDENCE: ICD-10-CM

## 2024-12-02 LAB
ANION GAP SERPL CALC-SCNC: 12 MMOL/L (ref 7–16)
BUN SERPL-MCNC: 17 MG/DL (ref 8–22)
CALCIUM SERPL-MCNC: 10.1 MG/DL (ref 8.5–10.5)
CHLORIDE SERPL-SCNC: 103 MMOL/L (ref 96–112)
CO2 SERPL-SCNC: 26 MMOL/L (ref 20–33)
CREAT SERPL-MCNC: 1.14 MG/DL (ref 0.5–1.4)
CRP SERPL HS-MCNC: 1.9 MG/L (ref 0–3)
ERYTHROCYTE [DISTWIDTH] IN BLOOD BY AUTOMATED COUNT: 47.3 FL (ref 35.9–50)
GFR SERPLBLD CREATININE-BSD FMLA CKD-EPI: 55 ML/MIN/1.73 M 2
GLUCOSE SERPL-MCNC: 112 MG/DL (ref 65–99)
HCT VFR BLD AUTO: 43.6 % (ref 37–47)
HGB BLD-MCNC: 14.4 G/DL (ref 12–16)
MCH RBC QN AUTO: 32.4 PG (ref 27–33)
MCHC RBC AUTO-ENTMCNC: 33 G/DL (ref 32.2–35.5)
MCV RBC AUTO: 98.2 FL (ref 81.4–97.8)
PLATELET # BLD AUTO: 315 K/UL (ref 164–446)
PMV BLD AUTO: 10.6 FL (ref 9–12.9)
POTASSIUM SERPL-SCNC: 3.9 MMOL/L (ref 3.6–5.5)
RBC # BLD AUTO: 4.44 M/UL (ref 4.2–5.4)
SODIUM SERPL-SCNC: 141 MMOL/L (ref 135–145)
WBC # BLD AUTO: 6.7 K/UL (ref 4.8–10.8)

## 2024-12-02 PROCEDURE — 36415 COLL VENOUS BLD VENIPUNCTURE: CPT

## 2024-12-02 PROCEDURE — 80048 BASIC METABOLIC PNL TOTAL CA: CPT

## 2024-12-02 PROCEDURE — 85027 COMPLETE CBC AUTOMATED: CPT

## 2024-12-02 PROCEDURE — 82570 ASSAY OF URINE CREATININE: CPT

## 2024-12-02 PROCEDURE — 99214 OFFICE O/P EST MOD 30 MIN: CPT | Performed by: NURSE PRACTITIONER

## 2024-12-02 PROCEDURE — 86141 C-REACTIVE PROTEIN HS: CPT

## 2024-12-02 PROCEDURE — 3074F SYST BP LT 130 MM HG: CPT | Performed by: NURSE PRACTITIONER

## 2024-12-02 PROCEDURE — 3078F DIAST BP <80 MM HG: CPT | Performed by: NURSE PRACTITIONER

## 2024-12-02 PROCEDURE — 82043 UR ALBUMIN QUANTITATIVE: CPT

## 2024-12-02 RX ORDER — HYDROXYZINE HYDROCHLORIDE 25 MG/1
25 TABLET, FILM COATED ORAL 3 TIMES DAILY PRN
Qty: 90 TABLET | Refills: 0 | Status: SHIPPED | OUTPATIENT
Start: 2024-12-02 | End: 2024-12-24

## 2024-12-02 RX ORDER — GABAPENTIN 600 MG/1
600 TABLET ORAL 3 TIMES DAILY
Qty: 270 TABLET | Refills: 3 | Status: SHIPPED | OUTPATIENT
Start: 2024-12-02

## 2024-12-02 ASSESSMENT — FIBROSIS 4 INDEX: FIB4 SCORE: 0.73

## 2024-12-02 NOTE — TELEPHONE ENCOUNTER
Received request via: Pharmacy    Was the patient seen in the last year in this department? Yes    Does the patient have an active prescription (recently filled or refills available) for medication(s) requested? No    Pharmacy Name:     Does the patient have assisted Plus and need 100-day supply? (This applies to ALL medications)

## 2024-12-02 NOTE — PROGRESS NOTES
Assessment    I spoke to the patient this morning when she called in to respond to past phone calls and to receive assistance in scheduling an appointment with her provider in order to discuss smoking cessation, increased anxiety, and any needed referrals for these issues. Use of hydroxyzine reviewed. Medications reviewed for PCP appointment. The patient states that her GI symptoms are unchanged. She states that she has been having trouble getting in to GI associates and was encouraged to call regularly. The patient voices no novel or exacerbated cardiac symptoms at this time. She states that she did get imaging done on Saturday for her ENT and that she has an appointment scheduled next week to discuss them. The patient voices understanding of indications for urgent and emergency care. The patient states she has started seeing someone and that he has been a great support for her.     Education and Self-Management of Care    Education on hydroxyzine given  Patient will continue to follow up with PCP/specialists as indcicated.     Plan of Care and Goals    See Care Plan Note  Patient will remain free from falls/injuries  Healthy nutrition, hydration, and activity    Barriers:    Management of chronic and acute life stressors and medical concerns    Progress:    In Process    Next outreach: One Month

## 2024-12-03 LAB
CREAT UR-MCNC: 129.95 MG/DL
CREAT UR-MCNC: 139 MG/DL
MICROALBUMIN UR-MCNC: <1.2 MG/DL
MICROALBUMIN/CREAT UR: NORMAL MG/G (ref 0–30)

## 2024-12-03 NOTE — PROGRESS NOTES
Verbal consent was acquired by the patient to use [employer] ambient listening note generation during this visit yes    Subjective:     HPI:   History of Present Illness  The patient presents for evaluation of multiple medical concerns.    MRI Results and Anxiety  She underwent an MRI on Saturday, which revealed white spots in her brain. She is experiencing anxiety due to the MRI results and is seeking a referral to a neurologist. She has an appointment with an ENT specialist on 12/10/2024. She has been informed of extra bone growth in her ear, which is attributed to exposure to cold. However, it is not expected to significantly impact her hearing.  - Onset: MRI on Saturday.  - Character: White spots in the brain, extra bone growth in the ear.  - Alleviating/Aggravating Factors: Anxiety due to MRI results.  - Timing: Upcoming appointment with ENT specialist on 12/10/2024.    Pain and Fibromyalgia  She has been dealing with pain for several years and has been on disability since the age of 46 due to fibromyalgia.  - Onset: Several years ago.  - Duration: Chronic pain.  - Character: Pain due to fibromyalgia.  - Severity: On disability since age 46.    Neuropathy  She also reports neuropathy in her extremities, describing a sensation as if her toes have been amputated.  - Character: Sensation as if toes have been amputated.  - Location: Extremities.    Irritable Bowel Syndrome (IBS)  She has been diagnosed with Irritable Bowel Syndrome (IBS) since she was 21 years old.  - Onset: Since age 21.  - Character: IBS.    Smoking Cessation  She is making efforts to quit smoking and is seeking smoking cessation support. She has previously used patches, which she found helpful.  - Alleviating/Aggravating Factors: Previously used patches, found helpful.    Medication Refills  She has not refilled her hydroxyzine prescription for a while and is requesting a refill. She also needs a refill of her Voltaren gel.    FAMILY  "HISTORY  Her mother was diagnosed with lung disease and possible Alzheimer's or related memory loss.    Health Maintenance: Completed    Objective:     Exam:  /68 (BP Location: Left arm, Patient Position: Sitting, BP Cuff Size: Adult)   Pulse 75   Temp 36.3 °C (97.3 °F) (Temporal)   Ht 1.651 m (5' 5\")   Wt 72.4 kg (159 lb 9.6 oz)   LMP 12/01/2000   SpO2 99%   BMI 26.56 kg/m²  Body mass index is 26.56 kg/m².    Constitutional: Alert, no distress, well-groomed.  Skin: Warm, dry, good turgor, no rashes in visible areas.  Eye: Equal, round and reactive, conjunctiva clear, lids normal.  ENMT: Lips without lesions, good dentition, moist mucous membranes.  Neck: Trachea midline, no masses, no thyromegaly.  Respiratory: Unlabored respiratory effort, no cough.  MSK: Normal gait, moves all extremities.  Neuro: Grossly non-focal.   Psych: Alert and oriented x3, normal affect and mood.      Results  Imaging  MRI shows white matter changes in the brain.    Assessment & Plan:     1. White matter disease of brain due to ischemia  Referral to Neurology      2. KVNG (generalized anxiety disorder)  hydrOXYzine HCl (ATARAX) 25 MG Tab      3. Fibromyalgia  diclofenac sodium (VOLTAREN) 1 % Gel      4. Atherosclerosis of aorta (HCC)        5. Alcohol dependence, in remission (HCC)        6. Bipolar affective disorder, currently depressed, mild (HCC)        7. Encounter for tobacco use cessation counseling        8. Irritable bowel syndrome with diarrhea            Assessment & Plan  1. White matter changes - The patient's MRI results indicate white matter changes. She states these are causing her dizziness, encouraged patient to follow-up with ENT as scheduled. no brain lesions or arterial blood flow issues were detected.  - Referral to a neurologist for further evaluation and monitoring.    2. Anxiety - She is experiencing significant anxiety due to her MRI results and other personal stressors.  - Refill of hydroxyzine 25 mg " by mouth as needed to manage her anxiety.    3. Smoking cessation - She is advised to continue using the patch for smoking cessation.  - If the patch proves ineffective, alternative medications such as Chantix or Wellbutrin may be considered, though Chantix is not recommended due to the potential for vivid dreams.  - She is encouraged to utilize her supportive relationship to aid in quitting smoking.    4. Medication management - A refill of Voltaren gel will be provided.    5. Atherosclerosis - She is advised to discuss her atherosclerosis with her cardiologist during her upcoming appointment on December 17.    Follow-up  - Follow-up with the neurologist as scheduled.  - Follow-up with the cardiologist on December 17.  - Blood test to be completed as ordered by cardiology.        Return in about 1 month (around 1/2/2025), or if symptoms worsen or fail to improve.    I have placed the below orders and discussed them with an approved delegating provider. The MA is performing the below orders under the direction of Dr. Duke.      Please note that this dictation was created using voice recognition software. I have made every reasonable attempt to correct obvious errors, but I expect that there are errors of grammar and possibly content that I did not discover before finalizing the note.

## 2024-12-03 NOTE — CARE PLAN
Active Plans       Chronic Obstructive Pulmonary Disease Care Plan       Knowledge deficit regarding COPD disease process    Establish resources to help manage COPD  0 of 3    Increase understanding of COPD  0 of 8   Knowledge deficit regarding how to manage stress with COPD    Manage stress in conjunction with COPD  0 of 1                 Hypertension       Lifestyle Choices    Learn to Manage Calories  2 of 5   High Blood Pressure    Establish Plan for Regular Lab Work  0 of 4    Establish Regular Follow-Ups with PCP  0 of 3   Med Adherence    Consistently take Medications as Prescribed  2 of 4    Establish resources to assist with medical costs  0 of 1   Patient is Inactive    Exercise a designated amount of time daily  0 of 3                     Care Plans       Chronic Care Management (CMS)    Met: 0 of 5 Met: 0 of 5      No Outcome (5)       Patient will be able to verbalize understanding of CKD (Knowledge of Chronic Kidney Disease process)  Disciplines:  Nurse, Interdisciplinary  Expected end:  12/02/25     Patient will maintain/demonstrate improvement in lab values (Knowledge of Chronic Kidney Disease process)  Disciplines:  Nurse, Interdisciplinary  Expected end:  12/02/25     Patient able to identify personalized signs and symptoms of depression and anxiety. (Knowledge of signs and symptoms of depression and anxiety)  Disciplines:  Nurse, Interdisciplinary  Expected end:  12/02/25     Patient experiences a reduction in feelings of loneliness, grief, and sadness (Patient expresses feelings of loneliness, losses associated with aging and/or depression or anxiety)  Disciplines:  Interdisciplinary  Expected end:  12/02/25     Patient Stated Goal: Patient will increase activity and optimize nutrition (Patient Stated Problem: Lose 30 LBS)  Disciplines:  Nurse, Interdisciplinary  Expected end:  12/02/25   Patient-stated:  Yes

## 2024-12-13 ENCOUNTER — TELEPHONE (OUTPATIENT)
Dept: HEALTH INFORMATION MANAGEMENT | Facility: OTHER | Age: 59
End: 2024-12-13
Payer: MEDICARE

## 2024-12-15 DIAGNOSIS — M79.7 FIBROMYALGIA: ICD-10-CM

## 2024-12-15 DIAGNOSIS — G43.809 OTHER MIGRAINE WITHOUT STATUS MIGRAINOSUS, NOT INTRACTABLE: ICD-10-CM

## 2024-12-15 DIAGNOSIS — K58.0 IRRITABLE BOWEL SYNDROME WITH DIARRHEA: ICD-10-CM

## 2024-12-16 RX ORDER — ONDANSETRON 4 MG/1
TABLET, ORALLY DISINTEGRATING ORAL
Qty: 15 TABLET | Refills: 0 | Status: SHIPPED | OUTPATIENT
Start: 2024-12-16

## 2024-12-16 RX ORDER — TOPIRAMATE 50 MG/1
50 TABLET, FILM COATED ORAL 3 TIMES DAILY
Qty: 270 TABLET | Refills: 3 | Status: SHIPPED | OUTPATIENT
Start: 2024-12-16

## 2024-12-17 ENCOUNTER — APPOINTMENT (OUTPATIENT)
Dept: CARDIOLOGY | Facility: MEDICAL CENTER | Age: 59
End: 2024-12-17
Attending: NURSE PRACTITIONER
Payer: MEDICARE

## 2024-12-24 DIAGNOSIS — F41.1 GAD (GENERALIZED ANXIETY DISORDER): ICD-10-CM

## 2024-12-24 RX ORDER — HYDROXYZINE HYDROCHLORIDE 25 MG/1
25 TABLET, FILM COATED ORAL 3 TIMES DAILY PRN
Qty: 270 TABLET | Refills: 0 | Status: SHIPPED | OUTPATIENT
Start: 2024-12-24

## 2024-12-24 NOTE — TELEPHONE ENCOUNTER
Received request via: Pharmacy    Pharmacy comment: REQUEST FOR 90 DAYS PRESCRIPTION. DX Code Needed.

## 2025-01-02 ENCOUNTER — PATIENT OUTREACH (OUTPATIENT)
Dept: HEALTH INFORMATION MANAGEMENT | Facility: OTHER | Age: 60
End: 2025-01-02
Payer: MEDICARE

## 2025-01-02 DIAGNOSIS — R00.1 BRADYCARDIA: ICD-10-CM

## 2025-01-02 DIAGNOSIS — J44.89 COPD (CHRONIC OBSTRUCTIVE PULMONARY DISEASE) WITH CHRONIC BRONCHITIS (HCC): ICD-10-CM

## 2025-01-02 DIAGNOSIS — Z91.81 RISK FOR FALLS: ICD-10-CM

## 2025-01-02 DIAGNOSIS — N18.32 STAGE 3B CHRONIC KIDNEY DISEASE: ICD-10-CM

## 2025-01-02 DIAGNOSIS — I10 ESSENTIAL HYPERTENSION: ICD-10-CM

## 2025-01-03 NOTE — PROGRESS NOTES
First attempt made for patient PCM follow up for January. No answer. Message left with contact information and request for call back.

## 2025-01-14 ENCOUNTER — TELEPHONE (OUTPATIENT)
Dept: NEUROLOGY | Facility: MEDICAL CENTER | Age: 60
End: 2025-01-14
Payer: MEDICARE

## 2025-01-14 NOTE — TELEPHONE ENCOUNTER
NEUROLOGY PATIENT PRE-VISIT PLANNING     Patient was NOT contacted to complete PVP.  Note: Patient will not be contacted if there is no indication to call.     Patient Appointment is scheduled as: New Patient     Is visit type and length scheduled correctly? Yes    [employer] Patient is checked in Patient Demographics? Yes    3.   Is referral attached to visit? Yes    4. Were records received from referring provider? Yes    4. Patient was NOT contacted to have someone accompany them to visit.     5. Is this appointment scheduled as a Hospital Follow-Up?  No    6. Does the patient require any pre procedure or post procedure follow up? No    7. If any orders were placed at last visit or intended to be done for this visit do we have Results/Consult Notes? No  Labs - Labs were not ordered at last office visit.  Imaging - Imaging was not ordered at last office visit.  Referrals - No referrals were ordered at last office visit.  New Patient.   8. If patient appointment is for Botox - is order pended for provider? N/A    9. Was Plan Assessment from last Neurology Office Visit Reviewed?  Yes

## 2025-01-17 ENCOUNTER — PATIENT MESSAGE (OUTPATIENT)
Dept: HEALTH INFORMATION MANAGEMENT | Facility: OTHER | Age: 60
End: 2025-01-17

## 2025-01-17 NOTE — PROGRESS NOTES
Second attempt made for January [facility] follow ups. No answer. Message left with contact information and request for call back. Message left on My Chart.

## 2025-01-20 ENCOUNTER — TELEPHONE (OUTPATIENT)
Dept: CARDIOLOGY | Facility: MEDICAL CENTER | Age: 60
End: 2025-01-20
Payer: MEDICARE

## 2025-01-20 NOTE — TELEPHONE ENCOUNTER
Last OV: 09.20.2024  Proposed Surgery: EGD and Colonoscopy w/ Deep (propofol) Sedation  Surgery Date: 03.12.2025  Requesting Office Name: [facility]  Fax Number: [phone]  Preference of Location (default is surgery center unless specified by Cardiologist or JESSICA)  Prior Clearance Addressed: No    Is this a general clearance? YES   Anticoags/Antiplatelets: Aspirin  Anticoags/Antiplatelet managed by Cardiology? YES    Outstanding Cardiac Imaging : No  Ablation, Cardioversion, Stent, Cardiac Devices, Catheterization, Watchman: No  TAVR/Valve, Mitral Clip, Watchman (including open heart),: N/A   Recent Cardiac Hospitalization: Yes  Date:  07.29.2023            When: Greater than 3 months since hospitalization   History (cardiac history):   Past Medical History:   Diagnosis Date    Abdominal pain     Actinic keratosis 02/06/2018    Alcohol abuse     Allergy     Anxiety     Arthritis     Back pain     Back pain     Bronchitis     Chronic pain syndrome     Constipation     COPD (chronic obstructive pulmonary disease) (HCC)     Cough     Daytime sleepiness     DDD (degenerative disc disease), cervical     DDD (degenerative disc disease), thoracolumbar     Depression     Diarrhea     Diverticulosis     Dizziness     Essential tremor     Fatigue     Fibromyalgia     IBS (irritable bowel syndrome)     Insomnia     Migraine     Nausea     Painful joint     Pulmonary emphysema (HCC)     Restless leg syndrome     Ringing in ears     Snoring     SOB (shortness of breath)     Sore muscles     Sweat, sweating, excessive     Weakness     Wears glasses            Is this a dental clearance? NO  Ablation, Cardioversion, Watchman, Stents, Cath, Devices within the last 3 months? No   If yes- Send dental wait letter, do not forward to provider for review.     TAVR / Valve, Mitral clip within the last 6 months? No  If yes- Send dental wait letter, do not forward to provider for review.     If completing a general  clearance, continue per protocol.           Surgical Clearance Letter Sent: YES   **Scan clearance request letter into [facility].**

## 2025-01-20 NOTE — LETTER
PROCEDURE/SURGERY CLEARANCE FORM      Encounter Date: 1/20/2025    Patient: Jaclyn Mejia  YOB: 1965    CARDIOLOGIST:  Fabrice Marr M.D.    REFERRING DOCTOR:  No ref. provider found    Procedure/surgery:  EGD and Colonoscopy w/ Deep (propofol) Sedation                                           PROCEDURE/SURGERY CLEARANCE FORM    Date: 1/20/2025   Patient Name: Jaclyn Mejia    Dear Surgeon or Proceduralist,      Thank you for your request for cardiac stratification of our mutual patient Jaclyn Mejia 1965. We have reviewed their [facility] records; and to the best of our understanding this patient has not had stenting, ablation, watchman, cardiothoracic surgery or hospitalization for cardiovascular reasons in the past 6 months.  Jaclyn Mejia has been seen within the past 15 months and is considered to have non-modifiable cardiac risk for this low-risk procedure/surgery. They may proceed from a cardiovascular standpoint and may hold their antiplatelet/anticoagulation as briefly as possible. Please have patient resume this medication when hemodynamically stable to do so.     Aspirin or Prasugrel   - hold 7 days prior to procedure/surgery, resume when hemodynamically stable      Clopidrogrel or Ticagrelor  - hold 7 days for all neurological procedures, hold 5 days prior to all other procedure/surgery,  resume when hemodynamically stable     Warfarin - hold 7 days for all neurological procedures, hold 5 days prior to all other procedure/surgery and coordinate with [facility] ([phone]) INR testing and dose management.      Pradaxa/Xarelto/Eliquis/Savesya - hold 1 day prior to procedure for low bleeding risk procedure, 2 days for high bleeding risk procedure, or consider holding 3 days or longer for patients with reduced kidney function (CrCl <30mL/min) or spinal/cranial surgeries/procedures.      If they have a mechanical heart valve, please coordinate  with [facility] ([phone]) the proper management of their anticoagulant in the periprocedural or perioperative period.      Some patients have higher risk for cardiovascular complications or holding medication. If our patient has had prior complications of holding antiplatelet or anticoagulants in the past and we have seen them after these events, we have addressed these concerns with the patient. They are at an unknown degree of increased risk for recurrent complication.  You may hold anticoagulation/antiplatelets for the procedure or surgery if the benefits of the procedure or surgery outweigh this nonmodifiable risk.      If Jaclyn Mejia 1965 has new symptoms of heart failure decompensation, unstable arrythmia, or angina please reach out and we will assess the patient.      If you have other patient-specific concerns, please feel free to reach out to the patient's cardiologist directly at [phone].     Thank you,       [facility]      Electronically signed        MD Signature   Fabrice Marr M.D.

## 2025-01-30 ENCOUNTER — OFFICE VISIT (OUTPATIENT)
Dept: NEUROLOGY | Facility: MEDICAL CENTER | Age: 60
End: 2025-01-30
Attending: INTERNAL MEDICINE
Payer: MEDICARE

## 2025-01-30 VITALS
SYSTOLIC BLOOD PRESSURE: 132 MMHG | RESPIRATION RATE: 16 BRPM | WEIGHT: 160.94 LBS | HEIGHT: 65 IN | TEMPERATURE: 97.4 F | HEART RATE: 75 BPM | OXYGEN SATURATION: 97 % | BODY MASS INDEX: 26.81 KG/M2 | DIASTOLIC BLOOD PRESSURE: 78 MMHG

## 2025-01-30 DIAGNOSIS — G25.0 ESSENTIAL TREMOR: ICD-10-CM

## 2025-01-30 PROCEDURE — 99212 OFFICE O/P EST SF 10 MIN: CPT | Performed by: PSYCHIATRY & NEUROLOGY

## 2025-01-30 ASSESSMENT — FIBROSIS 4 INDEX: FIB4 SCORE: 0.75

## 2025-01-30 ASSESSMENT — PATIENT HEALTH QUESTIONNAIRE - PHQ9: CLINICAL INTERPRETATION OF PHQ2 SCORE: 0

## 2025-01-30 NOTE — PROGRESS NOTES
[facility] NEUROLOGY CLINIC    Date of Service: 01/30/25    Referred by: Magdiel Colorado, A.P.R.N.  [address]  [place] [id number]      Reason for referral  Abnormal MRI brain    History of present illness (HPI)   Jaclyn Mejia is a 60 y.o. female with history of fibromyalgia, chronic pain syndrome, migraines and essential tremors. She has worsening vertigo for the past 3 years and had an MRI of the brain ordered by ENT, which showed mild chronic ischemic changes but no IAC tumor or other brain abnormalities to explain her dizziness.     She has essential tremors for the past 12 years and was seeing a neurologist in [place]. She was on propranolol, but has been off it for many years because the tremors only happen when she is nervous or in pain. She has not noticed any worsening of them. The tremors involve mostly her hands, head, and voice and she says that today she is having them because she feels anxious and dizzy.   Regarding the migraines, she rarely has them and they are controlled with topiramate.     She reports a family history of Parkinson's disease (mother, sister). She denies any RBD, hallucinations, changes in taste or smell although she smokes cigarettes every day.       Review of Systems (ROS)  Negative for: Fever, chills, chest pain, shortness of breath, cough, diarrhea, constipation, urinary frequency, dysuria, skin rash, swelling.    Positive for: dizziness, tinnitus of both ears, pain.    Medical history  Past Medical History:   Diagnosis Date    Abdominal pain     Actinic keratosis 02/06/2018    Alcohol abuse     Allergy     Anxiety     Arthritis     Back pain     Back pain     Bronchitis     Chronic pain syndrome     Constipation     COPD (chronic obstructive pulmonary disease) (HCC)     Cough     Daytime sleepiness     DDD (degenerative disc disease), cervical     DDD (degenerative disc disease), thoracolumbar     Depression     Diarrhea     Diverticulosis      Dizziness     Essential tremor     Fatigue     Fibromyalgia     IBS (irritable bowel syndrome)     Insomnia     Migraine     Nausea     Painful joint     Pulmonary emphysema (HCC)     Restless leg syndrome     Ringing in ears     Snoring     SOB (shortness of breath)     Sore muscles     Sweat, sweating, excessive     Weakness     Wears glasses          Surgical history  Past Surgical History:   Procedure Laterality Date    LUMBAR MEDIAL BRANCH BLOCKS Bilateral 1/30/2024    Procedure: Diagnostic medial branch blocks targeting the BILATERAL L3-4, L4-5 and L5-S1 facet joints with fluoroscopic guidance #2 with sedation.   Note: plan on versed 1mg.  Diagnostic medial branch block #2 is only be done if procedure #1 is a positive block.  This will be on a separate date from procedure #1.;  Surgeon: Elmo Donaldson M.D.;  [facility];  Service: P    LUMBAR MEDIAL BRANCH BLOCKS  1/23/2024    Procedure: Diagnostic medial branch blocks targeting the BILATERAL L3-4, L4-5 and L5-S1 facet joints with fluoroscopic guidance #1 with sedation.    Note: plan on versed 1mg.;  Surgeon: Elmo Donaldson M.D.;  Location: [facility];  Service: Pain Management    ABDOMINAL HYSTERECTOMY TOTAL      CHOLECYSTECTOMY      HYSTERECTOMY LAPAROSCOPY      OTHER ABDOMINAL SURGERY      TONSILLECTOMY           Relevant family history  Family History   Problem Relation Age of Onset    Lung Disease Mother         copd    Cancer Mother         basal/squamous    Hypertension Mother     Hyperlipidemia Mother     Stroke Mother     Alcohol abuse Mother     Heart Disease Father         HF    Arthritis Father         rheumatoid    Lung Disease Father         emphysema    Alcohol abuse Father     Cancer Sister     Alcohol abuse Sister     Cancer Maternal Grandfather         Bladder or prostate?    Cancer Sister         pre cancerous spots     Alcohol abuse Sister     Kidney Disease Sister     Alcohol abuse Sister      Hypertension Sister     Alcohol abuse Sister          Social history  Social History     Tobacco Use    Smoking status: Every Day     Current packs/day: 1.00     Average packs/day: 1 pack/day for 45.6 years (45.6 ttl pk-yrs)     Types: Cigarettes     Start date: 7/1/1979    Smokeless tobacco: Never   Substance Use Topics    Alcohol use: Yes     Comment: occasionally         Medications    Outpatient Medications Marked as Taking for the 1/30/25 encounter (Office Visit) with Case Mccullough M.D.   Medication Sig Dispense Refill    hydrOXYzine HCl (ATARAX) 25 MG Tab TAKE 1 TABLET BY MOUTH THREE TIMES A DAY AS NEEDED FOR ANXIETY 270 Tablet 0    ondansetron (ZOFRAN ODT) 4 MG TABLET DISPERSIBLE DISSOLVE 1 TABLET ON THE TONGUE  EVERY 6 HOURS AS NEEDED FOR  NAUSEA/VOMITING 15 Tablet 0    topiramate (TOPAMAX) 50 MG tablet TAKE 1 TABLET BY MOUTH 3 TIMES  DAILY 270 Tablet 3    gabapentin (NEURONTIN) 600 MG tablet TAKE 1 TABLET BY MOUTH 3 TIMES  DAILY 270 Tablet 3    diclofenac sodium (VOLTAREN) 1 % Gel Apply 2 g topically 4 times a day as needed (pain). 100 g 0    clindamycin (CLEOCIN) 2 % vaginal cream Use vaginally after sexual activity for irritation and odor related to BV. 40 g 0    famotidine (PEPCID) 40 MG Tab TAKE 1 TABLET BY MOUTH DAILY 90 Tablet 3    tiotropium (SPIRIVA HANDIHALER) 18 MCG Cap Place 1 Capsule into inhaler and inhale every day. 30 Capsule 11    albuterol 108 (90 Base) MCG/ACT Aero Soln inhalation aerosol Inhale 2 Puffs every four hours as needed for Shortness of Breath. 1 Each 0    aspirin 81 MG EC tablet Take 81 mg by mouth every day.      rosuvastatin (CRESTOR) 5 MG Tab TAKE 1 TABLET BY MOUTH IN THE  EVENING 100 Tablet 2    SAVELLA 100 MG Tab TAKE 1 TABLET BY MOUTH TWICE  DAILY 180 Tablet 0    baclofen (LIORESAL) 10 MG Tab TAKE 1 TABLET BY MOUTH 3 TIMES  DAILY 270 Tablet 0    dicyclomine (BENTYL) 20 MG Tab TAKE 1 TABLET BY MOUTH EVERY 6 HOURS AS NEEDED (IBS). 360 Tablet 1    Multiple Vitamin  (MULTIVITAMIN ADULT PO)       NON SPECIFIED Take  by mouth. Flax oil gel cap, one capsule daily      Omega-3 Fatty Acids (FISH OIL) 1000 MG Cap capsule Take 1,000 mg by mouth 1 time a day as needed.      magnesium oxide (MAG-OX) 400 MG Tab tablet Take 400 mg by mouth every day.      Loratadine (CLARITIN PO) Take 1 tablet by mouth every day.           Screening    Depression Screening    Little interest or pleasure in doing things?  0 - not at all  Feeling down, depressed , or hopeless? 0 - not at all  Trouble falling or staying asleep, or sleeping too much?     Feeling tired or having little energy?     Poor appetite or overeating?     Feeling bad about yourself - or that you are a failure or have let yourself or your family down?    Trouble concentrating on things, such as reading the newspaper or watching television?    Moving or speaking so slowly that other people could have noticed.  Or the opposite - being so fidgety or restless that you have been moving around a lot more than usual?     Thoughts that you would be better off dead, or of hurting yourself?     Patient Health Questionnaire Score:        If depressive symptoms identified deferred to follow up visit unless specifically addressed in assesment and plan.    Interpretation of PHQ-9 Total Score   Score Severity   1-4 No Depression   5-9 Mild Depression   10-14 Moderate Depression   15-19 Moderately Severe Depression   20-27 Severe Depression        [place] Suicide Severity Rating Scale     Wish to be Dead?: No  Suicidal Thoughts: No    Suicidal Thoughts with Method Without Specific Plan or Intent to Act:    Suicidal Intent Without Specific Plan:    Suicide Intent with Specific Plan:    Suicide Behavior Question: No  How long ago did you do any of these?:    C-SSRS Risk Level: No Risk    Additional Suicide Screening Questions    Suspected or Confirmed Suicide Attempted?: No  Harming or killing others?: No    [place] Suicide Reassessment     New or  continued thoughts about killing self?: No  Preparing to end life?: No         Physical exam    Vitals:    01/30/25 0950   BP: 132/78   Pulse: 75   Resp: 16   Temp: 36.3 °C (97.4 °F)   SpO2: 97%       Focused Neurological Exam  Alert and oriented to all spheres.  Speech fluency and comprehension are intact.  There are no cranial neuropathies.  Sensation to light touch intact throughout.  Severe resting and seemingly distractible tremor of both hands, head and voice. There is dysmetria with both hands on finger to nose.   There is no rigidity or bradykinesia. No frontal release signs.  She can walk without support.     Lab Results  Lab Results   Component Value Date/Time    WBC 6.7 12/02/2024 03:50 PM    RBC 4.44 12/02/2024 03:50 PM    HEMOGLOBIN 14.4 12/02/2024 03:50 PM    HEMATOCRIT 43.6 12/02/2024 03:50 PM    MCV 98.2 (H) 12/02/2024 03:50 PM    MCH 32.4 12/02/2024 03:50 PM    MCHC 33.0 12/02/2024 03:50 PM    MPV 10.6 12/02/2024 03:50 PM    NEUTSPOLYS 46.90 07/29/2023 01:22 PM    LYMPHOCYTES 45.70 (H) 07/29/2023 01:22 PM    MONOCYTES 5.00 07/29/2023 01:22 PM    EOSINOPHILS 1.40 07/29/2023 01:22 PM    BASOPHILS 0.60 07/29/2023 01:22 PM          Lab Results   Component Value Date/Time    SODIUM 141 12/02/2024 03:50 PM    POTASSIUM 3.9 12/02/2024 03:50 PM    CHLORIDE 103 12/02/2024 03:50 PM    CO2 26 12/02/2024 03:50 PM    GLUCOSE 112 (H) 12/02/2024 03:50 PM    BUN 17 12/02/2024 03:50 PM    CREATININE 1.14 12/02/2024 03:50 PM            NEURO-DIAGNOSTICS      Impression and Plan  Briefly, 60 y.o. female with dizziness, tremors, migraines who was referred to me to review an abnormal MRI of the brain showing minimal white matter changes, which are expected for her age and do not cause any symptoms. These are likely related to her history of smoking. She has no cognitive issues. On my exam, she had tremors of the head, hands, and voice, but some of that appeared to be distractible. These have been stable for the past 12  years and she told me they only occur when she is stressed or in pain. She was on propranolol, but is not taking it anymore because the tremors do not appear very often and she feels fine at home. I would like her to see one of our movement disorder specialists to rule out other possible causes of these tremors, although I think these are not associated with any neurodegenerative conditions.     For the dizziness, I recommend she gets a thorough work up with ENT, as she has tinnitus and worsening dizziness with head movements and positional changes. The MRI does not show any abnormalities that would explain her dizziness.     PCP:  Consider referring her to vestibular therapy and checking for medication interactions (atarax, gabapentin, topiramante, baclofen, loratadine, dicyclomine, etc) can cause or exacerbate her symptoms.       Numerous questions were answered to the best of my knowledge. The patient is in agreement with the plan. Return to the clinic as needed    ADMINISTRATIVE BILLING  I spent a total of 60 minutes on this patient encounter.    Case Mccullough MD  Diplomate of the American Board of Psychiatry and Neurology.  General Neurology & Neuro-Oncology.  [facility].   of Clinical Neurology at [facility].

## 2025-01-31 ENCOUNTER — PATIENT MESSAGE (OUTPATIENT)
Dept: HEALTH INFORMATION MANAGEMENT | Facility: OTHER | Age: 60
End: 2025-01-31

## 2025-02-01 NOTE — PROGRESS NOTES
"Reason for Follow-Up:    I spoke to the patient this afternoon when she returned my phone call from earlier this month in order to complete her monthly PCM follow up. The patient is followed based on diagnoses of COPD, Bipolar,  and HTN,     Current Health Status:    The patient reports having a \"rough day\" yesterday with increased tremors and fatigue. She attended her neurology appointment and is going to be undergoing further evaluation next week. She is keeping herself in good spirits. The patient denies no acute cardiac or respiratory concerns. The patient reports no community health concerns.     Medical Updates:  Since our last conversation the patient has seen neurology, where she will be undergoing further evaluation next week. She has been requesting medication refills appropriately.     Medication Updates:  The patient denies any updates to her medication list at this time. She reports taking medications as indicated.     Symptoms: Dizziness,  Increased tremors (Saw Neuro yesterday)    Plan of Care Goals and Progress:    Goal 1. Over the next month the patient will take concrete and consistent steps towards optimizing pulmonary health     Progress:  The patient reports taking medications appropriately. The patient follows up with providers appropriately. The patient denies any new or worsening symptoms.       Barriers:  The patient has a lot of life stressors     Interventions:  The patient will learn breathing exercises.      Education:  Information on COPD breathing exercises sent by [employer].     Goal 2.  Over the next month, the patient will take concrete and consistent steps to optimize mental health.      Progress:  The patient is aware of the importance of self care and boundaries.       Barriers:  The patient has a lot of life stressors that can take a toll on her health.      Interventions:  The patient will learn helpful breathing exercises this month. The patient will continue to enforce healthy " boundaries.      Education:  Breathing exercises sent by [employer]      Patient's Concerns and Feedback (Self Management of Care):     The patient is concerned about her family members that are struggling and her acute health concerns. The patient is consciously working to rest appropriately and maintain healthy perspective.     Next Steps:     Follow-Up Plan:  The patient's next PCM follow up will be in 4 weeks.       Appointments:  The patient's future appointments were reviewed and she is planning to attend as scheudled     Contact Information:  Call [phone] with any questions or concerns.

## 2025-02-04 ENCOUNTER — APPOINTMENT (OUTPATIENT)
Dept: CARDIOLOGY | Facility: MEDICAL CENTER | Age: 60
End: 2025-02-04
Attending: STUDENT IN AN ORGANIZED HEALTH CARE EDUCATION/TRAINING PROGRAM
Payer: MEDICARE

## 2025-02-05 ENCOUNTER — OFFICE VISIT (OUTPATIENT)
Dept: NEUROLOGY | Facility: MEDICAL CENTER | Age: 60
End: 2025-02-05
Attending: INTERNAL MEDICINE
Payer: MEDICARE

## 2025-02-05 VITALS
TEMPERATURE: 97.6 F | DIASTOLIC BLOOD PRESSURE: 62 MMHG | WEIGHT: 162 LBS | BODY MASS INDEX: 26.99 KG/M2 | RESPIRATION RATE: 16 BRPM | OXYGEN SATURATION: 98 % | SYSTOLIC BLOOD PRESSURE: 122 MMHG | HEIGHT: 65 IN | HEART RATE: 61 BPM

## 2025-02-05 DIAGNOSIS — R25.1 FUNCTIONAL TREMOR: ICD-10-CM

## 2025-02-05 DIAGNOSIS — R26.81 GAIT INSTABILITY: ICD-10-CM

## 2025-02-05 DIAGNOSIS — R25.1 TREMORS OF NERVOUS SYSTEM: ICD-10-CM

## 2025-02-05 PROCEDURE — 99212 OFFICE O/P EST SF 10 MIN: CPT | Performed by: INTERNAL MEDICINE

## 2025-02-05 PROCEDURE — 3074F SYST BP LT 130 MM HG: CPT | Performed by: INTERNAL MEDICINE

## 2025-02-05 PROCEDURE — 99417 PROLNG OP E/M EACH 15 MIN: CPT | Performed by: INTERNAL MEDICINE

## 2025-02-05 PROCEDURE — 99215 OFFICE O/P EST HI 40 MIN: CPT | Performed by: INTERNAL MEDICINE

## 2025-02-05 PROCEDURE — 3078F DIAST BP <80 MM HG: CPT | Performed by: INTERNAL MEDICINE

## 2025-02-05 ASSESSMENT — FIBROSIS 4 INDEX: FIB4 SCORE: 0.75

## 2025-02-05 ASSESSMENT — PATIENT HEALTH QUESTIONNAIRE - PHQ9: CLINICAL INTERPRETATION OF PHQ2 SCORE: 0

## 2025-02-05 NOTE — PROGRESS NOTES
[facility]  [address] [place] [address], [address]. [place] [address]  Phone: [phone], Fax: [phone]    Froylan Garber DO  Neurology, Movement Disorders    ASSESSMENT / PLAN   Jaclyn Mejia is a 60 y.o. RHD female presenting for tremors and dizziness    Functional Tremors  History of tremors in her arms and head for the past 12 years that fluctuate based on stressors.  Exam shows variable frequency, distractibility, inconsistent phenomenology compared to essential tremor.  We discussed that she has a functional tremor in which can be exacerbated when there is increased cognitive load, anxiety, stress, etc.  She had agreed with the diagnosis and is open to working on her mindfulness to avoid triggering the tremors.  - TSH  - Overcoming Functional Neurological Symptoms: A Five Areas Approach, by Ulises Stephens:   - [email or web address]  - [email or web address].[email or web address]    Gait instability  No evidence of sensory or cerebellar ataxia.  She does have chronic pain which may be leading to her deconditioning and impaired gait and balance.  - look up balancing exercises on youtube  - let me know if you need physical therapy referral   - RADHA with [facility]  Dr. Soto  [address] [facility], [place] [address] [phone]      PHQ-9, C-SSRS: neg 02/05/25    Orders Placed This Encounter    TSH     Return if symptoms worsen or fail to improve.    BILLING DOCUMENTATION:   Excluding time spent on procedures during visit, I spent  70  minutes reviewing the medical record, interviewing and examining the patient, discussing diagnosis and treatment, and coordinating care.              HISTORY OF PRESENT ILLNESS   Jaclyn Mejia is a 60 y.o. RHD female presenting for tremors and dizziness    Initial HPI 02/05/25    Tremors  - dx with ET for 12 years.   - moved from [place] to [place], and tremors calmed down.   - previously on low dose of propranolol, but no sfx and no clear benefit  - tremors worsened in the past  "year.     Dizziness   - worsened in the past 3 years  - feeling \"shoved\" when standing. Dizzy when walking \"off balance\"  - pain + numbness in feet, hx of neuropathy. Uncertain if prior NCS/EMG.   - hx of fibromyalgia    - watching TV is OK  - triggered with turning head  - ENT: had hearing test, epley's. Didn't have specific recommendations.  - didn't continue PT due to cost    Migraines  - topiramate 50mg TID, migraines well controlled  - last migraine: a few days ago.   - frequency: <1/month  - blurred vision, whole head \"in a vice\". +light sensitively, +nausea, No sound sensitivity.    Notable meds:  Savella 100mg BID  Gabapentin 600mg TID    Previously:  - amitriptyline    Past Medical History:   Diagnosis Date    Abdominal pain     Actinic keratosis 02/06/2018    Alcohol abuse     Allergy     Anxiety     Arthritis     Back pain     Back pain     Bronchitis     Chronic pain syndrome     Constipation     COPD (chronic obstructive pulmonary disease) (HCC)     Cough     Daytime sleepiness     DDD (degenerative disc disease), cervical     DDD (degenerative disc disease), thoracolumbar     Depression     Diarrhea     Diverticulosis     Dizziness     Essential tremor     Fatigue     Fibromyalgia     IBS (irritable bowel syndrome)     Insomnia     Migraine     Nausea     Painful joint     Pulmonary emphysema (HCC)     Restless leg syndrome     Ringing in ears     Snoring     SOB (shortness of breath)     Sore muscles     Sweat, sweating, excessive     Weakness     Wears glasses      Past Surgical History:   Procedure Laterality Date    LUMBAR MEDIAL BRANCH BLOCKS Bilateral 1/30/2024    Procedure: Diagnostic medial branch blocks targeting the BILATERAL L3-4, L4-5 and L5-S1 facet joints with fluoroscopic guidance #2 with sedation.   Note: plan on versed 1mg.  Diagnostic medial branch block #2 is only be done if procedure #1 is a positive block.  This will be on a separate date from procedure #1.;  Surgeon: Elmo CLIFTON" MARYCHUY Donaldson;  [facility];  Service: P    LUMBAR MEDIAL BRANCH BLOCKS  1/23/2024    Procedure: Diagnostic medial branch blocks targeting the BILATERAL L3-4, L4-5 and L5-S1 facet joints with fluoroscopic guidance #1 with sedation.    Note: plan on versed 1mg.;  Surgeon: Elmo Donaldson M.D.;  Location: [facility];  Service: Pain Management    ABDOMINAL HYSTERECTOMY TOTAL      CHOLECYSTECTOMY      HYSTERECTOMY LAPAROSCOPY      OTHER ABDOMINAL SURGERY      TONSILLECTOMY       Family History   Problem Relation Age of Onset    Lung Disease Mother         copd    Cancer Mother         basal/squamous    Hypertension Mother     Hyperlipidemia Mother     Stroke Mother     Alcohol abuse Mother     Heart Disease Father         HF    Arthritis Father         rheumatoid    Lung Disease Father         emphysema    Alcohol abuse Father     Cancer Sister     Alcohol abuse Sister     Cancer Maternal Grandfather         Bladder or prostate?    Cancer Sister         pre cancerous spots     Alcohol abuse Sister     Kidney Disease Sister     Alcohol abuse Sister     Hypertension Sister     Alcohol abuse Sister      Social History     Socioeconomic History    Marital status: Single     Spouse name: Not on file    Number of children: Not on file    Years of education: Not on file    Highest education level: Associate degree: occupational, technical, or vocational program   Occupational History    Not on file   Tobacco Use    Smoking status: Every Day     Current packs/day: 1.00     Average packs/day: 1 pack/day for 45.6 years (45.6 ttl pk-yrs)     Types: Cigarettes     Start date: 7/1/1979    Smokeless tobacco: Never   Vaping Use    Vaping status: Every Day    Substances: THC, CBD    Devices: Disposable, Pre-filled or refillable cartridge, Refillable tank, Pre-filled pod   Substance and Sexual Activity    Alcohol use: Yes     Comment: occasionally    Drug use: Yes     Frequency: 7.0 times  "per week     Types: Marijuana, Inhaled, Oral     Comment: Only canibus medicinal for anti spasm, anxiety, ibs, inflamm    Sexual activity: Not Currently     Partners: Male, Female     Comment: currently with male   Other Topics Concern    Not on file   Social History Narrative    Initial Intake Date:  Last Updated:        Financial situation:    SSD $1570/mo w/ $450 savings. We did discuss care credit in regards to behavioral health treatment interventions. Jaclyn        Food resources & insecurities:    Deferred.         Housing & environmental:    Resides in [place] in [facility] ([address] wheel).         Transportation:    Deferred.        Family dynamics & family/friend social supports:    Pt has local family to include 86-year-old mother and sister. She reports family are functioning alcoholics but are supportive in her sobriety. They will provide alternative alcohol free drinks at gatherings. Pt has son currently in inpatient treatment for alcohol use.         Pt has significant other who resides in the same [facility]. He is also pursuing sobriety w/ Jaclyn.        ADLs/IADLs & associated diagnoses:    Pt's ADLs/IADL have previously been affected by her alcohol use. They have cause incontinent issues in the past as well as extended periods of vomiting.         Advanced life planning:    Not on file.         Behavioral/Mental:    Associated diagnoses include migraine without status migrainosus, not intractable; insomnia due to medical condition; recurrent major depressive disorder; alcoholism; deliberate self-cutting; and obesity.        Pt has extensive history of substance use and recovery. In her young adult years pt used methamphatmine for aprox 1 year until she sought treatment. At this time she was mother to 2 children. She attended both AA[facility]NA and [facility]\" in the [facility] area. She became sober \"[place]\" and maintained sobriety for 16 years. Pt then returned to alcohol use " "via social drinking; she maintained a job at this time and managed 1 glass wine at events. Pt's daughter then left the home (at age 21) which affected her drinking; at that time she also became disabled. Pt relapsed on methamphetamine with use over 2 years w/ a 9 month period of sobriety. Pt then moved to [place] where her drinking increased (vodka). Pt reports medical symptoms from drinking include every other day vomitting, a period of GI/rectal bleeding and hypertensive events. Per chart review pt drinking 5th vodka daily. Pt's last drink 3/7/2021. She is pursuing sobriety with her significant other, Bradly. Pt is 6 days sober at time of assessment (3/12/2021). She is \"tired of being sick and tired.\"        Pt's reservations include attending inpatient; she would need to take her pet dog with her as no one else can care for the dog. Dog is  animal. And the allotment of time required for intensive out patient (3 days a week); she is concerned her fibromyalgia will affect her attendance. Thre is a general concern of cost which may direct pt towards low income treatment options.         Assessments completed by :    n/a        Collaterals:     Social Drivers of Health     Financial Resource Strain: Medium Risk (1/29/2024)    Overall Financial Resource Strain (CARDIA)     Difficulty of Paying Living Expenses: Somewhat hard   Food Insecurity: No Food Insecurity (1/29/2024)    Hunger Vital Sign     Worried About Running Out of Food in the Last Year: Never true     Ran Out of Food in the Last Year: Never true   Transportation Needs: No Transportation Needs (1/29/2024)    PRAPARE - Transportation     Lack of Transportation (Medical): No     Lack of Transportation (Non-Medical): No   Physical Activity: Inactive (9/8/2023)    Exercise Vital Sign     Days of Exercise per Week: 0 days     Minutes of Exercise per Session: 0 min   Stress: Stress Concern Present (9/8/2023)    [facility] [employer] " Health - Occupational Stress Questionnaire     Feeling of Stress : Rather much   Social Connections: Moderately Integrated (9/8/2023)    Social Connection and Isolation Panel [NHANES]     Frequency of Communication with Friends and Family: More than three times a week     Frequency of Social Gatherings with Friends and Family: Once a week     Attends Orthodox Services: More than 4 times per year     Active Member of Clubs or Organizations: Yes     Attends Club or Organization Meetings: More than 4 times per year     Marital Status:    Intimate Partner Violence: Not on file   Housing Stability: Low Risk  (1/29/2024)    Housing Stability Vital Sign     Unable to Pay for Housing in the Last Year: No     Number of Places Lived in the Last Year: 1     Unstable Housing in the Last Year: No     Current Outpatient Medications   Medication    hydrOXYzine HCl (ATARAX) 25 MG Tab    ondansetron (ZOFRAN ODT) 4 MG TABLET DISPERSIBLE    topiramate (TOPAMAX) 50 MG tablet    gabapentin (NEURONTIN) 600 MG tablet    diclofenac sodium (VOLTAREN) 1 % Gel    clindamycin (CLEOCIN) 2 % vaginal cream    famotidine (PEPCID) 40 MG Tab    tiotropium (SPIRIVA HANDIHALER) 18 MCG Cap    albuterol 108 (90 Base) MCG/ACT Aero Soln inhalation aerosol    aspirin 81 MG EC tablet    rosuvastatin (CRESTOR) 5 MG Tab    SAVELLA 100 MG Tab    baclofen (LIORESAL) 10 MG Tab    dicyclomine (BENTYL) 20 MG Tab    Multiple Vitamin (MULTIVITAMIN ADULT PO)    NON SPECIFIED    Omega-3 Fatty Acids (FISH OIL) 1000 MG Cap capsule    magnesium oxide (MAG-OX) 400 MG Tab tablet    Loratadine (CLARITIN PO)     No current facility-administered medications for this visit.     Allergies   Allergen Reactions    Erythromycin Rash     Chest rash    Tetracycline Rash     Chest rash             DATA / RESULTS     25-Hydroxy   Vitamin D 25   Date Value Ref Range Status   08/24/2021 63 30 - 100 ng/mL Final     Comment:     Adult Ranges:   <20 ng/mL - Deficiency  20-29 ng/mL  "- Insufficiency   ng/mL - Sufficiency  Effective 3/18/2020, this electrochemiluminescence binding assay is  performed using Roche william e immunoassay analyzer.  The [employer] Vitamin D  total II assay is intended for the quantitative determination of total 25  hydroxyvitamin D in human serum and plasma. This assay is to be used as an  aid in the assessment of vitamin D sufficiency in adults.       Vitamin B12 -True Cobalamin   Date Value Ref Range Status   03/11/2024 340 211 - 911 pg/mL Final     TSH   Date Value Ref Range Status   08/16/2021 2.050 0.380 - 5.330 uIU/mL Final     Comment:     Reference Range:    Pregnant Females, 1st Trimester  0.050-3.700  Pregnant Females, 2nd Trimester  0.310-4.350  Pregnant Females, 3rd Trimester  0.410-5.180       Glycohemoglobin   Date Value Ref Range Status   06/30/2023 5.5 4.0 - 5.6 % Final     Comment:     Increased risk for diabetes:  5.7 -6.4%  Diabetes:  >6.4%  Glycemic control for adults with diabetes:  <7.0%    The above interpretations are per ADA guidelines.  Diagnosis  of diabetes mellitus on the basis of elevated Hemoglobin A1c  should be confirmed by repeating the Hb A1c test.       LDL   Date Value Ref Range Status   01/31/2024 95 <100 mg/dL Final                OBJECTIVE      Vitals:    02/05/25 0751   BP: 122/62   BP Location: Left arm   Patient Position: Sitting   BP Cuff Size: Adult   Pulse: 61   Resp: 16   Temp: 36.4 °C (97.6 °F)   TempSrc: Temporal   SpO2: 98%   Weight: 73.5 kg (162 lb)   Height: 1.651 m (5' 5\")     Physical Exam   General: NAD, appears stated age.      Mental status: Speech clear and fluent. Intermittent voice tremor, associated with fluctuating head tremors. Delay in recall for part of history    Cranial Nerves:  CN2: PERRL. Visual fields are full to finger confrontation.   CN3/4/6: EOMI. There is no nystagmus. Saccades are normal.   CN5: V1-V3 intact to light touch    CN7: Symmetric face.   CN8: Hearing grossly intact.   CN9/10/12: " Soft palate and uvula rise symmetrically. Tongue midline.   CN11: Shoulder shrug intact bilaterally.     Strength  Right Left   Shoulder Abduction  5 5   Elbow Flexion 5 5   Elbow Extension  5 5   Wrist Extension  5 5   Finger Extension  5 5   Hip Flexion  5 5   Knee Extension 5 5   Ankle Dorsiflexion  5 5     Deep Tendon Reflexes: R2L1+ biceps, 1+ brachioradialis, 0 patella and ankles. Plantar reflexes in flexion.     + distractible head (yes-yes) tremor (between 0-1cm) with intentional and postural tremors in bilateral arms (between 0-3cm). Differing frequency in neck vs arms    No dystonia, dyskinesias, tics, stereotypies, athetosis, akathisia, or chorea noted.      Sensory: normal to sharp, temperature.    Quantitative tuning fork Right Left   Hands 8 8   Feet 8 8       Cerebellar: No dysmetria with FTN, finger zara, rapid alternating movement, or heel-to-shin.    Gait:   Posture - normal   Base - narrow   Stride length - normal   Arm swing - normal   Speed - slow, weaving side to side  Shuffling/freezing - none  U-Turn - unsteady, cautious   Romberg - some sway but no assistance   Tandem stance - unsteady  Heel, toe - normal  Tandem walk - slightly unsteady but improves after practice           PROCEDURE     N/A

## 2025-02-05 NOTE — PATIENT INSTRUCTIONS
Dizziness  - look up balancing exercises on youtube  - let me know if you need physical therapy referral   - check TSH at any [facility] lab    Tremors  - Overcoming Functional Neurological Symptoms: A Five Areas Approach, by Ulises Stephens:   - [email or web address]  - [email or web address].org/en  
Yes

## 2025-02-06 PROBLEM — R26.81 GAIT INSTABILITY: Status: ACTIVE | Noted: 2025-02-06

## 2025-02-06 PROBLEM — R25.1 FUNCTIONAL TREMOR: Status: ACTIVE | Noted: 2025-02-06

## 2025-02-14 ENCOUNTER — APPOINTMENT (OUTPATIENT)
Dept: CARDIOLOGY | Facility: MEDICAL CENTER | Age: 60
End: 2025-02-14
Attending: PHYSICIAN ASSISTANT
Payer: MEDICARE

## 2025-02-19 ENCOUNTER — PATIENT OUTREACH (OUTPATIENT)
Dept: HEALTH INFORMATION MANAGEMENT | Facility: OTHER | Age: 60
End: 2025-02-19
Payer: MEDICARE

## 2025-02-19 DIAGNOSIS — R00.1 BRADYCARDIA: ICD-10-CM

## 2025-02-19 DIAGNOSIS — I10 ESSENTIAL HYPERTENSION: ICD-10-CM

## 2025-02-19 DIAGNOSIS — Z91.81 RISK FOR FALLS: ICD-10-CM

## 2025-02-19 DIAGNOSIS — N18.32 STAGE 3B CHRONIC KIDNEY DISEASE: ICD-10-CM

## 2025-02-19 DIAGNOSIS — J44.89 COPD (CHRONIC OBSTRUCTIVE PULMONARY DISEASE) WITH CHRONIC BRONCHITIS (HCC): ICD-10-CM

## 2025-02-20 NOTE — PROGRESS NOTES
First attempt made for PCM monthly and quarterly follow ups. No answer. Message left with contact information and request for call back.

## 2025-02-26 ENCOUNTER — APPOINTMENT (OUTPATIENT)
Dept: FAMILY PLANNING/WOMEN'S HEALTH CLINIC | Facility: PHYSICIAN GROUP | Age: 60
End: 2025-02-26
Attending: FAMILY MEDICINE
Payer: MEDICARE

## 2025-02-26 DIAGNOSIS — R26.81 GAIT INSTABILITY: ICD-10-CM

## 2025-02-26 DIAGNOSIS — F15.21 METHAMPHETAMINE DEPENDENCE IN REMISSION (HCC): ICD-10-CM

## 2025-02-26 DIAGNOSIS — J43.9 PULMONARY EMPHYSEMA, UNSPECIFIED EMPHYSEMA TYPE (HCC): ICD-10-CM

## 2025-02-26 DIAGNOSIS — F41.1 GENERALIZED ANXIETY DISORDER: ICD-10-CM

## 2025-02-26 DIAGNOSIS — Z91.81 RISK FOR FALLS: ICD-10-CM

## 2025-02-26 DIAGNOSIS — F10.21 ALCOHOL DEPENDENCE, IN REMISSION (HCC): ICD-10-CM

## 2025-02-26 DIAGNOSIS — F31.31 BIPOLAR AFFECTIVE DISORDER, CURRENTLY DEPRESSED, MILD (HCC): ICD-10-CM

## 2025-02-26 DIAGNOSIS — N18.32 STAGE 3B CHRONIC KIDNEY DISEASE: ICD-10-CM

## 2025-02-26 DIAGNOSIS — G63 POLYNEUROPATHY IN OTHER DISEASES CLASSIFIED ELSEWHERE (HCC): ICD-10-CM

## 2025-02-28 ENCOUNTER — PATIENT MESSAGE (OUTPATIENT)
Dept: HEALTH INFORMATION MANAGEMENT | Facility: OTHER | Age: 60
End: 2025-02-28

## 2025-03-01 NOTE — PROGRESS NOTES
No contact received in response to PCP follow up attempt. Will attempt again in March. Message sent by [employer].

## 2025-03-04 ENCOUNTER — APPOINTMENT (OUTPATIENT)
Dept: FAMILY PLANNING/WOMEN'S HEALTH CLINIC | Facility: PHYSICIAN GROUP | Age: 60
End: 2025-03-04
Attending: FAMILY MEDICINE
Payer: MEDICARE

## 2025-03-04 DIAGNOSIS — R26.81 GAIT INSTABILITY: ICD-10-CM

## 2025-03-04 DIAGNOSIS — G63 POLYNEUROPATHY IN OTHER DISEASES CLASSIFIED ELSEWHERE (HCC): ICD-10-CM

## 2025-03-04 DIAGNOSIS — N18.32 STAGE 3B CHRONIC KIDNEY DISEASE: ICD-10-CM

## 2025-03-04 DIAGNOSIS — J43.9 PULMONARY EMPHYSEMA, UNSPECIFIED EMPHYSEMA TYPE (HCC): ICD-10-CM

## 2025-03-04 DIAGNOSIS — F10.21 ALCOHOL DEPENDENCE, IN REMISSION (HCC): ICD-10-CM

## 2025-03-04 DIAGNOSIS — F31.31 BIPOLAR AFFECTIVE DISORDER, CURRENTLY DEPRESSED, MILD (HCC): ICD-10-CM

## 2025-03-04 DIAGNOSIS — F15.21 METHAMPHETAMINE DEPENDENCE IN REMISSION (HCC): ICD-10-CM

## 2025-03-04 DIAGNOSIS — Z91.81 RISK FOR FALLS: ICD-10-CM

## 2025-03-04 DIAGNOSIS — G43.809 OTHER MIGRAINE WITHOUT STATUS MIGRAINOSUS, NOT INTRACTABLE: ICD-10-CM

## 2025-03-04 DIAGNOSIS — F41.1 GENERALIZED ANXIETY DISORDER: ICD-10-CM

## 2025-03-11 ENCOUNTER — APPOINTMENT (OUTPATIENT)
Dept: FAMILY PLANNING/WOMEN'S HEALTH CLINIC | Facility: PHYSICIAN GROUP | Age: 60
End: 2025-03-11
Attending: FAMILY MEDICINE
Payer: MEDICARE

## 2025-03-11 ENCOUNTER — OFFICE VISIT (OUTPATIENT)
Dept: CARDIOLOGY | Facility: MEDICAL CENTER | Age: 60
End: 2025-03-11
Attending: PHYSICIAN ASSISTANT
Payer: MEDICARE

## 2025-03-11 VITALS
RESPIRATION RATE: 16 BRPM | OXYGEN SATURATION: 98 % | HEART RATE: 66 BPM | DIASTOLIC BLOOD PRESSURE: 74 MMHG | HEIGHT: 65 IN | SYSTOLIC BLOOD PRESSURE: 102 MMHG | BODY MASS INDEX: 26.86 KG/M2 | WEIGHT: 161.2 LBS

## 2025-03-11 DIAGNOSIS — R73.01 ELEVATED FASTING GLUCOSE: ICD-10-CM

## 2025-03-11 DIAGNOSIS — N18.32 STAGE 3B CHRONIC KIDNEY DISEASE: ICD-10-CM

## 2025-03-11 DIAGNOSIS — E78.5 DYSLIPIDEMIA: ICD-10-CM

## 2025-03-11 DIAGNOSIS — G47.33 OSA ON CPAP: ICD-10-CM

## 2025-03-11 DIAGNOSIS — F17.210 CIGARETTE SMOKER: ICD-10-CM

## 2025-03-11 DIAGNOSIS — J43.9 PULMONARY EMPHYSEMA, UNSPECIFIED EMPHYSEMA TYPE (HCC): ICD-10-CM

## 2025-03-11 DIAGNOSIS — I25.10 CORONARY ARTERY CALCIFICATION: ICD-10-CM

## 2025-03-11 DIAGNOSIS — R07.89 OTHER CHEST PAIN: ICD-10-CM

## 2025-03-11 PROCEDURE — 3078F DIAST BP <80 MM HG: CPT | Performed by: PHYSICIAN ASSISTANT

## 2025-03-11 PROCEDURE — 3074F SYST BP LT 130 MM HG: CPT | Performed by: PHYSICIAN ASSISTANT

## 2025-03-11 PROCEDURE — 99213 OFFICE O/P EST LOW 20 MIN: CPT | Performed by: PHYSICIAN ASSISTANT

## 2025-03-11 PROCEDURE — 99214 OFFICE O/P EST MOD 30 MIN: CPT | Performed by: PHYSICIAN ASSISTANT

## 2025-03-11 ASSESSMENT — FIBROSIS 4 INDEX: FIB4 SCORE: 0.75

## 2025-03-11 ASSESSMENT — ENCOUNTER SYMPTOMS
DIZZINESS: 0
MYALGIAS: 1
SHORTNESS OF BREATH: 1
DOUBLE VISION: 0
CONSTITUTIONAL NEGATIVE: 1
PND: 0
NAUSEA: 0
BLURRED VISION: 0
BRUISES/BLEEDS EASILY: 0
COUGH: 0
CHILLS: 0
NEUROLOGICAL NEGATIVE: 1
CLAUDICATION: 0
WEAKNESS: 0
FEVER: 0
VOMITING: 0
PSYCHIATRIC NEGATIVE: 1
HEADACHES: 0
GASTROINTESTINAL NEGATIVE: 1
PALPITATIONS: 0
ABDOMINAL PAIN: 0
EYES NEGATIVE: 1
ORTHOPNEA: 0
LOSS OF CONSCIOUSNESS: 0

## 2025-03-11 NOTE — PROGRESS NOTES
Chief Complaint   Patient presents with    Bradycardia       Subjective     Jaclyndiane Mejia is a 60 y.o. female  with a history of coronary artery calcification, dyslipidemia, DORA and tobacco abuse who presents today for follow up.     Her primary cardiologist is Dr. Marr and previously Dr. Deal. Last seen 9/20/2024. At that exam, she continued to experience chronic chest pain and shortness of breath which was unchanged since her last visit. She had continued to smoke cigarettes and was working on quitting. She had been prescribed colchicine for unclear reasons and was recommended to proceed with a hs-CRP. She was also recommended to start aspirin and to stop smoking. She was to follow up in 3 months.    Today, she is doing well overall. She does continue to have chronic chest pain and shortness of breath which she attributes to her COPD and fibromyalgia. Her shortness of breath in particular is alleviated by inhalers. No palpitations. No orthopnea or PND. No lower extremity edema. No dizziness or lightheadedness. No syncope or presyncope.      She continues to smokes a pack a day.    Past Medical History:   Diagnosis Date    Abdominal pain     Actinic keratosis 02/06/2018    Alcohol abuse     Allergy     Anxiety     Arthritis     Back pain     Back pain     Bronchitis     Chronic pain syndrome     Constipation     COPD (chronic obstructive pulmonary disease) (HCC)     Cough     Daytime sleepiness     DDD (degenerative disc disease), cervical     DDD (degenerative disc disease), thoracolumbar     Depression     Diarrhea     Diverticulosis     Dizziness     Essential tremor     Fatigue     Fibromyalgia     IBS (irritable bowel syndrome)     Insomnia     Migraine     Nausea     Painful joint     Pulmonary emphysema (HCC)     Restless leg syndrome     Ringing in ears     Snoring     SOB (shortness of breath)     Sore muscles     Sweat, sweating, excessive     Weakness     Wears glasses      Past Surgical  History:   Procedure Laterality Date    LUMBAR MEDIAL BRANCH BLOCKS Bilateral 1/30/2024    Procedure: Diagnostic medial branch blocks targeting the BILATERAL L3-4, L4-5 and L5-S1 facet joints with fluoroscopic guidance #2 with sedation.   Note: plan on versed 1mg.  Diagnostic medial branch block #2 is only be done if procedure #1 is a positive block.  This will be on a separate date from procedure #1.;  Surgeon: Elmo Donaldson M.D.;  [facility];  Service: P    LUMBAR MEDIAL BRANCH BLOCKS  1/23/2024    Procedure: Diagnostic medial branch blocks targeting the BILATERAL L3-4, L4-5 and L5-S1 facet joints with fluoroscopic guidance #1 with sedation.    Note: plan on versed 1mg.;  Surgeon: Elmo Donaldson M.D.;  [facility];  Service: Pain Management    ABDOMINAL HYSTERECTOMY TOTAL      CHOLECYSTECTOMY      HYSTERECTOMY LAPAROSCOPY      OTHER ABDOMINAL SURGERY      TONSILLECTOMY       Family History   Problem Relation Age of Onset    Lung Disease Mother         copd    Cancer Mother         basal/squamous    Hypertension Mother     Hyperlipidemia Mother     Stroke Mother     Alcohol abuse Mother     Heart Disease Father         HF    Arthritis Father         rheumatoid    Lung Disease Father         emphysema    Alcohol abuse Father     Cancer Sister     Alcohol abuse Sister     Cancer Maternal Grandfather         Bladder or prostate?    Cancer Sister         pre cancerous spots     Alcohol abuse Sister     Kidney Disease Sister     Alcohol abuse Sister     Hypertension Sister     Alcohol abuse Sister      Social History     Socioeconomic History    Marital status: Single     Spouse name: Not on file    Number of children: Not on file    Years of education: Not on file    Highest education level: Associate degree: occupational, technical, or vocational program   Occupational History    Not on file   Tobacco Use    Smoking status: Every Day     Current packs/day:  1.00     Average packs/day: 1 pack/day for 45.7 years (45.7 ttl pk-yrs)     Types: Cigarettes     Start date: 7/1/1979    Smokeless tobacco: Never   Vaping Use    Vaping status: Every Day    Substances: THC, CBD    Devices: Disposable, Pre-filled or refillable cartridge, Refillable tank, Pre-filled pod   Substance and Sexual Activity    Alcohol use: Yes     Comment: 1-2 drinks    Drug use: Yes     Frequency: 7.0 times per week     Types: Marijuana, Inhaled, Oral     Comment: Only canibus medicinal for anti spasm, anxiety, ibs, inflamm    Sexual activity: Not Currently     Partners: Male, Female     Comment: currently with male   Other Topics Concern    Not on file   Social History Narrative    Initial Intake Date:  Last Updated:        Financial situation:    SSD $1570/mo w/ $450 savings. We did discuss care credit in regards to behavioral health treatment interventions. Jaclyn        Food resources & insecurities:    Deferred.         Housing & environmental:    Resides in [place] in [facility] ([address]).         Transportation:    Deferred.        Family dynamics & family/friend social supports:    Pt has local family to include 86-year-old mother and sister. She reports family are functioning alcoholics but are supportive in her sobriety. They will provide alternative alcohol free drinks at gatherings. Pt has son currently in inpatient treatment for alcohol use.         Pt has significant other who resides in the same [facility]. He is also pursuing sobriety w/ Jaclyn.        ADLs/IADLs & associated diagnoses:    Pt's ADLs/IADL have previously been affected by her alcohol use. They have cause incontinent issues in the past as well as extended periods of vomiting.         Advanced life planning:    Not on file.         Behavioral/Mental:    Associated diagnoses include migraine without status migrainosus, not intractable; insomnia due to medical condition; recurrent major depressive disorder;  "alcoholism; deliberate self-cutting; and obesity.        Pt has extensive history of substance use and recovery. In her young adult years pt used methamphatmine for aprox 1 year until she sought treatment. At this time she was mother to 2 children. She attended both AA[facility] and [facility] in the [facility] area. She became sober \"[place]\" and maintained sobriety for 16 years. 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 Pt's daughter then left the home (at age 21) which affected her drinking; at that time she also became disabled. Pt relapsed on methamphetamine with use over 2 years w/ a 9 month period of sobriety. Pt then moved to [place] where her drinking increased (vodka). Pt reports medical symptoms from drinking include every other day vomitting, a period of GI/rectal bleeding and hypertensive events. Per chart review pt drinking 5th vodka daily. Pt's last drink 3/7/2021. She is pursuing sobriety with her significant other, Bradly. Pt is 6 days sober at time of assessment (3/12/2021). She is \"tired of being sick and tired.\"        Pt's reservations include attending inpatient; she would need to take her pet dog with her as no one else can care for the dog. Dog is  animal. And the allotment of time required for intensive out patient (3 days a week); she is concerned her fibromyalgia will affect her attendance. Thre is a general concern of cost which may direct pt towards low income treatment options.         Assessments completed by :    n/a        Collaterals:     Social Drivers of Health     Financial Resource Strain: Medium Risk (1/29/2024)    Overall Financial Resource Strain (CARDIA)     Difficulty of Paying Living Expenses: Somewhat hard   Food Insecurity: No Food Insecurity (1/29/2024)    Hunger Vital Sign     Worried About Running Out of Food in the Last Year: Never true     Ran Out of Food " in the Last Year: Never true   Transportation Needs: No Transportation Needs (1/29/2024)    PRAPARE - Transportation     Lack of Transportation (Medical): No     Lack of Transportation (Non-Medical): No   Physical Activity: Inactive (9/8/2023)    Exercise Vital Sign     Days of Exercise per Week: 0 days     Minutes of Exercise per Session: 0 min   Stress: Stress Concern Present (9/8/2023)    [employer] - Occupational Stress Questionnaire     Feeling of Stress : Rather much   Social Connections: Moderately Integrated (9/8/2023)    Social Connection and Isolation Panel [NHANES]     Frequency of Communication with Friends and Family: More than three times a week     Frequency of Social Gatherings with Friends and Family: Once a week     Attends Christianity Services: More than 4 times per year     Active Member of Clubs or Organizations: Yes     Attends Club or Organization Meetings: More than 4 times per year     Marital Status:    Intimate Partner Violence: Not on file   Housing Stability: Low Risk  (1/29/2024)    Housing Stability Vital Sign     Unable to Pay for Housing in the Last Year: No     Number of Places Lived in the Last Year: 1     Unstable Housing in the Last Year: No     Allergies   Allergen Reactions    Erythromycin Rash     Chest rash    Tetracycline Rash     Chest rash     Outpatient Encounter Medications as of 3/11/2025   Medication Sig Dispense Refill    hydrOXYzine HCl (ATARAX) 25 MG Tab TAKE 1 TABLET BY MOUTH THREE TIMES A DAY AS NEEDED FOR ANXIETY 270 Tablet 0    ondansetron (ZOFRAN ODT) 4 MG TABLET DISPERSIBLE DISSOLVE 1 TABLET ON THE TONGUE  EVERY 6 HOURS AS NEEDED FOR  NAUSEA/VOMITING 15 Tablet 0    topiramate (TOPAMAX) 50 MG tablet TAKE 1 TABLET BY MOUTH 3 TIMES  DAILY 270 Tablet 3    gabapentin (NEURONTIN) 600 MG tablet TAKE 1 TABLET BY MOUTH 3 TIMES  DAILY 270 Tablet 3    diclofenac sodium (VOLTAREN) 1 % Gel Apply 2 g topically 4 times a day as needed  (pain). 100 g 0    clindamycin (CLEOCIN) 2 % vaginal cream Use vaginally after sexual activity for irritation and odor related to BV. 40 g 0    famotidine (PEPCID) 40 MG Tab TAKE 1 TABLET BY MOUTH DAILY 90 Tablet 3    tiotropium (SPIRIVA HANDIHALER) 18 MCG Cap Place 1 Capsule into inhaler and inhale every day. 30 Capsule 11    albuterol 108 (90 Base) MCG/ACT Aero Soln inhalation aerosol Inhale 2 Puffs every four hours as needed for Shortness of Breath. 1 Each 0    aspirin 81 MG EC tablet Take 81 mg by mouth every day.      rosuvastatin (CRESTOR) 5 MG Tab TAKE 1 TABLET BY MOUTH IN THE  EVENING 100 Tablet 2    SAVELLA 100 MG Tab TAKE 1 TABLET BY MOUTH TWICE  DAILY 180 Tablet 0    baclofen (LIORESAL) 10 MG Tab TAKE 1 TABLET BY MOUTH 3 TIMES  DAILY 270 Tablet 0    dicyclomine (BENTYL) 20 MG Tab TAKE 1 TABLET BY MOUTH EVERY 6 HOURS AS NEEDED (IBS). 360 Tablet 1    Multiple Vitamin (MULTIVITAMIN ADULT PO)       NON SPECIFIED Take  by mouth. Flax oil gel cap, one capsule daily      Omega-3 Fatty Acids (FISH OIL) 1000 MG Cap capsule Take 1,000 mg by mouth 1 time a day as needed.      magnesium oxide (MAG-OX) 400 MG Tab tablet Take 400 mg by mouth every day.      Loratadine (CLARITIN PO) Take 1 tablet by mouth every day.       No facility-administered encounter medications on file as of 3/11/2025.     Review of Systems   Constitutional: Negative.  Negative for chills, fever and malaise/fatigue.   HENT: Negative.     Eyes: Negative.  Negative for blurred vision and double vision.   Respiratory:  Positive for shortness of breath (attributed to COPD alleviated by inhalers). Negative for cough.    Cardiovascular:  Positive for chest pain (intermittent which she attributes to her fibromyalgias and COPD). Negative for palpitations, orthopnea, claudication, leg swelling and PND.   Gastrointestinal: Negative.  Negative for abdominal pain, nausea and vomiting.   Genitourinary: Negative.    Musculoskeletal:  Positive for myalgias.  "  Skin: Negative.  Negative for rash.   Neurological: Negative.  Negative for dizziness, loss of consciousness, weakness and headaches.   Endo/Heme/Allergies: Negative.  Does not bruise/bleed easily.   Psychiatric/Behavioral: Negative.                Objective     /74 (BP Location: Left arm, Patient Position: Sitting, BP Cuff Size: Adult)   Pulse 66   Resp 16   Ht 1.651 m (5' 5\")   Wt 73.1 kg (161 lb 3.2 oz)   LMP 12/01/2000   SpO2 98%   BMI 26.83 kg/m²     Physical Exam  Vitals reviewed.   Constitutional:       General: She is not in acute distress.     Appearance: Normal appearance.   HENT:      Head: Normocephalic and atraumatic.      Right Ear: External ear normal.      Left Ear: External ear normal.   Eyes:      General: No scleral icterus.     Extraocular Movements: Extraocular movements intact.      Conjunctiva/sclera: Conjunctivae normal.      Pupils: Pupils are equal, round, and reactive to light.   Cardiovascular:      Rate and Rhythm: Normal rate and regular rhythm.      Pulses: Normal pulses.      Heart sounds: Normal heart sounds. No murmur heard.     No friction rub. No gallop.   Pulmonary:      Effort: Pulmonary effort is normal.      Breath sounds: Normal breath sounds.   Abdominal:      General: Bowel sounds are normal.      Palpations: Abdomen is soft.      Tenderness: There is no abdominal tenderness.   Musculoskeletal:         General: Normal range of motion.      Cervical back: Normal range of motion and neck supple.      Right lower leg: No edema.      Left lower leg: No edema.   Skin:     General: Skin is warm and dry.      Capillary Refill: Capillary refill takes less than 2 seconds.   Neurological:      General: No focal deficit present.      Mental Status: She is alert and oriented to person, place, and time.   Psychiatric:         Mood and Affect: Mood normal.         Behavior: Behavior normal.         Judgment: Judgment normal.         Lab Results   Component Value Date/Time "    CHOLSTRLTOT 158 01/31/2024 10:21 AM    LDL 95 01/31/2024 10:21 AM    HDL 45 01/31/2024 10:21 AM    TRIGLYCERIDE 88 01/31/2024 10:21 AM       Lab Results   Component Value Date/Time    SODIUM 141 12/02/2024 03:50 PM    POTASSIUM 3.9 12/02/2024 03:50 PM    CHLORIDE 103 12/02/2024 03:50 PM    CO2 26 12/02/2024 03:50 PM    GLUCOSE 112 (H) 12/02/2024 03:50 PM    BUN 17 12/02/2024 03:50 PM    CREATININE 1.14 12/02/2024 03:50 PM     Lab Results   Component Value Date/Time    ALKPHOSPHAT 93 07/29/2023 01:22 PM    ASTSGOT 12 07/29/2023 01:22 PM    ALTSGPT 9 07/29/2023 01:22 PM    TBILIRUBIN 0.2 07/29/2023 01:22 PM      Cardiovascular imaging and procedures:    EKG dated 7/29/2023: My personal interpretation is Sinus arrhythmia, Probable left atrial enlargement     Echo dated 8/30/2023:   CONCLUSIONS  No prior study is available for comparison.   The left ventricle is normal in size and thickness.  Normal right ventricular systolic function.   No significant valvular abnormalities.      Nuclear Perfusion Imaging (7/28/2023):   NUCLEAR IMAGING INTERPRETATION   No evidence of significant jeopardized viable myocardium or prior myocardial    infarction.   Normal left ventricular size, ejection fraction, and wall motion.   ECG INTERPRETATION   Negative stress ECG for ischemia.         Assessment & Plan     1. Coronary artery calcification  Lipid Profile      2. Dyslipidemia  Comp Metabolic Panel    Lipid Profile      3. Elevated fasting glucose  HEMOGLOBIN A1C (Glycohemoglobin GHB Total/A1C with MBG Estimate)      4. Pulmonary emphysema, unspecified emphysema type (HCC)        5. Stage 3b chronic kidney disease        6. Cigarette smoker        7. DORA on CPAP        8. Other chest pain            Medical Decision Making: Today's Assessment/Status/Plan:        Chest pain  - Continued symptoms but this is at her baseline and she attributes her symptoms to COPD and fibromyalgia.   -She previously underwent a nuclear stress test,  which showed normal perfusion in July 2023. Echocardiogram with normal LVEF and normal pericardium.   -Manage cardiovascular risk factors as below  -Patient was prescribed colchicine for unclear reasons. Hs-CRP was negative for pericarditis and she has been off of colchicine since December without any change in her symptoms.      Dyslipidemia  Coronary artery calcification   -CT lung cancer screening 2/29/2024 shows coronary artery calcification  -Continue rosuvastatin 5 mg daily  - Repeat lipid panel and CMP.   -Continue aspirin 81 mg daily     Cigarette smoker  -Patient is currently smoking a pack a day.   - Encouraged cessation.   - She will talk with her PCP about options for cessation.     Elevated fasting glucose  -She had an elevated reading in 2023 and 2024.  - We will proceed with A1C testing for further risk stratification.     Follow up in 6 months or sooner with clinical changes.    Encouraged her to reach out via MyChart or telephone with any questions or concerns.    Thank you for allowing me to participate in the care of Jaclyn Mejia .    Tiara Gonzalez PA-C, Cardiology  [facility], [facility]  [address] [address]  Phone: [phone]  Fax: [phone]    PLEASE NOTE: This note was created using voice recognition software. I have made every reasonable attempt to correct obvious errors, but I expect that there are errors of grammar and possibly content that I did not discover before finalizing the note.     I personally spent a total of 32 minutes which includes face-to-face time and non-face-to-face time spent on preparing to see the patient, reviewing hospital notes and tests, obtaining history from the patient, performing a medically appropriate exam, counseling and educating the patient, ordering medications/tests/procedures/referrals as clinically indicated, and documenting information in the  electronic medical record.

## 2025-03-13 ENCOUNTER — APPOINTMENT (OUTPATIENT)
Dept: FAMILY PLANNING/WOMEN'S HEALTH CLINIC | Facility: PHYSICIAN GROUP | Age: 60
End: 2025-03-13
Attending: FAMILY MEDICINE
Payer: MEDICARE

## 2025-03-13 DIAGNOSIS — G43.809 OTHER MIGRAINE WITHOUT STATUS MIGRAINOSUS, NOT INTRACTABLE: ICD-10-CM

## 2025-03-13 DIAGNOSIS — F15.21 METHAMPHETAMINE DEPENDENCE IN REMISSION (HCC): ICD-10-CM

## 2025-03-13 DIAGNOSIS — Z91.81 RISK FOR FALLS: ICD-10-CM

## 2025-03-13 DIAGNOSIS — J43.9 PULMONARY EMPHYSEMA, UNSPECIFIED EMPHYSEMA TYPE (HCC): ICD-10-CM

## 2025-03-13 DIAGNOSIS — F10.21 ALCOHOL DEPENDENCE, IN REMISSION (HCC): ICD-10-CM

## 2025-03-13 DIAGNOSIS — R26.81 GAIT INSTABILITY: ICD-10-CM

## 2025-03-13 DIAGNOSIS — N18.32 STAGE 3B CHRONIC KIDNEY DISEASE: ICD-10-CM

## 2025-03-13 DIAGNOSIS — F41.1 GENERALIZED ANXIETY DISORDER: ICD-10-CM

## 2025-03-13 DIAGNOSIS — F31.31 BIPOLAR AFFECTIVE DISORDER, CURRENTLY DEPRESSED, MILD (HCC): ICD-10-CM

## 2025-03-13 DIAGNOSIS — G63 POLYNEUROPATHY IN OTHER DISEASES CLASSIFIED ELSEWHERE (HCC): ICD-10-CM

## 2025-03-24 ASSESSMENT — PATIENT HEALTH QUESTIONNAIRE - PHQ9
2. FEELING DOWN, DEPRESSED, IRRITABLE, OR HOPELESS: SEVERAL DAYS
1. LITTLE INTEREST OR PLEASURE IN DOING THINGS: NOT AT ALL

## 2025-03-24 ASSESSMENT — ACTIVITIES OF DAILY LIVING (ADL): BATHING_REQUIRES_ASSISTANCE: 0

## 2025-03-24 ASSESSMENT — ENCOUNTER SYMPTOMS: GENERAL WELL-BEING: FAIR

## 2025-03-25 ENCOUNTER — APPOINTMENT (OUTPATIENT)
Dept: FAMILY PLANNING/WOMEN'S HEALTH CLINIC | Facility: PHYSICIAN GROUP | Age: 60
End: 2025-03-25
Attending: FAMILY MEDICINE
Payer: MEDICARE

## 2025-03-25 DIAGNOSIS — F15.21 METHAMPHETAMINE DEPENDENCE IN REMISSION (HCC): ICD-10-CM

## 2025-03-25 DIAGNOSIS — F10.21 ALCOHOL DEPENDENCE, IN REMISSION (HCC): ICD-10-CM

## 2025-03-25 DIAGNOSIS — J43.9 PULMONARY EMPHYSEMA, UNSPECIFIED EMPHYSEMA TYPE (HCC): ICD-10-CM

## 2025-03-25 DIAGNOSIS — G63 POLYNEUROPATHY IN OTHER DISEASES CLASSIFIED ELSEWHERE (HCC): ICD-10-CM

## 2025-03-25 DIAGNOSIS — N18.32 STAGE 3B CHRONIC KIDNEY DISEASE: ICD-10-CM

## 2025-03-25 DIAGNOSIS — R26.81 GAIT INSTABILITY: ICD-10-CM

## 2025-03-25 DIAGNOSIS — Z91.81 RISK FOR FALLS: ICD-10-CM

## 2025-03-25 DIAGNOSIS — F31.31 BIPOLAR AFFECTIVE DISORDER, CURRENTLY DEPRESSED, MILD (HCC): ICD-10-CM

## 2025-03-25 DIAGNOSIS — G43.809 OTHER MIGRAINE WITHOUT STATUS MIGRAINOSUS, NOT INTRACTABLE: ICD-10-CM

## 2025-03-25 ASSESSMENT — ENCOUNTER SYMPTOMS: GENERAL WELL-BEING: FAIR

## 2025-03-25 ASSESSMENT — ACTIVITIES OF DAILY LIVING (ADL): BATHING_REQUIRES_ASSISTANCE: 0

## 2025-03-26 ENCOUNTER — PATIENT OUTREACH (OUTPATIENT)
Dept: HEALTH INFORMATION MANAGEMENT | Facility: OTHER | Age: 60
End: 2025-03-26
Payer: MEDICARE

## 2025-03-26 DIAGNOSIS — J44.89 COPD (CHRONIC OBSTRUCTIVE PULMONARY DISEASE) WITH CHRONIC BRONCHITIS (HCC): ICD-10-CM

## 2025-03-26 DIAGNOSIS — N18.32 STAGE 3B CHRONIC KIDNEY DISEASE: ICD-10-CM

## 2025-03-26 DIAGNOSIS — I10 ESSENTIAL HYPERTENSION: ICD-10-CM

## 2025-03-28 NOTE — PROGRESS NOTES
"Reason for Follow-Up:    I spoke to the patient by phone this afternoon in order to complete her monthly PCM follow up.     Current Health Status:    The patient is followed based on diagnoses of COPD, bipolar, and HTN. She denies any pulmonary or cardiac symptoms at this time. She reports mental health being a bigger issue in the spring and she is pinpointing triggers.     Medical Updates:  The patient reports \"doing ok.\"     Medication Updates:  The patient reports no updates to medication list today    Symptoms:  Shingles like rash in a couple of \"silver dollar sized\" spots on her back (Largely resolved), \"sinusy\" symptoms    Plan of Care Goals and Progress:    Goal 1. Over the next month the patient would like to continue \"getting up and doing stuff\" like walking and participating in activities she enjoys     Progress:  The patient has been walking in [place] with her dog. The patient has been cooking for herself and her pets.       Barriers:  The patient has increased motivation issues in the spring     Interventions:  The patient will consider effective and ineffective elements of her routine.      Education:  The importance of exercise and self care reinforced     Goal 2. Over the next month the patient will take concrete and consistent steps to optimize sleep     Progress:  The patient has given thought to the best sleep environment for her including lighting.       Barriers:  She goes back and forth between different environments.      Interventions:  Physical cues for sleep     Education:  \"Wind down\" activities discussed.       Patient's Concerns and Feedback (Self Management of Care):     The patient reports \"doing well\" she is aware of her future appointments and recommended care measures. She is addressing them one by one.     Next Steps:     Follow-Up Plan:  The patient's next PCM follow up will be in approximately 4 weeks.       [facility]:  [facility] " 4/17/25(12:45 arrival, Magdiel), 9/11/25 (1:15 pm, Blair)     Contact Information:  Call [phone] with any questions or concerns.

## 2025-03-31 NOTE — CARE PLAN
Problem: Knowledge of signs and symptoms of depression and anxiety  Goal: Patient able to identify personalized signs and symptoms of depression and anxiety.  Outcome: Progressing  Note: The patient states that her boyfriend has become a great support for her. She voices her depression symptoms and triggers with clarity. She employs persistence.   Intervention: Provide education about depression in lay terms  Intervention: Encourage psychoeducational learning opportunities  Intervention: Encourage identification of triggering events  Intervention: Encourage patient to Identify a support person they can talk to

## 2025-04-01 DIAGNOSIS — B96.89 BV (BACTERIAL VAGINOSIS): ICD-10-CM

## 2025-04-01 DIAGNOSIS — N76.0 BV (BACTERIAL VAGINOSIS): ICD-10-CM

## 2025-04-01 RX ORDER — CLINDAMYCIN PHOSPHATE 20 MG/G
CREAM VAGINAL
Qty: 40 G | Refills: 0 | Status: SHIPPED | OUTPATIENT
Start: 2025-04-01 | End: 2025-04-22 | Stop reason: SDUPTHER

## 2025-04-01 NOTE — TELEPHONE ENCOUNTER
Received request via: Pharmacy    Was the patient seen in the last year in this department? Yes    Does the patient have an active prescription (recently filled or refills available) for medication(s) requested? No    Pharmacy Name: mail    Does the patient have [facility] Plus and need 100-day supply? (This applies to ALL medications) Yes, quantity updated to 100 days

## 2025-04-10 ENCOUNTER — APPOINTMENT (OUTPATIENT)
Dept: FAMILY PLANNING/WOMEN'S HEALTH CLINIC | Facility: PHYSICIAN GROUP | Age: 60
End: 2025-04-10
Attending: FAMILY MEDICINE
Payer: MEDICARE

## 2025-04-10 ASSESSMENT — ENCOUNTER SYMPTOMS: GENERAL WELL-BEING: FAIR

## 2025-04-10 ASSESSMENT — ACTIVITIES OF DAILY LIVING (ADL): BATHING_REQUIRES_ASSISTANCE: 0

## 2025-04-17 ENCOUNTER — APPOINTMENT (OUTPATIENT)
Dept: MEDICAL GROUP | Facility: PHYSICIAN GROUP | Age: 60
End: 2025-04-17
Payer: MEDICARE

## 2025-04-22 ENCOUNTER — APPOINTMENT (OUTPATIENT)
Dept: FAMILY PLANNING/WOMEN'S HEALTH CLINIC | Facility: PHYSICIAN GROUP | Age: 60
End: 2025-04-22
Attending: FAMILY MEDICINE
Payer: MEDICARE

## 2025-04-22 DIAGNOSIS — F31.31 BIPOLAR AFFECTIVE DISORDER, CURRENTLY DEPRESSED, MILD (HCC): ICD-10-CM

## 2025-04-22 DIAGNOSIS — R26.81 GAIT INSTABILITY: ICD-10-CM

## 2025-04-22 DIAGNOSIS — F41.1 GENERALIZED ANXIETY DISORDER: ICD-10-CM

## 2025-04-22 DIAGNOSIS — G43.809 OTHER MIGRAINE WITHOUT STATUS MIGRAINOSUS, NOT INTRACTABLE: ICD-10-CM

## 2025-04-22 DIAGNOSIS — J43.9 PULMONARY EMPHYSEMA, UNSPECIFIED EMPHYSEMA TYPE (HCC): ICD-10-CM

## 2025-04-22 DIAGNOSIS — N76.0 BV (BACTERIAL VAGINOSIS): ICD-10-CM

## 2025-04-22 DIAGNOSIS — G63 POLYNEUROPATHY IN OTHER DISEASES CLASSIFIED ELSEWHERE (HCC): ICD-10-CM

## 2025-04-22 DIAGNOSIS — N18.32 STAGE 3B CHRONIC KIDNEY DISEASE: ICD-10-CM

## 2025-04-22 DIAGNOSIS — B96.89 BV (BACTERIAL VAGINOSIS): ICD-10-CM

## 2025-04-22 DIAGNOSIS — Z91.81 RISK FOR FALLS: ICD-10-CM

## 2025-04-22 DIAGNOSIS — F10.21 ALCOHOL DEPENDENCE, IN REMISSION (HCC): ICD-10-CM

## 2025-04-22 DIAGNOSIS — F15.21 METHAMPHETAMINE DEPENDENCE IN REMISSION (HCC): ICD-10-CM

## 2025-04-22 ASSESSMENT — ENCOUNTER SYMPTOMS: GENERAL WELL-BEING: FAIR

## 2025-04-22 ASSESSMENT — ACTIVITIES OF DAILY LIVING (ADL): BATHING_REQUIRES_ASSISTANCE: 0

## 2025-04-23 NOTE — TELEPHONE ENCOUNTER
Received request via: Patient    Was the patient seen in the last year in this department? Yes    Does the patient have an active prescription (recently filled or refills available) for medication(s) requested? No    Pharmacy Name: [facility] - [address]      Does the patient have [facility] and need 100-day supply? (This applies to ALL medications) Yes, quantity updated to 100 days

## 2025-04-24 RX ORDER — CLINDAMYCIN PHOSPHATE 20 MG/G
CREAM VAGINAL
Qty: 40 G | Refills: 0 | Status: SHIPPED | OUTPATIENT
Start: 2025-04-24

## 2025-04-27 DIAGNOSIS — F41.1 GAD (GENERALIZED ANXIETY DISORDER): ICD-10-CM

## 2025-04-28 ENCOUNTER — PATIENT OUTREACH (OUTPATIENT)
Dept: HEALTH INFORMATION MANAGEMENT | Facility: OTHER | Age: 60
End: 2025-04-28
Payer: MEDICARE

## 2025-04-28 DIAGNOSIS — J44.89 COPD (CHRONIC OBSTRUCTIVE PULMONARY DISEASE) WITH CHRONIC BRONCHITIS (HCC): ICD-10-CM

## 2025-04-28 DIAGNOSIS — N18.32 STAGE 3B CHRONIC KIDNEY DISEASE: ICD-10-CM

## 2025-04-28 DIAGNOSIS — I10 ESSENTIAL HYPERTENSION: ICD-10-CM

## 2025-04-28 DIAGNOSIS — R00.1 BRADYCARDIA: ICD-10-CM

## 2025-04-28 RX ORDER — HYDROXYZINE HYDROCHLORIDE 25 MG/1
25 TABLET, FILM COATED ORAL 3 TIMES DAILY PRN
Qty: 270 TABLET | Refills: 0 | Status: SHIPPED | OUTPATIENT
Start: 2025-04-28

## 2025-04-28 NOTE — PROGRESS NOTES
First attempt made for PCM monthly check in. No answer. Message left with contact information and request for call back.

## 2025-04-30 NOTE — PROGRESS NOTES
Second attempt made for PCM follow up for April. No answer. Message left with contact information and request for call back. Message sent by [employer].

## 2025-05-01 ENCOUNTER — APPOINTMENT (OUTPATIENT)
Dept: MEDICAL GROUP | Facility: PHYSICIAN GROUP | Age: 60
End: 2025-05-01
Payer: MEDICARE

## 2025-05-07 DIAGNOSIS — I25.10 ASCVD (ARTERIOSCLEROTIC CARDIOVASCULAR DISEASE): ICD-10-CM

## 2025-05-07 RX ORDER — ROSUVASTATIN CALCIUM 5 MG/1
5 TABLET, COATED ORAL EVERY EVENING
Qty: 100 TABLET | Refills: 0 | Status: SHIPPED | OUTPATIENT
Start: 2025-05-07 | End: 2025-05-22 | Stop reason: SDUPTHER

## 2025-05-22 DIAGNOSIS — G43.809 OTHER MIGRAINE WITHOUT STATUS MIGRAINOSUS, NOT INTRACTABLE: ICD-10-CM

## 2025-05-22 DIAGNOSIS — M79.7 FIBROMYALGIA: ICD-10-CM

## 2025-05-22 DIAGNOSIS — K21.9 GASTROESOPHAGEAL REFLUX DISEASE WITHOUT ESOPHAGITIS: ICD-10-CM

## 2025-05-22 DIAGNOSIS — F41.1 GAD (GENERALIZED ANXIETY DISORDER): ICD-10-CM

## 2025-05-22 DIAGNOSIS — I25.10 ASCVD (ARTERIOSCLEROTIC CARDIOVASCULAR DISEASE): ICD-10-CM

## 2025-05-22 RX ORDER — BACLOFEN 10 MG/1
10 TABLET ORAL 3 TIMES DAILY
Qty: 270 TABLET | Refills: 0 | Status: SHIPPED | OUTPATIENT
Start: 2025-05-22

## 2025-05-22 RX ORDER — HYDROXYZINE HYDROCHLORIDE 25 MG/1
25 TABLET, FILM COATED ORAL 3 TIMES DAILY PRN
Qty: 270 TABLET | Refills: 0 | Status: SHIPPED | OUTPATIENT
Start: 2025-05-22

## 2025-05-22 RX ORDER — MILNACIPRAN HYDROCHLORIDE 100 MG/1
1 TABLET, FILM COATED ORAL 2 TIMES DAILY
Qty: 180 TABLET | Refills: 3 | Status: SHIPPED | OUTPATIENT
Start: 2025-05-22 | End: 2025-05-22 | Stop reason: SDUPTHER

## 2025-05-22 RX ORDER — MILNACIPRAN HYDROCHLORIDE 100 MG/1
1 TABLET, FILM COATED ORAL 2 TIMES DAILY
Qty: 180 TABLET | Refills: 3 | Status: SHIPPED | OUTPATIENT
Start: 2025-05-22

## 2025-05-22 RX ORDER — ROSUVASTATIN CALCIUM 5 MG/1
5 TABLET, COATED ORAL EVERY EVENING
Qty: 100 TABLET | Refills: 0 | Status: SHIPPED | OUTPATIENT
Start: 2025-05-22

## 2025-05-22 RX ORDER — TOPIRAMATE 50 MG/1
50 TABLET, FILM COATED ORAL 3 TIMES DAILY
Qty: 270 TABLET | Refills: 3 | Status: SHIPPED | OUTPATIENT
Start: 2025-05-22

## 2025-05-22 RX ORDER — FAMOTIDINE 40 MG/1
40 TABLET, FILM COATED ORAL DAILY
Qty: 90 TABLET | Refills: 3 | Status: SHIPPED | OUTPATIENT
Start: 2025-05-22

## 2025-05-22 NOTE — TELEPHONE ENCOUNTER
Received request via: Patient    Was the patient seen in the last year in this department? Yes    Does the patient have an active prescription (recently filled or refills available) for medication(s) requested? No    Pharmacy Name:     Does the patient have [facility] Plus and need 100-day supply? (This applies to ALL medications)

## 2025-05-22 NOTE — TELEPHONE ENCOUNTER
Received request via: Patient    Was the patient seen in the last year in this department? Yes    Does the patient have an active prescription (recently filled or refills available) for medication(s) requested? No    Pharmacy Name:     [facility] and need [facility] supply? (This applies to ALL medications)

## 2025-05-22 NOTE — TELEPHONE ENCOUNTER
Received request via: Patient    Was the patient seen in the last year in this department? Yes    Does the patient have an active prescription (recently filled or refills available) for medication(s) requested? No    Pharmacy Name: [facility] - [address]     Does the patient have group home Plus and need 100-day supply? (This applies to ALL medications) Yes, quantity updated to 100 days

## 2025-05-27 ENCOUNTER — APPOINTMENT (OUTPATIENT)
Dept: FAMILY PLANNING/WOMEN'S HEALTH CLINIC | Facility: PHYSICIAN GROUP | Age: 60
End: 2025-05-27
Attending: FAMILY MEDICINE
Payer: MEDICARE

## 2025-05-27 VITALS
OXYGEN SATURATION: 97 % | HEIGHT: 65 IN | WEIGHT: 158 LBS | SYSTOLIC BLOOD PRESSURE: 116 MMHG | BODY MASS INDEX: 26.33 KG/M2 | HEART RATE: 93 BPM | TEMPERATURE: 98 F | DIASTOLIC BLOOD PRESSURE: 70 MMHG

## 2025-05-27 DIAGNOSIS — F15.21 METHAMPHETAMINE DEPENDENCE IN REMISSION (HCC): ICD-10-CM

## 2025-05-27 DIAGNOSIS — G63 POLYNEUROPATHY IN OTHER DISEASES CLASSIFIED ELSEWHERE (HCC): ICD-10-CM

## 2025-05-27 DIAGNOSIS — F41.1 GENERALIZED ANXIETY DISORDER: ICD-10-CM

## 2025-05-27 DIAGNOSIS — F31.31 BIPOLAR AFFECTIVE DISORDER, CURRENTLY DEPRESSED, MILD (HCC): ICD-10-CM

## 2025-05-27 DIAGNOSIS — F10.21 ALCOHOL DEPENDENCE, IN REMISSION (HCC): Primary | ICD-10-CM

## 2025-05-27 DIAGNOSIS — N18.32 STAGE 3B CHRONIC KIDNEY DISEASE: ICD-10-CM

## 2025-05-27 DIAGNOSIS — Z12.31 ENCOUNTER FOR SCREENING MAMMOGRAM FOR MALIGNANT NEOPLASM OF BREAST: ICD-10-CM

## 2025-05-27 DIAGNOSIS — J43.9 PULMONARY EMPHYSEMA, UNSPECIFIED EMPHYSEMA TYPE (HCC): ICD-10-CM

## 2025-05-27 SDOH — ECONOMIC STABILITY: FOOD INSECURITY: HOW HARD IS IT FOR YOU TO PAY FOR THE VERY BASICS LIKE FOOD, HOUSING, MEDICAL CARE, AND HEATING?: NOT HARD AT ALL

## 2025-05-27 SDOH — ECONOMIC STABILITY: HOUSING INSECURITY: AT ANY TIME IN THE PAST 12 MONTHS, WERE YOU HOMELESS OR LIVING IN A SHELTER (INCLUDING NOW)?: NO

## 2025-05-27 SDOH — ECONOMIC STABILITY: HOUSING INSECURITY: IN THE LAST 12 MONTHS, WAS THERE A TIME WHEN YOU WERE NOT ABLE TO PAY THE MORTGAGE OR RENT ON TIME?: NO

## 2025-05-27 SDOH — ECONOMIC STABILITY: FOOD INSECURITY: WITHIN THE PAST 12 MONTHS, YOU WORRIED THAT YOUR FOOD WOULD RUN OUT BEFORE YOU GOT THE MONEY TO BUY MORE.: NEVER TRUE

## 2025-05-27 SDOH — ECONOMIC STABILITY: HOUSING INSECURITY: IN THE PAST 12 MONTHS, HOW MANY TIMES HAVE YOU MOVED WHERE YOU WERE LIVING?: 0

## 2025-05-27 SDOH — ECONOMIC STABILITY: TRANSPORTATION INSECURITY: IN THE PAST 12 MONTHS, HAS LACK OF TRANSPORTATION KEPT YOU FROM MEDICAL APPOINTMENTS OR FROM GETTING MEDICATIONS?: NO

## 2025-05-27 ASSESSMENT — PATIENT HEALTH QUESTIONNAIRE - PHQ9
SUM OF ALL RESPONSES TO PHQ QUESTIONS 1-9: 7
5. POOR APPETITE OR OVEREATING: 0 - NOT AT ALL
CLINICAL INTERPRETATION OF PHQ2 SCORE: 1

## 2025-05-27 ASSESSMENT — PAIN SCALES - GENERAL: PAINLEVEL_OUTOF10: 6=MODERATE PAIN

## 2025-05-27 ASSESSMENT — ACTIVITIES OF DAILY LIVING (ADL): LACK_OF_TRANSPORTATION: NO

## 2025-05-27 ASSESSMENT — FIBROSIS 4 INDEX: FIB4 SCORE: 0.75

## 2025-05-27 NOTE — ASSESSMENT & PLAN NOTE
Chronic, stable. The patient reports that she drinks occasionally.  She tries to stay out of the casinos where alcohol is served for free.  Follow-up at least annually.

## 2025-05-27 NOTE — ASSESSMENT & PLAN NOTE
Chronic, stable.  The patient reports stable mood most of the time.  She denies SI/HI. Not currently in counseling.   Continue with current defined treatment plan: hydrOXYzine HCl (ATARAX) 25 MG Tab. Follow-up at least annually.

## 2025-05-27 NOTE — ASSESSMENT & PLAN NOTE
Chronic, stable. The patient continues to be in remission. She reports last use about 8 years ago.  Continue with abstinence.  Follow-up at least annually.

## 2025-05-27 NOTE — ASSESSMENT & PLAN NOTE
Discussed with the patient the importance of getting regular breast cancer screenings. The patient verbalized understanding and agreement.  Orders for MA-SCREENING MAMMO BILAT W/CAD  placed.      Neuro Interventional History and Physical    Name  Jorge Shelton  MRN [id number]   1949  Date 2018    Reason for procedure: diagnostic cerebral angiogram ~ 1 year after rupture of ACom aneurysm (s/p coil embolization     Chief Complaint: history of aneurysm, occasional urinary incontinence    Pre-morbid Modified Karolina Score: 1    History of Present Illness: Jorge Shelton is a 69 year old male with PMHx significant for tobacco use and HTN. He is well known to [facility] from 2018 hospitalization for ruptured MARILYNN/ACom aneurysm.  He was emergently treated with balloon assisted coil embolization on (18, Dr. Ibanez). His ICU stay was complicated by vasospasm and delirium with episodic confusion and impulsivity. His condition improved and he was transferred to [facility] on (18). Jorge was last seen in clinic on 18 and MRA at that time noted questionable re-cannulization of the neck on recent MRA, enlargement of the ventricles and clinical syndrome suggestive for NPH. He underwent LP on  with improvement in symptoms of fogginess and gait ataxia following procedure. Unfortunately these symptoms returned in  and it was recommended that large volume LP be repeated with referral to NS for shunt placement evaluation; patient however declined these recommendations. Patient presents to clinic for 6 month follow up and to discuss plan for continued aneurysm surveillance.     Today patient reports just returning from [employer] [place]. During time on the ship noticed some episodes of urinary leakage/incontinece which has improved since being home. He does have some decreased cognition following SAH, but feels he has improved significantly in the past year. He continues with memory games. Wife notes that he is not participating in physical exercise such as walking and became fatigued during this most recent vacation. Patient also has been enjoying babysitting for his ~ 1 year old grandson with his  wife.     Today discussed obtaining CT head to r/o worsening ventriculomegaly d/t urinary symptoms. Also discussed small amount of residual noted on MRA in April to ACom aneurysm and plan for repeat DSA at this time.     Assessment:  · History of SAH r/t spontaneous rupture of MARILYNN/ACom aneurysm s/p coil embolization on 1/8/18  · Ventriculomegaly with high volume LP in April 2018 with improvement in symptoms, concern for mild incontinence and decreased cognition, gait intact  · PMHx significant for tobacco use and HTN    Plan:  · Reviewed cerebral angiogram from Jan. 2018 and MRA April 2018  · Recommend diagnostic cerebral angiogram w/in the next month for further evaluation of residual aneurysm/re-cannulization (tentatively scheduled for 12/17/19 d/t preferring Monday appt)  · Risk and benefits of the procedure were discussed with the patient.  Included but not limited to bleeding, groin infection/hematoma, need for stent, contrast allergy, kidney injury, allergic reaction, dissection, stroke, and death.  Consent was signed and placed on the chart to be scanned into medical records  · Current NPH symptoms include: mild incontinence and decreased cognition, gait intact  · CT head to evaluate ventricle size prior to DSA  · Patient currently taking metoprolol, lisinopril, and simvastatin, continue current regime  · Blood pressure goal < 140/90; current BP noted to be 140's. has PCP follow up next week, mildly elevated blood pressure today.  · Reviewed BEFAST and activation of EMS  · Please call clinic with questions and concerns.         Past Medical History:   Diagnosis Date   • Borderline glaucoma with ocular hypertension    • Essential (primary) hypertension    • Hypertrophy (benign) of prostate 8/99    mild   • Other and unspecified hyperlipidemia    • Psychosexual dysfunction with inhibited sexual excitement     Sexual dysfunction   • Subarachnoid hemorrhage from anterior communicating artery (CMS/HCC) 1/8/2018    • Tobacco use disorder      Past Surgical History:   Procedure Laterality Date   • Colonoscopy remove lesion by snare  10/15/09    Dr. Reynolds, Hemorrhoids/Polyps, F/U 5 years   • Colonoscopy remove lesion by snare  1/13/15    Dr. Reynolds, Polyps, F/U 5 years   • Fl guided lumbar puncture  2018     Social History     Socioeconomic History   • Marital status: /Civil Union     Spouse name: Not on file   • Number of children: 3   • Years of education: Not on file   • Highest education level: Not on file   Social Needs   • Financial resource strain: Not on file   • Food insecurity - worry: Not on file   • Food insecurity - inability: Not on file   • Transportation needs - medical: Not on file   • Transportation needs - non-medical: Not on file   Occupational History   • Occupation: CPA   Tobacco Use   • Smoking status: Current Every Day Smoker     Packs/day: 0.25     Years: 40.00     Pack years: 10.00     Types: Cigarettes   • Smokeless tobacco: Never Used   • Tobacco comment: 3-4 daily   Substance and Sexual Activity   • Alcohol use: Yes     Alcohol/week: 2.4 oz     Types: 4 Standard drinks or equivalent per week     Comment: per week 4  beers   • Drug use: No   • Sexual activity: Not on file   Other Topics Concern   •  Service Not Asked   • Blood Transfusions Not Asked   • Caffeine Concern No   • Occupational Exposure Not Asked   • Hobby Hazards Not Asked   • Sleep Concern No   • Stress Concern No   • Weight Concern No   • Special Diet No   • Back Care Not Asked   • Exercise Yes     Comment: walk treadmill 2-3 times weekly   • Bike Helmet Not Asked   • Seat Belt Yes   • Self-Exams Not Asked   Social History Narrative   • Not on file     Family History   Problem Relation Age of Onset   • Heart Father         MI in his 40s and  from cardiac causes at age 59   • Ophthalmology Father         glaucoma   • Ophthalmology Mother         low tension glaucoma   • Stroke Mother    • Cancer Brother          prostate   • Ophthalmology Maternal Grandmother         glaucoma   • Other Brother         sleep apnea   • Other Other         [place]- sleep apnea   • Other Brother         half-brother: sleep apnea     ALLERGIES:   Allergen Reactions   • Clindamycin RASH       Medications:  Current Outpatient Medications   Medication Sig Dispense Refill   • metoPROLOL tartrate (LOPRESSOR) 25 MG tablet Take 1 tablet by mouth daily. 30 tablet 1   • lisinopril (ZESTRIL) 10 MG tablet Take 1 tablet by mouth daily. 30 tablet 5   • simvastatin (ZOCOR) 80 MG tablet Take 0.5 tablets by mouth nightly. 45 tablet 5   • vitamin - therapeutic multivitamins w/minerals (CENTRUM SILVER,THERA-M) TABS Take 1 tablet by mouth daily.     • sildenafil (REVATIO) 20 MG tablet Take 2 tablets by mouth daily as needed for Erectile Dysfunction 12 tablet 6     No current facility-administered medications for this encounter.        Labs:  WBC (K/mcL)   Date Value   04/18/2018 6.9     RBC (mil/mcL)   Date Value   04/18/2018 4.40 (L)     HCT (%)   Date Value   04/18/2018 42.3     HGB (g/dL)   Date Value   04/18/2018 13.2     PLT (K/mcL)   Date Value   04/18/2018 197   12/16/2013 178     Sodium (mmol/L)   Date Value   01/24/2018 136     Potassium (mmol/L)   Date Value   01/24/2018 4.0     Chloride (mmol/L)   Date Value   01/24/2018 103     Glucose (mg/dL)   Date Value   01/24/2018 105 (H)     CALCIUM (mg/dL)   Date Value   01/24/2018 8.9   11/30/2011 9.4     CO2 (mmol/L)   Date Value   11/30/2011 31     Carbon Dioxide (mmol/L)   Date Value   01/24/2018 27     BUN (mg/dL)   Date Value   01/24/2018 27 (H)     Creatinine (mg/dL)   Date Value   01/24/2018 0.83   .  INR (no units)   Date Value   04/18/2018 1.0       Imaging:  CT head:   Results for orders placed in visit on 04/04/18   CT HEAD BRAIN    Impression IMPRESSION:     Ventricles have mildly increased in size since 1/21/2018.    Interval resolution of intraventricular blood.         CTA Head/Neck:   Results  for orders placed during the hospital encounter of 01/07/18   CT Angiogram Head and Neck    Impression IMPRESSION:    CT Head:  1.  Improving subarachnoid and intraventricular hemorrhage.   2.  Stable transfrontal drain without ventricular enlargement.  3.  Stable focal hypoattenuation at the right cingulate gyrus posteriorly  concerning for subacute infarct.  4.  No new findings.    CTA Neck:    1.  Mild atherosclerotic calcifications of the carotid bulbs without  significant stenosis.    CTA head:    1.  Improvement in multifocal areas of stenosis related to vasospasm. Mild  irregularity and narrowing of the left M1 and bilateral M2 segments as  outlined above.     I have reviewed the images, and agree with the resident interpretation.         Review of Systems:  GENERAL:  Patient denies fever, chills, malaise. Positive for fatigue.  EYES:  Patient denies blurred vision, double vision.  NEUROLOGIC: Patient denies headache, changes in vision, dysphagia, dysarthria, alterations in sensation, numbness, tingling, or focal weakness. Positive for feeling \"foggy\"  ENDOCRINE:  Patient denies excessive thirst, heat intolerance, lymph node enlargement.  GASTROINTESTINAL:  Patient denies abdominal pain, nausea/vomiting, diarrhea, constipation.  CARDIOVASCULAR:  Patient denies chest pain.  SKIN:  Patient denies skin rashes, itching, excessive bruising.  MUSCULOSKELETAL:  Patient denies joint pain, neck pain, back pain, leg swelling.  ENT/MOUTH:  Patient denies ear infections, sore throats, sinus problems, hearing loss.  GENITOURINARY:  Patient denies urine retention, painful urination, urinary frequency, blood in urine, nocturia. Positive for urinary incontinence at times.   RESPIRATORY:  Patient denies wheezing, frequent cough, shortness of breath.    Physical Exam:  Vitals:    11/12/18 1412   BP: 147/71   Pulse: 64       General: NAD, well groomed  Neurologic: AAO (awake, alert and oriented) X 3, nondysarthric, no obvious  aphasia, pupils 3 mm and PERRLA (pupils equal, round and reactive to light and accommodation) bilaterally, EOMi, visual fields full to confrontation, no visual neglect or extinction, no nystagmus, sensation intact throughout without extinction or neglect, face symmetric, hearing intact bilaterally, no drift to BUE (bilateral upper extremities) or BLE (bilateral lower extremities), BUE/BLE strength 5/5, no obvious ataxia, normal spontaneous unassisted gait.   Cardiovascular: RRR, no obvious murmur  Lungs: clear throughout without wheezes or crackles   Abdomen: + BS   Skin: warm and dry  Extremities: +2 radial pulses b/l, no significant edema to BLE.     Discussed with Dr. Ibanez and patient    11/12/2018  SHERITA Petty  Neuro Interventional supporting Osman Gee, and Marcelle  [facility]  Pager [phone]  Answering service: [phone]

## 2025-05-27 NOTE — ASSESSMENT & PLAN NOTE
Chronic, stable. The patient denies shortness of breath at this time. The patient is planning on quitting smoking. She just purchased nicotine patches that she is planning on using soon.  Continue with current defined treatment plan: albuterol 108 (90 Base) MCG/ACT Aero Soln inhalation aerosol. Follow-up at least annually.

## 2025-05-27 NOTE — ASSESSMENT & PLAN NOTE
Chronic, stable. PHQ-9 score today is 7. The patient denies SI/HI at this time.  The patient is planning on starting therapy.  Continue with current defined treatment plan: Milnacipran HCl (SAVELLA) 100 MG Tab  . Follow-up at least annually.

## 2025-05-27 NOTE — PROGRESS NOTES
Comprehensive Health Assessment Program     Jaclyn Mejia is a 60 y.o. here for her comprehensive health assessment.    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 12/02/2024    Atherosclerosis of aorta ([facility]) 03/04/2024    Dyspnea on exertion 02/29/2024    Lymphadenopathy, cervical 01/18/2024    Bradycardia 01/18/2024    Pulmonary emphysema ([facility]) 08/29/2023    BMI 29.0-29.9,adult 05/25/2023    Polyneuropathy in other diseases classified elsewhere ([facility]) 05/25/2023    Methamphetamine dependence in remission ([facility]) 05/25/2023    Osteopenia of lumbar spine 05/25/2023    Vertigo 04/11/2023    Chest pain 04/11/2023    Obstructive sleep apnea syndrome 04/11/2023    Tobacco dependence 09/22/2022    Currently attempting to quit smoking 07/08/2022    DDD (degenerative disc disease), lumbar 06/01/2022    Chronic fatigue 08/24/2021    Pelvic floor dysfunction 08/24/2021    Stage 3b chronic kidney disease 08/24/2021    Elevated hemoglobin A1c 05/20/2021    Risk for falls 03/18/2021    Dupuytren's contracture of right hand 08/29/2019    Alcohol dependence, in remission ([facility]) 08/14/2019    Deliberate self-cutting 08/14/2019    Actinic keratosis 02/06/2018    H/O hysterectomy with oophorectomy 02/09/2017    Essential tremor 02/09/2017    Irritable bowel syndrome with diarrhea 02/09/2017    Bipolar affective disorder, current episode depressed (HCC) 02/09/2017    Migraine without status migrainosus, not intractable 09/15/2016    Facet arthropathy, cervical 04/08/2016    Facet arthropathy, lumbar 04/08/2016    KVNG (generalized anxiety disorder) 09/23/2014    Seborrheic keratosis 09/25/2013    Fibromyalgia 01/01/2002       Current Medications[1]       Current supplements as per medication list.     Allergies:   Erythromycin and  Tetracycline  Social History[2]  Family History   Problem Relation Age of Onset    Lung Disease Mother         copd    Cancer Mother         basal/squamous    Hypertension Mother     Hyperlipidemia Mother     Stroke Mother     Alcohol abuse Mother     Heart Disease Father         HF    Arthritis Father         rheumatoid    Lung Disease Father         emphysema    Alcohol abuse Father     Cancer Sister     Alcohol abuse Sister     Cancer Maternal Grandfather         Bladder or prostate?    Cancer Sister         pre cancerous spots     Alcohol abuse Sister     Kidney Disease Sister     Alcohol abuse Sister     Hypertension Sister     Alcohol abuse Sister      Jaclyn  has a past medical history of Abdominal pain, Actinic keratosis (02/06/2018), Alcohol abuse, Allergy, Anxiety, Arthritis, Back pain, Back pain, Bronchitis, Chronic pain syndrome, Constipation, COPD (chronic obstructive pulmonary disease) (HCC), Cough, Daytime sleepiness, DDD (degenerative disc disease), cervical, DDD (degenerative disc disease), thoracolumbar, Depression, Diarrhea, Diverticulosis, Dizziness, Essential tremor, Fatigue, Fibromyalgia, IBS (irritable bowel syndrome), Insomnia, Migraine, Nausea, Painful joint, Pulmonary emphysema (HCC), Restless leg syndrome, Ringing in ears, Snoring, SOB (shortness of breath), Sore muscles, Sweat, sweating, excessive, Weakness, and Wears glasses.    She has no past medical history of GERD (gastroesophageal reflux disease) or Hyperlipidemia.   Past Surgical History[3]    Screening:  In the last six months have you experienced any leakage of urine? No    Depression Screening  Little interest or pleasure in doing things?  0 - not at all  Feeling down, depressed , or hopeless? 1 - several days  Trouble falling or staying asleep, or sleeping too much?  0 - not at all  Feeling tired or having little energy?  1 - several days  Poor appetite or overeating?  0 - not at all  Feeling bad about yourself - or that you  are a failure or have let yourself or your family down? 2 - more than half the days  Trouble concentrating on things, such as reading the newspaper or watching television? 1 - several days  Moving or speaking so slowly that other people could have noticed.  Or the opposite - being so fidgety or restless that you have been moving around a lot more than usual?  2 - more than half the days  Thoughts that you would be better off dead, or of hurting yourself?  0 - not at all  Patient Health Questionnaire Score: 7    If depressive symptoms identified deferred to follow up visit unless specifically addressed in assessment and plan.    Interpretation of PHQ-9 Total Score   Score Severity   1-4 No Depression   5-9 Mild Depression   10-14 Moderate Depression   15-19 Moderately Severe Depression   20-27 Severe Depression    Screening for Cognitive Impairment  Do you or any of your friends or family members have any concern about your memory? Yes  Three Minute Recall (Village, Kitchen, Baby) 2/3    Doroteo clock face with all 12 numbers and set the hands to show 10 minutes past 11.  Yes 5/5  Cognitive concerns identified deferred for follow up unless specifically addressed in assessment and plan.    Fall Risk Assessment  Has the patient had two or more falls in the last year or any fall with injury in the last year?  No    Safety Assessment  Do you always wear your seatbelt?  Yes  Any changes to home needed to function safely? No  Difficulty hearing.  No  Patient counseled about all safety risks that were identified.    Functional Assessment ADLs  Are there any barriers preventing you from cooking for yourself or meeting nutritional needs?  No.    Are there any barriers preventing you from driving safely or obtaining transportation?  No.    Are there any barriers preventing you from using a telephone or calling for help?  No    Are there any barriers preventing you from shopping?  No.    Are there any barriers preventing you from  taking care of your own finances?  No    Are there any barriers preventing you from managing your medications?  No    Are there any barriers preventing you from showering, bathing or dressing yourself? No    Are there any barriers preventing you from doing housework or laundry? Yes Herself, takes her time doing it  Are there any barriers preventing you from using the toilet?No    Are you currently engaging in any exercise or physical activity?  Yes.      Self-Assessment of Health  What is your perception of your health? Fair    Do you sleep more than six hours a night? Yes    In the past 7 days, how much did pain keep you from doing your normal work? A lot    Do you spend quality time with family or friends (virtually or in person)? Yes    Do you usually eat a heart healthy diet that constists of a variety of fruits, vegetables, whole grains and fiber? Yes    Do you eat foods high in fat and/or Fast Food more than three times per week? No    How concerned are you that your medical conditions are not being well managed? Not at all    Are you worried that in the next 2 months, you may not have stable housing that you own, rent, or stay in as part of a household? No        Advance Care Planning  Do you have an Advance Directive, Living Will, Durable Power of , or POLST? No                 Health Maintenance Summary            Current Care Gaps       Pneumococcal Vaccine: 50+ Years (3 of 3 - PCV20 or PCV21) Overdue since 1/22/2023 06/09/2022  Order placed for Pneumococcal Conjugate Vaccine 20-Valent (19 yrs+) by Magdiel Colorado, A.P.R.N.    01/22/2018  Imm Admin: Pneumococcal polysaccharide vaccine (PPSV-23)    10/24/2014  Imm Admin: Pneumococcal Conjugate Vaccine (Prevnar/PCV-13)    10/24/2014  Imm Admin: Pneumococcal polysaccharide vaccine (PPSV-23)              Annual Pulmonary Function Test / Spirometry (Yearly) Overdue since 2/16/2025 02/16/2024  PULMONARY FUNCTION TESTS -Test  requested: Complete Pulmonary Function Test    09/28/2021  PULMONARY FUNCTION TESTS -Test requested: Complete Pulmonary Function Test              Lung Cancer Screening (Yearly) Overdue since 2/28/2025 02/29/2024  CT-LUNG CANCER-SCREENING              Zoster (Shingles) Vaccines (2 of 2) Overdue since 5/10/2025      03/15/2025  Outside Immunization: Zoster Kee (Shingrix)                      Needs Review       Colorectal Cancer Screening (Colonoscopy - Every 10 Years) Tentatively due on 9/18/2029 09/18/2019  REFERRAL TO GI FOR COLONOSCOPY    01/15/2019  OCCULT BLOOD FECES IMMUNOASSAY                      Awaiting Completion       Mammogram (Yearly) Order placed this encounter      05/27/2025  Order placed for MA-SCREENING MAMMO BILAT W/CAD by VENUS CassidyP.RMaryN.    01/16/2024  MA-SCREENING MAMMO BILAT W/TOMOSYNTHESIS W/CAD    08/30/2022  MA-SCREENING MAMMO BILAT W/TOMOSYNTHESIS W/CAD    04/16/2021  MA-SCREENING MAMMO BILAT W/TOMOSYNTHESIS W/CAD    09/13/2019  MA-SCREENING MAMMO BILAT W/TOMOSYNTHESIS W/CAD     Only the first 5 history entries have been loaded, but more history exists.                    Upcoming       COVID-19 Vaccine (4 - 2024-25 season) Postponed until 12/2/2025 12/07/2021  Imm Admin: PFIZER PURPLE CAP SARS-COV-2 VACCINATION (12+)    04/15/2021  Imm Admin: PFIZER PURPLE CAP SARS-COV-2 VACCINATION (12+)    03/23/2021  Imm Admin: PFIZER PURPLE CAP SARS-COV-2 VACCINATION (12+)              Annual Wellness Visit (Yearly) Next due on 5/27/2026 05/27/2025  Level of Service: [place] ANNUAL WELLNESS VISIT-INCLUDES PPPS SUBSEQUE*    01/29/2024  Level of Service: [place] ANNUAL WELLNESS VISIT-INCLUDES PPPS SUBSEQUE*    06/20/2023  Visit Dx: Medicare annual wellness visit, subsequent    06/20/2023  Level of Service: [place] ANNUAL WELLNESS VISIT-INCLUDES PPPS SUBSEQUE*    05/25/2023  Level of Service: [place] ANNUAL WELLNESS VISIT-INCLUDES PPPS SUBSEQUE*      Only the first 5 history entries have been  loaded, but more history exists.              IMM DTaP/Tdap/Td Vaccine (3 - Td or Tdap) Next due on 1/18/2034 01/18/2024  Imm Admin: Tdap Vaccine    01/14/2014  Imm Admin: Tdap Vaccine                      Completed or No Longer Recommended       Influenza Vaccine (Series Information) Completed      10/03/2024  Imm Admin: Influenza split virus trivalent (PF)    09/05/2023  Imm Admin: Influenza Vaccine Quad Inj (Pf)    12/07/2021  Imm Admin: Influenza, unspecified formulation    10/02/2019  Imm Admin: Influenza Vaccine Quad Inj (Pf)    12/01/2017  Imm Admin: Influenza Vaccine Quad Inj (Pf)      Only the first 5 history entries have been loaded, but more history exists.              Hepatitis C Screening  Completed      01/31/2024  Hepatitis C Antibody component of HEP C VIRUS ANTIBODY              Lung Cancer Screening Shared Decision Making  Completed      02/27/2024  Registry Metric: Lung Cancer Shared Decision Making Conversation Date              Hepatitis A Vaccine (Hep A) (Series Information) Aged Out      03/15/2025  Outside Immunization: Hep A, adult    01/18/2024  Imm Admin: Hepatitis A Vaccine, Adult              HPV Vaccines (Series Information) Aged Out      No completion history exists for this topic.              Polio Vaccine (Inactivated Polio) (Series Information) Aged Out     No completion history exists for this topic.              Meningococcal Immunization (Series Information) Aged Out     No completion history exists for this topic.              Meningococcal B Vaccine (Series Information) Aged Out     No completion history exists for this topic.              Hepatitis B Vaccine (Hep B)  Discontinued     No completion history exists for this topic.                            Patient Care Team:  ETIENNE Rose.P.RVALARIE as PCP - General (Family Medicine)  VENUS RosePMaryRVALARIE as PCP - [facility] Paneled  Salty Oliva Ass't as [facility] Personal  "Assistant  [facility] (DME Supplier)  Guera Louise R.N. as Chronic Care Manager (Registered Nurse)  Yuniel Ortiz, PT (Inactive) as Physical Therapist (Physical Therapy)    Financial Resource Strain: Low Risk  (5/27/2025)    Overall Financial Resource Strain (CARDIA)     Difficulty of Paying Living Expenses: Not hard at all      Transportation Needs: No Transportation Needs (5/27/2025)    PRAPARE - Transportation     Lack of Transportation (Medical): No     Lack of Transportation (Non-Medical): No      Food Insecurity: Unknown (5/27/2025)    Hunger Vital Sign     Worried About Running Out of Food in the Last Year: Never true     Ran Out of Food in the Last Year: Not on file        Encounter Vitals  Temperature: 36.7 °C (98 °F)  Temp src: Temporal  Blood Pressure: 116/70  Pulse: 93  Pulse Oximetry: 97 %  O2 Delivery Device: None - Room Air  Weight: 71.7 kg (158 lb)  Height: 165.1 cm (5' 5\")  BMI (Calculated): 26.29  Pain Score: 6=Moderate Pain  DME  O2 Delivery Device: None - Room Air     ROS:  No fever, chills, nausea, vomiting, diarrhea, chest pain or shortness of breath. See HPI.    Physical Exam:  Constitutional: NAD  HENMT: NC/AT, PERRLA, EOMI, OP clear,  no lymphadenopathy, no thyromegaly.  No JVD.  Cardiovascular: RRR, No m/r/g  Lungs: CTAB, no w/r/r  Extremities: 2+ DP, PT and Radial pulses bilaterally. No c/c/e  Skin: No legions notes  Neurologic: Alert & oriented x3, CN II-XII grossly intact    Assessment and Plan. The following treatment and monitoring plan is recommended:    KVNG (generalized anxiety disorder)  Chronic, stable.  The patient reports stable mood most of the time.  She denies SI/HI. Not currently in counseling.   Continue with current defined treatment plan: hydrOXYzine HCl (ATARAX) 25 MG Tab. Follow-up at least annually.      Bipolar affective disorder, current episode depressed (HCC)  Chronic, stable. PHQ-9 score today is 7. The patient denies SI/HI at this time.  The " patient is planning on starting therapy.  Continue with current defined treatment plan: Milnacipran HCl (SAVELLA) 100 MG Tab  . Follow-up at least annually.     Methamphetamine dependence in remission (HCC)  Chronic, stable. The patient continues to be in remission. She reports last use about 8 years ago.  Continue with abstinence.  Follow-up at least annually.     Polyneuropathy in other diseases classified elsewhere (HCC)  Chronic, stable. The patient reports that she has sharp pain all over. Her pain is well managed with current medication.  Her symptoms are likely due to fibromyalgia.  Continue with current defined treatment plan: gabapentin (NEURONTIN) 600 MG tablet and Milnacipran HCl (SAVELLA) 100 MG Tab . Follow-up at least annually.     Pulmonary emphysema (HCC)  Chronic, stable. The patient denies shortness of breath at this time. The patient is planning on quitting smoking. She just purchased nicotine patches that she is planning on using soon.  Continue with current defined treatment plan: albuterol 108 (90 Base) MCG/ACT Aero Soln inhalation aerosol. Follow-up at least annually.       Alcohol dependence, in remission (HCC)  Chronic, stable. The patient reports that she drinks occasionally.  She tries to stay out of the casinos where alcohol is served for free.  Follow-up at least annually.     Encounter for screening mammogram for malignant neoplasm of breast  Discussed with the patient the importance of getting regular breast cancer screenings. The patient verbalized understanding and agreement.  Orders for MA-SCREENING MAMMO BILAT W/CAD  placed.       Stage 3b chronic kidney disease (HCC)  Chronic, stable. The patient denies any symptoms at this time. No current treatment. Discussed drinking plenty of water and avoiding nephrotoxins such as NSAIDs.  Follow-up at least annually.      Services suggested: No services needed at this time  Health Care Screening: Age-appropriate preventive services  recommended by USPTF and ACIP covered by Medicare were discussed today. Services ordered if indicated and agreed upon by the patient.  Referrals offered: Community-based lifestyle interventions to reduce health risks and promote self-management and wellness, fall prevention, nutrition, physical activity, tobacco-use cessation, weight loss, and mental health services as per orders if indicated.    Discussion today about general wellness and lifestyle habits:    Prevent falls and reduce trip hazards; Cautioned about securing or removing rugs.  Have a working fire alarm and carbon monoxide detector.  Engage in regular physical activity and social activities.    Follow-up: Return for appointment with Primary Care Provider as needed.              [1]   Current Outpatient Medications   Medication Sig Dispense Refill    baclofen (LIORESAL) 10 MG Tab Take 1 Tablet by mouth 3 times a day. 270 Tablet 0    famotidine (PEPCID) 40 MG Tab Take 1 Tablet by mouth every day. 90 Tablet 3    diclofenac sodium (VOLTAREN) 1 % Gel Apply 2 g topically 4 times a day as needed (pain). 100 g 0    topiramate (TOPAMAX) 50 MG tablet Take 1 Tablet by mouth 3 times a day. 270 Tablet 3    rosuvastatin (CRESTOR) 5 MG Tab Take 1 Tablet by mouth every evening. 100 Tablet 0    Milnacipran HCl (SAVELLA) 100 MG Tab Take 1 Tablet by mouth 2 times a day. 180 Tablet 3    hydrOXYzine HCl (ATARAX) 25 MG Tab Take 1 Tablet by mouth 3 times a day as needed for Anxiety. 270 Tablet 0    clindamycin (CLEOCIN) 2 % vaginal cream Use vaginally after sexual activity for irritation and odor related to BV. 40 g 0    ondansetron (ZOFRAN ODT) 4 MG TABLET DISPERSIBLE DISSOLVE 1 TABLET ON THE TONGUE  EVERY 6 HOURS AS NEEDED FOR  NAUSEA/VOMITING 15 Tablet 0    gabapentin (NEURONTIN) 600 MG tablet TAKE 1 TABLET BY MOUTH 3 TIMES  DAILY 270 Tablet 3    tiotropium (SPIRIVA HANDIHALER) 18 MCG Cap Place 1 Capsule into inhaler and inhale every day. 30 Capsule 11    albuterol 108  (90 Base) MCG/ACT Aero Soln inhalation aerosol Inhale 2 Puffs every four hours as needed for Shortness of Breath. 1 Each 0    aspirin 81 MG EC tablet Take 81 mg by mouth every day.      dicyclomine (BENTYL) 20 MG Tab TAKE 1 TABLET BY MOUTH EVERY 6 HOURS AS NEEDED (IBS). 360 Tablet 1    Multiple Vitamin (MULTIVITAMIN ADULT PO)       NON SPECIFIED Take  by mouth. Flax oil gel cap, one capsule daily      Omega-3 Fatty Acids (FISH OIL) 1000 MG Cap capsule Take 1,000 mg by mouth 1 time a day as needed.      magnesium oxide (MAG-OX) 400 MG Tab tablet Take 400 mg by mouth every day.      Loratadine (CLARITIN PO) Take 1 tablet by mouth every day.       No current facility-administered medications for this visit.   [2]   Social History  Tobacco Use    Smoking status: Every Day     Current packs/day: 1.00     Average packs/day: 1 pack/day for 45.9 years (45.9 ttl pk-yrs)     Types: Cigarettes     Start date: 7/1/1979    Smokeless tobacco: Never   Vaping Use    Vaping status: Former    Substances: THC, CBD    Devices: Disposable, Pre-filled or refillable cartridge, Refillable tank, Pre-filled pod   Substance Use Topics    Alcohol use: Yes     Comment: 1-2 drinks    Drug use: Yes     Frequency: 7.0 times per week     Types: Marijuana, Inhaled, Oral     Comment: Only canibus medicinal for anti spasm, anxiety, ibs, inflamm   [3]   Past Surgical History:  Procedure Laterality Date    LUMBAR MEDIAL BRANCH BLOCKS Bilateral 1/30/2024    Procedure: Diagnostic medial branch blocks targeting the BILATERAL L3-4, L4-5 and L5-S1 facet joints with fluoroscopic guidance #2 with sedation.   Note: plan on versed 1mg.  Diagnostic medial branch block #2 is only be done if procedure #1 is a positive block.  This will be on a separate date from procedure #1.;  Surgeon: Elmo Donaldson M.D.;  [facility];  Service: P    LUMBAR MEDIAL BRANCH BLOCKS  1/23/2024    Procedure: Diagnostic medial branch blocks targeting the  BILATERAL L3-4, L4-5 and L5-S1 facet joints with fluoroscopic guidance #1 with sedation.    Note: plan on versed 1mg.;  Surgeon: Elmo Donaldson M.D.;  Location: [facility];  Service: Pain Management    ABDOMINAL HYSTERECTOMY TOTAL      CHOLECYSTECTOMY      HYSTERECTOMY LAPAROSCOPY      OTHER ABDOMINAL SURGERY      TONSILLECTOMY

## 2025-05-28 ENCOUNTER — PATIENT OUTREACH (OUTPATIENT)
Dept: HEALTH INFORMATION MANAGEMENT | Facility: OTHER | Age: 60
End: 2025-05-28
Payer: MEDICARE

## 2025-05-28 DIAGNOSIS — R00.1 BRADYCARDIA: ICD-10-CM

## 2025-05-28 DIAGNOSIS — J44.89 COPD (CHRONIC OBSTRUCTIVE PULMONARY DISEASE) WITH CHRONIC BRONCHITIS (HCC): ICD-10-CM

## 2025-05-28 DIAGNOSIS — N18.32 STAGE 3B CHRONIC KIDNEY DISEASE: Primary | ICD-10-CM

## 2025-05-28 DIAGNOSIS — I10 ESSENTIAL HYPERTENSION: ICD-10-CM

## 2025-05-28 NOTE — PROGRESS NOTES
I spoke to the patient today in order to complete her monthly and quarterly PCM follow ups. The patient is on the way out of the house to do some time sensitive errands and requests a call back later today or tomorrow around 4 pm

## 2025-05-30 NOTE — CARE PLAN
Problem: Knowledge of signs and symptoms of depression and anxiety  Goal: Patient able to identify personalized signs and symptoms of depression and anxiety.  Outcome: Progressing  Note: The patient reports managing her anxiety well. Article on breathing exercises sent by mail.   Intervention: Encourage patient to educate family and friends on personal signs and symptoms     Problem: Patient expresses feelings of loneliness, losses associated with aging and/or depression or anxiety  Goal: Patient experiences a reduction in feelings of loneliness, grief, and sadness  Outcome: Progressing  Note: The patient is taking opportunities to connect with family and friends. She is provided with information on breathing exercises.   Intervention: Provide information and education for use of mindfulness activities  Intervention: Provide support and unconditional positive regard.     Problem: Patient expresses feelings of loneliness, losses associated with aging and/or depression or anxiety  Goal: Patient experiences a reduction in feelings of loneliness, grief, and sadness  Outcome: Progressing  Note: The patient is taking opportunities to connect with family and friends. She is provided with information on breathing exercises.   Intervention: Provide information and education for use of mindfulness activities  Intervention: Provide support and unconditional positive regard.

## 2025-05-30 NOTE — PROGRESS NOTES
Reason for Follow-Up:    I spoke to the patient this afternoon by phone to complete her PCM follow up.     Current Health Status:    The patient is followed by PCM based on diagnoses of COPD, Alcoholism Recovery, Bipolar, and HTN. The patient denies any cardiac or pulmonary symptoms at this time. Her blood pressures are well controlled over her last 3 clinic visits. She reports taking all medications as prescribed. She reports some pre travel anxiety but nothing requiring clinical intervention.     Medical Updates:  Since our last conversation the patient has had her comprehensive health assessment and requested refills/communicated with her healthcare team by [facility].     Medication Updates:  The patient denies any updates to her medication list at this time.     Symptoms:  Patient denies any notable medical symptoms at this time.     Plan of Care Goals and Progress:    Goal 1. Over the next month the patient will take concrete and consistent measures to optimize pulmonary health.      Progress:  The patient has a plan in place to start nicotine patches and stop smoking.       Barriers:  The patient has chronic lung concerns that will require ongoing monitoring and treatment.      Interventions:  The patient will begin the process of stopping smoking. The patient will consider breathing exercises.      Education:  Education provided on breathing exercises, article sent by mail.     Goal 2. Over the next month the patient will take concrete and consistent measures to optimize mental health.      Progress:  The patient is getting to take a trip with her sister to spend time with extended family over the next 4 weeks. The patient reports some anxiety but that she is managing it well.       Barriers:  The patient has ongoing mental health concerns that will require ongoing monitoring and effort on her part.      Interventions:  The patient will be given information to enhance her knowledge of mental health strategies.       Education:  Education provided on breathing exercises, article sent by mail.       Patient's Concerns and Feedback (Self Management of Care):     The patient feels like she is progressing in keeping her anxiety relatively well managed and having opportunities to connect with her family members. The patient also has a plan to begin her smoking cessation journey. She is aware of future appointment dates and times. She is planning to attend as scheduled.     Next Steps:     Follow-Up Plan:  We will discuss the patient's smoking cessation journey, nutrition, and mental health goals in approximately 4 weeks.       Appointments:  6/30/25 (1:05 arrival, [facility]), 9/11/25 (1:15 pm, Cardiology)     Contact Information:  Call [phone] with any questions or concerns.

## 2025-06-30 ENCOUNTER — APPOINTMENT (OUTPATIENT)
Dept: MEDICAL GROUP | Facility: PHYSICIAN GROUP | Age: 60
End: 2025-06-30
Payer: MEDICARE

## 2025-06-30 ENCOUNTER — PATIENT OUTREACH (OUTPATIENT)
Dept: HEALTH INFORMATION MANAGEMENT | Facility: OTHER | Age: 60
End: 2025-06-30

## 2025-06-30 ENCOUNTER — PATIENT MESSAGE (OUTPATIENT)
Dept: HEALTH INFORMATION MANAGEMENT | Facility: OTHER | Age: 60
End: 2025-06-30

## 2025-06-30 DIAGNOSIS — N18.32 STAGE 3B CHRONIC KIDNEY DISEASE: Primary | ICD-10-CM

## 2025-06-30 DIAGNOSIS — R00.1 BRADYCARDIA: ICD-10-CM

## 2025-06-30 DIAGNOSIS — I10 ESSENTIAL HYPERTENSION: ICD-10-CM

## 2025-06-30 DIAGNOSIS — J44.89 COPD (CHRONIC OBSTRUCTIVE PULMONARY DISEASE) WITH CHRONIC BRONCHITIS (HCC): ICD-10-CM

## 2025-06-30 NOTE — PROGRESS NOTES
Attempt made for PCM monthly follow up. No answer. Message left with contact information. Message sent by [facility]. I will attempt again in July if no contact is received.

## 2025-07-21 ENCOUNTER — APPOINTMENT (OUTPATIENT)
Dept: MEDICAL GROUP | Facility: PHYSICIAN GROUP | Age: 60
End: 2025-07-21
Payer: MEDICARE

## 2025-07-22 ENCOUNTER — PATIENT OUTREACH (OUTPATIENT)
Dept: HEALTH INFORMATION MANAGEMENT | Facility: OTHER | Age: 60
End: 2025-07-22
Payer: MEDICARE

## 2025-07-22 DIAGNOSIS — Z91.81 RISK FOR FALLS: ICD-10-CM

## 2025-07-22 DIAGNOSIS — N18.32 STAGE 3B CHRONIC KIDNEY DISEASE: Primary | ICD-10-CM

## 2025-07-22 DIAGNOSIS — I10 ESSENTIAL HYPERTENSION: ICD-10-CM

## 2025-07-22 DIAGNOSIS — R00.1 BRADYCARDIA: ICD-10-CM

## 2025-07-22 DIAGNOSIS — J44.89 COPD (CHRONIC OBSTRUCTIVE PULMONARY DISEASE) WITH CHRONIC BRONCHITIS (HCC): ICD-10-CM

## 2025-07-31 NOTE — PROGRESS NOTES
No communication received from contact earlier this month. Message sent by [employer]. I will attempt again in August.

## 2025-08-05 ENCOUNTER — APPOINTMENT (OUTPATIENT)
Dept: RADIOLOGY | Facility: MEDICAL CENTER | Age: 60
End: 2025-08-05
Payer: MEDICARE

## 2025-08-11 ENCOUNTER — HOSPITAL ENCOUNTER (OUTPATIENT)
Dept: LAB | Facility: MEDICAL CENTER | Age: 60
End: 2025-08-11
Attending: PHYSICIAN ASSISTANT
Payer: MEDICARE

## 2025-08-11 ENCOUNTER — HOSPITAL ENCOUNTER (OUTPATIENT)
Dept: LAB | Facility: MEDICAL CENTER | Age: 60
End: 2025-08-11
Attending: INTERNAL MEDICINE
Payer: MEDICARE

## 2025-08-11 ENCOUNTER — OFFICE VISIT (OUTPATIENT)
Dept: URGENT CARE | Facility: CLINIC | Age: 60
End: 2025-08-11
Payer: MEDICARE

## 2025-08-11 VITALS
SYSTOLIC BLOOD PRESSURE: 126 MMHG | WEIGHT: 160 LBS | TEMPERATURE: 96.9 F | RESPIRATION RATE: 18 BRPM | HEIGHT: 65 IN | OXYGEN SATURATION: 96 % | BODY MASS INDEX: 26.66 KG/M2 | DIASTOLIC BLOOD PRESSURE: 84 MMHG | HEART RATE: 76 BPM

## 2025-08-11 DIAGNOSIS — I25.10 CORONARY ARTERY CALCIFICATION: ICD-10-CM

## 2025-08-11 DIAGNOSIS — R21 GENERALIZED MACULOPAPULAR RASH: ICD-10-CM

## 2025-08-11 DIAGNOSIS — T14.8XXA INFECTED LACERATION: Primary | ICD-10-CM

## 2025-08-11 DIAGNOSIS — L08.9 INFECTED LACERATION: Primary | ICD-10-CM

## 2025-08-11 DIAGNOSIS — R25.1 TREMORS OF NERVOUS SYSTEM: ICD-10-CM

## 2025-08-11 DIAGNOSIS — E78.5 DYSLIPIDEMIA: ICD-10-CM

## 2025-08-11 DIAGNOSIS — R73.01 ELEVATED FASTING GLUCOSE: ICD-10-CM

## 2025-08-11 DIAGNOSIS — L50.9 URTICARIA: ICD-10-CM

## 2025-08-11 LAB
ALBUMIN SERPL BCP-MCNC: 4.3 G/DL (ref 3.2–4.9)
ALBUMIN/GLOB SERPL: 1.5 G/DL
ALP SERPL-CCNC: 131 U/L (ref 30–99)
ALT SERPL-CCNC: 46 U/L (ref 2–50)
ANION GAP SERPL CALC-SCNC: 13 MMOL/L (ref 7–16)
AST SERPL-CCNC: 53 U/L (ref 12–45)
BILIRUB SERPL-MCNC: 0.5 MG/DL (ref 0.1–1.5)
BUN SERPL-MCNC: 15 MG/DL (ref 8–22)
CALCIUM ALBUM COR SERPL-MCNC: 9.3 MG/DL (ref 8.5–10.5)
CALCIUM SERPL-MCNC: 9.5 MG/DL (ref 8.5–10.5)
CHLORIDE SERPL-SCNC: 105 MMOL/L (ref 96–112)
CHOLEST SERPL-MCNC: 120 MG/DL (ref 100–199)
CO2 SERPL-SCNC: 23 MMOL/L (ref 20–33)
CREAT SERPL-MCNC: 1.08 MG/DL (ref 0.5–1.4)
EST. AVERAGE GLUCOSE BLD GHB EST-MCNC: 97 MG/DL
FASTING STATUS PATIENT QL REPORTED: NORMAL
GFR SERPLBLD CREATININE-BSD FMLA CKD-EPI: 59 ML/MIN/1.73 M 2
GLOBULIN SER CALC-MCNC: 2.9 G/DL (ref 1.9–3.5)
GLUCOSE SERPL-MCNC: 95 MG/DL (ref 65–99)
HBA1C MFR BLD: 5 % (ref 4–5.6)
HDLC SERPL-MCNC: 60 MG/DL
LDLC SERPL CALC-MCNC: 46 MG/DL
POTASSIUM SERPL-SCNC: 4.4 MMOL/L (ref 3.6–5.5)
PROT SERPL-MCNC: 7.2 G/DL (ref 6–8.2)
SODIUM SERPL-SCNC: 141 MMOL/L (ref 135–145)
TRIGL SERPL-MCNC: 69 MG/DL (ref 0–149)
TSH SERPL-ACNC: 3.25 UIU/ML (ref 0.38–5.33)

## 2025-08-11 PROCEDURE — 99214 OFFICE O/P EST MOD 30 MIN: CPT

## 2025-08-11 PROCEDURE — 3074F SYST BP LT 130 MM HG: CPT

## 2025-08-11 PROCEDURE — 80061 LIPID PANEL: CPT

## 2025-08-11 PROCEDURE — 36415 COLL VENOUS BLD VENIPUNCTURE: CPT

## 2025-08-11 PROCEDURE — 3079F DIAST BP 80-89 MM HG: CPT

## 2025-08-11 PROCEDURE — 80053 COMPREHEN METABOLIC PANEL: CPT

## 2025-08-11 PROCEDURE — 84443 ASSAY THYROID STIM HORMONE: CPT

## 2025-08-11 PROCEDURE — 83036 HEMOGLOBIN GLYCOSYLATED A1C: CPT

## 2025-08-11 RX ORDER — CEPHALEXIN 500 MG/1
500 CAPSULE ORAL
COMMUNITY
Start: 2025-08-06 | End: 2025-08-11

## 2025-08-11 RX ORDER — SULFAMETHOXAZOLE AND TRIMETHOPRIM 800; 160 MG/1; MG/1
1 TABLET ORAL 2 TIMES DAILY
Qty: 10 TABLET | Refills: 0 | Status: SHIPPED | OUTPATIENT
Start: 2025-08-11 | End: 2025-08-19 | Stop reason: SDUPTHER

## 2025-08-11 RX ORDER — MUPIROCIN 2 %
1 OINTMENT (GRAM) TOPICAL 2 TIMES DAILY
Qty: 22 G | Refills: 0 | Status: SHIPPED | OUTPATIENT
Start: 2025-08-11 | End: 2025-08-26 | Stop reason: SDUPTHER

## 2025-08-11 RX ORDER — METHYLPREDNISOLONE 4 MG/1
TABLET ORAL
Qty: 21 TABLET | Refills: 0 | Status: SHIPPED | OUTPATIENT
Start: 2025-08-11

## 2025-08-11 ASSESSMENT — ENCOUNTER SYMPTOMS
SHORTNESS OF BREATH: 0
VOMITING: 0
FEVER: 0
CHILLS: 0
NAUSEA: 0
WHEEZING: 0
COUGH: 0

## 2025-08-12 ENCOUNTER — APPOINTMENT (OUTPATIENT)
Dept: MEDICAL GROUP | Facility: PHYSICIAN GROUP | Age: 60
End: 2025-08-12
Payer: MEDICARE

## 2025-08-12 ENCOUNTER — PATIENT OUTREACH (OUTPATIENT)
Dept: HEALTH INFORMATION MANAGEMENT | Facility: OTHER | Age: 60
End: 2025-08-12

## 2025-08-12 DIAGNOSIS — I10 ESSENTIAL HYPERTENSION: ICD-10-CM

## 2025-08-12 DIAGNOSIS — J44.89 COPD (CHRONIC OBSTRUCTIVE PULMONARY DISEASE) WITH CHRONIC BRONCHITIS (HCC): ICD-10-CM

## 2025-08-12 DIAGNOSIS — R00.1 BRADYCARDIA: ICD-10-CM

## 2025-08-12 DIAGNOSIS — N18.32 STAGE 3B CHRONIC KIDNEY DISEASE: Primary | ICD-10-CM

## 2025-08-12 DIAGNOSIS — Z91.81 RISK FOR FALLS: ICD-10-CM

## 2025-08-14 ENCOUNTER — OFFICE VISIT (OUTPATIENT)
Dept: MEDICAL GROUP | Facility: PHYSICIAN GROUP | Age: 60
End: 2025-08-14
Payer: MEDICARE

## 2025-08-14 ENCOUNTER — APPOINTMENT (OUTPATIENT)
Dept: MEDICAL GROUP | Facility: PHYSICIAN GROUP | Age: 60
End: 2025-08-14
Payer: MEDICARE

## 2025-08-14 ENCOUNTER — DOCUMENTATION (OUTPATIENT)
Dept: HEALTH INFORMATION MANAGEMENT | Facility: OTHER | Age: 60
End: 2025-08-14

## 2025-08-14 VITALS
BODY MASS INDEX: 26.66 KG/M2 | SYSTOLIC BLOOD PRESSURE: 112 MMHG | HEIGHT: 65 IN | WEIGHT: 160 LBS | DIASTOLIC BLOOD PRESSURE: 70 MMHG | HEART RATE: 78 BPM | TEMPERATURE: 97.8 F | OXYGEN SATURATION: 98 %

## 2025-08-14 DIAGNOSIS — L50.9 FULL BODY HIVES: ICD-10-CM

## 2025-08-14 DIAGNOSIS — J43.9 PULMONARY EMPHYSEMA, UNSPECIFIED EMPHYSEMA TYPE (HCC): ICD-10-CM

## 2025-08-14 DIAGNOSIS — S81.812D LACERATION OF LEFT LOWER LEG, SUBSEQUENT ENCOUNTER: Primary | ICD-10-CM

## 2025-08-14 DIAGNOSIS — F10.229 ALCOHOL DEPENDENCE WITH INTOXICATION WITH COMPLICATION (HCC): ICD-10-CM

## 2025-08-14 PROBLEM — R90.82 WHITE MATTER DISEASE: Status: RESOLVED | Noted: 2025-08-14 | Resolved: 2025-08-14

## 2025-08-14 PROBLEM — R90.82 WHITE MATTER DISEASE: Status: ACTIVE | Noted: 2025-08-14

## 2025-08-14 PROBLEM — G47.30 SLEEP APNEA: Status: ACTIVE | Noted: 2025-08-14

## 2025-08-14 PROCEDURE — 3074F SYST BP LT 130 MM HG: CPT | Performed by: NURSE PRACTITIONER

## 2025-08-14 PROCEDURE — 3078F DIAST BP <80 MM HG: CPT | Performed by: NURSE PRACTITIONER

## 2025-08-14 PROCEDURE — 99214 OFFICE O/P EST MOD 30 MIN: CPT | Performed by: NURSE PRACTITIONER

## 2025-08-14 ASSESSMENT — FIBROSIS 4 INDEX: FIB4 SCORE: 1.46

## 2025-08-19 ENCOUNTER — OFFICE VISIT (OUTPATIENT)
Dept: MEDICAL GROUP | Facility: PHYSICIAN GROUP | Age: 60
End: 2025-08-19
Payer: MEDICARE

## 2025-08-19 VITALS
BODY MASS INDEX: 26.66 KG/M2 | OXYGEN SATURATION: 95 % | SYSTOLIC BLOOD PRESSURE: 112 MMHG | WEIGHT: 160 LBS | DIASTOLIC BLOOD PRESSURE: 68 MMHG | HEIGHT: 65 IN | HEART RATE: 81 BPM | TEMPERATURE: 97.8 F

## 2025-08-19 DIAGNOSIS — T14.8XXA INFECTED LACERATION: ICD-10-CM

## 2025-08-19 DIAGNOSIS — L08.9 INFECTED LACERATION: ICD-10-CM

## 2025-08-19 PROCEDURE — 3078F DIAST BP <80 MM HG: CPT | Performed by: NURSE PRACTITIONER

## 2025-08-19 PROCEDURE — 3074F SYST BP LT 130 MM HG: CPT | Performed by: NURSE PRACTITIONER

## 2025-08-19 PROCEDURE — 99213 OFFICE O/P EST LOW 20 MIN: CPT | Mod: 25 | Performed by: NURSE PRACTITIONER

## 2025-08-19 PROCEDURE — 15853 REMOVAL SUTR/STAPL XREQ ANES: CPT | Performed by: NURSE PRACTITIONER

## 2025-08-19 RX ORDER — SULFAMETHOXAZOLE AND TRIMETHOPRIM 800; 160 MG/1; MG/1
1 TABLET ORAL 2 TIMES DAILY
Qty: 10 TABLET | Refills: 0 | Status: SHIPPED | OUTPATIENT
Start: 2025-08-19 | End: 2025-08-24

## 2025-08-19 ASSESSMENT — FIBROSIS 4 INDEX: FIB4 SCORE: 1.46

## 2025-08-25 ENCOUNTER — PATIENT MESSAGE (OUTPATIENT)
Dept: MEDICAL GROUP | Facility: PHYSICIAN GROUP | Age: 60
End: 2025-08-25
Payer: MEDICARE

## 2025-08-25 DIAGNOSIS — T14.8XXA INFECTED LACERATION: ICD-10-CM

## 2025-08-25 DIAGNOSIS — L08.9 INFECTED LACERATION: ICD-10-CM

## 2025-08-26 RX ORDER — MUPIROCIN 2 %
1 OINTMENT (GRAM) TOPICAL 2 TIMES DAILY
Qty: 22 G | Refills: 0 | Status: SHIPPED | OUTPATIENT
Start: 2025-08-26

## 2025-09-09 ENCOUNTER — APPOINTMENT (OUTPATIENT)
Dept: MEDICAL GROUP | Facility: PHYSICIAN GROUP | Age: 60
End: 2025-09-09
Payer: MEDICARE